# Patient Record
Sex: FEMALE | Race: BLACK OR AFRICAN AMERICAN | Employment: UNEMPLOYED | URBAN - METROPOLITAN AREA
[De-identification: names, ages, dates, MRNs, and addresses within clinical notes are randomized per-mention and may not be internally consistent; named-entity substitution may affect disease eponyms.]

---

## 2018-04-06 ENCOUNTER — APPOINTMENT (OUTPATIENT)
Dept: ULTRASOUND IMAGING | Age: 33
End: 2018-04-06
Payer: COMMERCIAL

## 2018-04-06 ENCOUNTER — HOSPITAL ENCOUNTER (EMERGENCY)
Age: 33
Discharge: HOME | End: 2018-04-06
Payer: COMMERCIAL

## 2018-04-06 VITALS
OXYGEN SATURATION: 100 % | HEART RATE: 112 BPM | RESPIRATION RATE: 18 BRPM | DIASTOLIC BLOOD PRESSURE: 98 MMHG | WEIGHT: 250 LBS | TEMPERATURE: 98.6 F | BODY MASS INDEX: 47.2 KG/M2 | SYSTOLIC BLOOD PRESSURE: 171 MMHG | HEIGHT: 61 IN

## 2018-04-06 DIAGNOSIS — O20.0 THREATENED MISCARRIAGE: Primary | ICD-10-CM

## 2018-04-06 DIAGNOSIS — O41.8X10 SUBCHORIONIC HEMORRHAGE OF PLACENTA IN FIRST TRIMESTER, SINGLE OR UNSPECIFIED FETUS: ICD-10-CM

## 2018-04-06 DIAGNOSIS — O46.8X1 SUBCHORIONIC HEMORRHAGE OF PLACENTA IN FIRST TRIMESTER, SINGLE OR UNSPECIFIED FETUS: ICD-10-CM

## 2018-04-06 LAB
ALBUMIN SERPL-MCNC: 3.8 G/DL (ref 3.5–5.2)
ALP BLD-CCNC: 56 U/L (ref 35–104)
ALT SERPL-CCNC: 10 U/L (ref 0–32)
ANION GAP SERPL CALCULATED.3IONS-SCNC: 16 MMOL/L (ref 7–16)
AST SERPL-CCNC: 11 U/L (ref 0–31)
BACTERIA: ABNORMAL /HPF
BASOPHILS ABSOLUTE: 0.02 E9/L (ref 0–0.2)
BASOPHILS RELATIVE PERCENT: 0.2 % (ref 0–2)
BILIRUB SERPL-MCNC: <0.2 MG/DL (ref 0–1.2)
BILIRUBIN URINE: NEGATIVE
BLOOD, URINE: ABNORMAL
BUN BLDV-MCNC: 11 MG/DL (ref 6–20)
CALCIUM SERPL-MCNC: 8.9 MG/DL (ref 8.6–10.2)
CHLORIDE BLD-SCNC: 98 MMOL/L (ref 98–107)
CHP ED QC CHECK: YES
CLARITY: CLEAR
CO2: 23 MMOL/L (ref 22–29)
COLOR: YELLOW
CREAT SERPL-MCNC: 0.6 MG/DL (ref 0.5–1)
EOSINOPHILS ABSOLUTE: 0.17 E9/L (ref 0.05–0.5)
EOSINOPHILS RELATIVE PERCENT: 1.7 % (ref 0–6)
EPITHELIAL CELLS, UA: ABNORMAL /HPF
GFR AFRICAN AMERICAN: >60
GFR NON-AFRICAN AMERICAN: >60 ML/MIN/1.73
GLUCOSE BLD-MCNC: 86 MG/DL (ref 74–109)
GLUCOSE URINE: NEGATIVE MG/DL
GONADOTROPIN, CHORIONIC (HCG) QUANT: ABNORMAL MIU/ML
HCT VFR BLD CALC: 38.7 % (ref 34–48)
HEMOGLOBIN: 12.4 G/DL (ref 11.5–15.5)
IMMATURE GRANULOCYTES #: 0.03 E9/L
IMMATURE GRANULOCYTES %: 0.3 % (ref 0–5)
KETONES, URINE: NEGATIVE MG/DL
LEUKOCYTE ESTERASE, URINE: NEGATIVE
LYMPHOCYTES ABSOLUTE: 2.87 E9/L (ref 1.5–4)
LYMPHOCYTES RELATIVE PERCENT: 29.2 % (ref 20–42)
MCH RBC QN AUTO: 25.9 PG (ref 26–35)
MCHC RBC AUTO-ENTMCNC: 32 % (ref 32–34.5)
MCV RBC AUTO: 80.8 FL (ref 80–99.9)
MONOCYTES ABSOLUTE: 0.7 E9/L (ref 0.1–0.95)
MONOCYTES RELATIVE PERCENT: 7.1 % (ref 2–12)
NEUTROPHILS ABSOLUTE: 6.03 E9/L (ref 1.8–7.3)
NEUTROPHILS RELATIVE PERCENT: 61.5 % (ref 43–80)
NITRITE, URINE: NEGATIVE
PDW BLD-RTO: 14.9 FL (ref 11.5–15)
PH UA: 6 (ref 5–9)
PLATELET # BLD: 234 E9/L (ref 130–450)
PMV BLD AUTO: 9.4 FL (ref 7–12)
POTASSIUM SERPL-SCNC: 3.7 MMOL/L (ref 3.5–5)
PREGNANCY TEST URINE, POC: POSITIVE
PROTEIN UA: 30 MG/DL
RBC # BLD: 4.79 E12/L (ref 3.5–5.5)
RBC UA: >20 /HPF (ref 0–2)
SODIUM BLD-SCNC: 137 MMOL/L (ref 132–146)
SPECIFIC GRAVITY UA: >=1.03 (ref 1–1.03)
TOTAL PROTEIN: 7.4 G/DL (ref 6.4–8.3)
UROBILINOGEN, URINE: 0.2 E.U./DL
WBC # BLD: 9.8 E9/L (ref 4.5–11.5)
WBC UA: ABNORMAL /HPF (ref 0–5)

## 2018-04-06 PROCEDURE — 36415 COLL VENOUS BLD VENIPUNCTURE: CPT

## 2018-04-06 PROCEDURE — 80053 COMPREHEN METABOLIC PANEL: CPT

## 2018-04-06 PROCEDURE — 99284 EMERGENCY DEPT VISIT MOD MDM: CPT

## 2018-04-06 PROCEDURE — 85025 COMPLETE CBC W/AUTO DIFF WBC: CPT

## 2018-04-06 PROCEDURE — 76817 TRANSVAGINAL US OBSTETRIC: CPT

## 2018-04-06 PROCEDURE — 81001 URINALYSIS AUTO W/SCOPE: CPT

## 2018-04-06 PROCEDURE — 84702 CHORIONIC GONADOTROPIN TEST: CPT

## 2018-04-06 ASSESSMENT — PAIN DESCRIPTION - PAIN TYPE: TYPE: ACUTE PAIN

## 2018-04-06 ASSESSMENT — PAIN SCALES - GENERAL: PAINLEVEL_OUTOF10: 3

## 2018-04-06 ASSESSMENT — PAIN DESCRIPTION - ORIENTATION: ORIENTATION: LOWER

## 2018-04-06 ASSESSMENT — PAIN DESCRIPTION - LOCATION: LOCATION: BACK

## 2018-04-06 ASSESSMENT — PAIN DESCRIPTION - ONSET: ONSET: ON-GOING

## 2018-04-06 ASSESSMENT — PAIN DESCRIPTION - DESCRIPTORS: DESCRIPTORS: ACHING

## 2018-08-13 ENCOUNTER — HOSPITAL ENCOUNTER (EMERGENCY)
Age: 33
Discharge: HOME | End: 2018-08-13
Attending: EMERGENCY MEDICINE
Payer: COMMERCIAL

## 2018-08-13 VITALS
WEIGHT: 230 LBS | HEART RATE: 108 BPM | OXYGEN SATURATION: 98 % | DIASTOLIC BLOOD PRESSURE: 59 MMHG | BODY MASS INDEX: 42.33 KG/M2 | TEMPERATURE: 98.5 F | RESPIRATION RATE: 18 BRPM | HEIGHT: 62 IN | SYSTOLIC BLOOD PRESSURE: 118 MMHG

## 2018-08-13 DIAGNOSIS — R00.2 PALPITATIONS: Primary | ICD-10-CM

## 2018-08-13 LAB
ANION GAP SERPL CALCULATED.3IONS-SCNC: 13 MMOL/L (ref 7–16)
BACTERIA: NORMAL /HPF
BASOPHILS ABSOLUTE: 0.02 E9/L (ref 0–0.2)
BASOPHILS RELATIVE PERCENT: 0.2 % (ref 0–2)
BILIRUBIN URINE: NEGATIVE
BLOOD, URINE: NEGATIVE
BUN BLDV-MCNC: 7 MG/DL (ref 6–20)
CALCIUM SERPL-MCNC: 8.5 MG/DL (ref 8.6–10.2)
CHLORIDE BLD-SCNC: 106 MMOL/L (ref 98–107)
CLARITY: CLEAR
CO2: 18 MMOL/L (ref 22–29)
COLOR: YELLOW
CREAT SERPL-MCNC: 0.4 MG/DL (ref 0.5–1)
EKG ATRIAL RATE: 111 BPM
EKG P AXIS: 39 DEGREES
EKG P-R INTERVAL: 154 MS
EKG Q-T INTERVAL: 332 MS
EKG QRS DURATION: 80 MS
EKG QTC CALCULATION (BAZETT): 451 MS
EKG R AXIS: 65 DEGREES
EKG T AXIS: 32 DEGREES
EKG VENTRICULAR RATE: 111 BPM
EOSINOPHILS ABSOLUTE: 0.09 E9/L (ref 0.05–0.5)
EOSINOPHILS RELATIVE PERCENT: 0.8 % (ref 0–6)
EPITHELIAL CELLS, UA: NORMAL /HPF
GFR AFRICAN AMERICAN: >60
GFR NON-AFRICAN AMERICAN: >60 ML/MIN/1.73
GLUCOSE BLD-MCNC: 104 MG/DL (ref 74–109)
GLUCOSE URINE: NEGATIVE MG/DL
HCT VFR BLD CALC: 34.4 % (ref 34–48)
HEMOGLOBIN: 11.4 G/DL (ref 11.5–15.5)
IMMATURE GRANULOCYTES #: 0.06 E9/L
IMMATURE GRANULOCYTES %: 0.5 % (ref 0–5)
KETONES, URINE: NEGATIVE MG/DL
LEUKOCYTE ESTERASE, URINE: ABNORMAL
LYMPHOCYTES ABSOLUTE: 2.29 E9/L (ref 1.5–4)
LYMPHOCYTES RELATIVE PERCENT: 19.6 % (ref 20–42)
MCH RBC QN AUTO: 26.6 PG (ref 26–35)
MCHC RBC AUTO-ENTMCNC: 33.1 % (ref 32–34.5)
MCV RBC AUTO: 80.2 FL (ref 80–99.9)
MONOCYTES ABSOLUTE: 0.88 E9/L (ref 0.1–0.95)
MONOCYTES RELATIVE PERCENT: 7.5 % (ref 2–12)
NEUTROPHILS ABSOLUTE: 8.36 E9/L (ref 1.8–7.3)
NEUTROPHILS RELATIVE PERCENT: 71.4 % (ref 43–80)
NITRITE, URINE: NEGATIVE
PDW BLD-RTO: 14.3 FL (ref 11.5–15)
PH UA: 6 (ref 5–9)
PLATELET # BLD: 215 E9/L (ref 130–450)
PMV BLD AUTO: 9.4 FL (ref 7–12)
POTASSIUM SERPL-SCNC: 4.1 MMOL/L (ref 3.5–5)
PROTEIN UA: NEGATIVE MG/DL
RBC # BLD: 4.29 E12/L (ref 3.5–5.5)
RBC UA: NORMAL /HPF (ref 0–2)
SODIUM BLD-SCNC: 137 MMOL/L (ref 132–146)
SPECIFIC GRAVITY UA: 1.01 (ref 1–1.03)
TSH SERPL DL<=0.05 MIU/L-ACNC: 1 UIU/ML (ref 0.27–4.2)
UROBILINOGEN, URINE: 0.2 E.U./DL
WBC # BLD: 11.7 E9/L (ref 4.5–11.5)
WBC UA: NORMAL /HPF (ref 0–5)

## 2018-08-13 PROCEDURE — 99285 EMERGENCY DEPT VISIT HI MDM: CPT

## 2018-08-13 PROCEDURE — 81001 URINALYSIS AUTO W/SCOPE: CPT

## 2018-08-13 PROCEDURE — 84443 ASSAY THYROID STIM HORMONE: CPT

## 2018-08-13 PROCEDURE — 85025 COMPLETE CBC W/AUTO DIFF WBC: CPT

## 2018-08-13 PROCEDURE — 80048 BASIC METABOLIC PNL TOTAL CA: CPT

## 2018-08-13 RX ORDER — PANTOPRAZOLE SODIUM 40 MG/1
40 TABLET, DELAYED RELEASE ORAL DAILY
COMMUNITY
End: 2020-08-17

## 2018-08-13 RX ORDER — AMOXICILLIN 500 MG/1
500 CAPSULE ORAL 3 TIMES DAILY
COMMUNITY
End: 2020-08-17

## 2018-08-13 ASSESSMENT — ENCOUNTER SYMPTOMS
BACK PAIN: 0
SHORTNESS OF BREATH: 0
ABDOMINAL PAIN: 0
SORE THROAT: 0

## 2018-08-13 NOTE — ED PROVIDER NOTES
rhythm and intact distal pulses. No murmur heard. Pulmonary/Chest: Effort normal and breath sounds normal. No respiratory distress. She has no wheezes. She has no rales. Abdominal: Soft. She exhibits no distension. There is no tenderness. There is no rebound and no guarding. Musculoskeletal: She exhibits no edema. Lymphadenopathy:     She has no cervical adenopathy. Neurological: She is alert and oriented to person, place, and time. No cranial nerve deficit. Skin: Skin is warm and dry. No rash noted. Psychiatric: She has a normal mood and affect. Her behavior is normal. Thought content normal.   Nursing note and vitals reviewed. Procedures    MDM  Number of Diagnoses or Management Options  Palpitations:   Diagnosis management comments: This is a 34year old female who is 27 weeks pregnant who presented to the ED for palpations which began 3 days ago. Patient was in no distress and described it as a sensation as her heart wanting to jump out of her chest. Patient was evaluated for a number of etiologies including anemia, cystitis and hypoglycemia to mention a few. Patient did not have any abnormalities and I discussed the need to go to her OB/GYN for further follow today. The patient was agreeable to plan and she was advised to return to the ED immediately if she developed any increasing palpitations, chest pain or any other concerns. She was agreeable.             --------------------------------------------- PAST HISTORY ---------------------------------------------  Past Medical History:  has a past medical history of Abnormal Pap smear; Anemia; Irregular menses; Migraines; and UTI (lower urinary tract infection). Past Surgical History:  has a past surgical history that includes hernia repair and Salpingo-oophorectomy (Left, 03/27/2016). Social History:  reports that she has never smoked.  She has never used smokeless tobacco. She reports that she does not drink alcohol or use drugs. Family History: family history includes Hypertension in her mother. The patients home medications have been reviewed. Allergies: Patient has no known allergies.     -------------------------------------------------- RESULTS -------------------------------------------------  Labs:  Results for orders placed or performed during the hospital encounter of 08/13/18   Urinalysis   Result Value Ref Range    Color, UA Yellow Straw/Yellow    Clarity, UA Clear Clear    Glucose, Ur Negative Negative mg/dL    Bilirubin Urine Negative Negative    Ketones, Urine Negative Negative mg/dL    Specific Gravity, UA 1.010 1.005 - 1.030    Blood, Urine Negative Negative    pH, UA 6.0 5.0 - 9.0    Protein, UA Negative Negative mg/dL    Urobilinogen, Urine 0.2 <2.0 E.U./dL    Nitrite, Urine Negative Negative    Leukocyte Esterase, Urine TRACE (A) Negative   TSH without Reflex   Result Value Ref Range    TSH 1.000 0.270 - 4.200 uIU/mL   Basic Metabolic Panel   Result Value Ref Range    Sodium 137 132 - 146 mmol/L    Potassium 4.1 3.5 - 5.0 mmol/L    Chloride 106 98 - 107 mmol/L    CO2 18 (L) 22 - 29 mmol/L    Anion Gap 13 7 - 16 mmol/L    Glucose 104 74 - 109 mg/dL    BUN 7 6 - 20 mg/dL    CREATININE 0.4 (L) 0.5 - 1.0 mg/dL    GFR Non-African American >60 >=60 mL/min/1.73    GFR African American >60     Calcium 8.5 (L) 8.6 - 10.2 mg/dL   CBC Auto Differential   Result Value Ref Range    WBC 11.7 (H) 4.5 - 11.5 E9/L    RBC 4.29 3.50 - 5.50 E12/L    Hemoglobin 11.4 (L) 11.5 - 15.5 g/dL    Hematocrit 34.4 34.0 - 48.0 %    MCV 80.2 80.0 - 99.9 fL    MCH 26.6 26.0 - 35.0 pg    MCHC 33.1 32.0 - 34.5 %    RDW 14.3 11.5 - 15.0 fL    Platelets 683 991 - 565 E9/L    MPV 9.4 7.0 - 12.0 fL    Neutrophils % 71.4 43.0 - 80.0 %    Immature Granulocytes % 0.5 0.0 - 5.0 %    Lymphocytes % 19.6 (L) 20.0 - 42.0 %    Monocytes % 7.5 2.0 - 12.0 %    Eosinophils % 0.8 0.0 - 6.0 %    Basophils % 0.2 0.0 - 2.0 %    Neutrophils # 8.36 (H) 1.80 - management. They have remained hemodynamically stable throughout their entire ED visit and are stable for discharge with outpatient follow-up. The plan has been discussed in detail and they are aware of the specific conditions for emergent return, as well as the importance of follow-up. Discharge Medication List as of 8/13/2018  5:30 PM          Diagnosis:  1. Palpitations        Disposition:  Patient's disposition: Discharge to home  Patient's condition is stable.          Jatinder Nath, DO  Resident  08/13/18 3504

## 2018-08-18 ENCOUNTER — HOSPITAL ENCOUNTER (OUTPATIENT)
Age: 33
Setting detail: OBSERVATION
Discharge: HOME | End: 2018-08-18
Attending: OBSTETRICS & GYNECOLOGY | Admitting: OBSTETRICS & GYNECOLOGY
Payer: COMMERCIAL

## 2018-08-18 VITALS
SYSTOLIC BLOOD PRESSURE: 124 MMHG | TEMPERATURE: 98.2 F | HEIGHT: 62 IN | BODY MASS INDEX: 42.33 KG/M2 | WEIGHT: 230 LBS | RESPIRATION RATE: 14 BRPM | HEART RATE: 89 BPM | DIASTOLIC BLOOD PRESSURE: 58 MMHG

## 2018-08-18 PROBLEM — O47.00 PRETERM CONTRACTIONS: Status: ACTIVE | Noted: 2018-08-18

## 2018-08-18 LAB
BACTERIA: ABNORMAL /HPF
BILIRUBIN URINE: NEGATIVE
BLOOD, URINE: ABNORMAL
CLARITY: CLEAR
COLOR: YELLOW
EPITHELIAL CELLS, UA: ABNORMAL /HPF
FETAL FIBRONECTIN: POSITIVE
GLUCOSE URINE: NEGATIVE MG/DL
KETONES, URINE: NEGATIVE MG/DL
LEUKOCYTE ESTERASE, URINE: ABNORMAL
NITRITE, URINE: NEGATIVE
PH UA: 7 (ref 5–9)
PROTEIN UA: NEGATIVE MG/DL
RBC UA: ABNORMAL /HPF (ref 0–2)
SPECIFIC GRAVITY UA: 1.01 (ref 1–1.03)
UROBILINOGEN, URINE: 0.2 E.U./DL
WBC UA: ABNORMAL /HPF (ref 0–5)

## 2018-08-18 PROCEDURE — 76817 TRANSVAGINAL US OBSTETRIC: CPT

## 2018-08-18 PROCEDURE — 76817 TRANSVAGINAL US OBSTETRIC: CPT | Performed by: OBSTETRICS & GYNECOLOGY

## 2018-08-18 PROCEDURE — 87077 CULTURE AEROBIC IDENTIFY: CPT

## 2018-08-18 PROCEDURE — 76819 FETAL BIOPHYS PROFIL W/O NST: CPT | Performed by: OBSTETRICS & GYNECOLOGY

## 2018-08-18 PROCEDURE — 96372 THER/PROPH/DIAG INJ SC/IM: CPT

## 2018-08-18 PROCEDURE — 82731 ASSAY OF FETAL FIBRONECTIN: CPT

## 2018-08-18 PROCEDURE — 87088 URINE BACTERIA CULTURE: CPT

## 2018-08-18 PROCEDURE — 87186 SC STD MICRODIL/AGAR DIL: CPT

## 2018-08-18 PROCEDURE — G0378 HOSPITAL OBSERVATION PER HR: HCPCS

## 2018-08-18 PROCEDURE — 76819 FETAL BIOPHYS PROFIL W/O NST: CPT

## 2018-08-18 PROCEDURE — 99242 OFF/OP CONSLTJ NEW/EST SF 20: CPT | Performed by: OBSTETRICS & GYNECOLOGY

## 2018-08-18 PROCEDURE — 6360000002 HC RX W HCPCS: Performed by: OBSTETRICS & GYNECOLOGY

## 2018-08-18 PROCEDURE — 2500000003 HC RX 250 WO HCPCS: Performed by: OBSTETRICS & GYNECOLOGY

## 2018-08-18 PROCEDURE — 81001 URINALYSIS AUTO W/SCOPE: CPT

## 2018-08-18 PROCEDURE — 76811 OB US DETAILED SNGL FETUS: CPT

## 2018-08-18 PROCEDURE — 76811 OB US DETAILED SNGL FETUS: CPT | Performed by: OBSTETRICS & GYNECOLOGY

## 2018-08-18 RX ADMIN — LIDOCAINE HYDROCHLORIDE 1 G: 10 INJECTION, SOLUTION EPIDURAL; INFILTRATION; INTRACAUDAL; PERINEURAL at 11:25

## 2018-08-18 NOTE — PROGRESS NOTES
presents to labor and delivery at 28 weeks gestation with complaints of continuous vaginal pressure and abdominal cramping that started around 11pm yesterday evening. At 3:30am the patient went to the bathroom and noticed a small amount of red vaginal bleeding with mucous. Denies LOF, states positive fetal movement.

## 2018-08-18 NOTE — PROGRESS NOTES
Dr. Jose Juan Galdamez updated on UA results and positive fetal fibronectin. Order given for MFM consult.

## 2018-08-18 NOTE — PROGRESS NOTES
Spoke with Dr Jose Juan Galdamez. Updated on patient seeing MFM and OK for discharge. Notified of recommendation of treatment for UTI. Orders received. Patient of for discharge after.

## 2018-08-18 NOTE — H&P
CHIEF COMPLAINT:  contractions, vaginal bleeding    HISTORY OF PRESENT ILLNESS:      The patient is a 34 y.o. female at 28w0d presents with cramping and vaginal pressure. Pt  States that started around 11pm and then around 3:30 she got up to use restroom and wiped and saw blood. More wine colored. History of 2  deliveries at 35 weeks. Pt denies leaking of fluid and states good fetal movement   OB History      Para Term  AB Living    3 2 0 2 0 2    SAB TAB Ectopic Molar Multiple Live Births    0 0 0   0 2      Patient presents with a chief complaint as above and is being admitted for observation    Estimated Due Date: Estimated Date of Delivery: 11/10/18    PRENATAL CARE:    Complicated by: none    PAST OB HISTORY  OB History      Para Term  AB Living    3 2 0 2 0 2    SAB TAB Ectopic Molar Multiple Live Births    0 0 0   0 2          Past Medical History:        Diagnosis Date    Abnormal Pap smear     abnormal cells     Anemia     Iron deficiency    Irregular menses     Migraines     UTI (lower urinary tract infection)      Past Surgical History:        Procedure Laterality Date    HERNIA REPAIR      SALPINGO-OOPHORECTOMY Left 2016    laparoscopic     Allergies:  Patient has no known allergies. Social History:    Social History     Social History    Marital status: Single     Spouse name: N/A    Number of children: N/A    Years of education: N/A     Occupational History    Not on file.      Social History Main Topics    Smoking status: Never Smoker    Smokeless tobacco: Never Used    Alcohol use No      Comment: Social    Drug use: No    Sexual activity: Yes     Partners: Male     Other Topics Concern    Not on file     Social History Narrative    No narrative on file     Family History:   Family History   Problem Relation Age of Onset    Hypertension Mother      Medications Prior to Admission:  Prescriptions Prior to Admission: pantoprazole

## 2018-08-19 NOTE — DISCHARGE SUMMARY
Admitted  818/18    Third  Trimester  Bleeding  No  Blood  Seen   Consult  mfm  Due to   Pos  ffn  sono  Good  Home  sevral  Hrs  later

## 2018-08-20 LAB
ORGANISM: ABNORMAL
URINE CULTURE, ROUTINE: ABNORMAL
URINE CULTURE, ROUTINE: ABNORMAL

## 2018-08-22 ENCOUNTER — HOSPITAL ENCOUNTER (OUTPATIENT)
Dept: SLEEP CENTER | Age: 33
Discharge: HOME | End: 2018-08-22
Payer: COMMERCIAL

## 2018-08-22 PROCEDURE — 93226 XTRNL ECG REC<48 HR SCAN A/R: CPT

## 2018-08-22 PROCEDURE — 93225 XTRNL ECG REC<48 HRS REC: CPT

## 2018-10-21 ENCOUNTER — HOSPITAL ENCOUNTER (OUTPATIENT)
Age: 33
Setting detail: OBSERVATION
Discharge: HOME | End: 2018-10-21
Attending: OBSTETRICS & GYNECOLOGY | Admitting: OBSTETRICS & GYNECOLOGY
Payer: COMMERCIAL

## 2018-10-21 VITALS
SYSTOLIC BLOOD PRESSURE: 131 MMHG | RESPIRATION RATE: 20 BRPM | BODY MASS INDEX: 42.88 KG/M2 | TEMPERATURE: 99.3 F | HEART RATE: 112 BPM | WEIGHT: 233 LBS | DIASTOLIC BLOOD PRESSURE: 66 MMHG | HEIGHT: 62 IN

## 2018-10-21 PROBLEM — R09.89 UPPER RESPIRATORY SYMPTOM: Status: ACTIVE | Noted: 2018-10-21

## 2018-10-21 LAB
BACTERIA: ABNORMAL /HPF
BILIRUBIN URINE: NEGATIVE
BLOOD, URINE: ABNORMAL
CLARITY: CLEAR
COLOR: YELLOW
EPITHELIAL CELLS, UA: ABNORMAL /HPF
GLUCOSE URINE: 100 MG/DL
KETONES, URINE: NEGATIVE MG/DL
LEUKOCYTE ESTERASE, URINE: NEGATIVE
NITRITE, URINE: NEGATIVE
PH UA: 7.5 (ref 5–9)
PROTEIN UA: NEGATIVE MG/DL
RBC UA: ABNORMAL /HPF (ref 0–2)
SPECIFIC GRAVITY UA: 1.01 (ref 1–1.03)
UROBILINOGEN, URINE: 0.2 E.U./DL
WBC UA: ABNORMAL /HPF (ref 0–5)

## 2018-10-21 PROCEDURE — G0378 HOSPITAL OBSERVATION PER HR: HCPCS

## 2018-10-21 PROCEDURE — 81001 URINALYSIS AUTO W/SCOPE: CPT

## 2018-10-21 PROCEDURE — 99211 OFF/OP EST MAY X REQ PHY/QHP: CPT

## 2018-10-21 PROCEDURE — 2580000003 HC RX 258: Performed by: OBSTETRICS & GYNECOLOGY

## 2018-10-21 RX ORDER — METRONIDAZOLE 500 MG/1
500 TABLET ORAL 2 TIMES DAILY
COMMUNITY
End: 2020-08-17

## 2018-10-21 RX ORDER — SODIUM CHLORIDE, SODIUM LACTATE, POTASSIUM CHLORIDE, CALCIUM CHLORIDE 600; 310; 30; 20 MG/100ML; MG/100ML; MG/100ML; MG/100ML
INJECTION, SOLUTION INTRAVENOUS CONTINUOUS
Status: DISCONTINUED | OUTPATIENT
Start: 2018-10-21 | End: 2018-10-21 | Stop reason: HOSPADM

## 2018-10-21 RX ADMIN — SODIUM CHLORIDE, POTASSIUM CHLORIDE, SODIUM LACTATE AND CALCIUM CHLORIDE: 600; 310; 30; 20 INJECTION, SOLUTION INTRAVENOUS at 17:20

## 2018-10-21 NOTE — PROGRESS NOTES
Updated dr. Kwan Poster on fht, ua results, and patient pulse. States patient can be discharged and take otc cough medication.

## 2018-10-21 NOTE — PROGRESS NOTES
Patient given verbal and typed discharge instructions, verbalizes understanding. Ambulatory upon discharge.

## 2018-10-23 ENCOUNTER — HOSPITAL ENCOUNTER (OUTPATIENT)
Age: 33
Discharge: HOME | End: 2018-10-23
Payer: COMMERCIAL

## 2018-10-23 ENCOUNTER — HOSPITAL ENCOUNTER (OUTPATIENT)
Age: 33
Discharge: HOME | End: 2018-10-25
Payer: COMMERCIAL

## 2018-10-23 ENCOUNTER — HOSPITAL ENCOUNTER (OUTPATIENT)
Dept: GENERAL RADIOLOGY | Age: 33
Discharge: HOME | End: 2018-10-25
Payer: COMMERCIAL

## 2018-10-23 DIAGNOSIS — R50.9 HYPERTHERMIA: ICD-10-CM

## 2018-10-23 LAB
HCT VFR BLD CALC: 34.2 % (ref 34–48)
HEMOGLOBIN: 10.9 G/DL (ref 11.5–15.5)
MCH RBC QN AUTO: 25.5 PG (ref 26–35)
MCHC RBC AUTO-ENTMCNC: 31.9 % (ref 32–34.5)
MCV RBC AUTO: 79.9 FL (ref 80–99.9)
PDW BLD-RTO: 15.9 FL (ref 11.5–15)
PLATELET # BLD: 160 E9/L (ref 130–450)
PMV BLD AUTO: 9.8 FL (ref 7–12)
RBC # BLD: 4.28 E12/L (ref 3.5–5.5)
WBC # BLD: 11.2 E9/L (ref 4.5–11.5)

## 2018-10-23 PROCEDURE — 36415 COLL VENOUS BLD VENIPUNCTURE: CPT

## 2018-10-23 PROCEDURE — 71046 X-RAY EXAM CHEST 2 VIEWS: CPT

## 2018-10-23 PROCEDURE — 85027 COMPLETE CBC AUTOMATED: CPT

## 2018-10-24 ENCOUNTER — PREP FOR PROCEDURE (OUTPATIENT)
Dept: OBGYN | Age: 33
End: 2018-10-24

## 2018-10-24 RX ORDER — ONDANSETRON 2 MG/ML
4 INJECTION INTRAMUSCULAR; INTRAVENOUS EVERY 6 HOURS PRN
Status: CANCELLED | OUTPATIENT
Start: 2018-10-24

## 2018-10-24 RX ORDER — SODIUM CHLORIDE, SODIUM LACTATE, POTASSIUM CHLORIDE, CALCIUM CHLORIDE 600; 310; 30; 20 MG/100ML; MG/100ML; MG/100ML; MG/100ML
INJECTION, SOLUTION INTRAVENOUS CONTINUOUS
Status: CANCELLED | OUTPATIENT
Start: 2018-10-24

## 2018-10-31 ENCOUNTER — HOSPITAL ENCOUNTER (INPATIENT)
Age: 33
LOS: 3 days | Discharge: HOME | End: 2018-11-03
Attending: OBSTETRICS & GYNECOLOGY | Admitting: OBSTETRICS & GYNECOLOGY
Payer: COMMERCIAL

## 2018-10-31 ENCOUNTER — ANESTHESIA EVENT (OUTPATIENT)
Dept: LABOR AND DELIVERY | Age: 33
End: 2018-10-31

## 2018-10-31 ENCOUNTER — APPOINTMENT (OUTPATIENT)
Dept: LABOR AND DELIVERY | Age: 33
End: 2018-10-31
Payer: COMMERCIAL

## 2018-10-31 ENCOUNTER — ANESTHESIA (OUTPATIENT)
Dept: LABOR AND DELIVERY | Age: 33
End: 2018-10-31

## 2018-10-31 PROBLEM — Z3A.39 39 WEEKS GESTATION OF PREGNANCY: Status: ACTIVE | Noted: 2018-10-31

## 2018-10-31 LAB
ABO/RH: NORMAL
AMPHETAMINE SCREEN, URINE: NOT DETECTED
ANTIBODY SCREEN: NORMAL
BARBITURATE SCREEN URINE: NOT DETECTED
BENZODIAZEPINE SCREEN, URINE: NOT DETECTED
CANNABINOID SCREEN URINE: NOT DETECTED
COCAINE METABOLITE SCREEN URINE: NOT DETECTED
HCT VFR BLD CALC: 31.8 % (ref 34–48)
HEMOGLOBIN: 10.6 G/DL (ref 11.5–15.5)
MCH RBC QN AUTO: 26.2 PG (ref 26–35)
MCHC RBC AUTO-ENTMCNC: 33.3 % (ref 32–34.5)
MCV RBC AUTO: 78.5 FL (ref 80–99.9)
METHADONE SCREEN, URINE: NOT DETECTED
OPIATE SCREEN URINE: NOT DETECTED
PDW BLD-RTO: 15.6 FL (ref 11.5–15)
PHENCYCLIDINE SCREEN URINE: NOT DETECTED
PLATELET # BLD: 225 E9/L (ref 130–450)
PMV BLD AUTO: 10.1 FL (ref 7–12)
PROPOXYPHENE SCREEN: NOT DETECTED
RBC # BLD: 4.05 E12/L (ref 3.5–5.5)
WBC # BLD: 12.3 E9/L (ref 4.5–11.5)

## 2018-10-31 PROCEDURE — 36415 COLL VENOUS BLD VENIPUNCTURE: CPT

## 2018-10-31 PROCEDURE — 6360000002 HC RX W HCPCS: Performed by: OBSTETRICS & GYNECOLOGY

## 2018-10-31 PROCEDURE — 85027 COMPLETE CBC AUTOMATED: CPT

## 2018-10-31 PROCEDURE — 86900 BLOOD TYPING SEROLOGIC ABO: CPT

## 2018-10-31 PROCEDURE — 1220000001 HC SEMI PRIVATE L&D R&B

## 2018-10-31 PROCEDURE — 80307 DRUG TEST PRSMV CHEM ANLYZR: CPT

## 2018-10-31 PROCEDURE — 86901 BLOOD TYPING SEROLOGIC RH(D): CPT

## 2018-10-31 PROCEDURE — 86850 RBC ANTIBODY SCREEN: CPT

## 2018-10-31 PROCEDURE — 2580000003 HC RX 258: Performed by: OBSTETRICS & GYNECOLOGY

## 2018-10-31 RX ORDER — SODIUM CHLORIDE, SODIUM LACTATE, POTASSIUM CHLORIDE, CALCIUM CHLORIDE 600; 310; 30; 20 MG/100ML; MG/100ML; MG/100ML; MG/100ML
INJECTION, SOLUTION INTRAVENOUS CONTINUOUS
Status: DISCONTINUED | OUTPATIENT
Start: 2018-10-31 | End: 2018-11-03 | Stop reason: HOSPADM

## 2018-10-31 RX ORDER — LIDOCAINE HYDROCHLORIDE 10 MG/ML
INJECTION, SOLUTION INFILTRATION; PERINEURAL
Status: DISPENSED
Start: 2018-10-31 | End: 2018-11-01

## 2018-10-31 RX ORDER — ONDANSETRON 2 MG/ML
4 INJECTION INTRAMUSCULAR; INTRAVENOUS EVERY 6 HOURS PRN
Status: DISCONTINUED | OUTPATIENT
Start: 2018-10-31 | End: 2018-11-01 | Stop reason: HOSPADM

## 2018-10-31 RX ORDER — LIDOCAINE HYDROCHLORIDE 10 MG/ML
INJECTION, SOLUTION INFILTRATION; PERINEURAL
Status: DISCONTINUED
Start: 2018-10-31 | End: 2018-10-31 | Stop reason: WASHOUT

## 2018-10-31 RX ORDER — ACETAMINOPHEN 650 MG
TABLET, EXTENDED RELEASE ORAL
Status: COMPLETED
Start: 2018-10-31 | End: 2018-11-01

## 2018-10-31 RX ORDER — ACETAMINOPHEN 650 MG
TABLET, EXTENDED RELEASE ORAL
Status: DISCONTINUED
Start: 2018-10-31 | End: 2018-10-31 | Stop reason: WASHOUT

## 2018-10-31 RX ADMIN — Medication 1 MILLI-UNITS/MIN: at 22:05

## 2018-10-31 RX ADMIN — SODIUM CHLORIDE, POTASSIUM CHLORIDE, SODIUM LACTATE AND CALCIUM CHLORIDE: 600; 310; 30; 20 INJECTION, SOLUTION INTRAVENOUS at 21:00

## 2018-11-01 PROCEDURE — 6370000000 HC RX 637 (ALT 250 FOR IP): Performed by: OBSTETRICS & GYNECOLOGY

## 2018-11-01 PROCEDURE — 88307 TISSUE EXAM BY PATHOLOGIST: CPT

## 2018-11-01 PROCEDURE — 6360000002 HC RX W HCPCS: Performed by: OBSTETRICS & GYNECOLOGY

## 2018-11-01 PROCEDURE — 2580000003 HC RX 258: Performed by: OBSTETRICS & GYNECOLOGY

## 2018-11-01 PROCEDURE — 1220000000 HC SEMI PRIVATE OB R&B

## 2018-11-01 PROCEDURE — 7200000001 HC VAGINAL DELIVERY

## 2018-11-01 RX ORDER — IBUPROFEN 800 MG/1
800 TABLET ORAL EVERY 8 HOURS
Status: DISCONTINUED | OUTPATIENT
Start: 2018-11-01 | End: 2018-11-03 | Stop reason: HOSPADM

## 2018-11-01 RX ORDER — SODIUM CHLORIDE 0.9 % (FLUSH) 0.9 %
10 SYRINGE (ML) INJECTION PRN
Status: DISCONTINUED | OUTPATIENT
Start: 2018-11-01 | End: 2018-11-03 | Stop reason: HOSPADM

## 2018-11-01 RX ORDER — DOCUSATE SODIUM 100 MG/1
100 CAPSULE, LIQUID FILLED ORAL 2 TIMES DAILY
Status: DISCONTINUED | OUTPATIENT
Start: 2018-11-01 | End: 2018-11-03 | Stop reason: HOSPADM

## 2018-11-01 RX ORDER — ACETAMINOPHEN 325 MG/1
650 TABLET ORAL EVERY 4 HOURS PRN
Status: DISCONTINUED | OUTPATIENT
Start: 2018-11-01 | End: 2018-11-03 | Stop reason: HOSPADM

## 2018-11-01 RX ORDER — SODIUM CHLORIDE 0.9 % (FLUSH) 0.9 %
10 SYRINGE (ML) INJECTION EVERY 12 HOURS SCHEDULED
Status: DISCONTINUED | OUTPATIENT
Start: 2018-11-01 | End: 2018-11-03 | Stop reason: HOSPADM

## 2018-11-01 RX ORDER — LIDOCAINE HYDROCHLORIDE 10 MG/ML
INJECTION, SOLUTION INFILTRATION; PERINEURAL
Status: DISCONTINUED
Start: 2018-11-01 | End: 2018-11-01 | Stop reason: WASHOUT

## 2018-11-01 RX ORDER — ACETAMINOPHEN 650 MG
TABLET, EXTENDED RELEASE ORAL
Status: DISCONTINUED
Start: 2018-11-01 | End: 2018-11-01 | Stop reason: WASHOUT

## 2018-11-01 RX ORDER — LANOLIN 100 %
OINTMENT (GRAM) TOPICAL PRN
Status: DISCONTINUED | OUTPATIENT
Start: 2018-11-01 | End: 2018-11-03 | Stop reason: HOSPADM

## 2018-11-01 RX ORDER — SODIUM CHLORIDE, SODIUM LACTATE, POTASSIUM CHLORIDE, CALCIUM CHLORIDE 600; 310; 30; 20 MG/100ML; MG/100ML; MG/100ML; MG/100ML
INJECTION, SOLUTION INTRAVENOUS CONTINUOUS
Status: DISCONTINUED | OUTPATIENT
Start: 2018-11-01 | End: 2018-11-03 | Stop reason: HOSPADM

## 2018-11-01 RX ADMIN — ONDANSETRON 4 MG: 2 INJECTION INTRAMUSCULAR; INTRAVENOUS at 04:06

## 2018-11-01 RX ADMIN — Medication 10 ML: at 09:36

## 2018-11-01 RX ADMIN — IBUPROFEN 800 MG: 800 TABLET ORAL at 15:35

## 2018-11-01 RX ADMIN — DOCUSATE SODIUM 100 MG: 100 CAPSULE, LIQUID FILLED ORAL at 20:25

## 2018-11-01 RX ADMIN — SODIUM CHLORIDE, POTASSIUM CHLORIDE, SODIUM LACTATE AND CALCIUM CHLORIDE: 600; 310; 30; 20 INJECTION, SOLUTION INTRAVENOUS at 03:54

## 2018-11-01 RX ADMIN — DOCUSATE SODIUM 100 MG: 100 CAPSULE, LIQUID FILLED ORAL at 09:35

## 2018-11-01 RX ADMIN — IBUPROFEN 800 MG: 800 TABLET ORAL at 07:23

## 2018-11-01 RX ADMIN — Medication: at 04:57

## 2018-11-01 ASSESSMENT — PAIN SCALES - GENERAL
PAINLEVEL_OUTOF10: 2
PAINLEVEL_OUTOF10: 0

## 2018-11-01 NOTE — ANESTHESIA PRE PROCEDURE
survey Z36.89    Urinary tract infection during pregnancy, third trimester O23.43    Spotting complicating pregnancy, third trimester O26.853    Upper respiratory symptom R09.89    39 weeks gestation of pregnancy Z3A.39       Past Medical History:        Diagnosis Date    Abnormal Pap smear     abnormal cells 2008    Abnormal Pap smear of cervix     years ago    Anemia     Iron deficiency    Diabetes mellitus ([facility] [address]     GDM-diet controlled    Irregular menses     Migraines     UTI (lower urinary tract infection)        Past Surgical History:        Procedure Laterality Date    HERNIA REPAIR      SALPINGO-OOPHORECTOMY Left 03/27/2016    laparoscopic       Social History:    Social History   Substance Use Topics    Smoking status: Never Smoker    Smokeless tobacco: Never Used    Alcohol use No      Comment: Social                                Counseling given: Not Answered      Vital Signs (Current):   Vitals:    10/31/18 2049   BP: 137/83   Pulse: 105   Resp: 18   Temp: 36.8 °C (98.3 °F)   TempSrc: Oral   Weight: 236 lb (107 kg)   Height: 5' 2\" (1.575 m)                                              BP Readings from Last 3 Encounters:   10/31/18 137/83   10/21/18 131/66   08/18/18 (!) 124/58       NPO Status: Time of last liquid consumption: 1800                        Time of last solid consumption: 1800                        Date of last liquid consumption: 10/31/18                        Date of last solid food consumption: 10/31/18    BMI:   Wt Readings from Last 3 Encounters:   10/31/18 236 lb (107 kg)   10/21/18 233 lb (105.7 kg)   08/18/18 230 lb (104.3 kg)     Body mass index is 43.16 kg/m².     CBC:   Lab Results   Component Value Date    WBC 12.3 10/31/2018    RBC 4.05 10/31/2018    HGB 10.6 10/31/2018    HCT 31.8 10/31/2018    MCV 78.5 10/31/2018    RDW 15.6 10/31/2018     10/31/2018       CMP:   Lab Results   Component Value Date     08/13/2018    K 4.1 08/13/2018

## 2018-11-01 NOTE — LACTATION NOTE
Set up with Symphony pump, primo lacto collection system. Instructed on frequency, cleaning. PT requests pump for home- baby in NICU.

## 2018-11-01 NOTE — L&D DELIVERY NOTE
Delivery note    of viable male at 552  Nuchal cord x 1  apgars 8 9 spontaneous placenta iv pit  ebl 200cc  Special care here for delivery intact

## 2018-11-01 NOTE — PROGRESS NOTES
Pt , 39weeks here for scheduled induction with pitocin. Was 3cm in office this past week. Denies lof, vag bleeding, or any contractions. Baby has been moving adequately per patient.

## 2018-11-01 NOTE — CARE COORDINATION
Carol appeared calm, was doing skin to skin rocking and her baby in NICU for our conversation. Beto Cordoba was leaving the room to get her 2 other children from school when Sw arrived to assess. Sw encouraged her to ask for Sw if needs arise or discharge planning questions. Plan   Sw gave HEAP MYCAP info to [place]re[place]. Await Cord Stat results. Sw will follow for support and discharge planning as needed.   Electronically signed by Jacquelynne Bosworth on 11/1/2018 at 2:56 PM.

## 2018-11-02 LAB
HCT VFR BLD CALC: 27.6 % (ref 34–48)
HEMOGLOBIN: 8.9 G/DL (ref 11.5–15.5)
MCH RBC QN AUTO: 25.6 PG (ref 26–35)
MCHC RBC AUTO-ENTMCNC: 32.2 % (ref 32–34.5)
MCV RBC AUTO: 79.5 FL (ref 80–99.9)
PDW BLD-RTO: 15.8 FL (ref 11.5–15)
PLATELET # BLD: 186 E9/L (ref 130–450)
PMV BLD AUTO: 10.1 FL (ref 7–12)
RBC # BLD: 3.47 E12/L (ref 3.5–5.5)
WBC # BLD: 13.6 E9/L (ref 4.5–11.5)

## 2018-11-02 PROCEDURE — 6370000000 HC RX 637 (ALT 250 FOR IP)

## 2018-11-02 PROCEDURE — 85027 COMPLETE CBC AUTOMATED: CPT

## 2018-11-02 PROCEDURE — 6370000000 HC RX 637 (ALT 250 FOR IP): Performed by: OBSTETRICS & GYNECOLOGY

## 2018-11-02 PROCEDURE — 36415 COLL VENOUS BLD VENIPUNCTURE: CPT

## 2018-11-02 PROCEDURE — 1220000000 HC SEMI PRIVATE OB R&B

## 2018-11-02 RX ORDER — FERROUS SULFATE 325(65) MG
325 TABLET ORAL 2 TIMES DAILY WITH MEALS
Status: DISCONTINUED | OUTPATIENT
Start: 2018-11-02 | End: 2018-11-03 | Stop reason: HOSPADM

## 2018-11-02 RX ORDER — FERROUS SULFATE 325(65) MG
TABLET ORAL
Status: COMPLETED
Start: 2018-11-02 | End: 2018-11-02

## 2018-11-02 RX ADMIN — DOCUSATE SODIUM 100 MG: 100 CAPSULE, LIQUID FILLED ORAL at 10:55

## 2018-11-02 RX ADMIN — FERROUS SULFATE TAB 325 MG (65 MG ELEMENTAL FE) 325 MG: 325 (65 FE) TAB at 12:48

## 2018-11-02 RX ADMIN — BENZOCAINE AND LEVOMENTHOL: 200; 5 SPRAY TOPICAL at 22:48

## 2018-11-02 RX ADMIN — Medication: at 22:48

## 2018-11-02 RX ADMIN — DOCUSATE SODIUM 100 MG: 100 CAPSULE, LIQUID FILLED ORAL at 22:48

## 2018-11-02 RX ADMIN — IBUPROFEN 800 MG: 800 TABLET ORAL at 14:42

## 2018-11-02 RX ADMIN — IBUPROFEN 800 MG: 800 TABLET ORAL at 10:54

## 2018-11-02 RX ADMIN — IBUPROFEN 800 MG: 800 TABLET ORAL at 00:41

## 2018-11-02 RX ADMIN — FERROUS SULFATE TAB 325 MG (65 MG ELEMENTAL FE) 325 MG: 325 (65 FE) TAB at 22:49

## 2018-11-02 RX ADMIN — IBUPROFEN 800 MG: 800 TABLET ORAL at 22:49

## 2018-11-02 ASSESSMENT — PAIN SCALES - GENERAL
PAINLEVEL_OUTOF10: 2

## 2018-11-03 VITALS
HEIGHT: 62 IN | SYSTOLIC BLOOD PRESSURE: 128 MMHG | BODY MASS INDEX: 43.43 KG/M2 | TEMPERATURE: 98.8 F | DIASTOLIC BLOOD PRESSURE: 68 MMHG | WEIGHT: 236 LBS | HEART RATE: 84 BPM | RESPIRATION RATE: 16 BRPM

## 2018-11-03 PROCEDURE — 6370000000 HC RX 637 (ALT 250 FOR IP): Performed by: OBSTETRICS & GYNECOLOGY

## 2018-11-03 RX ORDER — IBUPROFEN 800 MG/1
800 TABLET ORAL EVERY 8 HOURS
Qty: 120 TABLET | Refills: 1 | Status: SHIPPED | OUTPATIENT
Start: 2018-11-03 | End: 2021-01-26

## 2018-11-03 RX ADMIN — IBUPROFEN 800 MG: 800 TABLET ORAL at 08:17

## 2018-11-03 RX ADMIN — DOCUSATE SODIUM 100 MG: 100 CAPSULE, LIQUID FILLED ORAL at 08:17

## 2018-11-03 RX ADMIN — FERROUS SULFATE TAB 325 MG (65 MG ELEMENTAL FE) 325 MG: 325 (65 FE) TAB at 08:17

## 2018-11-03 ASSESSMENT — PAIN SCALES - GENERAL: PAINLEVEL_OUTOF10: 3

## 2018-11-03 NOTE — DISCHARGE SUMMARY
Admitted 10/31/18  iup 39 weeks  Gestational diabetes   of viable male 18  Discharged 11/3/18  rx motrin  Satis condition

## 2018-12-13 ENCOUNTER — HOSPITAL ENCOUNTER (OUTPATIENT)
Age: 33
Discharge: HOME | End: 2018-12-15
Payer: COMMERCIAL

## 2018-12-13 PROCEDURE — 88175 CYTOPATH C/V AUTO FLUID REDO: CPT

## 2020-07-02 ENCOUNTER — HOSPITAL ENCOUNTER (OUTPATIENT)
Age: 35
Discharge: HOME | End: 2020-07-02
Payer: COMMERCIAL

## 2020-07-02 LAB
ALBUMIN SERPL-MCNC: 3.9 G/DL (ref 3.5–5.2)
ALP BLD-CCNC: 74 U/L (ref 35–104)
ALT SERPL-CCNC: 12 U/L (ref 0–32)
ANION GAP SERPL CALCULATED.3IONS-SCNC: 13 MMOL/L (ref 7–16)
AST SERPL-CCNC: 12 U/L (ref 0–31)
BASOPHILS ABSOLUTE: 0.02 E9/L (ref 0–0.2)
BASOPHILS RELATIVE PERCENT: 0.2 % (ref 0–2)
BILIRUB SERPL-MCNC: <0.2 MG/DL (ref 0–1.2)
BUN BLDV-MCNC: 11 MG/DL (ref 6–20)
CALCIUM SERPL-MCNC: 8.6 MG/DL (ref 8.6–10.2)
CHLORIDE BLD-SCNC: 102 MMOL/L (ref 98–107)
CHOLESTEROL, TOTAL: 148 MG/DL (ref 0–199)
CO2: 22 MMOL/L (ref 22–29)
CREAT SERPL-MCNC: 0.6 MG/DL (ref 0.5–1)
EOSINOPHILS ABSOLUTE: 0.13 E9/L (ref 0.05–0.5)
EOSINOPHILS RELATIVE PERCENT: 1.4 % (ref 0–6)
GFR AFRICAN AMERICAN: >60
GFR NON-AFRICAN AMERICAN: >60 ML/MIN/1.73
GLUCOSE BLD-MCNC: 120 MG/DL (ref 74–99)
HBA1C MFR BLD: 5.8 % (ref 4–5.6)
HCT VFR BLD CALC: 38.3 % (ref 34–48)
HDLC SERPL-MCNC: 30 MG/DL
HEMOGLOBIN: 12.1 G/DL (ref 11.5–15.5)
IMMATURE GRANULOCYTES #: 0.04 E9/L
IMMATURE GRANULOCYTES %: 0.4 % (ref 0–5)
LDL CHOLESTEROL CALCULATED: 97 MG/DL (ref 0–99)
LYMPHOCYTES ABSOLUTE: 2.18 E9/L (ref 1.5–4)
LYMPHOCYTES RELATIVE PERCENT: 22.8 % (ref 20–42)
MCH RBC QN AUTO: 25 PG (ref 26–35)
MCHC RBC AUTO-ENTMCNC: 31.6 % (ref 32–34.5)
MCV RBC AUTO: 79.1 FL (ref 80–99.9)
MONOCYTES ABSOLUTE: 0.78 E9/L (ref 0.1–0.95)
MONOCYTES RELATIVE PERCENT: 8.1 % (ref 2–12)
NEUTROPHILS ABSOLUTE: 6.43 E9/L (ref 1.8–7.3)
NEUTROPHILS RELATIVE PERCENT: 67.1 % (ref 43–80)
PDW BLD-RTO: 15.9 FL (ref 11.5–15)
PLATELET # BLD: 281 E9/L (ref 130–450)
PMV BLD AUTO: 9.4 FL (ref 7–12)
POTASSIUM SERPL-SCNC: 3.9 MMOL/L (ref 3.5–5)
RBC # BLD: 4.84 E12/L (ref 3.5–5.5)
SODIUM BLD-SCNC: 137 MMOL/L (ref 132–146)
TOTAL PROTEIN: 7.3 G/DL (ref 6.4–8.3)
TRIGL SERPL-MCNC: 106 MG/DL (ref 0–149)
TSH SERPL DL<=0.05 MIU/L-ACNC: 0.96 UIU/ML (ref 0.27–4.2)
VITAMIN D 25-HYDROXY: 15 NG/ML (ref 30–100)
VLDLC SERPL CALC-MCNC: 21 MG/DL
WBC # BLD: 9.6 E9/L (ref 4.5–11.5)

## 2020-07-02 PROCEDURE — 83036 HEMOGLOBIN GLYCOSYLATED A1C: CPT

## 2020-07-02 PROCEDURE — 85025 COMPLETE CBC W/AUTO DIFF WBC: CPT

## 2020-07-02 PROCEDURE — 84443 ASSAY THYROID STIM HORMONE: CPT

## 2020-07-02 PROCEDURE — 80053 COMPREHEN METABOLIC PANEL: CPT

## 2020-07-02 PROCEDURE — 80061 LIPID PANEL: CPT

## 2020-07-02 PROCEDURE — 36415 COLL VENOUS BLD VENIPUNCTURE: CPT

## 2020-07-02 PROCEDURE — 82306 VITAMIN D 25 HYDROXY: CPT

## 2020-07-17 ENCOUNTER — APPOINTMENT (OUTPATIENT)
Dept: GENERAL RADIOLOGY | Age: 35
End: 2020-07-17
Payer: COMMERCIAL

## 2020-07-17 ENCOUNTER — HOSPITAL ENCOUNTER (OUTPATIENT)
Dept: GENERAL RADIOLOGY | Age: 35
Discharge: HOME | End: 2020-07-19
Payer: COMMERCIAL

## 2020-07-17 PROCEDURE — 76642 ULTRASOUND BREAST LIMITED: CPT

## 2020-07-17 PROCEDURE — G0279 TOMOSYNTHESIS, MAMMO: HCPCS

## 2020-07-21 ENCOUNTER — TELEPHONE (OUTPATIENT)
Dept: GENERAL RADIOLOGY | Age: 35
End: 2020-07-21

## 2020-07-21 NOTE — TELEPHONE ENCOUNTER
Authorization for breast MRI (CPT 78437) has been obtained from [address] [place] [address]. Authorization # [id number], valid until 1-17-21.

## 2020-08-04 ENCOUNTER — HOSPITAL ENCOUNTER (OUTPATIENT)
Dept: MRI IMAGING | Age: 35
Discharge: HOME | End: 2020-08-06
Payer: COMMERCIAL

## 2020-08-04 PROCEDURE — 77049 MRI BREAST C-+ W/CAD BI: CPT

## 2020-08-04 PROCEDURE — 6360000004 HC RX CONTRAST MEDICATION: Performed by: RADIOLOGY

## 2020-08-04 PROCEDURE — A9585 GADOBUTROL INJECTION: HCPCS | Performed by: RADIOLOGY

## 2020-08-04 RX ADMIN — GADOBUTROL 10 ML: 604.72 INJECTION INTRAVENOUS at 13:25

## 2020-08-05 ENCOUNTER — TELEPHONE (OUTPATIENT)
Dept: GENERAL RADIOLOGY | Age: 35
End: 2020-08-05

## 2020-08-05 NOTE — TELEPHONE ENCOUNTER
Instructed on breast MRI findings of skin thickening/enhancement medial aspect of both breasts. She denies known skin infection or inflammation to that area. She states her physician's office called her this morning and will refer her to a surgeon.  Electronically signed by Julia Colindres RN, BSN on 8/5/2020 at 9:28 AM

## 2020-08-13 ENCOUNTER — TELEPHONE (OUTPATIENT)
Dept: BREAST CENTER | Age: 35
End: 2020-08-13

## 2020-08-13 NOTE — TELEPHONE ENCOUNTER
Left message with call back number in attempt to obtain information for upcoming consultation in the breast clinic on Monday.

## 2020-08-13 NOTE — PROGRESS NOTES
Subjective:      Patient ID: Deepa Vaughn is a 27 y.o. female. HPI  History and Physical    Patient's Name/Date of Birth: Deepa Vaughn / 1985    Date: 2020        Deepa Vaughn presents for evaluation of bilateral breast inflammation and skin thickening. Patient had more pain in the right more than left, occurred about 3 months ago. Saw her PCP 7 days later, at that time pain resolved but right . Patient denies pain at this time. Skin punch biopsy recommended per  MRI. PCP: Jasmin Ge MD. Gynecologist:Andie. The tenderness was located in  bilateral breasts but right side was worse. She has not been treated with antibiotics. Patient denies  prior similar episode of breast inflammation. The patient had noted a change in BSE since presentation, which has since resolved. Did not recall any prior infections or trauma to the breast immediately prior to swelling and discomfort. Patient denies nipple discharge. Patient denies a personal history of breast cancer. Breast cancer risk factors include family hx on mother's side, maternal aunt times 2 with breast cancer, maternal grandmother breast cancer. Ashkenazi Baptist Ancestry: No.    OBSTETRIC RELATED HISTORY:  Age of menarche was 4. Age of menopause was NA     Patient denies hormonal therapy. Patient is . Age of first live birth was 31. Patient did breast feed. Is patient interested in fertility information about fertility preservation? Yes    CANCER SURVEILLANCE HISTORY:  Mammograms: Yes   Breast MRI's: Yes   Breast Biopsies: No   Colonoscopy: No   GI Polyps: No   EGD: No  Pelvic Exam: Yes   Pap Smear: Yes   Dermatology: No   Lung screening: no        Estimated body mass index is 43.16 kg/m² as calculated from the following:    Height as of 10/31/18: 5' 2\" (1.575 m). Weight as of 10/31/18: 236 lb (107 kg).   Bra Size: 38dd    Because violence is so common, we ask all our patients: are you in a relationship or do you live with a person who threatens, hurts, or controls you:  denies    Patient drinks little caffeinated beverages. Patient does not smoke cigarettes. Patient does not use recreational drugs. Past Medical History:   Diagnosis Date    Abnormal Pap smear     abnormal cells 2008    Abnormal Pap smear of cervix     years ago    Anemia     Iron deficiency    Diabetes mellitus (HCC)     GDM-diet controlled    Irregular menses     Migraines     UTI (lower urinary tract infection)        Past Surgical History:   Procedure Laterality Date    HERNIA REPAIR      SALPINGO-OOPHORECTOMY Left 03/27/2016    laparoscopic       Current Outpatient Medications   Medication Sig Dispense Refill    ibuprofen (ADVIL;MOTRIN) 800 MG tablet Take 1 tablet by mouth every 8 hours 120 tablet 1    metroNIDAZOLE (FLAGYL) 500 MG tablet Take 500 mg by mouth 2 times daily      Prenatal MV-Min-Fe Fum-FA-DHA (PRENATAL 1 PO) Take by mouth      pantoprazole (PROTONIX) 40 MG tablet Take 40 mg by mouth daily      amoxicillin (AMOXIL) 500 MG capsule Take 500 mg by mouth 3 times daily       No current facility-administered medications for this visit.         No Known Allergies    Family History   Problem Relation Age of Onset    Hypertension Mother        Social History     Socioeconomic History    Marital status: Single     Spouse name: Not on file    Number of children: Not on file    Years of education: Not on file    Highest education level: Not on file   Occupational History    Not on file   Social Needs    Financial resource strain: Not on file    Food insecurity     Worry: Not on file     Inability: Not on file    Transportation needs     Medical: Not on file     Non-medical: Not on file   Tobacco Use    Smoking status: Never Smoker    Smokeless tobacco: Never Used   Substance and Sexual Activity    Alcohol use: No     Comment: Social    Drug use: No    Sexual activity: Yes     Partners: Male Lifestyle    Physical activity     Days per week: Not on file     Minutes per session: Not on file    Stress: Not on file   Relationships    Social connections     Talks on phone: Not on file     Gets together: Not on file     Attends Rastafarian service: Not on file     Active member of club or organization: Not on file     Attends meetings of clubs or organizations: Not on file     Relationship status: Not on file    Intimate partner violence     Fear of current or ex partner: Not on file     Emotionally abused: Not on file     Physically abused: Not on file     Forced sexual activity: Not on file   Other Topics Concern    Not on file   Social History Narrative    Not on file       Occupation: [employer] nurse    Review of Systems  CONSTITUTIONAL: No fever, chills. Good appetite and energy level. ENMT: Eyes: No diplopia; Nose: No epistaxis. Mouth: No sore throat. RESPIRATORY: No hemoptysis, shortness of breath, cough. CARDIOVASCULAR: No chest pain, pressure, or palpitations. GASTROINTESTINAL: No nausea/vomiting, abdominal pain, diarrhea/constipation. No blood in the stools. GENITOURINARY: No dysuria, urinary frequency, hematuria. NEURO: No syncope, presyncope, headache. Remainder:  ROS NEGATIVE    Patient denies previous history of DVT/PE. Review of systems as noted above completely reviewed with patient. No changes. Objective:   Physical Exam  Vitals signs and nursing note reviewed. Constitutional:       General: She is not in acute distress. Appearance: She is well-developed. She is obese. She is not diaphoretic. HENT:      Head: Normocephalic and atraumatic. Eyes:      Conjunctiva/sclera: Conjunctivae normal.      Pupils: Pupils are equal, round, and reactive to light. Neck:      Musculoskeletal: Normal range of motion and neck supple. Thyroid: No thyromegaly. Trachea: No tracheal deviation.    Cardiovascular:      Rate and Rhythm: Normal rate and regular rhythm. Heart sounds: Normal heart sounds. Pulmonary:      Effort: Pulmonary effort is normal. No respiratory distress. Breath sounds: Normal breath sounds. Chest:      Breasts: Breasts are symmetrical.         Right: No inverted nipple, mass, nipple discharge, skin change or tenderness. Left: No inverted nipple, mass, nipple discharge, skin change or tenderness. Comments: Breasts large and ptotic but no current skin changes. Prominent dense breast tissue in the upper outer aspects bilaterally. Correlates with imaging. Abdominal:      General: There is no distension. Palpations: Abdomen is soft. Musculoskeletal:      Right shoulder: She exhibits normal range of motion. Left shoulder: She exhibits normal range of motion. Lymphadenopathy:      Cervical: No cervical adenopathy. Upper Body:      Right upper body: No supraclavicular adenopathy. Left upper body: No supraclavicular adenopathy. Skin:     General: Skin is warm and dry. Coloration: Skin is not pale. Findings: No erythema. Comments: Multiple tattoos   Neurological:      Mental Status: She is alert and oriented to person, place, and time. Psychiatric:         Behavior: Behavior normal.         Thought Content: Thought content normal.         Judgment: Judgment normal.       Assessment:      27 y.o. woman who presents for clinical evaluation for history of bilateral breast inflammation/pain/tenderness and skin thickening on imaging. She does have a family history of breast cancer.       7/17/2020: BILATERAL DIAGNOSTIC MAMMOGRAM AND ULTRASOUNDS: [facility]    MAMMOGRAM FINDINGS:    Finding 1:  There are multiple oval masses of varying size seen in the scattered region of both breasts.         Finding 2: Bonilla Hearing is a focal asymmetry seen in the left breast in the upper outer quadrant region and in the retro-areolar region.         ULTRASOUND FINDINGS:         Finding 1:  Sonography was performed in both breasts using a radial and anti-radial approach. The findings in question are not seen on ultrasound.         Finding 2:  The finding in question is not seen on ultrasound.         Stereotactic biopsy of these lesions cannot be performed due to their limited visualization on mammography which would prevent localization.         IMPRESSION:    Finding 1:  Masses in both breasts require additional evaluation. A breast MRI is recommended.         Finding 2:  Focal asymmetry in the left breast requires additional evaluation. A breast MRI is recommended.         =======================================    BI-RADS Category 0:  Incomplete: Need Additional Imaging Evaluation =======================================         RISK ASSESSMENT:    During this patient's visit, information obtained was used to generate a lifetime risk assessment using the Tyrer-Cuzick model (also called the MISHA [International Breast Cancer Intervention Study] Breast Cancer Risk Evaluation Tool).      - LIFETIME RISK -    Patient has a Versie [place] score of 23.7%    - BREAST TISSUE DENSITY -    Extremely dense      8/4/2020: BILATERAL MRI; [facility]    FINDINGS:    There is heterogeneous fibroglandular tissue with moderate background    parenchymal enhancement. Focal areas of skin thickening are visualized    in the medial aspects of both breasts with associated enhancement    (images 31 and 60 of the axial T1 postcontrast series). There is no    lymphadenopathy.  Bilateral skin and nipple areolar complexes are    normal. Visualized portions of the chest and abdomen are unremarkable.              Impression    Focal areas of skin thickening in the medial aspects of both breasts    with associated enhancement are suspicious.         RECOMMENDATION:    Punch biopsy of the suspicious areas of skin thickening in the medial    aspect of both breasts is advised.         BIRADS 4: Suspicious abnormality                Clinical examination correlates with imaging, although no skin thickening present today. Dense tissue upper outer quadrants bilaterally. Correlates with her imaging findings. Long discussion about opportunities to decrease her risk of developing breast cancer including exercise, weight loss, and healthy diet. In light of her family history suggested that she come to see us for imaging and a clinical breast exam in July 2021. Questions answered to patient satisfaction. Plan:      1. Continue monthly breast self examination; detailed instructions reviewed today. Bring any changes to your physician's attention. 2. Education/Lifestyle recommendations: A regular exercise program is encouraged. Avoid excessive caffeine intake. Maintain a diet rich in vegetables and fruits avoiding processed and fast food. 3. Avoid alcohol. 4. Limit caffeine intake. 5. Routine screening imaging in July 2021.  6. Continue follow up with Primary Care. 7. Office follow-up visit should be scheduled in 1 year and PRN. During today's visit, face-to-face time 45 minutes, greater than 50% in counseling education and coordination of care. All questions were answered to her apparent satisfaction, and she is agreeable to the plan as outlined above. I personally and independently saw and examined patient and reviewed all pertinent laboratory data and imaging studies. I have reviewed and agree with the CNP history and review of systems as documented in the above note. This document is generated, in part, by voice recognition software and thus syntax and grammatical errors are possible. Sarah Vuong MD [facility]  August 17, 2020

## 2020-08-14 ENCOUNTER — TELEPHONE (OUTPATIENT)
Dept: BREAST CENTER | Age: 35
End: 2020-08-14

## 2020-08-14 NOTE — TELEPHONE ENCOUNTER
Spoke with patient and updated obstetric related and cancer surveillance history. This information will be used for medical decision making and planning for the upcoming surgical consultation on August 17, 2020 with Dr. Luciano Treviño.

## 2020-08-17 ENCOUNTER — OFFICE VISIT (OUTPATIENT)
Dept: BREAST CENTER | Age: 35
End: 2020-08-17
Payer: COMMERCIAL

## 2020-08-17 VITALS
WEIGHT: 242 LBS | SYSTOLIC BLOOD PRESSURE: 134 MMHG | BODY MASS INDEX: 44.53 KG/M2 | HEIGHT: 62 IN | HEART RATE: 99 BPM | RESPIRATION RATE: 18 BRPM | DIASTOLIC BLOOD PRESSURE: 80 MMHG | OXYGEN SATURATION: 98 % | TEMPERATURE: 99.3 F

## 2020-08-17 PROCEDURE — G8427 DOCREV CUR MEDS BY ELIG CLIN: HCPCS | Performed by: SURGERY

## 2020-08-17 PROCEDURE — G8417 CALC BMI ABV UP PARAM F/U: HCPCS | Performed by: SURGERY

## 2020-08-17 PROCEDURE — 99204 OFFICE O/P NEW MOD 45 MIN: CPT | Performed by: SURGERY

## 2020-08-17 PROCEDURE — 99203 OFFICE O/P NEW LOW 30 MIN: CPT | Performed by: SURGERY

## 2020-08-17 PROCEDURE — 4004F PT TOBACCO SCREEN RCVD TLK: CPT | Performed by: SURGERY

## 2020-08-17 NOTE — PATIENT INSTRUCTIONS
Patient is scheduled for mammogram 8/5/21 @ 8:[facility]am [facility]    Office visit  Teresa Villa CNP   8/5/21 @ 9:00am

## 2020-08-17 NOTE — LETTER
[facility] Breast  [address] [address]  Phone: [phone]  Fax: [phone]    Tamara Ojeda MD        August 17, 2020     Betty Bustillos, [address]    Patient: Judie Olvera  MR Number: [id number]  YOB: 1985  Date of Visit: 8/17/2020    Dear Dr. Betty Bustillos:    Thank you for the request for consultation for Judie Olvera to me for the evaluation of her breast changes. Below are the relevant portions of my assessment and plan of care.    27 y.o. woman who presents for clinical evaluation for history of bilateral breast inflammation/pain/tenderness and skin thickening on imaging. She does have a family history of breast cancer.        7/17/2020: BILATERAL DIAGNOSTIC MAMMOGRAM AND ULTRASOUNDS: [facility]     MAMMOGRAM FINDINGS:    Finding 1:  There are multiple oval masses of varying size seen in the scattered region of both breasts.         Finding 2: Max Rojas is a focal asymmetry seen in the left breast in the upper outer quadrant region and in the retro-areolar region.         ULTRASOUND FINDINGS:         Finding 1:  Sonography was performed in both breasts using a radial and anti-radial approach. The findings in question are not seen on ultrasound.         Finding 2:  The finding in question is not seen on ultrasound.         Stereotactic biopsy of these lesions cannot be performed due to their limited visualization on mammography which would prevent localization.         IMPRESSION:    Finding 1:  Masses in both breasts require additional evaluation. A breast MRI is recommended.         Finding 2:  Focal asymmetry in the left breast requires additional evaluation.     A breast MRI is recommended.         =======================================    BI-RADS Category 0:  Incomplete: Need Additional Imaging Evaluation =======================================         RISK ASSESSMENT: During this patient's visit, information obtained was used to generate a lifetime risk assessment using the Tyrer-Cuzick model (also called the MISHA [International Breast Cancer Intervention Study] Breast Cancer Risk Evaluation Tool).          LIFETIME RISK     Patient has a Tyrer Cuzick score of 23.7%     BREAST TISSUE DENSITY     Extremely dense       8/4/2020: BILATERAL MRI; [facility]     FINDINGS:    There is heterogeneous fibroglandular tissue with moderate background    parenchymal enhancement. Focal areas of skin thickening are visualized    in the medial aspects of both breasts with associated enhancement    (images 31 and 60 of the axial T1 postcontrast series). There is no    lymphadenopathy. Bilateral skin and nipple areolar complexes are    normal. Visualized portions of the chest and abdomen are unremarkable.              Impression    Focal areas of skin thickening in the medial aspects of both breasts    with associated enhancement are suspicious.         RECOMMENDATION:    Punch biopsy of the suspicious areas of skin thickening in the medial    aspect of both breasts is advised.         BIRADS 4: Suspicious abnormality                 Clinical examination correlates with imaging, although no skin thickening present today. Dense tissue upper outer quadrants bilaterally. Correlates with her imaging findings. Long discussion about opportunities to decrease her risk of developing breast cancer including exercise, weight loss, and healthy diet. In light of her family history suggested that she come to see us for imaging and a clinical breast exam in July 2021. Questions answered to patient satisfaction.     If you have questions, please do not hesitate to call me. I look forward to following Carol along with you.     Sincerely,  Brisa Haddad MD

## 2020-11-11 LAB
ALBUMIN SERPL-MCNC: NORMAL G/DL
ALP BLD-CCNC: NORMAL U/L
ALT SERPL-CCNC: NORMAL U/L
ANION GAP SERPL CALCULATED.3IONS-SCNC: NORMAL MMOL/L
AST SERPL-CCNC: NORMAL U/L
AVERAGE GLUCOSE: 128
BILIRUB SERPL-MCNC: NORMAL MG/DL
BUN BLDV-MCNC: NORMAL MG/DL
CALCIUM SERPL-MCNC: NORMAL MG/DL
CHLORIDE BLD-SCNC: NORMAL MMOL/L
CHOLESTEROL, TOTAL: NORMAL
CHOLESTEROL/HDL RATIO: NORMAL
CO2: NORMAL
CREAT SERPL-MCNC: NORMAL MG/DL
FERRITIN: NORMAL
GFR CALCULATED: NORMAL
GLUCOSE BLD-MCNC: NORMAL MG/DL
HBA1C MFR BLD: 6.1 %
HDLC SERPL-MCNC: NORMAL MG/DL
IRON: NORMAL
LDL CHOLESTEROL CALCULATED: NORMAL
LDL CHOLESTEROL DIRECT: NORMAL
NONHDLC SERPL-MCNC: NORMAL MG/DL
POTASSIUM SERPL-SCNC: NORMAL MMOL/L
SODIUM BLD-SCNC: NORMAL MMOL/L
TOTAL IRON BINDING CAPACITY: NORMAL
TOTAL PROTEIN: NORMAL
TRIGL SERPL-MCNC: NORMAL MG/DL
VITAMIN D 25-HYDROXY: NORMAL
VITAMIN D2, 25 HYDROXY: NORMAL
VITAMIN D3,25 HYDROXY: NORMAL
VLDLC SERPL CALC-MCNC: NORMAL MG/DL

## 2020-12-08 ENCOUNTER — OFFICE VISIT (OUTPATIENT)
Dept: PRIMARY CARE CLINIC | Age: 35
End: 2020-12-08
Payer: COMMERCIAL

## 2020-12-08 VITALS
TEMPERATURE: 97.8 F | DIASTOLIC BLOOD PRESSURE: 70 MMHG | OXYGEN SATURATION: 99 % | BODY MASS INDEX: 44.16 KG/M2 | RESPIRATION RATE: 18 BRPM | SYSTOLIC BLOOD PRESSURE: 132 MMHG | HEIGHT: 62 IN | WEIGHT: 240 LBS | HEART RATE: 106 BPM

## 2020-12-08 DIAGNOSIS — Z20.822 SUSPECTED COVID-19 VIRUS INFECTION: ICD-10-CM

## 2020-12-08 DIAGNOSIS — R35.0 URINARY FREQUENCY: ICD-10-CM

## 2020-12-08 LAB
BILIRUBIN, POC: NORMAL
BLOOD URINE, POC: NORMAL
CLARITY, POC: CLEAR
COLOR, POC: YELLOW
GLUCOSE URINE, POC: NORMAL
KETONES, POC: NORMAL
LEUKOCYTE EST, POC: NORMAL
Lab: NORMAL
NITRITE, POC: NORMAL
PH, POC: NORMAL
PROTEIN, POC: 30
QC PASS/FAIL: NORMAL
SARS-COV-2, POC: NORMAL
SPECIFIC GRAVITY, POC: 1.02
UROBILINOGEN, POC: NORMAL

## 2020-12-08 PROCEDURE — 81002 URINALYSIS NONAUTO W/O SCOPE: CPT | Performed by: NURSE PRACTITIONER

## 2020-12-08 PROCEDURE — G8427 DOCREV CUR MEDS BY ELIG CLIN: HCPCS | Performed by: NURSE PRACTITIONER

## 2020-12-08 PROCEDURE — 99213 OFFICE O/P EST LOW 20 MIN: CPT | Performed by: NURSE PRACTITIONER

## 2020-12-08 PROCEDURE — G8484 FLU IMMUNIZE NO ADMIN: HCPCS | Performed by: NURSE PRACTITIONER

## 2020-12-08 PROCEDURE — G8417 CALC BMI ABV UP PARAM F/U: HCPCS | Performed by: NURSE PRACTITIONER

## 2020-12-08 PROCEDURE — 87426 SARSCOV CORONAVIRUS AG IA: CPT | Performed by: NURSE PRACTITIONER

## 2020-12-08 PROCEDURE — 1036F TOBACCO NON-USER: CPT | Performed by: NURSE PRACTITIONER

## 2020-12-08 RX ORDER — HYDROXYZINE PAMOATE 25 MG/1
CAPSULE ORAL
Status: ON HOLD | COMMUNITY
Start: 2020-11-11 | End: 2021-06-09 | Stop reason: HOSPADM

## 2020-12-08 RX ORDER — ASCORBIC ACID 500 MG
500 TABLET ORAL 2 TIMES DAILY
Qty: 14 TABLET | Refills: 0 | Status: SHIPPED
Start: 2020-12-08 | End: 2021-06-07

## 2020-12-08 RX ORDER — ZINC SULFATE 50(220)MG
50 CAPSULE ORAL DAILY
Qty: 7 CAPSULE | Refills: 0 | Status: SHIPPED
Start: 2020-12-08 | End: 2021-03-18

## 2020-12-08 NOTE — PROGRESS NOTES
Chief Complaint   Sweats (x3 days ); Generalized Body Aches; Cough; Pharyngitis; and Nasal Congestion      History of Present Illness   Source of history provided by: patientLucía Schultz is a 38 y.o. old female who presents to the flu clinic for evaluation of cough, sore throat and nasal congestion X 3 days. Associated symptoms include body aches, head aches, chills, decreased of smell and taste, shortness of breath, nausea, and urinary frequency. Since onset symptoms have been about the same. Patient has had known COVID exposure. Has taken Advil at home with some symptomatic relief. Denies any fever, chills, CP, dyspnea, LE edema, abdominal pain, vomiting, rash, or lethargy. Denies any hx of asthma, recurrent bronchitis, COPD, pneumonia. Has no history of tobacco abuse. ROS   Pertinent positives and negatives are stated within HPI, all other systems reviewed and are negative. Past Medical History:  has a past medical history of Abnormal Pap smear, Abnormal Pap smear of cervix, Anemia, Diabetes mellitus ([address] Irregular menses, Migraines, and UTI (lower urinary tract infection). Surgical History:  has a past surgical history that includes hernia repair and Salpingo-oophorectomy (Left, 03/27/2016). Social History:  reports that she has never smoked. She has never used smokeless tobacco. She reports that she does not drink alcohol or use drugs. Family History: family history includes Breast Cancer in her maternal uncle; Breast Cancer (age of onset: 48) in her maternal aunt and maternal aunt; Breast Cancer (age of onset: 80) in her maternal grandmother; Hypertension in her mother. Allergies: Patient has no known allergies.     Physical Exam      VS:  /70   Pulse 106   Temp 97.8 °F (36.6 °C) (Temporal)   Resp 18   Ht 5' 2\" (1.575 m)   Wt 240 lb (108.9 kg)   SpO2 99%   BMI 43.90 kg/m²    Oxygen Saturation Interpretation: Normal.    Constitutional:  Alert, development consistent with age. NAD. Head:  NC/NT. Airway patent. No TTP over maxillary and frontal sinuses. Mouth: Posterior pharynx with mild erythema and clear postnasal drip. No tonsillar hypertrophy or exudate. Neck:  Normal ROM. Supple. No anterior cervical adenopathy noted. Lungs: CTAB without wheezes, rales, or rhonchi. CV:  Regular rate and rhythm, normal heart sounds, without pathological murmurs, ectopy, gallops, or rubs. GI: Bowel sounds are active x4 abdomen is soft nontender. There is no suprapubic tenderness noted. Back: No CVA tenderness noted bilaterally. Skin:  Normal turgor. Warm, dry, without visible rash. Lymphatic: No lymphangitis or adenopathy noted. Neurological:  Oriented. Motor functions intact. Lab / Imaging Results   (All laboratory and radiology results have been personally reviewed by myself)  Labs:  Results for orders placed or performed in visit on 12/08/20   POCT Urinalysis no Micro   Result Value Ref Range    Color, UA yellow     Clarity, UA clear     Glucose, UA POC neg     Bilirubin, UA neg     Ketones, UA neg     Spec Grav, UA 1.025     Blood, UA POC trace     pH, UA neg     Protein, UA POC 30     Urobilinogen, UA neg     Leukocytes, UA neg     Nitrite, UA neg    POCT COVID-19, Antigen   Result Value Ref Range    SARS-COV-2, POC Not-Detected Not Detected    Lot Number [id number]     QC Pass/Fail pass        Imaging: All Radiology results interpreted by Radiologist unless otherwise noted. No results found. Medical Decision Making   Pt non-toxic, in no apparent distress and stable at time of discharge. Assessment/Plan   Carol was seen today for sweats, generalized body aches, cough, pharyngitis and nasal congestion. Diagnoses and all orders for this visit:    Urinary frequency  -     POCT Urinalysis no Micro  -     Culture, Urine; Future    Suspected COVID-19 virus infection  -     COVID-19; Future  -     vitamin C (ASCORBIC ACID) 500 MG tablet;  Take 1 tablet by mouth 2 times daily for 7 days  -     zinc sulfate (ZINCATE) 220 (50 Zn) MG capsule; Take 1 capsule by mouth daily for 7 days  -     POCT COVID-19, Antigen    Viral illness  -     vitamin C (ASCORBIC ACID) 500 MG tablet; Take 1 tablet by mouth 2 times daily for 7 days  -     zinc sulfate (ZINCATE) 220 (50 Zn) MG capsule; Take 1 capsule by mouth daily for 7 days        Rapid Covid testing in office is negative. COVID-19 swab obtained and pending, will call with results once available. Advised cautionary self-quarantine at home in the interim. If testing is positive, pt should remain out of work and the general public for at least 10 days from the start of symptoms. Regardless of testing results, pt should also be fever free for 24 hours and symptoms should be improved overall prior to returning. Increase fluids and rest. Symptomatic relief discussed including Tylenol prn pain/fever. UA in office showed trace blood. Will send for urine culture and call with results. If urine culture is positive we will call in an antibiotic. Increase fluids and rest.  Perineal hygiene discussed. The patient was agreeable to this plan. Flag symptoms were discussed. Schedule virtual f/u with PCP in 7-10 days if symptoms persist. ED sooner if symptoms worsen or change. ED immediately with high or refractory fever, progressive SOB, dyspnea, CP, calf pain/swelling, shaking chills, vomiting, abdominal pain, lethargy, flank pain, or decreased urinary output. Pt verbalizes understanding and is in agreement with plan of care. All questions answered. KRISTYN Kelsey NP    This visit was provided as a focused evaluation during the COVID -19 pandemic/national emergency. A comprehensive review of all previous patient history and testing was not conducted. Pertinent findings were elicited during the visit. *NOTE: This report was transcribed using voice recognition software.  Every effort was made to ensure accuracy; however, inadvertent computerized transcription errors may be present.

## 2020-12-10 LAB — SARS-COV-2, PCR: NOT DETECTED

## 2020-12-11 LAB — URINE CULTURE, ROUTINE: NORMAL

## 2020-12-14 ENCOUNTER — OFFICE VISIT (OUTPATIENT)
Dept: PRIMARY CARE CLINIC | Age: 35
End: 2020-12-14
Payer: COMMERCIAL

## 2020-12-14 VITALS
DIASTOLIC BLOOD PRESSURE: 90 MMHG | HEART RATE: 118 BPM | BODY MASS INDEX: 43.9 KG/M2 | OXYGEN SATURATION: 99 % | SYSTOLIC BLOOD PRESSURE: 130 MMHG | WEIGHT: 240 LBS | RESPIRATION RATE: 16 BRPM | TEMPERATURE: 100.3 F

## 2020-12-14 DIAGNOSIS — Z20.822 SUSPECTED COVID-19 VIRUS INFECTION: ICD-10-CM

## 2020-12-14 DIAGNOSIS — R50.9 FEVER, UNSPECIFIED FEVER CAUSE: ICD-10-CM

## 2020-12-14 LAB
INFLUENZA A ANTIBODY: NORMAL
INFLUENZA B ANTIBODY: NORMAL
Lab: NORMAL
QC PASS/FAIL: NORMAL
SARS-COV-2, POC: NORMAL

## 2020-12-14 PROCEDURE — 87426 SARSCOV CORONAVIRUS AG IA: CPT | Performed by: PHYSICIAN ASSISTANT

## 2020-12-14 PROCEDURE — G8417 CALC BMI ABV UP PARAM F/U: HCPCS | Performed by: PHYSICIAN ASSISTANT

## 2020-12-14 PROCEDURE — G8484 FLU IMMUNIZE NO ADMIN: HCPCS | Performed by: PHYSICIAN ASSISTANT

## 2020-12-14 PROCEDURE — 1036F TOBACCO NON-USER: CPT | Performed by: PHYSICIAN ASSISTANT

## 2020-12-14 PROCEDURE — 87804 INFLUENZA ASSAY W/OPTIC: CPT | Performed by: PHYSICIAN ASSISTANT

## 2020-12-14 PROCEDURE — 99213 OFFICE O/P EST LOW 20 MIN: CPT | Performed by: PHYSICIAN ASSISTANT

## 2020-12-14 PROCEDURE — G8427 DOCREV CUR MEDS BY ELIG CLIN: HCPCS | Performed by: PHYSICIAN ASSISTANT

## 2020-12-14 RX ORDER — AZITHROMYCIN 250 MG/1
250 TABLET, FILM COATED ORAL SEE ADMIN INSTRUCTIONS
Qty: 6 TABLET | Refills: 0 | Status: SHIPPED | OUTPATIENT
Start: 2020-12-14 | End: 2020-12-19

## 2020-12-14 RX ORDER — BENZONATATE 100 MG/1
100 CAPSULE ORAL 3 TIMES DAILY PRN
Qty: 30 CAPSULE | Refills: 0 | Status: SHIPPED | OUTPATIENT
Start: 2020-12-14 | End: 2020-12-24

## 2020-12-14 NOTE — PROGRESS NOTES
Chief Complaint   Headache (Symptoms started last Sun, tested Tues was negative. Still with symptoms, needs cleared to rtw), Nausea, and Cough    History of Present Illness   Source of history provided by: patient. Merilynn Skiff is a 38 y.o. old female who has a past medical history of:   Past Medical History:   Diagnosis Date    Abnormal Pap smear     abnormal cells 2008    Abnormal Pap smear of cervix     years ago    Anemia     Iron deficiency    Diabetes mellitus ([facility] [address]     GDM-diet controlled    Irregular menses     Migraines     UTI (lower urinary tract infection)     Presents to the flu clinic for evaluation of intermittent fever (TMAX 102F), chills, headaches, nausea, sore throat, cough, sweats, and congestion x 8 days. Was seen 6 days ago and rapid and PCR testing were negative for COVID. Denies any CP, dyspnea, LE edema, abdominal pain, vomiting, rash, or lethargy. Denies diarrhea or loss of taste/smell. Has been taking Tylenol without symptomatic relief. Reports contact with individuals with known COVID-19 infection or under investigation for COVID-19 infection- works in home care. Denies any hx of asthma or COPD. Denies hx of tobacco use. ROS   Pertinent positives and negatives are stated within HPI, all other systems reviewed and are negative. Surgical History:  has a past surgical history that includes hernia repair and Salpingo-oophorectomy (Left, 03/27/2016). Social History:  reports that she has never smoked. She has never used smokeless tobacco. She reports that she does not drink alcohol or use drugs. Family History: family history includes Breast Cancer in her maternal uncle; Breast Cancer (age of onset: 48) in her maternal aunt and maternal aunt; Breast Cancer (age of onset: [address]) in her maternal grandmother; Hypertension in her mother. Allergies: Patient has no known allergies.     Physical Exam      VS:  BP (!) 130/90   Pulse 118   Temp 100.3 °F (37.9 °C)   Resp 16 Wt 240 lb (108.9 kg)   SpO2 99%   BMI 43.90 kg/m²    Oxygen Saturation Interpretation: Normal.    Constitutional:  Alert, development consistent with age. NAD. Head:  NC/NT. Airway patent. Ears: TMs normal bilaterally. Canals without exudate or swelling bilaterally. Mouth: Posterior pharynx with mild erythema and clear postnasal drip. No tonsillar hypertrophy or exudate. Neck:  Normal ROM. Supple. No anterior cervical adenopathy noted. Lungs: CTAB without wheezes, rales, or rhonchi. CV: Tachycardic. Regular rhythm, normal heart sounds, without pathological murmurs, ectopy, gallops, or rubs. Skin:  Normal turgor. Warm, dry, without visible rash. Lymphatic: No lymphangitis or adenopathy noted. Neurological:  Oriented. Motor functions intact. Lab / Imaging Results   (All laboratory and radiology results have been personally reviewed by myself)  Labs:  Results for orders placed or performed in visit on 12/14/20   POCT COVID-19, Antigen   Result Value Ref Range    SARS-COV-2, POC Not-Detected Not Detected    Lot Number [id number]     QC Pass/Fail pass    POCT Influenza A/B   Result Value Ref Range    Influenza A Ab neg     Influenza B Ab neg      Imaging: All Radiology results interpreted by Radiologist unless otherwise noted. No results found. Medical Decision Making   Pt non-toxic, in no apparent distress and stable at time of discharge. Assessment/Plan   Carol was seen today for headache, nausea and cough. Diagnoses and all orders for this visit:    Suspected COVID-19 virus infection  -     POCT COVID-19, Antigen  -     COVID-19; Future  -     azithromycin (ZITHROMAX) 250 MG tablet; Take 1 tablet by mouth See Admin Instructions for 5 days 500mg on day 1 followed by 250mg on days 2 - 5  -     benzonatate (TESSALON PERLES) 100 MG capsule;  Take 1 capsule by mouth 3 times daily as needed for Cough    Fever, unspecified fever cause  -     POCT Influenza A/B  -     COVID-19; Future  - azithromycin (ZITHROMAX) 250 MG tablet; Take 1 tablet by mouth See Admin Instructions for 5 days 500mg on day 1 followed by 250mg on days 2 - 5  -     benzonatate (TESSALON PERLES) 100 MG capsule; Take 1 capsule by mouth 3 times daily as needed for Cough      Rapid COVID and influenza negative. COVID-19 swab obtained and pending, will call with results once available. Advised cautionary self-quarantine at home in the interim. Pt should also be fever free for 24 hours and symptoms should be improved overall prior to returning to work if applicable. Scripts written for zpak and tessalon perles, side effects discussed. Increase fluids and rest. Symptomatic relief discussed including Tylenol prn pain/fever. Schedule virtual f/u with PCP in 7-10 days if symptoms persist. ED sooner if symptoms worsen or change. ED immediately with high or refractory fever, progressive SOB, dyspnea, CP, calf pain/swelling, shaking chills, vomiting, abdominal pain, lethargy, flank pain, or decreased urinary output. Pt verbalizes understanding and is in agreement with plan of care. All questions answered. Mitch Hale PA-C    This visit was provided as a focused evaluation during the COVID -19 pandemic/national emergency. A comprehensive review of all previous patient history and testing was not conducted. Pertinent findings were elicited during the visit.

## 2020-12-15 LAB — SARS-COV-2, PCR: NOT DETECTED

## 2021-01-06 ENCOUNTER — HOSPITAL ENCOUNTER (OUTPATIENT)
Dept: GENERAL RADIOLOGY | Age: 36
Discharge: HOME | End: 2021-01-08
Payer: COMMERCIAL

## 2021-01-06 ENCOUNTER — HOSPITAL ENCOUNTER (OUTPATIENT)
Age: 36
Discharge: HOME | End: 2021-01-08
Payer: COMMERCIAL

## 2021-01-06 DIAGNOSIS — R05.9 COUGH: ICD-10-CM

## 2021-01-06 PROCEDURE — 71046 X-RAY EXAM CHEST 2 VIEWS: CPT

## 2021-01-26 ENCOUNTER — HOSPITAL ENCOUNTER (EMERGENCY)
Age: 36
Discharge: HOME | End: 2021-01-27
Attending: EMERGENCY MEDICINE
Payer: COMMERCIAL

## 2021-01-26 ENCOUNTER — NURSE TRIAGE (OUTPATIENT)
Dept: OTHER | Facility: CLINIC | Age: 36
End: 2021-01-26

## 2021-01-26 ENCOUNTER — APPOINTMENT (OUTPATIENT)
Dept: GENERAL RADIOLOGY | Age: 36
End: 2021-01-26
Payer: COMMERCIAL

## 2021-01-26 DIAGNOSIS — R05.9 COUGH: ICD-10-CM

## 2021-01-26 DIAGNOSIS — J40 BRONCHITIS: Primary | ICD-10-CM

## 2021-01-26 DIAGNOSIS — R19.7 NAUSEA VOMITING AND DIARRHEA: ICD-10-CM

## 2021-01-26 DIAGNOSIS — R59.0 AXILLARY ADENOPATHY: ICD-10-CM

## 2021-01-26 DIAGNOSIS — R11.2 NAUSEA VOMITING AND DIARRHEA: ICD-10-CM

## 2021-01-26 LAB
ALBUMIN SERPL-MCNC: 3.8 G/DL (ref 3.5–5.2)
ALP BLD-CCNC: 77 U/L (ref 35–104)
ALT SERPL-CCNC: 12 U/L (ref 0–32)
ANION GAP SERPL CALCULATED.3IONS-SCNC: 10 MMOL/L (ref 7–16)
AST SERPL-CCNC: 18 U/L (ref 0–31)
BACTERIA: ABNORMAL /HPF
BASOPHILS ABSOLUTE: 0.04 E9/L (ref 0–0.2)
BASOPHILS RELATIVE PERCENT: 0.3 % (ref 0–2)
BILIRUB SERPL-MCNC: <0.2 MG/DL (ref 0–1.2)
BILIRUBIN URINE: NEGATIVE
BLOOD, URINE: ABNORMAL
BUN BLDV-MCNC: 14 MG/DL (ref 6–20)
CALCIUM SERPL-MCNC: 8.9 MG/DL (ref 8.6–10.2)
CHLORIDE BLD-SCNC: 105 MMOL/L (ref 98–107)
CLARITY: CLEAR
CO2: 22 MMOL/L (ref 22–29)
COLOR: YELLOW
CREAT SERPL-MCNC: 0.7 MG/DL (ref 0.5–1)
EOSINOPHILS ABSOLUTE: 0.11 E9/L (ref 0.05–0.5)
EOSINOPHILS RELATIVE PERCENT: 0.9 % (ref 0–6)
EPITHELIAL CELLS, UA: ABNORMAL /HPF
GFR AFRICAN AMERICAN: >60
GFR NON-AFRICAN AMERICAN: >60 ML/MIN/1.73
GLUCOSE BLD-MCNC: 120 MG/DL (ref 74–99)
GLUCOSE URINE: NEGATIVE MG/DL
HCG, URINE, POC: NEGATIVE
HCT VFR BLD CALC: 38.1 % (ref 34–48)
HEMOGLOBIN: 11.8 G/DL (ref 11.5–15.5)
IMMATURE GRANULOCYTES #: 0.05 E9/L
IMMATURE GRANULOCYTES %: 0.4 % (ref 0–5)
KETONES, URINE: NEGATIVE MG/DL
LEUKOCYTE ESTERASE, URINE: ABNORMAL
LYMPHOCYTES ABSOLUTE: 3.53 E9/L (ref 1.5–4)
LYMPHOCYTES RELATIVE PERCENT: 28 % (ref 20–42)
Lab: NORMAL
MCH RBC QN AUTO: 23.2 PG (ref 26–35)
MCHC RBC AUTO-ENTMCNC: 31 % (ref 32–34.5)
MCV RBC AUTO: 75 FL (ref 80–99.9)
MONOCYTES ABSOLUTE: 1.47 E9/L (ref 0.1–0.95)
MONOCYTES RELATIVE PERCENT: 11.6 % (ref 2–12)
NEGATIVE QC PASS/FAIL: NORMAL
NEUTROPHILS ABSOLUTE: 7.42 E9/L (ref 1.8–7.3)
NEUTROPHILS RELATIVE PERCENT: 58.8 % (ref 43–80)
NITRITE, URINE: NEGATIVE
PDW BLD-RTO: 18 FL (ref 11.5–15)
PH UA: 6.5 (ref 5–9)
PLATELET # BLD: 364 E9/L (ref 130–450)
PMV BLD AUTO: 9.3 FL (ref 7–12)
POSITIVE QC PASS/FAIL: NORMAL
POTASSIUM REFLEX MAGNESIUM: 4.3 MMOL/L (ref 3.5–5)
PROTEIN UA: ABNORMAL MG/DL
RBC # BLD: 5.08 E12/L (ref 3.5–5.5)
RBC UA: ABNORMAL /HPF (ref 0–2)
REASON FOR REJECTION: NORMAL
REJECTED TEST: NORMAL
SODIUM BLD-SCNC: 137 MMOL/L (ref 132–146)
SPECIFIC GRAVITY UA: 1.02 (ref 1–1.03)
TOTAL PROTEIN: 7.5 G/DL (ref 6.4–8.3)
TROPONIN: <0.01 NG/ML (ref 0–0.03)
UROBILINOGEN, URINE: 0.2 E.U./DL
WBC # BLD: 12.6 E9/L (ref 4.5–11.5)
WBC UA: ABNORMAL /HPF (ref 0–5)

## 2021-01-26 PROCEDURE — 84484 ASSAY OF TROPONIN QUANT: CPT

## 2021-01-26 PROCEDURE — 80053 COMPREHEN METABOLIC PANEL: CPT

## 2021-01-26 PROCEDURE — 6360000002 HC RX W HCPCS: Performed by: STUDENT IN AN ORGANIZED HEALTH CARE EDUCATION/TRAINING PROGRAM

## 2021-01-26 PROCEDURE — 71046 X-RAY EXAM CHEST 2 VIEWS: CPT

## 2021-01-26 PROCEDURE — 36415 COLL VENOUS BLD VENIPUNCTURE: CPT

## 2021-01-26 PROCEDURE — 96374 THER/PROPH/DIAG INJ IV PUSH: CPT

## 2021-01-26 PROCEDURE — 81001 URINALYSIS AUTO W/SCOPE: CPT

## 2021-01-26 PROCEDURE — 85025 COMPLETE CBC W/AUTO DIFF WBC: CPT

## 2021-01-26 PROCEDURE — 85378 FIBRIN DEGRADE SEMIQUANT: CPT

## 2021-01-26 PROCEDURE — 0202U NFCT DS 22 TRGT SARS-COV-2: CPT

## 2021-01-26 PROCEDURE — 99284 EMERGENCY DEPT VISIT MOD MDM: CPT

## 2021-01-26 PROCEDURE — 93005 ELECTROCARDIOGRAM TRACING: CPT | Performed by: PHYSICIAN ASSISTANT

## 2021-01-26 RX ORDER — ONDANSETRON 2 MG/ML
4 INJECTION INTRAMUSCULAR; INTRAVENOUS ONCE
Status: COMPLETED | OUTPATIENT
Start: 2021-01-26 | End: 2021-01-26

## 2021-01-26 RX ORDER — BUDESONIDE AND FORMOTEROL FUMARATE DIHYDRATE 160; 4.5 UG/1; UG/1
2 AEROSOL RESPIRATORY (INHALATION) 2 TIMES DAILY
COMMUNITY
Start: 2021-01-08 | End: 2022-10-11 | Stop reason: SDUPTHER

## 2021-01-26 RX ORDER — FERROUS SULFATE 325(65) MG
325 TABLET ORAL
COMMUNITY
End: 2021-08-27 | Stop reason: ALTCHOICE

## 2021-01-26 RX ADMIN — ONDANSETRON 4 MG: 2 INJECTION INTRAMUSCULAR; INTRAVENOUS at 23:24

## 2021-01-26 ASSESSMENT — PAIN SCALES - GENERAL: PAINLEVEL_OUTOF10: 4

## 2021-01-26 ASSESSMENT — PAIN DESCRIPTION - FREQUENCY: FREQUENCY: CONTINUOUS

## 2021-01-27 ENCOUNTER — APPOINTMENT (OUTPATIENT)
Dept: CT IMAGING | Age: 36
End: 2021-01-27
Payer: COMMERCIAL

## 2021-01-27 VITALS
SYSTOLIC BLOOD PRESSURE: 119 MMHG | HEART RATE: 108 BPM | TEMPERATURE: 98.8 F | DIASTOLIC BLOOD PRESSURE: 79 MMHG | BODY MASS INDEX: 44.16 KG/M2 | RESPIRATION RATE: 18 BRPM | HEIGHT: 62 IN | OXYGEN SATURATION: 100 % | WEIGHT: 240 LBS

## 2021-01-27 LAB
ADENOVIRUS BY PCR: NOT DETECTED
BORDETELLA PARAPERTUSSIS BY PCR: NOT DETECTED
BORDETELLA PERTUSSIS BY PCR: NOT DETECTED
CHLAMYDOPHILIA PNEUMONIAE BY PCR: NOT DETECTED
CORONAVIRUS 229E BY PCR: NOT DETECTED
CORONAVIRUS HKU1 BY PCR: NOT DETECTED
CORONAVIRUS NL63 BY PCR: NOT DETECTED
CORONAVIRUS OC43 BY PCR: NOT DETECTED
D DIMER: 303 NG/ML DDU
EKG ATRIAL RATE: 95 BPM
EKG P AXIS: 27 DEGREES
EKG P-R INTERVAL: 150 MS
EKG Q-T INTERVAL: 344 MS
EKG QRS DURATION: 88 MS
EKG QTC CALCULATION (BAZETT): 432 MS
EKG R AXIS: 72 DEGREES
EKG T AXIS: 27 DEGREES
EKG VENTRICULAR RATE: 95 BPM
HUMAN METAPNEUMOVIRUS BY PCR: NOT DETECTED
HUMAN RHINOVIRUS/ENTEROVIRUS BY PCR: NOT DETECTED
INFLUENZA A BY PCR: NOT DETECTED
INFLUENZA B BY PCR: NOT DETECTED
MYCOPLASMA PNEUMONIAE BY PCR: NOT DETECTED
PARAINFLUENZA VIRUS 1 BY PCR: NOT DETECTED
PARAINFLUENZA VIRUS 2 BY PCR: NOT DETECTED
PARAINFLUENZA VIRUS 3 BY PCR: NOT DETECTED
PARAINFLUENZA VIRUS 4 BY PCR: NOT DETECTED
RESPIRATORY SYNCYTIAL VIRUS BY PCR: NOT DETECTED
SARS-COV-2, PCR: NOT DETECTED

## 2021-01-27 PROCEDURE — 93010 ELECTROCARDIOGRAM REPORT: CPT | Performed by: INTERNAL MEDICINE

## 2021-01-27 PROCEDURE — 6360000004 HC RX CONTRAST MEDICATION: Performed by: RADIOLOGY

## 2021-01-27 PROCEDURE — 71275 CT ANGIOGRAPHY CHEST: CPT

## 2021-01-27 RX ADMIN — IOPAMIDOL 75 ML: 755 INJECTION, SOLUTION INTRAVENOUS at 00:25

## 2021-01-27 ASSESSMENT — ENCOUNTER SYMPTOMS
SHORTNESS OF BREATH: 0
NAUSEA: 1
BACK PAIN: 0
ABDOMINAL PAIN: 0
CHEST TIGHTNESS: 1
SORE THROAT: 0
VOMITING: 1
ABDOMINAL DISTENTION: 0
DIARRHEA: 0
COUGH: 1

## 2021-01-27 NOTE — TELEPHONE ENCOUNTER
Brief description of triage: Patient is calling because she has had a cough, shortness of breath, headache and fatigue since Dec 6, 2020. She has been tested many times for COVID-19 since onset of symptoms with negative results. Symptoms are being treated with medication per patient report. Patient is currently having shortness of breath at rest, sounded winded over the phone. Feels chest pressure. Heart rate is in the 130's at rest. Pulse ox is in the upper 90's. Patient is currently having nausea and vomiting as well. Denies fever. Patient denies any chronic illness    Reason for Disposition   Chest pain   [1] Chest pain lasts > 5 minutes AND [2] described as crushing, pressure-like, or heavy    Answer Assessment - Initial Assessment Questions  1. RESPIRATORY STATUS: \"Describe your breathing? \" (e.g., wheezing, shortness of breath, unable to speak, severe coughing)       - Shortness of breath. 2. ONSET: \"When did this breathing problem begin? \"       - Onset was Dec 6, 2020 - patient stated she has been treated with different medications and is not getting any better. 3. PATTERN \"Does the difficult breathing come and go, or has it been constant since it started? \"       - Constant. 4. SEVERITY: \"How bad is your breathing? \" (e.g., mild, moderate, severe)     - MILD: No SOB at rest, mild SOB with walking, speaks normally in sentences, can lay down, no retractions, pulse < 100.     - MODERATE: SOB at rest, SOB with minimal exertion and prefers to sit, cannot lie down flat, speaks in phrases, mild retractions, audible wheezing, pulse 100-120.     - SEVERE: Very SOB at rest, speaks in single words, struggling to breathe, sitting hunched forward, retractions, pulse > 120       - Patient currently having shortness of breath at rest. Patient sounded winded over the phone. 5. RECURRENT SYMPTOM: \"Have you had difficulty breathing before? \" If so, ask: \"When was the last time? \" and \"What happened that time? \" - Has had this issue since Dec 6, 2020    6. CARDIAC HISTORY: \"Do you have any history of heart disease? \" (e.g., heart attack, angina, bypass surgery, angioplasty)       - Denies any chronic disease. 7. LUNG HISTORY: \"Do you have any history of lung disease? \"  (e.g., pulmonary embolus, asthma, emphysema)      - Denies any chronic disease. 8. CAUSE: \"What do you think is causing the breathing problem? \"       - Unsure of the cause. 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 9. OTHER SYMPTOMS: \"Do you have any other symptoms? (e.g., dizziness, runny nose, cough, chest pain, fever)      - Cough, increased heart rate, nausea, vomiting and chest pressure. Denies fever. Protocols used: BREATHING DIFFICULTY-ADULT-AH, CHEST PAIN-ADULT-AH    Triage indicates for patient to call 911. Care advice provided, patient verbalizes understanding; denies any other questions or concerns; instructed to call back for any new or worsening symptoms. Attention Provider: Thank you for allowing me to participate in the care of your patient. The patient was connected to triage in response to symptoms provided. Please do not respond through this encounter as the response is not directed to a shared pool.

## 2021-01-27 NOTE — ED PROVIDER NOTES
HPI     Patient is a 38 y.o. femalewith no pertinent past medical history who presents with a chief complaint of cough. This has been occurring for 2 months. Patient states that it gets better with nothing. Patient states that it gets worse with nothing. Patient states that it is severe in severity. Patient states that she has been having intermittent severe cough for the past 2 months. Patient has been to multiple doctors and been tested for Covid multiple times. Patient also states that when she coughs a lot she will have a posttussive emesis. Patient notes that she is also short of breath. Patient has been sent home on [facility] that have little help. Patient states that she coughs and much that she will urinate herself and sometimes with fecal continence. Patient has severe coughing episodes. Patient states that she is not coughing anything up.v patient denies any fevers, chills, and any other diarrhea. Review of Systems   Constitutional: Negative for activity change, appetite change, chills, fatigue and fever. HENT: Negative for congestion, drooling and sore throat. Respiratory: Positive for cough and chest tightness. Negative for shortness of breath. Cardiovascular: Positive for chest pain. Negative for palpitations. Gastrointestinal: Positive for nausea and vomiting. Negative for abdominal distention, abdominal pain and diarrhea. Genitourinary: Negative for decreased urine volume, difficulty urinating, enuresis, flank pain, frequency and hematuria. Musculoskeletal: Negative for arthralgias, back pain and neck stiffness. Skin: Negative for rash and wound. Neurological: Negative for dizziness, facial asymmetry, light-headedness and headaches. Psychiatric/Behavioral: Negative for agitation, confusion and decreased concentration. Physical Exam  Constitutional:       General: She is not in acute distress. Appearance: Normal appearance.  She is not ill-appearing. HENT:      Mouth/Throat:      Mouth: Mucous membranes are moist.   Eyes:      Extraocular Movements: Extraocular movements intact. Pupils: Pupils are equal, round, and reactive to light. Cardiovascular:      Rate and Rhythm: Normal rate and regular rhythm. Pulses: Normal pulses. Heart sounds: Normal heart sounds. No murmur. Pulmonary:      Effort: Pulmonary effort is normal.      Breath sounds: Normal breath sounds. Comments: Patient had multiple coughing episodes while being examined. Patient's lungs are clear. Abdominal:      General: Abdomen is flat. There is no distension. Palpations: Abdomen is soft. Tenderness: There is no abdominal tenderness. There is no guarding. Musculoskeletal:         General: No swelling or tenderness. Skin:     General: Skin is warm and dry. Capillary Refill: Capillary refill takes less than 2 seconds. Neurological:      General: No focal deficit present. Mental Status: She is alert and oriented to person, place, and time. Psychiatric:         Mood and Affect: Mood normal.         Behavior: Behavior normal.          Procedures     [facility]     ED Course as of Jan 27 0104   Tue Jan 26, 2021   2594 EKG: This EKG is signed and interpreted by me. Rate: 95  Rhythm: Sinus  Interpretation: Normal sinus rhythm, normal VA interval, normal QRS, normal QT interval, no acute ST or T wave changes  Comparison: no previous EKG available        [JA]      ED Course User Index  [JA] Ronak Sanchez MD      Patient is a 38 y.o. female presenting with cough and shortness of breath. Patient has an extensive work-up for this. Patient states that the reason why she is coming in today is because the coughing is so bad. Patient has been tested for Covid multiple times and also had antigens that were all negative. Patient works as a nurse. Patient will have a cardiac work-up. Patient's lab work is benign.   Patient had a D-dimer drawn and then had a CTA. Patient CTA was negative for any acute findings. Patient was informed of this. Concern for possible pertussis but patient has also received azithromycin in the past.  Patient had a respiratory film array sent downtown. Patient was educated to follow-up on her [address] Patient will also receive a phone call if positive. Patient was given the number of a pulmonologist.  Patient is agreeable to this plan. Patient was given return precautions. Patient will follow up with their primary care provider. Patient is agreeable to this plan. Patient has remained stable throughout their stay in the ED. Patient was seen and evaluated by myself and my attending Martín Interiano MD. Assessment and Plan discussed with attending provider, please see attestation for final plan of care. This note was done using dictation software and there may be some grammatical errors associated with this. Madie Echeverria MD       ED Course as of Jan 27 0119   Tue Jan 26, 2021   0374 EKG: This EKG is signed and interpreted by me. Rate: 95  Rhythm: Sinus  Interpretation: Normal sinus rhythm, normal OR interval, normal QRS, normal QT interval, no acute ST or T wave changes  Comparison: no previous EKG available        [JA]      ED Course User Index  [RIA] Martín Interiano MD       --------------------------------------------- PAST HISTORY ---------------------------------------------  Past Medical History:  has a past medical history of Abnormal Pap smear, Abnormal Pap smear of cervix, Anemia, Diabetes mellitus ([address] Irregular menses, Migraines, and UTI (lower urinary tract infection). Past Surgical History:  has a past surgical history that includes hernia repair and Salpingo-oophorectomy (Left, 03/27/2016). Social History:  reports that she has never smoked. She has never used smokeless tobacco. She reports that she does not drink alcohol or use drugs.     Family History: family history includes Breast Cancer in her maternal uncle; Breast Cancer (age of onset: 48) in her maternal aunt and maternal aunt; Breast Cancer (age of onset: 80) in her maternal grandmother; Hypertension in her mother. The patients home medications have been reviewed. Allergies: Patient has no known allergies.     -------------------------------------------------- RESULTS -------------------------------------------------  Labs:  Results for orders placed or performed during the hospital encounter of 01/26/21   CBC Auto Differential   Result Value Ref Range    WBC 12.6 (H) 4.5 - 11.5 E9/L    RBC 5.08 3.50 - 5.50 E12/L    Hemoglobin 11.8 11.5 - 15.5 g/dL    Hematocrit 38.1 34.0 - 48.0 %    MCV 75.0 (L) 80.0 - 99.9 fL    MCH 23.2 (L) 26.0 - 35.0 pg    MCHC 31.0 (L) 32.0 - 34.5 %    RDW 18.0 (H) 11.5 - 15.0 fL    Platelets 384 291 - 900 E9/L    MPV 9.3 7.0 - 12.0 fL    Neutrophils % 58.8 43.0 - 80.0 %    Immature Granulocytes % 0.4 0.0 - 5.0 %    Lymphocytes % 28.0 20.0 - 42.0 %    Monocytes % 11.6 2.0 - 12.0 %    Eosinophils % 0.9 0.0 - 6.0 %    Basophils % 0.3 0.0 - 2.0 %    Neutrophils Absolute 7.42 (H) 1.80 - 7.30 E9/L    Immature Granulocytes # 0.05 E9/L    Lymphocytes Absolute 3.53 1.50 - 4.00 E9/L    Monocytes Absolute 1.47 (H) 0.10 - 0.95 E9/L    Eosinophils Absolute 0.11 0.05 - 0.50 E9/L    Basophils Absolute 0.04 0.00 - 0.20 E9/L   Comprehensive Metabolic Panel w/ Reflex to MG   Result Value Ref Range    Sodium 137 132 - 146 mmol/L    Potassium reflex Magnesium 4.3 3.5 - 5.0 mmol/L    Chloride 105 98 - 107 mmol/L    CO2 22 22 - 29 mmol/L    Anion Gap 10 7 - 16 mmol/L    Glucose 120 (H) 74 - 99 mg/dL    BUN 14 6 - 20 mg/dL    CREATININE 0.7 0.5 - 1.0 mg/dL    GFR Non-African American >60 >=60 mL/min/1.73    GFR African American >60     Calcium 8.9 8.6 - 10.2 mg/dL    Total Protein 7.5 6.4 - 8.3 g/dL    Albumin 3.8 3.5 - 5.2 g/dL    Total Bilirubin <0.2 0.0 - 1.2 mg/dL    Alkaline Phosphatase 77 35 - 104 U/L    ALT 12 0 - 32 U/L    AST 18 0 - 31 U/L   Troponin   Result Value Ref Range    Troponin <0.01 0.00 - 0.03 ng/mL   Urinalysis   Result Value Ref Range    Color, UA Yellow Straw/Yellow    Clarity, UA Clear Clear    Glucose, Ur Negative Negative mg/dL    Bilirubin Urine Negative Negative    Ketones, Urine Negative Negative mg/dL    Specific Gravity, UA 1.020 1.005 - 1.030    Blood, Urine SMALL (A) Negative    pH, UA 6.5 5.0 - 9.0    Protein, UA TRACE Negative mg/dL    Urobilinogen, Urine 0.2 <2.0 E.U./dL    Nitrite, Urine Negative Negative    Leukocyte Esterase, Urine TRACE (A) Negative   D-dimer, quantitative   Result Value Ref Range    D-Dimer, Quant 303 ng/mL DDU   SPECIMEN REJECTION   Result Value Ref Range    Rejected Test dimer     Reason for Rejection see below    Microscopic Urinalysis   Result Value Ref Range    WBC, UA 2-5 0 - 5 /HPF    RBC, UA 1-3 0 - 2 /HPF    Epithelial Cells, UA MANY /HPF    Bacteria, UA RARE (A) None Seen /HPF   POC Pregnancy Urine Qual   Result Value Ref Range    HCG, Urine, POC Negative Negative    Lot Number [id number]     Positive QC Pass/Fail Pass     Negative QC Pass/Fail Pass    EKG 12 Lead   Result Value Ref Range    Ventricular Rate 95 BPM    Atrial Rate 95 BPM    P-R Interval 150 ms    QRS Duration 88 ms    Q-T Interval 344 ms    QTc Calculation (Bazett) 432 ms    P Axis 27 degrees    R Axis 72 degrees    T Axis 27 degrees       Radiology:  CTA PULMONARY W CONTRAST   Final Result   No evidence of pulmonary embolism or acute pulmonary abnormality. XR CHEST (2 VW)   Final Result   No acute process. ------------------------- NURSING NOTES AND VITALS REVIEWED ---------------------------  Date / Time Roomed:  1/26/2021 10:33 PM  ED Bed Assignment:  12/12    The nursing notes within the ED encounter and vital signs as below have been reviewed.    /79   Pulse 108   Temp 98.8 °F (37.1 °C)   Resp 18   Ht 5' 2\" (1.575 m)   Wt 240 lb (108.9 kg)   SpO2 100%   BMI 43.90 kg/m²   Oxygen Saturation Interpretation: Normal      ------------------------------------------ PROGRESS NOTES ------------------------------------------  1:19 AM EST  I have spoken with the patient and discussed todays results, in addition to providing specific details for the plan of care and counseling regarding the diagnosis and prognosis. Their questions are answered at this time and they are agreeable with the plan. I discussed at length with them reasons for immediate return here for re evaluation. They will followup with their pulmonologist and primary care physician by calling their office tomorrow. --------------------------------- ADDITIONAL PROVIDER NOTES ---------------------------------  At this time the patient is without objective evidence of an acute process requiring hospitalization or inpatient management. They have remained hemodynamically stable throughout their entire ED visit and are stable for discharge with outpatient follow-up. The plan has been discussed in detail and they are aware of the specific conditions for emergent return, as well as the importance of follow-up. New Prescriptions    No medications on file       Diagnosis:  1. Bronchitis    2. Axillary adenopathy    3. Cough    4. Nausea vomiting and diarrhea        Disposition:  Patient's disposition: Discharge to home  Patient's condition is stable.          Melissa Thrasher MD  Resident  01/27/21 [id number]       Melissa Thrasher MD  Resident  01/27/21 9721

## 2021-01-27 NOTE — ED NOTES
FIRST PROVIDER CONTACT ASSESSMENT NOTE      Department of Emergency Medicine   ED  First Provider Note   1/26/21  10:31 PM EST    Chief Complaint: Shortness of Breath, Cough (Chest Pressure), Emesis, and Diarrhea      History of Present Illness:    Ruba Hassan is a 38 y.o. female who presents to the ED by private car for sob cough   Focused Screening Exam:  Constitutional:  Alert, appears stated age and is in no distress. Heart regular rate and rhythm  Lungs clear    *ALLERGIES*     Patient has no known allergies.      ED Triage Vitals   BP Temp Temp src Pulse Resp SpO2 Height Weight   -- -- -- -- -- -- -- --        Initial Plan of Care:  Initiate Treatment-Testing, Proceed toTreatment Area When Bed Available for ED Attending/MLP to Continue Care    -----------------END OF FIRST PROVIDER CONTACT ASSESSMENT NOTE--------------  Electronically signed by MISTY Davis   DD: 1/26/21     MISTY Davis  01/26/21 223

## 2021-01-28 ENCOUNTER — CARE COORDINATION (OUTPATIENT)
Dept: OTHER | Facility: CLINIC | Age: 36
End: 2021-01-28

## 2021-01-28 NOTE — CARE COORDINATION
[facility] [facility] ED Follow Up Call    2021    Patient: Mireille Mason Patient : 1985   MRN: [id number]  Reason for Admission: cough  Discharge Date: 2021      Date/Time:  2021 12:25 PM  Attempted to reach patient by telephone. Call within 2 business days of discharge: Yes Left HIPPA compliant message requesting a return call. Will attempt to reach patient again.

## 2021-01-29 ENCOUNTER — CARE COORDINATION (OUTPATIENT)
Dept: OTHER | Facility: CLINIC | Age: 36
End: 2021-01-29

## 2021-01-29 NOTE — CARE COORDINATION
[facility] ED Follow Up Call    2021    Patient: Katherine Tilley Patient : 1985   MRN: [id number]  Reason for Admission: cough  Discharge Date: 2021      Date/Time:  2021 10:25 AM  Attempted to reach patient by telephone. Call within 2 business days of discharge: Yes Left HIPPA compliant message requesting a return call. UTR letter sent via [employer]. No further outreach scheduled with this ACM, ACM will sign off care team at this time. Episode of Care resolved. Patient has this ACM's contact information if future needs arise.

## 2021-06-04 ENCOUNTER — HOSPITAL ENCOUNTER (OUTPATIENT)
Age: 36
Discharge: HOME | End: 2021-06-06
Payer: COMMERCIAL

## 2021-06-04 DIAGNOSIS — Z01.818 PREOP TESTING: ICD-10-CM

## 2021-06-04 PROCEDURE — U0005 INFEC AGEN DETEC AMPLI PROBE: HCPCS

## 2021-06-04 PROCEDURE — U0003 INFECTIOUS AGENT DETECTION BY NUCLEIC ACID (DNA OR RNA); SEVERE ACUTE RESPIRATORY SYNDROME CORONAVIRUS 2 (SARS-COV-2) (CORONAVIRUS DISEASE [COVID-19]), AMPLIFIED PROBE TECHNIQUE, MAKING USE OF HIGH THROUGHPUT TECHNOLOGIES AS DESCRIBED BY CMS-2020-01-R: HCPCS

## 2021-06-06 ENCOUNTER — NURSE TRIAGE (OUTPATIENT)
Dept: OTHER | Facility: CLINIC | Age: 36
End: 2021-06-06

## 2021-06-06 LAB
SARS-COV-2: NOT DETECTED
SOURCE: NORMAL

## 2021-06-06 PROCEDURE — 99284 EMERGENCY DEPT VISIT MOD MDM: CPT

## 2021-06-06 PROCEDURE — 29515 APPLICATION SHORT LEG SPLINT: CPT

## 2021-06-06 ASSESSMENT — PAIN SCALES - GENERAL: PAINLEVEL_OUTOF10: 7

## 2021-06-06 ASSESSMENT — PAIN DESCRIPTION - PAIN TYPE: TYPE: ACUTE PAIN

## 2021-06-07 ENCOUNTER — APPOINTMENT (OUTPATIENT)
Dept: GENERAL RADIOLOGY | Age: 36
End: 2021-06-07
Payer: COMMERCIAL

## 2021-06-07 ENCOUNTER — HOSPITAL ENCOUNTER (EMERGENCY)
Age: 36
Discharge: HOME | End: 2021-06-07
Payer: COMMERCIAL

## 2021-06-07 VITALS
SYSTOLIC BLOOD PRESSURE: 137 MMHG | HEIGHT: 62 IN | BODY MASS INDEX: 45.27 KG/M2 | HEART RATE: 76 BPM | RESPIRATION RATE: 16 BRPM | WEIGHT: 246 LBS | TEMPERATURE: 97.8 F | OXYGEN SATURATION: 99 % | DIASTOLIC BLOOD PRESSURE: 60 MMHG

## 2021-06-07 DIAGNOSIS — S93.602A FOOT SPRAIN, LEFT, INITIAL ENCOUNTER: ICD-10-CM

## 2021-06-07 DIAGNOSIS — S93.402A SPRAIN OF LEFT ANKLE, UNSPECIFIED LIGAMENT, INITIAL ENCOUNTER: ICD-10-CM

## 2021-06-07 DIAGNOSIS — S86.002A INJURY OF LEFT ACHILLES TENDON, INITIAL ENCOUNTER: Primary | ICD-10-CM

## 2021-06-07 PROCEDURE — 73610 X-RAY EXAM OF ANKLE: CPT

## 2021-06-07 PROCEDURE — 6370000000 HC RX 637 (ALT 250 FOR IP): Performed by: PHYSICIAN ASSISTANT

## 2021-06-07 PROCEDURE — 73630 X-RAY EXAM OF FOOT: CPT

## 2021-06-07 RX ORDER — OXYCODONE HYDROCHLORIDE AND ACETAMINOPHEN 5; 325 MG/1; MG/1
1 TABLET ORAL EVERY 6 HOURS PRN
Qty: 10 TABLET | Refills: 0 | Status: ON HOLD | OUTPATIENT
Start: 2021-06-07 | End: 2021-06-09 | Stop reason: HOSPADM

## 2021-06-07 RX ORDER — IBUPROFEN 800 MG/1
800 TABLET ORAL ONCE
Status: COMPLETED | OUTPATIENT
Start: 2021-06-07 | End: 2021-06-07

## 2021-06-07 RX ORDER — NAPROXEN 500 MG/1
500 TABLET ORAL 2 TIMES DAILY
Qty: 14 TABLET | Refills: 0 | Status: ON HOLD | OUTPATIENT
Start: 2021-06-07 | End: 2021-06-09 | Stop reason: HOSPADM

## 2021-06-07 RX ORDER — ALPRAZOLAM 0.5 MG/1
0.5 TABLET ORAL 2 TIMES DAILY PRN
Status: ON HOLD | COMMUNITY
End: 2021-06-09 | Stop reason: HOSPADM

## 2021-06-07 RX ORDER — HYDROCODONE BITARTRATE AND ACETAMINOPHEN 5; 325 MG/1; MG/1
1 TABLET ORAL EVERY 6 HOURS PRN
Qty: 12 TABLET | Refills: 0 | Status: SHIPPED | OUTPATIENT
Start: 2021-06-07 | End: 2021-06-07

## 2021-06-07 RX ADMIN — IBUPROFEN 800 MG: 800 TABLET, FILM COATED ORAL at 01:01

## 2021-06-07 ASSESSMENT — PAIN SCALES - GENERAL: PAINLEVEL_OUTOF10: 6

## 2021-06-07 NOTE — TELEPHONE ENCOUNTER
Reason for Disposition   Sounds like a serious injury to the triager    Answer Assessment - Initial Assessment Questions  1. MECHANISM: \"How did the injury happen? \" (e.g., twisting injury, direct blow)      Playing with kids, went to get up and heard a pop in the left leg/ankle  and \"went to the ground\". pt fell to the ground and scraped the right knee     2. ONSET: \"When did the injury happen? \" (Minutes or hours ago)       6/6/2021- few hours prior to triage     3. LOCATION: \"Where is the injury located? \"       Left ankle and leg     4. APPEARANCE of INJURY: \"What does the injury look like? \"  (e.g., deformity of leg)      Injury is starting to swell, cool to the touch     5. SEVERITY: \"Can you put weight on that leg? \" \"Can you walk? \"       Swelling has increased to the point I can't put  on my flip flop, and I can't wiggle my toes\"     6. SIZE: For cuts, bruises, or swelling, ask: \"How large is it? \" (e.g., inches or centimeters)       Swelling in the foot and ankle - \"left leg is a little bigger\" - the left  foot is swollen to the point patient cannot wiggle toes and has loss of sensation in  the foot. 7. PAIN: \"Is there pain? \" If so, ask: \"How bad is the pain? \"  (Scale 1-10; or mild, moderate, severe)      Pain from the ankle to the back of the leg to the back of the knee  7/10    8. TETANUS: For any breaks in the skin, ask: \"When was the last tetanus booster? \"      Busted knee on the right knee when pt fell - \"may have had a tetanus shot when I got hired 6 years ago\"    9. OTHER SYMPTOMS: \"Do you have any other symptoms? \"      Toes are numb, \"cant bring my foot up\" - flex my foot, foot and leg are cool to the touch, cant wiggle toes, numbness in the toes     10. PREGNANCY: \"Is there any chance you are pregnant? \" \"When was your last menstrual period? \"        Denies - LMP - April 16th or 17th    Protocols used: LEG INJURY-ADULT-AH    Brief description of triage: pt calling to report a left leg and foot

## 2021-06-07 NOTE — ED NOTES
Bed:  HALL-06  Expected date:   Expected time:   Means of arrival:   Comments:  Julienne Becerra RN  06/07/21 [id number]

## 2021-06-07 NOTE — PROGRESS NOTES
Have you been tested for COVID  Yes     06/04/21      Have you been told you were positive for COVID No  Have you had any known exposure to someone that is positive for COVID No  Do you have a cough                   No              Do you have shortness of breath No                 Do you have a sore throat            No                Are you having chills                    No                Are you having muscle aches. No                    Please come to the hospital wearing a mask and have your significant other wear a mask as well. Both of you should check your temperature before leaving to come here,  if it is 100 or higher please call [phone] for instruction.

## 2021-06-07 NOTE — PROGRESS NOTES
[facility] PRE-ADMISSION TESTING INSTRUCTIONS    The Preadmission Testing patient is instructed accordingly using the following criteria (check applicable):    ARRIVAL INSTRUCTIONS:  [x] Parking the day of Surgery is located in the Main Entrance lot. Upon entering the door, make an immediate right to the surgery reception desk    [x] Bring photo ID and insurance card    [] Bring in a copy of Living will or Durable Power of  papers. [x] Please be sure to arrange for responsible adult to provide transportation to and from the hospital    [x] Please arrange for responsible adult to be with you for the 24 hour period post procedure due to having anesthesia      GENERAL INSTRUCTIONS:    [x] Nothing by mouth after midnight, including gum, candy, mints or water    [x] You may brush your teeth, but do not swallow any water    [x] Take medications as instructed with 1-2 oz of water    [x] Stop herbal supplements and vitamins 5 days prior to procedure    [x] Follow preop dosing of blood thinners per physician instructions    [] Take 1/2 dose of evening insulin, but no insulin after midnight    [] No oral diabetic medications after midnight    [] If diabetic and have low blood sugar or feel symptomatic, take 1-2oz apple juice only    [] Bring inhalers day of surgery    [] Bring C-PAP/ Bi-Pap day of surgery    [x] Bring urine specimen day of surgery    [x] Shower or bath with soap, lather and rinse well, AM of Surgery, no lotion, powders or creams to surgical site    [] Follow bowel prep as instructed per surgeon    [x] No tobacco products within 24 hours of surgery     [x] No alcohol or illegal drug use within 24 hours of surgery.     [x] Jewelry, body piercing's, eyeglasses, contact lenses and dentures are not permitted into surgery (bring cases)      [x] Please do not wear any nail polish, make up or hair products on the day of surgery    [x] You can expect a call the business day prior to procedure to notify you if your arrival time changes    [x] If you receive a survey after surgery we would greatly appreciate your comments    [] Parent/guardian of a minor must accompany their child and remain on the premises  the entire time they are under our care     [] Pediatric patients may bring favorite toy, blanket or comfort item with them    [] A caregiver or family member must remain with the patient during their stay if they are mentally handicapped, have dementia, disoriented or unable to use a call light or would be a safety concern if left unattended    [x] Please notify surgeon if you develop any illness between now and time of surgery (cold, cough, sore throat, fever, nausea, vomiting) or any signs of infections  including skin, wounds, and dental.    [x]  The Outpatient Pharmacy is available to fill your prescription here on your day of surgery, ask your preop nurse for details    [] Other instructions    EDUCATIONAL MATERIALS PROVIDED:    [] PAT Preoperative Education Packet/Booklet     [] Medication List    [] Transfusion bracelet applied with instructions    [] Shower with soap, lather and rinse well, and use CHG wipes provided the evening before surgery as instructed    [] Incentive spirometer with instructions

## 2021-06-07 NOTE — ED PROVIDER NOTES
[facility]  HPI:  6/7/21, Time: 12:34 AM EDT         Robles Bryant is a 38 y.o. female presenting to the ED for  Left ankle , foot injury  beginning yesterday  The complaint has been persistent, moderate in severity, and worsened by movement of left ankle . Patient comes in with complaint of left ankle foot pain. She states she was playing with her children when she noticed a pop at the back of the ankle which caused her to fall forward. She states she has had increasing swelling to the ankle foot area with some numbness tingling. Review of Systems:   A complete review of systems was performed and pertinent positives and negatives are stated within HPI, all other systems reviewed and are negative.          --------------------------------------------- PAST HISTORY ---------------------------------------------  Past Medical History:  has a past medical history of Abnormal Pap smear, Abnormal Pap smear of cervix, Anemia, Diabetes mellitus ([address] Irregular menses, Migraines, and UTI (lower urinary tract infection). Past Surgical History:  has a past surgical history that includes hernia repair and Salpingo-oophorectomy (Left, 03/27/2016). Social History:  reports that she has never smoked. She has never used smokeless tobacco. She reports that she does not drink alcohol and does not use drugs. Family History: family history includes Breast Cancer in her maternal uncle; Breast Cancer (age of onset: 48) in her maternal aunt and maternal aunt; Breast Cancer (age of onset: 80) in her maternal grandmother; Hypertension in her mother. The patients home medications have been reviewed. Allergies: Patient has no known allergies.     -------------------------------------------------- RESULTS -------------------------------------------------  All laboratory and radiology results have been personally reviewed by myself   LABS:  No results found for this visit on 06/07/21. RADIOLOGY:  Interpreted by Radiologist.  XR ANKLE LEFT (MIN 3 VIEWS)   Final Result   Negative for acute abnormality. XR FOOT LEFT (MIN 3 VIEWS)   Final Result   Negative for acute abnormality.             ------------------------- NURSING NOTES AND VITALS REVIEWED ---------------------------   The nursing notes within the ED encounter and vital signs as below have been reviewed. /63   Pulse 88   Temp 97.8 °F (36.6 °C)   Resp 16   Ht 5' 2\" (1.575 m)   Wt 246 lb (111.6 kg)   SpO2 98%   BMI 44.99 kg/m²   Oxygen Saturation Interpretation: Normal      ---------------------------------------------------PHYSICAL EXAM--------------------------------------      Constitutional/General: Alert and oriented x3, well appearing, non toxic in NAD  Head: Normocephalic and atraumatic  Eyes: PERRL, EOMI  Mouth: Oropharynx clear, handling secretions, no trismus  Neck: Supple, full ROM,   Pulmonary: Lungs clear to auscultation bilaterally, no wheezes, rales, or rhonchi. Not in respiratory distress  Cardiovascular:  Regular rate and rhythm, no murmurs, gallops, or rubs. 2+ distal pulses  Abdomen: Soft, non tender, non distended,   Extremities: Moves all extremities x 4. Warm and well perfused tenderness of the left posterior ankle with deficit noted at the Achilles tendon. Patient is able to flex extend the foot. No tenderness of the medial lateral malleolus. Tenderness of the lateral left foot. Pulses normal cap refill less than 2 seconds normal sensation. Skin: warm and dry without rash  Neurologic: GCS 15,  Psych: Normal Affect      ------------------------------ ED COURSE/MEDICAL DECISION MAKING----------------------  Medications   ibuprofen (ADVIL;MOTRIN) tablet 800 mg (800 mg Oral Given 6/7/21 0101)         ED COURSE:       Medical Decision Making:    Patient comes in with complaint of left ankle pain after playing with her children and feeling a pop in the back of the heel.   She does appear to have deficit at the Achilles tendon she is able to flex and extend the foot. She was placed in a posterior leg splint and given crutches with referral to podiatry. Counseling: The emergency provider has spoken with the patient and discussed todays results, in addition to providing specific details for the plan of care and counseling regarding the diagnosis and prognosis. Questions are answered at this time and they are agreeable with the plan.      --------------------------------- IMPRESSION AND DISPOSITION ---------------------------------    IMPRESSION  1. Injury of left Achilles tendon, initial encounter    2. Sprain of left ankle, unspecified ligament, initial encounter    3. Foot sprain, left, initial encounter        DISPOSITION  Disposition: Discharge to home  Patient condition is good      NOTE: This report was transcribed using voice recognition software.  Every effort was made to ensure accuracy; however, inadvertent computerized transcription errors may be present     Etha Nissen, [place]  06/07/21 6292

## 2021-06-08 ENCOUNTER — CARE COORDINATION (OUTPATIENT)
Dept: OTHER | Facility: CLINIC | Age: 36
End: 2021-06-08

## 2021-06-08 ENCOUNTER — ANESTHESIA EVENT (OUTPATIENT)
Dept: OPERATING ROOM | Age: 36
End: 2021-06-08
Payer: COMMERCIAL

## 2021-06-08 NOTE — CARE COORDINATION
[facility] ED Follow Up Call    2021    Patient: Guadalupe Feliz Patient : 1985   MRN: [id number]  Reason for Admission: Left achilles tendon injury, Left ankle & foot sprain  Discharge Date: 2021      Care Transitions ED Follow Up    Care Transitions Interventions  Do you have any ongoing symptoms?: Yes   Onset of Patient-reported symptoms: In the past 7 days   Patient-reported symptoms: Pain   Do you have a copy of your discharge instructions?: Yes   Do you understand what to report and when to return?: Yes   Are you following your discharge instructions?: Yes   Do you have all of your prescriptions and are they filled?: Yes   Have you scheduled your follow up appointment?: No   Were you discharged with any Home Care or Post Acute Services or do you currently have any active services?: No              Needs to be reviewed by the provider   Additional needs identified to be addressed with provider No  none           Discussed COVID-19 related testing which was available at this time. Test results were negative. Patient informed of results, if available? No; COVID testing was not performed in ED but rather performed on 2021 for upcoming OP surgery. Ambulatory Care Manager [facility]) contacted the patient by telephone to perform post ED visit assessment. Call within 2 business days of discharge: Yes. Verified name and  with patient as identifiers. Patient reports still with left lower leg/foot pain that she currently rates as \"6\"/10. Reports decreased swelling since ED visit yesterday and states splint to LLE now looser. States she has been elevating LLE with sitting. States she called Dr. Cheryl Whitmore office, as listed on ED AVS for podiatry follow up and was told they are out of network. [facility] offered to send list of in network podiatrists to patient; patient accepted offer. [facility] sent list to patient via [address] [facility] to follow up with patient tomorrow morning.  Patient states has OP gynecological surgery scheduled tomorrow with hospital arrival time of noon. Reports being nervous due to current condition of left lower leg/foot and having pre-scheduled surgery. Patient states not sleeping well with current LLE injury but states does not normally sleep well due to anxiety. Patient states was given Percocet (instead of Norco; due to Norco allergy). States taking Percocet when able as it causes drowsiness and she needs to be alert to care for her kids. States using crutches with ambulation and states this is difficult. Reports ambulating as little as possible due to pain and difficulty ambulating with crutches. States has someone to transport her to surgery tomorrow. Interventions:    Counseling and education provided at today's visit on:   Red Flag symptoms to report  Symptom management  Medication compliance  Specialist appointment compliance  Activity limitations  Reinforced Discharge instructions  Advanced Care Planning education  Falls prevention and home safety measures    Medication reconciliation was performed with patient, who verbalizes understanding of administration of home medications. Advised obtaining a 90-day supply of all daily and as-needed medications. Reinforced resources available to patient including: PCP, Specialist and Benefits related nurse triage line. ACM encouraged outreach to Nurse Access Line and/or ACM for assessment and intervention guidance as needed. ACM encouraged patient to contact 911 for life threatening emergencies and PCP office 24/7 for non life-threatening symptoms. Reminded patient of alternatives to ED such as urgent care, walk in clinics and e-visits as available. Reviewed proper ED utilization using Right Care, Right Place, Right Time Flyer. Discussed follow-up appointments. If no appointment was previously scheduled, appointment scheduling offered: Yes Is follow up appointment scheduled within 7 days of discharge?  Podiatrist follow up still needs scheduled. ACM will assist with scheduling once patient determines what provider is preferred. [facility] follow up appointment(s):   Future Appointments   Date Time Provider [place]   6/9/2021  1:50 PM SCHEDULE, [facility]   6/19/2021 10:00 PM SE[facility] SLEEP LAB BEDROOM 2 SEB SLEEP [place] HOD   8/5/2021  9:00 AM [address], APRN - CNP [facility]     Non-[facility] follow up appointment(s): None known    Plan:  Continue daily outreaches to provide telephonic support, education and resources as needed. Discuss / follow up on:   Provider follow up appointment compliance  Goal Progress      Patient verbalized understanding and is agreeable. Goals      Conditions and Symptoms      I will schedule office visits, as directed by my provider. I will keep my appointment or reschedule if I have to cancel. I will notify my provider of any barriers to my plan of care. I will notify my provider of any symptoms that indicate a worsening of my condition. Barriers: was referred to out of network podiatrist for follow up  Plan for overcoming my barriers: I will work with my ACM to overcome barriers  Confidence: 8/10  Anticipated Goal Completion Date: 6/20/2021 6/8/2021: ACM sent list of in network podiatrists to patient via [employer] this date. ACM reviewed red flags and nurse access line with patient. Plan:  -ACM to send in network podiatrist list to patient via [address] (done)    -ACM to follow up with patient tomorrow to get name of chosen provider and discuss getting initial office visit scheduled. Anderson Price RN BSN  Associate Care Manager  Phone: [phone]  Email: [email or web address]

## 2021-06-09 ENCOUNTER — HOSPITAL ENCOUNTER (OUTPATIENT)
Age: 36
Setting detail: OUTPATIENT SURGERY
Discharge: HOME | End: 2021-06-09
Attending: OBSTETRICS & GYNECOLOGY | Admitting: OBSTETRICS & GYNECOLOGY
Payer: COMMERCIAL

## 2021-06-09 ENCOUNTER — ANESTHESIA (OUTPATIENT)
Dept: OPERATING ROOM | Age: 36
End: 2021-06-09
Payer: COMMERCIAL

## 2021-06-09 ENCOUNTER — CARE COORDINATION (OUTPATIENT)
Dept: OTHER | Facility: CLINIC | Age: 36
End: 2021-06-09

## 2021-06-09 VITALS
TEMPERATURE: 96.4 F | DIASTOLIC BLOOD PRESSURE: 59 MMHG | OXYGEN SATURATION: 98 % | SYSTOLIC BLOOD PRESSURE: 101 MMHG | RESPIRATION RATE: 15 BRPM

## 2021-06-09 VITALS
SYSTOLIC BLOOD PRESSURE: 134 MMHG | DIASTOLIC BLOOD PRESSURE: 66 MMHG | WEIGHT: 246 LBS | HEIGHT: 62 IN | HEART RATE: 82 BPM | TEMPERATURE: 97.5 F | BODY MASS INDEX: 45.27 KG/M2 | OXYGEN SATURATION: 97 % | RESPIRATION RATE: 16 BRPM

## 2021-06-09 DIAGNOSIS — Z01.818 PREOP TESTING: Primary | ICD-10-CM

## 2021-06-09 LAB
HCG, URINE, POC: NEGATIVE
HCT VFR BLD CALC: 29.6 % (ref 34–48)
HEMOGLOBIN: 8.9 G/DL (ref 11.5–15.5)
Lab: NORMAL
MCH RBC QN AUTO: 22.8 PG (ref 26–35)
MCHC RBC AUTO-ENTMCNC: 30.1 % (ref 32–34.5)
MCV RBC AUTO: 75.9 FL (ref 80–99.9)
NEGATIVE QC PASS/FAIL: NORMAL
PDW BLD-RTO: 18.6 FL (ref 11.5–15)
PLATELET # BLD: 219 E9/L (ref 130–450)
PMV BLD AUTO: 9 FL (ref 7–12)
POSITIVE QC PASS/FAIL: NORMAL
RBC # BLD: 3.9 E12/L (ref 3.5–5.5)
WBC # BLD: 7.5 E9/L (ref 4.5–11.5)

## 2021-06-09 PROCEDURE — C1886 CATHETER, ABLATION: HCPCS | Performed by: OBSTETRICS & GYNECOLOGY

## 2021-06-09 PROCEDURE — 3600000014 HC SURGERY LEVEL 4 ADDTL 15MIN: Performed by: OBSTETRICS & GYNECOLOGY

## 2021-06-09 PROCEDURE — 2500000003 HC RX 250 WO HCPCS: Performed by: NURSE ANESTHETIST, CERTIFIED REGISTERED

## 2021-06-09 PROCEDURE — 6360000002 HC RX W HCPCS: Performed by: NURSE ANESTHETIST, CERTIFIED REGISTERED

## 2021-06-09 PROCEDURE — 36415 COLL VENOUS BLD VENIPUNCTURE: CPT

## 2021-06-09 PROCEDURE — 7100000000 HC PACU RECOVERY - FIRST 15 MIN: Performed by: OBSTETRICS & GYNECOLOGY

## 2021-06-09 PROCEDURE — 7100000001 HC PACU RECOVERY - ADDTL 15 MIN: Performed by: OBSTETRICS & GYNECOLOGY

## 2021-06-09 PROCEDURE — 3700000001 HC ADD 15 MINUTES (ANESTHESIA): Performed by: OBSTETRICS & GYNECOLOGY

## 2021-06-09 PROCEDURE — 3700000000 HC ANESTHESIA ATTENDED CARE: Performed by: OBSTETRICS & GYNECOLOGY

## 2021-06-09 PROCEDURE — 7100000010 HC PHASE II RECOVERY - FIRST 15 MIN: Performed by: OBSTETRICS & GYNECOLOGY

## 2021-06-09 PROCEDURE — 2580000003 HC RX 258: Performed by: NURSE ANESTHETIST, CERTIFIED REGISTERED

## 2021-06-09 PROCEDURE — 2709999900 HC NON-CHARGEABLE SUPPLY: Performed by: OBSTETRICS & GYNECOLOGY

## 2021-06-09 PROCEDURE — 88305 TISSUE EXAM BY PATHOLOGIST: CPT

## 2021-06-09 PROCEDURE — 3600000004 HC SURGERY LEVEL 4 BASE: Performed by: OBSTETRICS & GYNECOLOGY

## 2021-06-09 PROCEDURE — 85027 COMPLETE CBC AUTOMATED: CPT

## 2021-06-09 PROCEDURE — 7100000011 HC PHASE II RECOVERY - ADDTL 15 MIN: Performed by: OBSTETRICS & GYNECOLOGY

## 2021-06-09 RX ORDER — MIDAZOLAM HYDROCHLORIDE 1 MG/ML
INJECTION INTRAMUSCULAR; INTRAVENOUS PRN
Status: DISCONTINUED | OUTPATIENT
Start: 2021-06-09 | End: 2021-06-09 | Stop reason: SDUPTHER

## 2021-06-09 RX ORDER — GLYCOPYRROLATE 1 MG/5 ML
SYRINGE (ML) INTRAVENOUS PRN
Status: DISCONTINUED | OUTPATIENT
Start: 2021-06-09 | End: 2021-06-09 | Stop reason: SDUPTHER

## 2021-06-09 RX ORDER — FENTANYL CITRATE 50 UG/ML
INJECTION, SOLUTION INTRAMUSCULAR; INTRAVENOUS PRN
Status: DISCONTINUED | OUTPATIENT
Start: 2021-06-09 | End: 2021-06-09 | Stop reason: SDUPTHER

## 2021-06-09 RX ORDER — SUCCINYLCHOLINE/SOD CL,ISO/PF 200MG/10ML
SYRINGE (ML) INTRAVENOUS PRN
Status: DISCONTINUED | OUTPATIENT
Start: 2021-06-09 | End: 2021-06-09 | Stop reason: SDUPTHER

## 2021-06-09 RX ORDER — PROPOFOL 10 MG/ML
INJECTION, EMULSION INTRAVENOUS PRN
Status: DISCONTINUED | OUTPATIENT
Start: 2021-06-09 | End: 2021-06-09 | Stop reason: SDUPTHER

## 2021-06-09 RX ORDER — ONDANSETRON 2 MG/ML
INJECTION INTRAMUSCULAR; INTRAVENOUS PRN
Status: DISCONTINUED | OUTPATIENT
Start: 2021-06-09 | End: 2021-06-09 | Stop reason: SDUPTHER

## 2021-06-09 RX ORDER — DEXAMETHASONE SODIUM PHOSPHATE 4 MG/ML
INJECTION, SOLUTION INTRA-ARTICULAR; INTRALESIONAL; INTRAMUSCULAR; INTRAVENOUS; SOFT TISSUE PRN
Status: DISCONTINUED | OUTPATIENT
Start: 2021-06-09 | End: 2021-06-09 | Stop reason: SDUPTHER

## 2021-06-09 RX ORDER — FENTANYL CITRATE 50 UG/ML
25 INJECTION, SOLUTION INTRAMUSCULAR; INTRAVENOUS EVERY 5 MIN PRN
Status: DISCONTINUED | OUTPATIENT
Start: 2021-06-09 | End: 2021-06-09 | Stop reason: HOSPADM

## 2021-06-09 RX ORDER — IBUPROFEN 800 MG/1
800 TABLET ORAL EVERY 8 HOURS PRN
Qty: 30 TABLET | Refills: 0 | Status: SHIPPED | OUTPATIENT
Start: 2021-06-09

## 2021-06-09 RX ORDER — LIDOCAINE HYDROCHLORIDE 20 MG/ML
INJECTION, SOLUTION INFILTRATION; PERINEURAL PRN
Status: DISCONTINUED | OUTPATIENT
Start: 2021-06-09 | End: 2021-06-09 | Stop reason: SDUPTHER

## 2021-06-09 RX ORDER — SODIUM CHLORIDE 9 MG/ML
INJECTION, SOLUTION INTRAVENOUS CONTINUOUS PRN
Status: DISCONTINUED | OUTPATIENT
Start: 2021-06-09 | End: 2021-06-09 | Stop reason: SDUPTHER

## 2021-06-09 RX ORDER — ESMOLOL HYDROCHLORIDE 10 MG/ML
INJECTION INTRAVENOUS PRN
Status: DISCONTINUED | OUTPATIENT
Start: 2021-06-09 | End: 2021-06-09 | Stop reason: SDUPTHER

## 2021-06-09 RX ADMIN — FENTANYL CITRATE 50 MCG: 50 INJECTION, SOLUTION INTRAMUSCULAR; INTRAVENOUS at 14:07

## 2021-06-09 RX ADMIN — FENTANYL CITRATE 50 MCG: 50 INJECTION, SOLUTION INTRAMUSCULAR; INTRAVENOUS at 14:17

## 2021-06-09 RX ADMIN — ONDANSETRON 4 MG: 2 INJECTION INTRAMUSCULAR; INTRAVENOUS at 14:22

## 2021-06-09 RX ADMIN — LIDOCAINE HYDROCHLORIDE 50 MG: 20 INJECTION, SOLUTION INFILTRATION; PERINEURAL at 14:07

## 2021-06-09 RX ADMIN — Medication 12 MCG: at 14:07

## 2021-06-09 RX ADMIN — MIDAZOLAM 2 MG: 1 INJECTION INTRAMUSCULAR; INTRAVENOUS at 13:57

## 2021-06-09 RX ADMIN — Medication 140 MG: at 14:07

## 2021-06-09 RX ADMIN — SODIUM CHLORIDE: 9 INJECTION, SOLUTION INTRAVENOUS at 13:57

## 2021-06-09 RX ADMIN — ESMOLOL HYDROCHLORIDE 30 MG: 10 INJECTION, SOLUTION INTRAVENOUS at 14:12

## 2021-06-09 RX ADMIN — DEXAMETHASONE SODIUM PHOSPHATE 8 MG: 4 INJECTION, SOLUTION INTRAMUSCULAR; INTRAVENOUS at 14:11

## 2021-06-09 RX ADMIN — ESMOLOL HYDROCHLORIDE 20 MG: 10 INJECTION, SOLUTION INTRAVENOUS at 14:40

## 2021-06-09 RX ADMIN — PROPOFOL 200 MG: 10 INJECTION, EMULSION INTRAVENOUS at 14:07

## 2021-06-09 ASSESSMENT — PULMONARY FUNCTION TESTS
PIF_VALUE: 5
PIF_VALUE: 2
PIF_VALUE: 1
PIF_VALUE: 22
PIF_VALUE: 2
PIF_VALUE: 28
PIF_VALUE: 8
PIF_VALUE: 5
PIF_VALUE: 7
PIF_VALUE: 7
PIF_VALUE: 22
PIF_VALUE: 3
PIF_VALUE: 22
PIF_VALUE: 20
PIF_VALUE: 31
PIF_VALUE: 22
PIF_VALUE: 1
PIF_VALUE: 22
PIF_VALUE: 22
PIF_VALUE: 21
PIF_VALUE: 22
PIF_VALUE: 22
PIF_VALUE: 31
PIF_VALUE: 16
PIF_VALUE: 0
PIF_VALUE: 16
PIF_VALUE: 21
PIF_VALUE: 2
PIF_VALUE: 5
PIF_VALUE: 22
PIF_VALUE: 22
PIF_VALUE: 28
PIF_VALUE: 4
PIF_VALUE: 13
PIF_VALUE: 22
PIF_VALUE: 21
PIF_VALUE: 15
PIF_VALUE: 22
PIF_VALUE: 22
PIF_VALUE: 4
PIF_VALUE: 7
PIF_VALUE: 0
PIF_VALUE: 22
PIF_VALUE: 22

## 2021-06-09 ASSESSMENT — PAIN DESCRIPTION - LOCATION: LOCATION: ABDOMEN

## 2021-06-09 ASSESSMENT — PAIN SCALES - GENERAL
PAINLEVEL_OUTOF10: 2

## 2021-06-09 ASSESSMENT — PAIN - FUNCTIONAL ASSESSMENT: PAIN_FUNCTIONAL_ASSESSMENT: 0-10

## 2021-06-09 ASSESSMENT — PAIN DESCRIPTION - DESCRIPTORS: DESCRIPTORS: DISCOMFORT

## 2021-06-09 ASSESSMENT — PAIN DESCRIPTION - PAIN TYPE: TYPE: SURGICAL PAIN

## 2021-06-09 ASSESSMENT — LIFESTYLE VARIABLES: SMOKING_STATUS: 0

## 2021-06-09 NOTE — PROGRESS NOTES
Went over discharge instructions with patient and family member. Both verbalized understanding of instructions. Went to go an  prescriptions then to the car.

## 2021-06-09 NOTE — H&P
Department of Gynecology  Attending Pre-operative History and Physical        DIAGNOSIS:  Menorrhagia    INDICATION:  same    PROCEDURE:  [facility] Ablation    CHIEF COMPLAINT:   As above    Reason for Admission:  surgery    History obtained from patient    HISTORY OF PRESENT ILLNESS:                     The patient is a 38 y.o. female with no significant past medical history  who presents with menorrhagia      Past Medical History:        Diagnosis Date    Abnormal Pap smear     abnormal cells     Abnormal Pap smear of cervix     years ago    Anemia     Iron deficiency    Anxiety     Diabetes mellitus ([facility] [address]     GDM-diet controlled    Gastritis     History of palpitations     and tachycardia with anxiety    History of sleep study to be completed 21    Irregular menses     Left ankle injury 2021    Migraines     Uses crutches 2021    left foot injury    UTI (lower urinary tract infection)      Past Surgical History:        Procedure Laterality Date    HERNIA REPAIR      SALPINGO-OOPHORECTOMY Left 2016    laparoscopic left side       Past Gynecological History:        OB History    Para Term  AB Living   3 2 0 2 0 2   SAB TAB Ectopic Molar Multiple Live Births   0 0 0   0 2      # Outcome Date GA Lbr Oc/2nd Weight Sex Delivery Anes PTL Lv   3             2  13 35w3d    Vag-Spont   REBEKAH   1  12 35w2d    Vag-Spont   REBEKAH       Medications Prior to Admission:   Medications Prior to Admission: naproxen (NAPROSYN) 500 MG tablet, Take 1 tablet by mouth 2 times daily for 7 days  oxyCODONE-acetaminophen (PERCOCET) 5-325 MG per tablet, Take 1 tablet by mouth every 6 hours as needed for Pain for up to 5 days.   ALPRAZolam (XANAX) 0.5 MG tablet, Take 0.5 mg by mouth 2 times daily as needed for Anxiety.  ergocalciferol (ERGOCALCIFEROL) 1.25 MG (65966 UT) capsule, Takes on   escitalopram (LEXAPRO) 10 MG tablet, take 1 tablet by mouth every morning  pantoprazole (PROTONIX) 40 MG tablet, take 1 tablet by mouth every morning  traZODone (DESYREL) 50 MG tablet, Takes PRN  fluticasone (FLONASE) 50 MCG/ACT nasal spray, instill 1 spray into each nostril daily  budesonide-formoterol (SYMBICORT) 160-4.5 MCG/ACT AERO, Inhale 1 puff into the lungs 2 times daily  ferrous sulfate (IRON 325) 325 (65 Fe) MG tablet, Take 325 mg by mouth daily (with breakfast)  montelukast (SINGULAIR) 10 MG tablet, take 1 tablet by mouth once daily  rizatriptan (MAXALT-MLT) 10 MG disintegrating tablet, dissolve 1 tablet ON TONGUE AT ONSET OF HEADACHE may repeat in 2 hours IF headache PERSISTS  hydrOXYzine (VISTARIL) 25 MG capsule, take 1 capsule by mouth once daily if needed    Allergies:  Norco [hydrocodone-acetaminophen] and Pcn [penicillins]     Social History:  TOBACCO:   reports that she has never smoked.  She has never used smokeless tobacco.    Family History:       Problem Relation Age of Onset    Hypertension Mother     Breast Cancer Maternal Aunt 48        breast cancer    Breast Cancer Maternal Uncle         colon cancer    Breast Cancer Maternal Grandmother 80        breast cancer    Breast Cancer Maternal Aunt 50        breast cancer       REVIEW OF SYSTEMS:      Constitutional:  negative  Eyes:  negative  Ears, nose, mouth, throat, and face:  negative  Respiratory:  negative  Cardiovascular:  negative  Gastrointestinal:  negative  Genitourinary:  negative  Integument/breast:  negative  Hematologic/lymphatic:  negative  Allergic/Immunologic:  negative  Endocrine:  negative  Musculoskeletal:  negative  Neurological:  negative  Behavior/Psych:  negative    PHYSICAL EXAM:    Vitals:  BP (!) 169/83   Pulse 77   Temp 98.1 °F (36.7 °C) (Temporal)   Resp 18   Ht 5' 2\" (1.575 m)   Wt 246 lb (111.6 kg)   LMP 04/17/2021 (Approximate)   SpO2 100%   Breastfeeding No   BMI 44.99 kg/m²     Eyes:  Lids and lashes normal, pupils equal, round and reactive to light, extra ocular muscles intact, sclera clear, conjunctiva normal    Head/ENT:  normocepalic, without obvious abnormality    Neck:  skin normal    Heart:  RRR    Lungs:  No increased work of breathing, good air exchange, clear to auscultation bilaterally, no crackles or wheezing    Abdomen:  Soft, nt    Extremities:  No clubbing, cyanosis, or edema          DATA:  Radiology Review:    GYN Sonogram / SIS   Transvaginal Imaging       Findings: The Uterus measures 9.2 x 7.3 x 7.1 cm and is normal in size, shape and echogenicity.  The endometrium is  8.1 mm in thickness.           The RT Ovary measures 5.9 x 4.7 x 2.2 cm   The LT Ovary is surgically absent       There is no fluid in the Posterior Cul-de-sac.  The adnexae are seen unremarkable.                Impression: Endometrial cavity was seen as NORMAL during the SIS procedure.       Reviewed, Interpreted and Approved by : Dr. Rupesh Zafar:      1. Patient is a 38 y.o. female with above specified procedure planned [facility] ABlation with anesthesia    2. Procedure options, risks and benefits reviewed with patient. Patient expresses understanding.

## 2021-06-09 NOTE — ANESTHESIA PRE PROCEDURE
Department of Anesthesiology  Preprocedure Note       Name:  Aayush Aguayo   Age:  38 y.o.  :  1985                                          MRN:  [id number]         Date:  2021      Surgeon: Celio Patrick):  Rob Sandoval DO    Procedure: Procedure(s):  DILATATION AND CURETTAGE HYSTEROSCOPY NOVASURE ABLATION    Medications prior to admission:   Prior to Admission medications    Medication Sig Start Date End Date Taking? Authorizing Provider   naproxen (NAPROSYN) 500 MG tablet Take 1 tablet by mouth 2 times daily for 7 days 21 Yes MISTY Multani   oxyCODONE-acetaminophen (PERCOCET) 5-325 MG per tablet Take 1 tablet by mouth every 6 hours as needed for Pain for up to 5 days. 21 Yes MISTY Multani   ALPRAZolam (XANAX) 0.5 MG tablet Take 0.5 mg by mouth 2 times daily as needed for Anxiety.    Yes Historical Provider, MD   ergocalciferol (ERGOCALCIFEROL) 1.25 MG (23724 UT) capsule Takes on Mondays 2/10/21  Yes Historical Provider, MD   escitalopram (LEXAPRO) 10 MG tablet take 1 tablet by mouth every morning 2/10/21  Yes Historical Provider, MD   pantoprazole (PROTONIX) 40 MG tablet take 1 tablet by mouth every morning 2/10/21  Yes Historical Provider, MD   traZODone (DESYREL) 50 MG tablet Takes PRN 2/10/21  Yes Historical Provider, MD   fluticasone (FLONASE) 50 MCG/ACT nasal spray instill 1 spray into each nostril daily 2/10/21  Yes Historical Provider, MD   budesonide-formoterol (SYMBICORT) 160-4.5 MCG/ACT AERO Inhale 1 puff into the lungs 2 times daily 21  Yes Historical Provider, MD   ferrous sulfate (IRON 325) 325 (65 Fe) MG tablet Take 325 mg by mouth daily (with breakfast)   Yes Historical Provider, MD   montelukast (SINGULAIR) 10 MG tablet take 1 tablet by mouth once daily 2/10/21   Historical Provider, MD   rizatriptan (MAXALT-MLT) 10 MG disintegrating tablet dissolve 1 tablet ON TONGUE AT ONSET OF HEADACHE may repeat in 2 hours IF headache PERSISTS 2/10/21 Historical Provider, MD   hydrOXYzine (VISTARIL) 25 MG capsule take 1 capsule by mouth once daily if needed 20   Historical Provider, MD       Current medications:    No current facility-administered medications for this encounter. Allergies:     Allergies   Allergen Reactions    Norco [Hydrocodone-Acetaminophen] Nausea And Vomiting    Pcn [Penicillins] Nausea And Vomiting       Problem List:    Patient Active Problem List   Diagnosis Code    Abnormal Pap smear     Anemia D64.9    Threatened , antepartum O20.0    Vaginal bleeding before 22 weeks gestation O20.9    Right ovarian cyst N83.201     contractions O47.9    Threatened premature labor, third trimester O47.03    Maternal morbid obesity in third trimester, antepartum ([facility] [address] O99.213, E66.01    28 weeks gestation of pregnancy Z3A.28    Suspected shortening of cervix not found Z03.75    Previous  delivery in third trimester, antepartum O12.26    Encounter for fetal anatomic survey Z36.89    Urinary tract infection during pregnancy, third trimester O23.43    Spotting complicating pregnancy, third trimester O26.853    Upper respiratory symptom R09.89    39 weeks gestation of pregnancy Z3A.39       Past Medical History:        Diagnosis Date    Abnormal Pap smear     abnormal cells     Abnormal Pap smear of cervix     years ago    Anemia     Iron deficiency    Anxiety     Diabetes mellitus ([facility] [address]     GDM-diet controlled    Gastritis     History of palpitations     and tachycardia with anxiety    History of sleep study to be completed 21    Irregular menses     Left ankle injury 2021    Migraines     Uses crutches 2021    left foot injury    UTI (lower urinary tract infection)        Past Surgical History:        Procedure Laterality Date    HERNIA REPAIR      SALPINGO-OOPHORECTOMY Left 2016    laparoscopic left side       Social History:    Social History     Tobacco Use    Smoking status: Never Smoker    Smokeless tobacco: Never Used   Substance Use Topics    Alcohol use: Yes     Comment: Social                                Counseling given: Not Answered      Vital Signs (Current):   Vitals:    06/07/21 1148 06/09/21 1154   BP:  (!) 169/83   Pulse:  77   Resp:  18   Temp:  98.1 °F (36.7 °C)   TempSrc:  Temporal   SpO2:  100%   Weight: 246 lb (111.6 kg)    Height: 5' 2\" (1.575 m)                                               BP Readings from Last 3 Encounters:   06/09/21 (!) 169/83   06/07/21 137/60   05/10/21 124/76       NPO Status: Time of last liquid consumption: 2000                        Time of last solid consumption: 2000                        Date of last liquid consumption: 06/08/21                        Date of last solid food consumption: 06/08/21    BMI:   Wt Readings from Last 3 Encounters:   06/07/21 246 lb (111.6 kg)   06/06/21 246 lb (111.6 kg)   05/10/21 246 lb 9.6 oz (111.9 kg)     Body mass index is 44.99 kg/m². CBC:   Lab Results   Component Value Date    WBC 7.5 06/09/2021    RBC 3.90 06/09/2021    HGB 8.9 06/09/2021    HCT 29.6 06/09/2021    MCV 75.9 06/09/2021    RDW 18.6 06/09/2021     06/09/2021       CMP:   Lab Results   Component Value Date     01/26/2021    K 4.3 01/26/2021     01/26/2021    CO2 22 01/26/2021    BUN 14 01/26/2021    CREATININE 0.7 01/26/2021    GFRAA >60 01/26/2021    LABGLOM >60 01/26/2021    GLUCOSE 120 01/26/2021    GLUCOSE 94 09/03/2011    PROT 7.5 01/26/2021    CALCIUM 8.9 01/26/2021    BILITOT <0.2 01/26/2021    ALKPHOS 77 01/26/2021    AST 18 01/26/2021    ALT 12 01/26/2021       POC Tests: No results for input(s): POCGLU, POCNA, POCK, POCCL, POCBUN, POCHEMO, POCHCT in the last 72 hours.     Coags: No results found for: PROTIME, INR, APTT    HCG (If Applicable):   Lab Results   Component Value Date    PREGTESTUR negative 04/20/2021    PREGSERUM POSITIVE 09/03/2011    HCG 335564.1 (H) 09/03/2011

## 2021-06-09 NOTE — BRIEF OP NOTE
Brief Postoperative Note      Patient: Robles Bryant  YOB: 1985  MRN: [id number]    Date of Procedure: 6/9/2021    Pre-Op Diagnosis: MENORRHAGIA  ENDOMETRIAL POLYP    Post-Op Diagnosis: Same       Procedure(s):  DILATATION AND CURETTAGE HYSTEROSCOPY NOVASURE ABLATION    Surgeon(s):  Hailey Contreras DO    Assistant:  * No surgical staff found *    Anesthesia: General    Estimated Blood Loss (mL): less than 50     Complications: None    Specimens:   ID Type Source Tests Collected by Time Destination   A : ENDOMETRIAL CURETTINGS Tissue Tissue SURGICAL PATHOLOGY Hailey Contreras DO 6/9/2021 1424        Implants:  * No implants in log *      Drains: * No LDAs found *    Findings: large amount of tissue on curettage; uterus sounded almost 10cm and using cavity length of 6 and cavity width of 2.0cm at a power of 66 the cavity was ablated for 2 minutes    Electronically signed by Hailey Contreras DO on 6/9/2021 at 2:36 PM

## 2021-06-10 NOTE — OP NOTE
[address] [address]                                OPERATIVE REPORT    PATIENT NAME: Ines Boyce                 :        1985  MED REC NO:   [id number]                            ROOM:  ACCOUNT NO:   [de-identified]                           ADMIT DATE: 2021  PROVIDER:     Hernán Garcia DO    DATE OF PROCEDURE:  2021    PREOPERATIVE DIAGNOSES:  Menorrhagia, endometrial polyp, anemia. POSTOPERATIVE DIAGNOSES:  Menorrhagia, endometrial polyp, anemia. Pending pathology. PROCEDURE PERFORMED:  D and C, hysteroscopy with NovaSure ablation. SURGEON:  Hernán Garcia DO.    ANESTHESIA:  General.    ESTIMATED BLOOD LOSS:  Minimal.    COMPLICATIONS:  None. FINDINGS:  Uterus sounded to 10 cm using a cavity length of 6 and a  cavity width of 2.0. At a power of 66, the cavity was ablated for 2  minutes. Large amount of tissue obtained on curettage. DESCRIPTION OF PROCEDURE:  The patient was taken to the operating room. She was placed in dorsal supine position and given general anesthesia,  then placed in dorsal lithotomy position and prepped and draped in  sterile fashion. Sterile weighted speculum was placed in the vagina. The anterior lip of  the cervix was grasped with a single-tooth tenaculum. The uterus was  then gently sounded to the measurement noted above. The cervix was then  gently dilated. The video hysteroscope was gently introduced. Using  saline infusion the cavity was noted, with the findings as noted above. After this was performed, a sharp curettage was performed resulting in a  moderate amount of tissue. At this point in time, the NovaSure ablation  device was gently introduced to the cavity. Using the measurements  obtained on length and width, the cavity was ablated, with the  measurements and findings as above.  After the end of the procedure, the  NovaSure ablation device was removed. The cavity was visualized one more  time with the video hysteroscope, noting the ablated cavity. Good hemostasis was noted. All instruments were removed. The patient  tolerated the procedure well. She was transferred to recovery in stable  condition.         Josephine Hernandez DO    D: 06/09/2021 14:38:08       T: 06/09/2021 23:52:11     [id number]  Job#: [id number]     Doc#: [id number]    CC:

## 2021-06-10 NOTE — ANESTHESIA POSTPROCEDURE EVALUATION
Department of Anesthesiology  Postprocedure Note    Patient: Fredi Pillai  MRN: [id number]  YOB: 1985  Date of evaluation: 6/9/2021  Time:  8:04 PM     Procedure Summary     Date: 06/09/21 Room / Location: [facility] OR [address] [place] [address]    Anesthesia Start: 9124 Anesthesia Stop: 1449    Procedure: DILATATION AND CURETTAGE HYSTEROSCOPY NOVASURE ABLATION (N/A Vagina ) Diagnosis: (MENORRHAGIA  ENDOMETRIAL POLYP)    Surgeons: Patricia Meadows DO Responsible Provider: Niraj Mckeon MD    Anesthesia Type: general ASA Status: 3          Anesthesia Type: general    Julius Phase I: Julius Score: 10    Julius Phase II: Julius Score: 10    Last vitals: Reviewed and per EMR flowsheets.        Anesthesia Post Evaluation    Patient location during evaluation: PACU  Patient participation: complete - patient participated  Level of consciousness: awake and alert  Pain score: 2  Airway patency: patent  Nausea & Vomiting: no vomiting and no nausea  Complications: no  Cardiovascular status: hemodynamically stable  Respiratory status: spontaneous ventilation  Hydration status: stable

## 2021-06-11 ENCOUNTER — CARE COORDINATION (OUTPATIENT)
Dept: OTHER | Facility: CLINIC | Age: 36
End: 2021-06-11

## 2021-06-11 RX ORDER — TRAZODONE HYDROCHLORIDE 50 MG/1
50 TABLET ORAL NIGHTLY
COMMUNITY
End: 2021-08-27 | Stop reason: ALTCHOICE

## 2021-06-11 RX ORDER — NAPROXEN 500 MG/1
500 TABLET ORAL 2 TIMES DAILY WITH MEALS
COMMUNITY
End: 2021-06-16 | Stop reason: ALTCHOICE

## 2021-06-11 RX ORDER — OXYCODONE HYDROCHLORIDE AND ACETAMINOPHEN 5; 325 MG/1; MG/1
1 TABLET ORAL EVERY 6 HOURS PRN
COMMUNITY
End: 2021-06-16 | Stop reason: ALTCHOICE

## 2021-06-11 RX ORDER — HYDROXYZINE PAMOATE 25 MG/1
25 CAPSULE ORAL DAILY PRN
COMMUNITY
End: 2021-06-16 | Stop reason: ALTCHOICE

## 2021-06-11 RX ORDER — ALPRAZOLAM 0.5 MG/1
0.5 TABLET ORAL 3 TIMES DAILY PRN
Status: ON HOLD | COMMUNITY
End: 2021-09-04 | Stop reason: HOSPADM

## 2021-06-11 NOTE — CARE COORDINATION
[facility] Transitions Initial Follow Up Call    Call within 2 business days of discharge: Yes    Patient: Dustin Nicolas Patient : 1985   MRN: [id number]  Reason for Admission: D&C, Hysteroscopy, Novasure ablation  Discharge Date: 21 RARS: No data recorded    Last Discharge [facility]       Complaint Diagnosis Description Type Department Provider    21  Preop testing Admission (Discharged) Corbin Torres DO           Spoke with: Corie Ventura, patient    Facility: [address] [place] [address],[address]    Non-face-to-face services provided:  Obtained and reviewed discharge summary and/or continuity of care documents  Assessment and support for treatment adherence and medication management-complete medication review performed with patient    Care Transitions 24 Hour Call    Do you have any ongoing symptoms?: No  Do you have a copy of your discharge instructions?: Yes  Do you have all of your prescriptions and are they filled?: Yes  Have you scheduled your follow up appointment?: Yes  How are you going to get to your appointment?: Car - family or friend to transport  Were you discharged with any Home Care or Post Acute Services: No  Care Transitions Interventions       Transitions of Care Initial Call    Was this an external facility discharge? No Discharge Facility: [address] [place] [address] to be reviewed by the provider   Additional needs identified to be addressed with provider: No  none             Method of communication with provider : none    Was this a readmission? No  Patient stated reason for admission: D&C, hysteroscopy, ablation  Patients top risk factors for readmission: functional physical ability, medical condition-recent left achilles injury, recent gynecological surgery and polypharmacy    Associate Care Manager [facility]) contacted the patient by telephone to perform post hospital discharge assessment. Verified name and  with patient as identifiers.  Provided introduction to self, and explanation of the ACM role. [facility] called patient for CC outreach after outpatient gynecological surgery on 6/9/2021. Patient states \"I felt so sick until yesterday evening\" with symptoms of dizziness and nausea. States also was experiencing heartburn with eating. States also had throat pain and neck soreness post-op. Reports has been using ice application to neck area. Reports feeling better today. States has been lying down and resting a lot due to ongoing LLE pain and post op discomfort. States she removed splint to LLE to shower today. States shower went ok and she replaced splint as soon as possible after shower. Patient states she [facility] [address] [facility]. States LLE pain and swelling has decreased since last ACM outreach. Patient states mid calf area painful to touch since injury. Patient states she called Dr. Natalie Dudley office re: the multiple discontinued meds on post-op AVS. States she was told per Dr. Meme Hwang ok to continue Xanax, Vistaril, Naproxen, Percocet, & Trazodone. States she took Ibuprofen yesterday after home yesterday and took 1 Percocet before bed last night. Reports slept ok. States appetite improved since yesterday and had BM this morning. [facility] reviewed discharge instructions, medical action plan and red flags with patient who verbalized understanding. Patient given an opportunity to ask questions and does not have any further questions or concerns at this time. Were discharge instructions available to patient? Yes. Reviewed appropriate site of care based on symptoms and resources available to patient including: PCP, Specialist and Benefits related nurse triage line. The patient agrees to contact the PCP office for questions related to their healthcare. Medication reconciliation was performed with patient, who verbalizes understanding of administration of home medications.  Advised obtaining a 90-day supply of all daily and as-needed medications. Covid Risk Education     Educated patient about risk for severe COVID-19 due to risk factors according to CDC guidelines. ACM reviewed discharge instructions, medical action plan and red flag symptoms with the patient who verbalized understanding. Discussed COVID vaccination status: No. Education provided on COVID-19 vaccination as appropriate. Discussed exposure protocols and quarantine with CDC Guidelines. Patient was given an opportunity to verbalize any questions and concerns and agrees to contact ACM or health care provider for questions related to their healthcare. Reviewed and educated patient on any new and changed medications related to discharge diagnosis. ACM provided contact information. Plan for follow-up call in 5-7 days based on severity of symptoms and risk factors. Goals      Conditions and Symptoms      I will schedule office visits, as directed by my provider. I will keep my appointment or reschedule if I have to cancel. I will notify my provider of any barriers to my plan of care. I will notify my provider of any symptoms that indicate a worsening of my condition. Barriers: was referred to out of network podiatrist for follow up  Plan for overcoming my barriers: I will work with my ACM to overcome barriers  Confidence: 8/10  Anticipated Goal Completion Date: 6/20/2021 6/8/2021: ACM sent list of in network podiatrists to patient via [employer] this date. ACM reviewed red flags and nurse access line with patient. 6/11/2021: Patient has office visit with Dr. Asia Degroot 6/15/2021. Also has follow up with Dr. Román Chou 6/30/2021.                Follow Up  Future Appointments   Date Time Provider Rose [place]   6/19/2021 10:00 PM [facility] BEDROOM 2 [facility] SLEEP [place] HOD   6/30/2021  1:50 PM Hailey Contreras DO [facility]   8/5/2021  9:00 AM Jaiden Real, APRN - CNP [facility]     Alma Rosa Chu RN

## 2021-06-16 NOTE — PROGRESS NOTES
[facility] PRE-ADMISSION TESTING INSTRUCTIONS    The Preadmission Testing patient is instructed accordingly using the following criteria (check applicable):    ARRIVAL INSTRUCTIONS:  [x] Parking the day of Surgery is located in the Main Entrance lot. Upon entering the door, make an immediate right to the surgery reception desk    [x] Bring photo ID and insurance card    [] Bring in a copy of Living will or Durable Power of  papers. [x] Please be sure to arrange for responsible adult to provide transportation to and from the hospital    [x] Please arrange for responsible adult to be with you for the 24 hour period post procedure due to having anesthesia      GENERAL INSTRUCTIONS:    [x] Nothing by mouth after midnight, including gum, candy, mints or water    [x] You may brush your teeth, but do not swallow any water    [x] Take medications as instructed with 1-2 oz of water    [x] Stop herbal supplements and vitamins 5 days prior to procedure    [] Follow preop dosing of blood thinners per physician instructions    [] Take 1/2 dose of evening insulin, but no insulin after midnight    [] No oral diabetic medications after midnight    [] If diabetic and have low blood sugar or feel symptomatic, take 1-2oz apple juice only    [] Bring inhalers day of surgery    [] Bring C-PAP/ Bi-Pap day of surgery    [] Bring urine specimen day of surgery    [x] Shower or bath with soap, lather and rinse well, AM of Surgery, no lotion, powders or creams to surgical site    [] Follow bowel prep as instructed per surgeon    [x] No tobacco products within 24 hours of surgery     [x] No alcohol or illegal drug use within 24 hours of surgery.     [x] Jewelry, body piercing's, eyeglasses, contact lenses and dentures are not permitted into surgery (bring cases)      [x] Please do not wear any nail polish, make up or hair products on the day of surgery    [x] You can expect a call the business day prior to procedure to notify you if your arrival time changes    [x] If you receive a survey after surgery we would greatly appreciate your comments    [] Parent/guardian of a minor must accompany their child and remain on the premises  the entire time they are under our care     [] Pediatric patients may bring favorite toy, blanket or comfort item with them    [] A caregiver or family member must remain with the patient during their stay if they are mentally handicapped, have dementia, disoriented or unable to use a call light or would be a safety concern if left unattended    [x] Please notify surgeon if you develop any illness between now and time of surgery (cold, cough, sore throat, fever, nausea, vomiting) or any signs of infections  including skin, wounds, and dental.    [x]  The Outpatient Pharmacy is available to fill your prescription here on your day of surgery, ask your preop nurse for details    [] Other instructions    EDUCATIONAL MATERIALS PROVIDED:    [] PAT Preoperative Education Packet/Booklet     [] Medication List    [] Transfusion bracelet applied with instructions    [] Shower with soap, lather and rinse well, and use CHG wipes provided the evening before surgery as instructed    [] Incentive spirometer with instructions

## 2021-06-17 ENCOUNTER — ANESTHESIA EVENT (OUTPATIENT)
Dept: OPERATING ROOM | Age: 36
End: 2021-06-17
Payer: COMMERCIAL

## 2021-06-18 ENCOUNTER — HOSPITAL ENCOUNTER (OUTPATIENT)
Age: 36
Setting detail: OUTPATIENT SURGERY
Discharge: HOME | End: 2021-06-18
Attending: PODIATRIST | Admitting: PODIATRIST
Payer: COMMERCIAL

## 2021-06-18 ENCOUNTER — APPOINTMENT (OUTPATIENT)
Dept: GENERAL RADIOLOGY | Age: 36
End: 2021-06-18
Attending: PODIATRIST
Payer: COMMERCIAL

## 2021-06-18 ENCOUNTER — ANESTHESIA (OUTPATIENT)
Dept: OPERATING ROOM | Age: 36
End: 2021-06-18
Payer: COMMERCIAL

## 2021-06-18 VITALS
BODY MASS INDEX: 45.08 KG/M2 | OXYGEN SATURATION: 100 % | HEIGHT: 62 IN | HEART RATE: 102 BPM | WEIGHT: 245 LBS | TEMPERATURE: 96.8 F | SYSTOLIC BLOOD PRESSURE: 148 MMHG | DIASTOLIC BLOOD PRESSURE: 76 MMHG | RESPIRATION RATE: 16 BRPM

## 2021-06-18 VITALS — TEMPERATURE: 95.5 F | DIASTOLIC BLOOD PRESSURE: 60 MMHG | SYSTOLIC BLOOD PRESSURE: 107 MMHG | OXYGEN SATURATION: 100 %

## 2021-06-18 LAB
HCG, URINE, POC: NEGATIVE
HCT VFR BLD CALC: 36.9 % (ref 34–48)
HEMOGLOBIN: 11.2 G/DL (ref 11.5–15.5)
Lab: NORMAL
MCH RBC QN AUTO: 23 PG (ref 26–35)
MCHC RBC AUTO-ENTMCNC: 30.4 % (ref 32–34.5)
MCV RBC AUTO: 75.6 FL (ref 80–99.9)
METER GLUCOSE: 126 MG/DL (ref 74–99)
NEGATIVE QC PASS/FAIL: NORMAL
PDW BLD-RTO: 18.9 FL (ref 11.5–15)
PLATELET # BLD: 317 E9/L (ref 130–450)
PMV BLD AUTO: 9.7 FL (ref 7–12)
POSITIVE QC PASS/FAIL: NORMAL
RBC # BLD: 4.88 E12/L (ref 3.5–5.5)
SARS-COV-2, NAAT: NOT DETECTED
WBC # BLD: 10.3 E9/L (ref 4.5–11.5)

## 2021-06-18 PROCEDURE — 2709999900 HC NON-CHARGEABLE SUPPLY: Performed by: PODIATRIST

## 2021-06-18 PROCEDURE — C1713 ANCHOR/SCREW BN/BN,TIS/BN: HCPCS | Performed by: PODIATRIST

## 2021-06-18 PROCEDURE — 3600000013 HC SURGERY LEVEL 3 ADDTL 15MIN: Performed by: PODIATRIST

## 2021-06-18 PROCEDURE — 76942 ECHO GUIDE FOR BIOPSY: CPT | Performed by: ANESTHESIOLOGY

## 2021-06-18 PROCEDURE — 3600000003 HC SURGERY LEVEL 3 BASE: Performed by: PODIATRIST

## 2021-06-18 PROCEDURE — 73610 X-RAY EXAM OF ANKLE: CPT

## 2021-06-18 PROCEDURE — 6360000002 HC RX W HCPCS: Performed by: PODIATRIST

## 2021-06-18 PROCEDURE — 7100000010 HC PHASE II RECOVERY - FIRST 15 MIN: Performed by: PODIATRIST

## 2021-06-18 PROCEDURE — 7100000000 HC PACU RECOVERY - FIRST 15 MIN: Performed by: PODIATRIST

## 2021-06-18 PROCEDURE — 87635 SARS-COV-2 COVID-19 AMP PRB: CPT

## 2021-06-18 PROCEDURE — 3209999900 FLUORO FOR SURGICAL PROCEDURES

## 2021-06-18 PROCEDURE — 6360000002 HC RX W HCPCS: Performed by: ANESTHESIOLOGY

## 2021-06-18 PROCEDURE — 6360000002 HC RX W HCPCS

## 2021-06-18 PROCEDURE — 2500000003 HC RX 250 WO HCPCS

## 2021-06-18 PROCEDURE — 3700000001 HC ADD 15 MINUTES (ANESTHESIA): Performed by: PODIATRIST

## 2021-06-18 PROCEDURE — 3700000000 HC ANESTHESIA ATTENDED CARE: Performed by: PODIATRIST

## 2021-06-18 PROCEDURE — 2500000003 HC RX 250 WO HCPCS: Performed by: PODIATRIST

## 2021-06-18 PROCEDURE — 6370000000 HC RX 637 (ALT 250 FOR IP)

## 2021-06-18 PROCEDURE — 82962 GLUCOSE BLOOD TEST: CPT

## 2021-06-18 PROCEDURE — 64445 NJX AA&/STRD SCIATIC NRV IMG: CPT | Performed by: ANESTHESIOLOGY

## 2021-06-18 PROCEDURE — C1762 CONN TISS, HUMAN(INC FASCIA): HCPCS | Performed by: PODIATRIST

## 2021-06-18 PROCEDURE — 7100000001 HC PACU RECOVERY - ADDTL 15 MIN: Performed by: PODIATRIST

## 2021-06-18 PROCEDURE — 7100000011 HC PHASE II RECOVERY - ADDTL 15 MIN: Performed by: PODIATRIST

## 2021-06-18 PROCEDURE — 36415 COLL VENOUS BLD VENIPUNCTURE: CPT

## 2021-06-18 PROCEDURE — 2500000003 HC RX 250 WO HCPCS: Performed by: ANESTHESIOLOGY

## 2021-06-18 PROCEDURE — 2580000003 HC RX 258

## 2021-06-18 PROCEDURE — 85027 COMPLETE CBC AUTOMATED: CPT

## 2021-06-18 DEVICE — SYSTEM IMPL TEND 4.75MM SWIVELOCK BAN SUT LASSO W/ NIT WIRE: Type: IMPLANTABLE DEVICE | Site: HEEL | Status: FUNCTIONAL

## 2021-06-18 DEVICE — GRAFT HUM TISS W40XL70MM THK1MM ACELLULAR ARTHROFLEX: Type: IMPLANTABLE DEVICE | Site: HEEL | Status: FUNCTIONAL

## 2021-06-18 RX ORDER — ROPIVACAINE HYDROCHLORIDE 5 MG/ML
30 INJECTION, SOLUTION EPIDURAL; INFILTRATION; PERINEURAL ONCE
Status: COMPLETED | OUTPATIENT
Start: 2021-06-18 | End: 2021-06-18

## 2021-06-18 RX ORDER — MEPERIDINE HYDROCHLORIDE 25 MG/ML
12.5 INJECTION INTRAMUSCULAR; INTRAVENOUS; SUBCUTANEOUS EVERY 5 MIN PRN
Status: DISCONTINUED | OUTPATIENT
Start: 2021-06-18 | End: 2021-06-18 | Stop reason: HOSPADM

## 2021-06-18 RX ORDER — FENTANYL CITRATE 50 UG/ML
INJECTION, SOLUTION INTRAMUSCULAR; INTRAVENOUS PRN
Status: DISCONTINUED | OUTPATIENT
Start: 2021-06-18 | End: 2021-06-18 | Stop reason: SDUPTHER

## 2021-06-18 RX ORDER — DEXAMETHASONE SODIUM PHOSPHATE 4 MG/ML
INJECTION, SOLUTION INTRA-ARTICULAR; INTRALESIONAL; INTRAMUSCULAR; INTRAVENOUS; SOFT TISSUE PRN
Status: DISCONTINUED | OUTPATIENT
Start: 2021-06-18 | End: 2021-06-18 | Stop reason: SDUPTHER

## 2021-06-18 RX ORDER — SODIUM CHLORIDE 9 MG/ML
INJECTION, SOLUTION INTRAVENOUS CONTINUOUS PRN
Status: DISCONTINUED | OUTPATIENT
Start: 2021-06-18 | End: 2021-06-18 | Stop reason: SDUPTHER

## 2021-06-18 RX ORDER — ONDANSETRON 2 MG/ML
INJECTION INTRAMUSCULAR; INTRAVENOUS PRN
Status: DISCONTINUED | OUTPATIENT
Start: 2021-06-18 | End: 2021-06-18 | Stop reason: SDUPTHER

## 2021-06-18 RX ORDER — LIDOCAINE HYDROCHLORIDE 20 MG/ML
INJECTION, SOLUTION EPIDURAL; INFILTRATION; INTRACAUDAL; PERINEURAL PRN
Status: DISCONTINUED | OUTPATIENT
Start: 2021-06-18 | End: 2021-06-18 | Stop reason: SDUPTHER

## 2021-06-18 RX ORDER — ROCURONIUM BROMIDE 10 MG/ML
INJECTION, SOLUTION INTRAVENOUS PRN
Status: DISCONTINUED | OUTPATIENT
Start: 2021-06-18 | End: 2021-06-18 | Stop reason: SDUPTHER

## 2021-06-18 RX ORDER — ONDANSETRON 2 MG/ML
4 INJECTION INTRAMUSCULAR; INTRAVENOUS
Status: DISCONTINUED | OUTPATIENT
Start: 2021-06-18 | End: 2021-06-18 | Stop reason: HOSPADM

## 2021-06-18 RX ORDER — MIDAZOLAM HYDROCHLORIDE 1 MG/ML
INJECTION INTRAMUSCULAR; INTRAVENOUS PRN
Status: DISCONTINUED | OUTPATIENT
Start: 2021-06-18 | End: 2021-06-18 | Stop reason: SDUPTHER

## 2021-06-18 RX ORDER — ALBUTEROL SULFATE 90 UG/1
AEROSOL, METERED RESPIRATORY (INHALATION) PRN
Status: DISCONTINUED | OUTPATIENT
Start: 2021-06-18 | End: 2021-06-18 | Stop reason: SDUPTHER

## 2021-06-18 RX ORDER — GLYCOPYRROLATE 1 MG/5 ML
SYRINGE (ML) INTRAVENOUS PRN
Status: DISCONTINUED | OUTPATIENT
Start: 2021-06-18 | End: 2021-06-18 | Stop reason: SDUPTHER

## 2021-06-18 RX ORDER — LIDOCAINE HYDROCHLORIDE 10 MG/ML
5 INJECTION, SOLUTION EPIDURAL; INFILTRATION; INTRACAUDAL; PERINEURAL ONCE
Status: COMPLETED | OUTPATIENT
Start: 2021-06-18 | End: 2021-06-18

## 2021-06-18 RX ORDER — FENTANYL CITRATE 50 UG/ML
100 INJECTION, SOLUTION INTRAMUSCULAR; INTRAVENOUS ONCE
Status: DISCONTINUED | OUTPATIENT
Start: 2021-06-18 | End: 2021-06-18 | Stop reason: HOSPADM

## 2021-06-18 RX ORDER — PROPOFOL 10 MG/ML
INJECTION, EMULSION INTRAVENOUS PRN
Status: DISCONTINUED | OUTPATIENT
Start: 2021-06-18 | End: 2021-06-18 | Stop reason: SDUPTHER

## 2021-06-18 RX ORDER — MIDAZOLAM HYDROCHLORIDE 2 MG/2ML
1 INJECTION, SOLUTION INTRAMUSCULAR; INTRAVENOUS EVERY 5 MIN PRN
Status: DISCONTINUED | OUTPATIENT
Start: 2021-06-18 | End: 2021-06-18 | Stop reason: HOSPADM

## 2021-06-18 RX ORDER — BUPIVACAINE HYDROCHLORIDE 5 MG/ML
INJECTION, SOLUTION EPIDURAL; INTRACAUDAL PRN
Status: DISCONTINUED | OUTPATIENT
Start: 2021-06-18 | End: 2021-06-18 | Stop reason: ALTCHOICE

## 2021-06-18 RX ORDER — NEOSTIGMINE METHYLSULFATE 1 MG/ML
INJECTION, SOLUTION INTRAVENOUS PRN
Status: DISCONTINUED | OUTPATIENT
Start: 2021-06-18 | End: 2021-06-18 | Stop reason: SDUPTHER

## 2021-06-18 RX ADMIN — MEPERIDINE HYDROCHLORIDE 12.5 MG: 25 INJECTION, SOLUTION INTRAMUSCULAR; INTRAVENOUS; SUBCUTANEOUS at 11:13

## 2021-06-18 RX ADMIN — LIDOCAINE HYDROCHLORIDE 100 MG: 20 INJECTION, SOLUTION EPIDURAL; INFILTRATION; INTRACAUDAL; PERINEURAL at 07:42

## 2021-06-18 RX ADMIN — HYDROMORPHONE HYDROCHLORIDE 0.25 MG: 1 INJECTION, SOLUTION INTRAMUSCULAR; INTRAVENOUS; SUBCUTANEOUS at 10:40

## 2021-06-18 RX ADMIN — SODIUM CHLORIDE: 9 INJECTION, SOLUTION INTRAVENOUS at 06:03

## 2021-06-18 RX ADMIN — ALBUTEROL SULFATE 2 PUFF: 108 AEROSOL, METERED RESPIRATORY (INHALATION) at 09:11

## 2021-06-18 RX ADMIN — DEXAMETHASONE SODIUM PHOSPHATE 10 MG: 4 INJECTION, SOLUTION INTRAMUSCULAR; INTRAVENOUS at 07:48

## 2021-06-18 RX ADMIN — Medication 3 MG: at 10:09

## 2021-06-18 RX ADMIN — HYDROMORPHONE HYDROCHLORIDE 0.25 MG: 1 INJECTION, SOLUTION INTRAMUSCULAR; INTRAVENOUS; SUBCUTANEOUS at 10:45

## 2021-06-18 RX ADMIN — FENTANYL CITRATE 50 MCG: 50 INJECTION, SOLUTION INTRAMUSCULAR; INTRAVENOUS at 08:55

## 2021-06-18 RX ADMIN — SODIUM CHLORIDE: 9 INJECTION, SOLUTION INTRAVENOUS at 08:56

## 2021-06-18 RX ADMIN — ROCURONIUM BROMIDE 50 MG: 10 INJECTION, SOLUTION INTRAVENOUS at 09:08

## 2021-06-18 RX ADMIN — Medication 0.6 MG: at 10:09

## 2021-06-18 RX ADMIN — FENTANYL CITRATE 50 MCG: 50 INJECTION, SOLUTION INTRAMUSCULAR; INTRAVENOUS at 09:54

## 2021-06-18 RX ADMIN — FENTANYL CITRATE 100 MCG: 50 INJECTION, SOLUTION INTRAMUSCULAR; INTRAVENOUS at 07:42

## 2021-06-18 RX ADMIN — PROPOFOL 200 MG: 10 INJECTION, EMULSION INTRAVENOUS at 07:42

## 2021-06-18 RX ADMIN — ROPIVACAINE HYDROCHLORIDE 15 ML: 5 INJECTION, SOLUTION EPIDURAL; INFILTRATION; PERINEURAL at 11:08

## 2021-06-18 RX ADMIN — ROPIVACAINE HYDROCHLORIDE 30 ML: 5 INJECTION, SOLUTION EPIDURAL; INFILTRATION; PERINEURAL at 11:00

## 2021-06-18 RX ADMIN — ROCURONIUM BROMIDE 50 MG: 10 INJECTION, SOLUTION INTRAVENOUS at 07:42

## 2021-06-18 RX ADMIN — LIDOCAINE HYDROCHLORIDE 4 ML: 10 INJECTION, SOLUTION EPIDURAL; INFILTRATION; INTRACAUDAL; PERINEURAL at 10:58

## 2021-06-18 RX ADMIN — MIDAZOLAM 2 MG: 1 INJECTION INTRAMUSCULAR; INTRAVENOUS at 07:36

## 2021-06-18 RX ADMIN — ONDANSETRON 4 MG: 2 INJECTION INTRAMUSCULAR; INTRAVENOUS at 09:19

## 2021-06-18 RX ADMIN — Medication 2000 MG: at 07:48

## 2021-06-18 ASSESSMENT — PULMONARY FUNCTION TESTS
PIF_VALUE: 27
PIF_VALUE: 31
PIF_VALUE: 27
PIF_VALUE: 1
PIF_VALUE: 27
PIF_VALUE: 2
PIF_VALUE: 27
PIF_VALUE: 3
PIF_VALUE: 27
PIF_VALUE: 31
PIF_VALUE: 30
PIF_VALUE: 27
PIF_VALUE: 30
PIF_VALUE: 4
PIF_VALUE: 30
PIF_VALUE: 30
PIF_VALUE: 27
PIF_VALUE: 30
PIF_VALUE: 27
PIF_VALUE: 27
PIF_VALUE: 30
PIF_VALUE: 31
PIF_VALUE: 33
PIF_VALUE: 27
PIF_VALUE: 23
PIF_VALUE: 30
PIF_VALUE: 27
PIF_VALUE: 30
PIF_VALUE: 27
PIF_VALUE: 27
PIF_VALUE: 35
PIF_VALUE: 27
PIF_VALUE: 27
PIF_VALUE: 20
PIF_VALUE: 30
PIF_VALUE: 33
PIF_VALUE: 27
PIF_VALUE: 30
PIF_VALUE: 27
PIF_VALUE: 30
PIF_VALUE: 33
PIF_VALUE: 1
PIF_VALUE: 31
PIF_VALUE: 31
PIF_VALUE: 30
PIF_VALUE: 18
PIF_VALUE: 30
PIF_VALUE: 5
PIF_VALUE: 25
PIF_VALUE: 4
PIF_VALUE: 27
PIF_VALUE: 27
PIF_VALUE: 25
PIF_VALUE: 32
PIF_VALUE: 6
PIF_VALUE: 27
PIF_VALUE: 7
PIF_VALUE: 30
PIF_VALUE: 30
PIF_VALUE: 27
PIF_VALUE: 24
PIF_VALUE: 25
PIF_VALUE: 29
PIF_VALUE: 30
PIF_VALUE: 4
PIF_VALUE: 27
PIF_VALUE: 32
PIF_VALUE: 32
PIF_VALUE: 27
PIF_VALUE: 3
PIF_VALUE: 27
PIF_VALUE: 26
PIF_VALUE: 27
PIF_VALUE: 1
PIF_VALUE: 30
PIF_VALUE: 30
PIF_VALUE: 27
PIF_VALUE: 3
PIF_VALUE: 25
PIF_VALUE: 23
PIF_VALUE: 30
PIF_VALUE: 30
PIF_VALUE: 31
PIF_VALUE: 3
PIF_VALUE: 29
PIF_VALUE: 27
PIF_VALUE: 31
PIF_VALUE: 27
PIF_VALUE: 27
PIF_VALUE: 31
PIF_VALUE: 25
PIF_VALUE: 27
PIF_VALUE: 27
PIF_VALUE: 30
PIF_VALUE: 28
PIF_VALUE: 27
PIF_VALUE: 31
PIF_VALUE: 30
PIF_VALUE: 30
PIF_VALUE: 25
PIF_VALUE: 27
PIF_VALUE: 33
PIF_VALUE: 27
PIF_VALUE: 30
PIF_VALUE: 30
PIF_VALUE: 32
PIF_VALUE: 30
PIF_VALUE: 30
PIF_VALUE: 23
PIF_VALUE: 31
PIF_VALUE: 27
PIF_VALUE: 30
PIF_VALUE: 32
PIF_VALUE: 30
PIF_VALUE: 22
PIF_VALUE: 27
PIF_VALUE: 27
PIF_VALUE: 30
PIF_VALUE: 27
PIF_VALUE: 25
PIF_VALUE: 33
PIF_VALUE: 30
PIF_VALUE: 27
PIF_VALUE: 27
PIF_VALUE: 30
PIF_VALUE: 25
PIF_VALUE: 27
PIF_VALUE: 21
PIF_VALUE: 32
PIF_VALUE: 27
PIF_VALUE: 27
PIF_VALUE: 24
PIF_VALUE: 30
PIF_VALUE: 27
PIF_VALUE: 23
PIF_VALUE: 30
PIF_VALUE: 25
PIF_VALUE: 25
PIF_VALUE: 28
PIF_VALUE: 27
PIF_VALUE: 5
PIF_VALUE: 24
PIF_VALUE: 27
PIF_VALUE: 30
PIF_VALUE: 5
PIF_VALUE: 27

## 2021-06-18 ASSESSMENT — PAIN SCALES - GENERAL
PAINLEVEL_OUTOF10: 0
PAINLEVEL_OUTOF10: 6
PAINLEVEL_OUTOF10: 5
PAINLEVEL_OUTOF10: 0
PAINLEVEL_OUTOF10: 6
PAINLEVEL_OUTOF10: 3
PAINLEVEL_OUTOF10: 4
PAINLEVEL_OUTOF10: 6
PAINLEVEL_OUTOF10: 0
PAINLEVEL_OUTOF10: 6

## 2021-06-18 ASSESSMENT — PAIN DESCRIPTION - PAIN TYPE
TYPE: SURGICAL PAIN

## 2021-06-18 ASSESSMENT — PAIN DESCRIPTION - ORIENTATION
ORIENTATION: LEFT
ORIENTATION: LEFT

## 2021-06-18 ASSESSMENT — LIFESTYLE VARIABLES: SMOKING_STATUS: 0

## 2021-06-18 ASSESSMENT — PAIN DESCRIPTION - LOCATION
LOCATION: FOOT
LOCATION: LEG

## 2021-06-18 ASSESSMENT — PAIN - FUNCTIONAL ASSESSMENT: PAIN_FUNCTIONAL_ASSESSMENT: 0-10

## 2021-06-18 ASSESSMENT — PAIN DESCRIPTION - PROGRESSION
CLINICAL_PROGRESSION: GRADUALLY WORSENING
CLINICAL_PROGRESSION: NOT CHANGED
CLINICAL_PROGRESSION: GRADUALLY IMPROVING

## 2021-06-18 NOTE — ANESTHESIA POSTPROCEDURE EVALUATION
Department of Anesthesiology  Postprocedure Note    Patient: Neena Ocampo  MRN: [id number]  YOB: 1985  Date of evaluation: 6/18/2021  Time:  11:59 AM     Procedure Summary     Date: 06/18/21 Room / Location: [facility] OR 01 / [facility]    Anesthesia Start: 1335 Anesthesia Stop: 0190    Procedure: REPAIR OF ACHILLES TENDON TEAR LEFT SIDE (Left ) Diagnosis: (ACHILLES TENDON TEAR ACUTE ACHILLES TENDON RUPTURE LEFT)    Surgeons: Rajan Hernandez DPM Responsible Provider: Paul Yeung MD    Anesthesia Type: general ASA Status: 3          Anesthesia Type: general    Julius Phase I: Julius Score: 10    Julius Phase II:      Last vitals: Reviewed and per EMR flowsheets.        Anesthesia Post Evaluation    Patient location during evaluation: PACU  Patient participation: complete - patient participated  Level of consciousness: awake and alert  Airway patency: patent  Nausea & Vomiting: no vomiting and no nausea  Complications: no  Cardiovascular status: hemodynamically stable  Respiratory status: acceptable

## 2021-06-18 NOTE — BRIEF OP NOTE
Brief Postoperative Note      Patient: Cony Acevedo  YOB: 1985  MRN: [id number]    Date of Procedure: 6/18/2021    Pre-Op Diagnosis: ACHILLES TENDON TEAR ACUTE ACHILLES TENDON RUPTURE LEFT    Post-Op Diagnosis: Same       Procedure(s):  REPAIR OF ACHILLES TENDON TEAR LEFT SIDE   +++ARTHREX+++     ++STAFF TO ROOM 6++    Surgeon(s):  Cindy Pierre DPM    Assistant:  * No surgical staff found Virgil Dawson PGY2    Anesthesia: General    Estimated Blood Loss (mL): Minimal    Complications: None    Specimens:   * No specimens in log *    Implants:  Implant Name Type Inv.  Item Serial No.  Lot No. LRB No. Used Action   SYSTEM IMPL TEND 4.75MM SWIVELOCK BAN SUT LASSO W/ NIT WIRE  SYSTEM IMPL TEND 4.75MM SWIVELOCK BAN SUT LASSO W/ NIT WIRE  ARTHREX [facility]- [id number] Left 1 Implanted   GRAFT HUM TISS [id number] THK1MM ACELLULAR ARTHROFLEX  GRAFT HUM TISS [id number] THK1MM ACELLULAR ARTHROFLEX  [facility] TISS[facility]- [de-identified] Left 1 Implanted         Drains: * No LDAs found *    Findings: consistent with diagnosis     Electronically signed by Virgil Dawson DPM on 6/18/2021 at 10:11 AM

## 2021-06-18 NOTE — ANESTHESIA PROCEDURE NOTES
Peripheral Block    Patient location during procedure: pre-op  Staffing  Performed: anesthesiologist   Anesthesiologist: Erica Zheng MD  Preanesthetic Checklist  Completed: patient identified, IV checked, site marked, risks and benefits discussed, surgical consent, monitors and equipment checked, pre-op evaluation, timeout performed, anesthesia consent given, oxygen available and patient being monitored  Peripheral Block  Patient position: supine  Prep: ChloraPrep  Patient monitoring: cardiac monitor, continuous pulse ox, frequent blood pressure checks and IV access  Block type: Sciatic  Laterality: left  Injection technique: single-shot  Guidance: ultrasound guided  Local infiltration: ropivacaine  Infiltration strength: 1 %  Dose: 30 mL  Popliteal  Provider prep: mask and sterile gloves  Local infiltration: ropivacaine  Needle  Needle gauge: 20 G  Needle length: 10 cm  Needle localization: ultrasound guidance  Assessment  Injection assessment: negative aspiration for heme, no paresthesia on injection and local visualized surrounding nerve on ultrasound  Paresthesia pain: none  Slow fractionated injection: yes  Hemodynamics: stable  Additional Notes  U/S 62319.  Time out performed. (1) Under ultrasound guidance, a 20 gauge needle was inserted and placed in close proximity to the left popliteal  nerve.  (2) Ultrasound was also used to visualize the spread of the anesthetic in close proximity to the nerve being blocked. (3) The nerve appeared anatomically normal, and (4 there were no apparent abnormal pathological findings on the image that were readily visible and related to the nerve being blocked. (5) A permanent ultrasound image was saved in the patient's record.             Reason for block: post-op pain management and at surgeon's request

## 2021-06-18 NOTE — PROGRESS NOTES
1100 left popliteal nerve block done post op in pacu  1108 left adductor canal nerve block done post op in pacu  Pt tolerated procedures well

## 2021-06-18 NOTE — ANESTHESIA PROCEDURE NOTES
Peripheral Block    Patient location during procedure: PACU  Staffing  Anesthesiologist: Angela Schrader MD  Preanesthetic Checklist  Completed: patient identified, IV checked, site marked, risks and benefits discussed, surgical consent, monitors and equipment checked, pre-op evaluation, timeout performed, anesthesia consent given, oxygen available and patient being monitored  Peripheral Block  Patient position: supine  Prep: ChloraPrep  Patient monitoring: cardiac monitor, continuous pulse ox, frequent blood pressure checks and IV access  Block type: Saphenous  Laterality: left  Injection technique: single-shot  Guidance: ultrasound guided  Local infiltration: ropivacaine  Infiltration strength: 1 %  Dose: 15 mL  Provider prep: mask and sterile gloves  Local infiltration: ropivacaine  Needle  Needle type: combined needle/nerve stimulator   Needle gauge: 20 G  Needle length: 10 cm  Needle localization: ultrasound guidance  Assessment  Injection assessment: negative aspiration for heme, no paresthesia on injection and local visualized surrounding nerve on ultrasound  Slow fractionated injection: yes  Hemodynamics: stable  Additional Notes  U/S 25443.  (1) Under ultrasound guidance, a 20 gauge needle was inserted and placed in close proximity to the left saphenous nerve.  (2) Ultrasound was also used to visualize the spread of the anesthetic in close proximity to the nerve being blocked. (3) The nerve appeared anatomically normal, and (4 there were no apparent abnormal pathological findings on the image that were readily visible and related to the nerve being blocked. (5) A permanent ultrasound image was saved in the patient's record.       Reason for block: post-op pain management and at surgeon's request

## 2021-06-18 NOTE — ANESTHESIA PRE PROCEDURE
Department of Anesthesiology  Preprocedure Note       Name:  Katja Bernard   Age:  38 y.o.  :  1985                                          MRN:  [id number]         Date:  2021      Surgeon: Elmer Dias):  Irene Anne DPM    Procedure: Procedure(s):  REPAIR OF ACHILLES TENDON TEAR LEFT SIDE   +++ARTHREX+++     ++STAFF TO ROOM 6++    Medications prior to admission:   Prior to Admission medications    Medication Sig Start Date End Date Taking? Authorizing Provider   ALPRAZolam Luis Bors) 0.5 MG tablet Take 0.5 mg by mouth 2 times daily as needed for Anxiety. Historical Provider, MD   traZODone (DESYREL) 50 MG tablet Take 50 mg by mouth nightly Dose: 1/2 tablet; patient takes PRN    Historical Provider, MD   ibuprofen (ADVIL;MOTRIN) 800 MG tablet Take 1 tablet by mouth every 8 hours as needed for Pain 21   Satnam Herrera DO   ergocalciferol (ERGOCALCIFEROL) 1.25 MG (62068 UT) capsule Takes on Mondays 2/10/21   Historical Provider, MD   montelukast (SINGULAIR) 10 MG tablet take 1 tablet by mouth once daily 2/10/21   Historical Provider, MD   escitalopram (LEXAPRO) 10 MG tablet take 1 tablet by mouth every morning 2/10/21   Historical Provider, MD   pantoprazole (PROTONIX) 40 MG tablet take 1 tablet by mouth every morning 2/10/21   Historical Provider, MD   rizatriptan (MAXALT-MLT) 10 MG disintegrating tablet dissolve 1 tablet ON TONGUE AT ONSET OF HEADACHE may repeat in 2 hours IF headache PERSISTS 2/10/21   Historical Provider, MD   fluticasone (FLONASE) 50 MCG/ACT nasal spray instill 1 spray into each nostril daily 2/10/21   Historical Provider, MD   budesonide-formoterol (SYMBICORT) 160-4.5 MCG/ACT AERO Inhale 1 puff into the lungs 2 times daily 21   Historical Provider, MD   ferrous sulfate (IRON 325) 325 (65 Fe) MG tablet Take 325 mg by mouth daily (with breakfast)    Historical Provider, MD       Current medications:    No current facility-administered medications for this visit. No current outpatient medications on file. Facility-Administered Medications Ordered in Other Visits   Medication Dose Route Frequency Provider Last Rate Last Admin    ceFAZolin (ANCEF) 2000 mg in sterile water 20 mL IV syringe  2,000 mg Intravenous Cyndie Mcmahan MD        0.9 % sodium chloride infusion   Intravenous Continuous PRN Nhan Mejia RN   New Bag at 21 0420       Allergies:     Allergies   Allergen Reactions    Norco [Hydrocodone-Acetaminophen] Nausea And Vomiting    Pcn [Penicillins] Nausea And Vomiting       Problem List:    Patient Active Problem List   Diagnosis Code    Abnormal Pap smear     Anemia D64.9    Threatened , antepartum O20.0    Vaginal bleeding before 22 weeks gestation O20.9    Right ovarian cyst N83.201     contractions O47.9    Threatened premature labor, third trimester O47.03    Maternal morbid obesity in third trimester, antepartum ([facility] [address] [address] O99.213, E66.01    28 weeks gestation of pregnancy Z3A.28    Suspected shortening of cervix not found Z03.75    Previous  delivery in third trimester, antepartum O12.26    Encounter for fetal anatomic survey Z36.89    Urinary tract infection during pregnancy, third trimester O23.43    Spotting complicating pregnancy, third trimester O26.853    Upper respiratory symptom R09.89    39 weeks gestation of pregnancy Z3A.39       Past Medical History:        Diagnosis Date    Anemia     Iron deficiency    Anxiety     Arthritis     Diabetes mellitus ([facility] [address]     GDM-diet controlled    History of palpitations     and tachycardia with anxiety    Irregular menses     Left ankle injury 2021    Migraines     Uses crutches 2021    left foot injury       Past Surgical History:        Procedure Laterality Date    DILATION AND CURETTAGE OF UTERUS N/A 2021    DILATATION AND CURETTAGE HYSTEROSCOPY NOVASURE ABLATION performed by Brayan Carl DO at [facility] [address]    Drug/Infectious Status (If Applicable):  No results found for: HIV, HEPCAB    COVID-19 Screening (If Applicable):   Lab Results   Component Value Date    COVID19 Not Detected 06/18/2021    COVID19 Not Detected 06/04/2021    COVID19 Not Detected 01/26/2021           Anesthesia Evaluation  Patient summary reviewed no history of anesthetic complications:   Airway: Mallampati: II  TM distance: >3 FB   Neck ROM: full  Mouth opening: > = 3 FB Dental:      Comment: Patient denies loose missing chipped teeth     Pulmonary:Negative Pulmonary ROS breath sounds clear to auscultation  (+) sleep apnea (not on cpap ):  asthma (on inhalers. denies diagnosis of asthma ):     (-) not a current smoker                           Cardiovascular:Negative CV ROS  Exercise tolerance: good (>4 METS),       (-) hypertension and  angina    ECG reviewed  Rhythm: regular  Rate: normal                    Neuro/Psych:   (+) headaches: migraine headaches, depression/anxiety             GI/Hepatic/Renal:   (+) GERD: poorly controlled, morbid obesity          Endo/Other:    (+) Diabetes, blood dyscrasia: anemia:., .                 Abdominal:   (+) obese,         Vascular: negative vascular ROS. Anesthesia Plan      general     ASA 3     (Pt agrees to GA--IV induction. Discussed options of block after surgery )  Induction: intravenous. MIPS: Postoperative opioids intended and Prophylactic antiemetics administered. Anesthetic plan and risks discussed with patient. Use of blood products discussed with patient whom consented to blood products. Plan discussed with CRNA and attending. Selene Jang RN   6/18/2021    DOS STAFF ADDENDUM:    Pt seen and examined, chart reviewed (including anesthesia, drug and allergy history). Anesthetic plan, risks, benefits, alternatives, and personnel involved discussed with patient. Patient verbalized an understanding and agrees to proceed.   Plan discussed with care team members and agreed upon. Dominic Bustos MD  Staff Anesthesiologist  6:54 AM     Department of Internal Medicine            Date/Time Seen:  6/18/21 at 6:54 AM EDT      Update History & Physical              The patient's History and Physical of was reviewed with the patient and there were no significant changes. I examined the patient and there were no significant changes from the previous History and Physical.    Plan: The risk, benefits, expected outcome, and alternative to the recommended procedure have been discussed with the patient. Patient understands and wants to proceed with the procedure.     Electronically signed by Dominic Bustos MD  on 6/18/2021 at 6:54 AM

## 2021-06-20 NOTE — OP NOTE
Operative Note      Patient: Wilian Kennedy  YOB: 1985  MRN: [id number]    Date of Procedure: 6/18/2021    Pre-Op Diagnosis: ACHILLES TENDON TEAR ACUTE ACHILLES TENDON RUPTURE LEFT    Post-Op Diagnosis: Same       Procedure(s):  REPAIR OF ACHILLES TENDON TEAR LEFT SIDE    Surgeon(s):  Evita Guerrero DPM    Assistant:   * No surgical staff found Pranav Loomis PGY2    Anesthesia: General    Estimated Blood Loss (mL): Minimal    Complications: None    Specimens:   * No specimens in log *    Implants:  Implant Name Type Inv. Item Serial No.  Lot No. LRB No. Used Action   SYSTEM IMPL TEND 4.75MM SWIVELOCK BAN SUT LASSO W/ NIT WIRE  SYSTEM IMPL TEND 4.75MM SWIVELOCK BAN SUT LASSO W/ NIT WIRE  [employer]RE[employer] [facility]- [id number] Left 1 Implanted   GRAFT HUM TISS [id number] THK1MM ACELLULAR ARTHROFLEX  GRAFT HUM TISS [id number] THK1MM ACELLULAR ARTHROFLEX  [facility] TISSUE [facility]- [de-identified] Left 1 Implanted         Drains: * No LDAs found *    Findings: consistent with diagnosis     Detailed Description of Procedure: The patient was brought into the operating room, properly identified, and placed in the supine position next to the OR table. General anesthesia was induced and then the patient was flipped into the prone position onto the operating room table. She was then prepped and draped in the usual sterile and aseptic manner with a well-padded thigh tourniquet. After elevating the left lower extremity tourniquet was inflated to 350 mmHg of pressure. Attention was then directed to the posterior aspect of the left leg and heel region. Utilizing a #15 blade, an approximately 12 cm linear longitudinal incision was made just medial to the central portion of the Achilles tendon overlying the notable rupture/defect of the Achilles tendon about 14 cm proximal to the distal calcaneal insertion. The incision was deepened through the subcutaneous tissue down to the level of the Achilles tendon. Examination of the tendon revealed an approximately 2 cm complete rupture of the Achilles tendon. The hematoma surrounding the Achilles tendon rupture was flushed out using copious amounts of normal sterile saline. Next, using fiber tape suture, a double [place] stitch was used in the proximal stump of the Achilles tendon rupture, with the 4 suture tails coming out the distal end of the proximal stump. Then an approximately 3 cm incision was made just distal to the calcaneal insertion of the Achilles tendon. Utilizing a #15 blade, the incision was carried down to and including subcutaneous tissue down to the level of the calcaneal bone. Within the calcaneal incision, at the medial and lateral aspects, a drill guide was inserted down to the bone and a 3.5 mm drill was used to drill down to a hard stop at 45 degrees from the center line of the Achilles in the coronal plane and 45 degrees from posterior to anterior in the sagittal plane. After drilling with a 3.5 mm drill, a tap was used with a 4.75 mm tap to prepare the two holes for the suture anchors. Then, utilizing the Arthrex cannula, the cannula was inserted at the medial and lateral aspects of the distal Achilles tendon and pushed out proximally to retrieve the suture tails from proximal stump and then transferred down through the distal stump of the Achilles tendon to the previously prepared bone tunnels at the calcaneus. Next the fiber tape sutures were then secured to just distal to Achilles with two 4.75 mm suture anchors with the foot in 10 to 15 degrees plantarflexion in the resting position of the contralateral foot. Proper length, alignment, and correction was noted at this time. To enhance healing, the Arthrex acellular arthro-Flex graft was then applied overlying the Achilles tendon rupture and secured with 3-0 Vicryl. The surgical site was then flushed with copious amounts of normal sterile saline.   The deep and subcutaneous layers were then coapted with 3-0 Monocryl suture. The skin was then closed with 3-0 nylon suture. The calf was tested and plantarflexion was noted. Next a proximal block of about 30 cc's of 0.5% Marcaine plain were injected about the surgical site. Tourniquet was deflated and normal vascular perfusion was noted. The wound was dressed with Betadine soaked Adaptic, 4 x 4's, ABD pad, Kerlix, web roll, and a posterior splint was applied. Patient tolerated the anesthesia and procedure well, without complications. She left the operating room with vital signs stable and vascular status intact to all areas of the left lower extremity. Following a period of postoperative monitoring, the patient will be discharged home with both written and oral postoperative instructions. The patient is not to bear weight on her left foot. She is to follow-up with Dr. Brittney Jarvis in clinic within 1 week.      Electronically signed by Neil Trevino DPM on 6/20/2021 at 12:14 PM

## 2021-06-21 ENCOUNTER — CARE COORDINATION (OUTPATIENT)
Dept: OTHER | Facility: CLINIC | Age: 36
End: 2021-06-21

## 2021-06-22 RX ORDER — OXYCODONE AND ACETAMINOPHEN 7.5; 325 MG/1; MG/1
1 TABLET ORAL EVERY 6 HOURS PRN
Status: ON HOLD | COMMUNITY
End: 2021-08-07 | Stop reason: HOSPADM

## 2021-06-22 RX ORDER — ASPIRIN 81 MG/1
81 TABLET ORAL 2 TIMES DAILY
COMMUNITY
End: 2021-08-27 | Stop reason: ALTCHOICE

## 2021-06-22 RX ORDER — DOXYCYCLINE HYCLATE 100 MG/1
100 CAPSULE ORAL 2 TIMES DAILY
Status: ON HOLD | COMMUNITY
End: 2021-07-31

## 2021-06-22 NOTE — CARE COORDINATION
[facility] Transitions Initial Follow Up Call    Call within 2 business days of discharge: Yes    Patient: Nivia Dakins Patient : 1985   MRN: [id number]  Reason for Admission: Left achilles repair  Discharge Date: 21 RARS: No data recorded    Last Discharge [facility]       Complaint Diagnosis Description Type Department Provider    21   Admission (Discharged) [facility], [facility]           Spoke with: Josep Venutra, patient    Facility: [address] [place] [address],[address]    Non-face-to-face services provided:  Obtained and reviewed discharge summary and/or continuity of care documents  Assessment and support for treatment adherence and medication management-complete medication review performed with patient    Care Transitions 24 Hour Call    Do you have any ongoing symptoms?: Yes  Patient-reported symptoms: Pain  Do you have a copy of your discharge instructions?: Yes  Do you have all of your prescriptions and are they filled?: Yes  Have you scheduled your follow up appointment?: Yes  How are you going to get to your appointment?: Car - family or friend to transport  Were you discharged with any Home Care or Post Acute Services: No  Do you feel like you have everything you need to keep you well at home?: No  Care Transitions Interventions       Transitions of Care Initial Call    Was this an external facility discharge? No Discharge Facility: [address] [place] [address],[address]    Challenges to be reviewed by the provider   Additional needs identified to be addressed with provider: No  none             Method of communication with provider : none      Advance Care Planning:   Does patient have an Advance Directive:  ACM previously reviewed and updated healthcare decision maker with patient. Was this a readmission?  No  Patient stated reason for admission: achilles surgery  Patients top risk factors for readmission: functional physical ability, medical condition-recent gyn & achilles surgeries and support system    Associate Care Manager [facility]) contacted the patient by telephone to perform post hospital discharge assessment. Verified name and  with patient as identifiers. Provided introduction to self, and explanation of the ACM role. ACM called patient for CC post-op follow up after left achilles surgery 2021 by Dr. Daniel Moreno. Patient states \"this has been rough\" due to post-op pain. States she has been having difficulty getting comfortable due to pain. Reports using Percocet q 6 hours around the clock, ice application, position changes, and elevation. States has had difficulty sleeping due to pain. Reports sleeping ~3 hours at a time during the night. Patient states \"glad to have surgery done and over with\". Patient states LLE NWB x2 weeks and states Dr. Daniel Moreno said plan is to start PT ~2 weeks post-op. Patient  has follow up with Dr. Daniel Moreno 2021. States she was told plan is to wear boot \"a few months\". Patient  has been getting help around the house from her older 2 kids (ages 9 & 5). Patient reports swelling to LLE has decreased since surgery.  woke up in severe pain in PACU and received nerve block which she states is keeping pain more controlled than it was.  was told to take ASA 81 mg BID x2 weeks due to immobility. Reports compliance with all medications.  has been sweating intermittently which prompted her to monitor temperature. States afebrile since home. Reports occasional nausea. Reports went upstairs a couple days ago to get washed up but reports staying on first level of the home for the most part. Patient reports issues with crutches due to rubber on bottom of one of the crutches and concerns that she may fall. States crutches were provided to her at ED visit on 2021. Patient questioned ACM about possibly getting knee scooter or new crutches. Patient gave verbal consent to have [address] [place] [address]  work on DME for her. ACM reviewed discharge instructions, medical action plan and red flags with patient who verbalized understanding. Patient given an opportunity to ask questions and does not have any further questions or concerns at this time. Were discharge instructions available to patient? Yes. Reviewed appropriate site of care based on symptoms and resources available to patient including: Specialist and Benefits related nurse triage line. The patient agrees to contact the PCP office for questions related to their healthcare. Medication reconciliation was performed with patient, who verbalizes understanding of administration of home medications. Advised obtaining a 90-day supply of all daily and as-needed medications. Covid Risk Education     Educated patient about risk for severe COVID-19 due to risk factors according to CDC guidelines. ACM reviewed discharge instructions, medical action plan and red flag symptoms with the patient who verbalized understanding. Discussed COVID vaccination status: No. Education provided on COVID-19 vaccination as appropriate. Discussed exposure protocols and quarantine with CDC Guidelines. Patient was given an opportunity to verbalize any questions and concerns and agrees to contact ACM or health care provider for questions related to their healthcare. Reviewed and educated patient on any new and changed medications related to discharge diagnosis. Was patient discharged with a pulse oximeter? No Discussed and confirmed pulse oximeter discharge instructions and when to notify provider or seek emergency care. ACM provided contact information. Plan for follow-up call in 5-7 days based on severity of symptoms and risk factors. Goals      Conditions and Symptoms      I will schedule office visits, as directed by my provider. I will keep my appointment or reschedule if I have to cancel. I will notify my provider of any barriers to my plan of care.   I will notify my provider of any symptoms that indicate a worsening of my condition. Barriers: was referred to out of network podiatrist for follow up  Plan for overcoming my barriers: I will work with my ACM to overcome barriers  Confidence: 8/10  Anticipated Goal Completion Date: 7/20/2021 6/8/2021: ACM sent list of in network podiatrists to patient via [employer] this date. ACM reviewed red flags and nurse access line with patient. 6/11/2021: Patient has office visit with Dr. Kerri Schultz 6/15/2021. Also has follow up with Dr. Shimon Nolan 6/30/2021.     6/21/2021: Patient states has follow up with Dr. Reza Delgado 6/24/2021. ACM discussed red flags and nurse access line with patient                Follow Up  Future Appointments   Date Time Provider [place]   6/30/2021  1:50 PM DO XIN Sanchez [facility]   8/5/2021  9:00 AM KRISTYN Nolasco - [address] [facility]     Follow-up With  Details  Why  Contact Info   Jose Steele DPM  On 6/24/2021  post-op follow up  [address] [place] [address]  [phone]     Plan:  -ACM to send referral to [address] [place] [address]  to assist patient with getting knee scooter or replacement crutches due to rubber stopper on end of one of the crutch legs coming off (done- referral sent 6/22/2021)    -Patient to follow up with Dr. Reza Delgado 6/24/2021 & 7/1/2021    -Patient states plan is to start PT in ~2 weeks. -ACM to follow up with patient in ~1 week. Anthony Jimeenz RN BSN  Associate Care Manager  Phone: [phone]  Email: [email or web address]

## 2021-06-23 ENCOUNTER — CARE COORDINATION (OUTPATIENT)
Dept: OTHER | Facility: CLINIC | Age: 36
End: 2021-06-23

## 2021-06-23 NOTE — CARE COORDINATION
clinics and e-visits as available. Yris Soto, [address] Coordinator  Associate Care Management  [address] [place] [address], [address]  Phone: [phone]  [email or web address]

## 2021-06-23 NOTE — CARE COORDINATION
Associate Care Manager forwarded referral for DME to Gordo Khanna LPN due to Luz Simons, [facility] specialist out sick. Martin Lux RN BSN  Associate Care Manager  Phone: [phone]  Email: [email or web address]

## 2021-06-24 ENCOUNTER — CARE COORDINATION (OUTPATIENT)
Dept: OTHER | Facility: CLINIC | Age: 36
End: 2021-06-24

## 2021-06-24 NOTE — CARE COORDINATION
ACC: Zandra An LPN CM Risk Score: 4  Charlson 10 Year Mortality Risk Score: 2%     Received call back from Nano Avila with [id number] with pricing information for a knee scooter. Purchase: $358 and takes 2 weeks to come to store    Rental:  - Weekly $35  - Monthly $120    Will forward pricing to patient and await for her response following her appointment with Dr. Delgado Jo today. Yris Virk, [address] Coordinator  Associate Care Management  [address] [place] [address]  Phone: [phone]  [email or web address]

## 2021-06-24 NOTE — CARE COORDINATION
Care Coordination  (CCSS) received referral from LPN/RN/SW requesting assistance with Follow-up on other/specific needs DME--patient needs replacement crutches or knee scooter  . Summary Note:     [address] - Per [place] they do provide crutches and/or knee scooters. States the insurance will only cover one or the other not both. States provider needs to fax orders and demographics to fax# [phone]. Also was informed patient could get a crutch repair kit which includes rubber pad  for an out of pocket cost of $16.93. CCSS attempted to outreach patient - Left VM  message to return call. Resources/Services Provided: In Network Tier I provider per [facility] site:                [address], [address] [place] [address]  [phone]       Plan:              Chart routed to LPN/RN/SW for review.

## 2021-06-29 ENCOUNTER — CARE COORDINATION (OUTPATIENT)
Dept: OTHER | Facility: CLINIC | Age: 36
End: 2021-06-29

## 2021-06-30 ENCOUNTER — CARE COORDINATION (OUTPATIENT)
Dept: OTHER | Facility: CLINIC | Age: 36
End: 2021-06-30

## 2021-06-30 NOTE — CARE COORDINATION
Care Coordination  (CCSS) received referral from LPN/RN/SW requesting assistance with Follow-up on other/specific needs DME-patient needs replacement crutches or knee scooter  . CCSS contacted the patient by telephone, verified name and  with patient as identifiers. Summary Note:     Spoke with patient informed her [facility] has knee scooters in stock which they will bill to insurance. Patient stated her providers office let her borrow a knee scooter from their office. Also informed patient there is a crutch repair kit available  for a \"out of pocket cost \" of $16.93. Patient was very grateful for the info and stated she will give [facility] contact # and fax# [phone] to her provider office if needed. Resources/Services Provided:     [facility]  [address], [address] [facility]  [phone]      No further outreach scheduled with Zach Doss will sign off care team at this time. Patient will be further outreached by the ACM on the care team.                        Plan:              Chart routed to LPN/RN/SW for review.

## 2021-07-07 ENCOUNTER — HOSPITAL ENCOUNTER (OUTPATIENT)
Dept: SLEEP CENTER | Age: 36
Discharge: HOME | End: 2021-07-07
Payer: COMMERCIAL

## 2021-07-07 DIAGNOSIS — G47.19 EXCESSIVE DAYTIME SLEEPINESS: ICD-10-CM

## 2021-07-07 DIAGNOSIS — E66.01 CLASS 3 SEVERE OBESITY WITH BODY MASS INDEX (BMI) OF 40.0 TO 44.9 IN ADULT, UNSPECIFIED OBESITY TYPE, UNSPECIFIED WHETHER SERIOUS COMORBIDITY PRESENT (HCC): Primary | ICD-10-CM

## 2021-07-07 DIAGNOSIS — R06.83 SNORES: ICD-10-CM

## 2021-07-07 DIAGNOSIS — R40.0 EXCESSIVE SLEEPINESS WHILE DRIVING: ICD-10-CM

## 2021-07-07 PROCEDURE — 95810 POLYSOM 6/> YRS 4/> PARAM: CPT

## 2021-07-08 VITALS
WEIGHT: 245 LBS | BODY MASS INDEX: 45.08 KG/M2 | HEART RATE: 96 BPM | OXYGEN SATURATION: 97 % | HEIGHT: 62 IN | SYSTOLIC BLOOD PRESSURE: 169 MMHG | DIASTOLIC BLOOD PRESSURE: 100 MMHG

## 2021-07-08 ASSESSMENT — SLEEP AND FATIGUE QUESTIONNAIRES
HOW LIKELY ARE YOU TO NOD OFF OR FALL ASLEEP WHILE SITTING QUIETLY AFTER LUNCH WITHOUT ALCOHOL: 2
HOW LIKELY ARE YOU TO NOD OFF OR FALL ASLEEP IN A CAR, WHILE STOPPED FOR A FEW MINUTES IN TRAFFIC: 1
HOW LIKELY ARE YOU TO NOD OFF OR FALL ASLEEP WHEN YOU ARE A PASSENGER IN A CAR FOR AN HOUR WITHOUT A BREAK: 3
HOW LIKELY ARE YOU TO NOD OFF OR FALL ASLEEP WHILE LYING DOWN TO REST IN THE AFTERNOON WHEN CIRCUMSTANCES PERMIT: 3
HOW LIKELY ARE YOU TO NOD OFF OR FALL ASLEEP WHILE SITTING AND TALKING TO SOMEONE: 1
HOW LIKELY ARE YOU TO NOD OFF OR FALL ASLEEP WHILE SITTING INACTIVE IN A PUBLIC PLACE: 1
HOW LIKELY ARE YOU TO NOD OFF OR FALL ASLEEP WHILE SITTING AND READING: 2
ESS TOTAL SCORE: 15
HOW LIKELY ARE YOU TO NOD OFF OR FALL ASLEEP WHILE WATCHING TV: 2

## 2021-07-15 NOTE — PROGRESS NOTES
[address] [address]                               SLEEP STUDY REPORT    PATIENT NAME: Abida Treviño                 :        1985  MED REC NO:   [id number]                            ROOM:  ACCOUNT NO:   [de-identified]                           ADMIT DATE: 2021  PROVIDER:     Yaritza Dominguez MD    DATE OF STUDY:  2021    The patient with evaluation for sleep apnea with history of snoring,  restless sleep, fatigue, and daytime sleepiness with B[place]ch[place] score of  16/24. PAST MEDICAL HISTORY:  Significant for morbid obesity with a BMI of  44.8. History of anemia, history of asthma, GERD, and mood disorder. MEDICATIONS:  Include the use of hydrocodone, Xanax, Symbicort, Celexa,  iron sulfate, fluticasone, hydroxyzine, montelukast, pantoprazol. The patient's neck circumference is 15.5 inches. The patient with past  medical history as well of obesity, anxiety, and arthritis. This test was performed with a recording of bilateral six EEGs, sleep  apnea protocol, bilateral EOGs, bilateral chin EMGs, bilateral anterior  tibialis EMGs, chest and abdominal movement, continuous pulse oximetry,  EKGs, and nasal pressure and airflows. Mild snoring was recorded during  this test.    SLEEP ARCHITECTURE:  Total recording time was 422 minutes, total sleep  time was 337 minutes, and sleep efficiency was 80%. The sleep latency  was 59 minutes and the patient spent 26 minutes awake after sleep onset. The REM latency was 63 minutes and the patient had 3 episodes of REM. SLEEP STAGES:  7.8% in stage I, 63% in stage II. Delta sleep was 0. REM sleep was 28.4%. RESPIRATORY EVENTS:  0 episodes of apneas, 2 episodes of obstructive  apneas, and 4 episodes of hypopneas for a longest event of 35 seconds  and the mean event of 16 seconds.   The combined apnea-hypopnea index was  7.8 and increased number of events were seen during supine position and  non-supine position. The patient spent in the supine position 88% of  the time, 10% on left side as well as right side. AROUSALS:  44 episode of arousals for an index of 7.8. The majority of  those were spontaneous. LIMB MOVEMENTS:  Total limb movement was 22 for an index of 3.9. No PLM  series seen. OXYGEN SATURATION:  The oxygen desaturation index was 6.2. The oxygen  saturation fluctuated between 98% to 84% with a mean sat of 94%. The  patient spent less than a minute with saturation equal or less than 88%. Heart rate fluctuated between 69 and 120 beats per minutes with the  average heart rate of 91 beats per minute. SUMMARY:  The patient presented with the followin. Decreased sleep efficiency, which is most likely associated to first  night events. 2.  Mild snoring. 3.  Mild JOYCE.     Considering the patient's morbid obesity, if the patient is able to  achieve 10% weight loss, we will suggest to reevaluate diagnostic  polysomnogram.        Arvind Cortez MD      D: 2021 15:34:31       T: 07/15/2021 5:14:28     [email or web address]  Job#: [id number]     Doc#: [id number]    CC:

## 2021-07-19 ENCOUNTER — CARE COORDINATION (OUTPATIENT)
Dept: OTHER | Facility: CLINIC | Age: 36
End: 2021-07-19

## 2021-07-19 NOTE — CARE COORDINATION
Associate Care Manager (ACM) called patient for CC outreach. No answer; ACM left HIPAA compliant voicemail message with request for return call. ACM also sent lost to follow up letter to patient via [address] ACM will continue to follow. Gila Dahl RN BSN  Associate Care Manager  Phone: [phone]  Email: [email or web address]

## 2021-07-26 ENCOUNTER — CARE COORDINATION (OUTPATIENT)
Dept: OTHER | Facility: CLINIC | Age: 36
End: 2021-07-26

## 2021-07-26 RX ORDER — ACETAMINOPHEN 500 MG
1000 TABLET ORAL EVERY 6 HOURS PRN
COMMUNITY

## 2021-07-26 NOTE — CARE COORDINATION
Ambulatory Care Coordination Note  7/26/2021  CM Risk Score: 8  Charlson 10 Year Mortality Risk Score: 2%     ACC: Juan Braswell RN    Summary Note: Associate Care Manager (ACM) called patient for CC outreach. Patient reports things have been rough recently as left foot incision still open, which is preventing her from showering.  has not been able to start PT due to her and her children are currently in quarantine due to children's father Colin Fink. States she has office visit scheduled for her children tomorrow as youngest son has had fever and gets febrile seizures. Patient states she has had productive cough with yellow sputum, low grade temp, and PCP started her on Z Ellis. States she has also been taking Zinc and Vitamin C. Patient reports physician gave her home exercise instructions which she states she has been doing.  has had increased pain from putting toes on floor.  has also had issues with urinary incontinence recently and is hoping to see urology once able to get to office appointment.  has been \"exhausted from doing anything\" recently. States using knee scooter (borrowed from podiatry office) and now back is hurting. Also reports LUE pain and difficulty moving at times. Patient reports has been ordering groceries from local stores and mother is dropping items off when needed. Patient states intially told she would be able to return to work end of August but states podiatry notified her she will be in boot for \"few months\". 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 ACM suggested patient discuss concerns with podiatry, which she states she plans to do. Patient reports currently using PRN Ibuprofen or Acetaminophen for pain. States anticipates will need stronger pain med to use prior to therapy once starting PT. Patient states podiatry notified her to discuss that with them once need arises. Patient states she plans to do so.        Goals Addressed                    This Visit's Progress       Patient Stated      My incision to left foot will heal. (pt-stated)         My incision to left foot will heal.    Barriers: none  Plan for overcoming my barriers: N/A  Confidence: 8/10  Anticipated Goal Completion Date: 9/1/2021 7/26/2021: Patient reports incision to left foot open. Patient following up with podiatry. Prior to Admission medications    Medication Sig Start Date End Date Taking? Authorizing Provider   Azithromycin (ZITHROMAX Z-WALESKA PO) Take by mouth As directed   Yes Historical Provider, MD   ZINC PO Take by mouth   Yes Historical Provider, MD   Ascorbic Acid (VITAMIN C PO) Take by mouth   Yes Historical Provider, MD   acetaminophen (TYLENOL) 500 MG tablet Take 1,000 mg by mouth every 6 hours as needed for Pain   Yes Historical Provider, MD   oxyCODONE-acetaminophen (PERCOCET) 7.5-325 MG per tablet Take 1 tablet by mouth every 6 hours as needed for Pain. Patient not taking: Reported on 7/26/2021    Historical Provider, MD   aspirin 81 MG EC tablet Take 81 mg by mouth 2 times daily    Historical Provider, MD   doxycycline hyclate (VIBRAMYCIN) 100 MG capsule Take 100 mg by mouth 2 times daily    Historical Provider, MD   ALPRAZolam (XANAX) 0.5 MG tablet Take 0.5 mg by mouth 2 times daily as needed for Anxiety.     Historical Provider, MD   traZODone (DESYREL) 50 MG tablet Take 50 mg by mouth nightly Dose: 1/2 tablet; patient takes PRN    Historical Provider, MD   ibuprofen (ADVIL;MOTRIN) 800 MG tablet Take 1 tablet by mouth every 8 hours as needed for Pain 6/9/21   Obed Evans DO   ergocalciferol (ERGOCALCIFEROL) 1.25 MG (63810 UT) capsule Takes on Mondays 2/10/21   Historical Provider, MD   montelukast (SINGULAIR) 10 MG tablet take 1 tablet by mouth once daily 2/10/21   Historical Provider, MD   escitalopram (LEXAPRO) 10 MG tablet take 1 tablet by mouth every morning 2/10/21   Historical Provider, MD   pantoprazole (PROTONIX) 40 MG tablet take 1 tablet by mouth every morning 2/10/21   Historical Provider, MD   rizatriptan (MAXALT-MLT) 10 MG disintegrating tablet dissolve 1 tablet ON TONGUE AT ONSET OF HEADACHE may repeat in 2 hours IF headache PERSISTS 2/10/21   Historical Provider, MD   fluticasone (FLONASE) 50 MCG/ACT nasal spray instill 1 spray into each nostril daily 2/10/21   Historical Provider, MD   budesonide-formoterol (SYMBICORT) 160-4.5 MCG/ACT AERO Inhale 1 puff into the lungs 2 times daily 1/8/21   Historical Provider, MD   ferrous sulfate (IRON 325) 325 (65 Fe) MG tablet Take 325 mg by mouth daily (with breakfast)    Historical Provider, MD       Future Appointments   Date Time Provider Rose Ga   8/25/2021 10:30 AM [address], APRN - CNP [facility]   10/12/2021  9:30 AM DO [facility]     Plan:  -Patient to finish Z pack for URI symptoms.    -Patient hoping to start physical therapy soon.    -Patient to see urology for incontinence. -ACM to follow up with patient in ~2 weeks. Gene Chirinos RN BSN  Associate Care Manager  Phone: [phone]  Email: [email or web address]

## 2021-07-27 LAB — SARS-COV-2: DETECTED

## 2021-07-31 ENCOUNTER — APPOINTMENT (OUTPATIENT)
Dept: CT IMAGING | Age: 36
DRG: 177 | End: 2021-07-31
Payer: COMMERCIAL

## 2021-07-31 ENCOUNTER — HOSPITAL ENCOUNTER (INPATIENT)
Age: 36
LOS: 7 days | Discharge: HOME | DRG: 177 | End: 2021-08-07
Attending: EMERGENCY MEDICINE | Admitting: INTERNAL MEDICINE
Payer: COMMERCIAL

## 2021-07-31 DIAGNOSIS — J96.01 ACUTE RESPIRATORY FAILURE WITH HYPOXIA (HCC): ICD-10-CM

## 2021-07-31 DIAGNOSIS — U07.1 COVID-19: ICD-10-CM

## 2021-07-31 DIAGNOSIS — J12.82 PNEUMONIA DUE TO COVID-19 VIRUS: Primary | ICD-10-CM

## 2021-07-31 DIAGNOSIS — U07.1 PNEUMONIA DUE TO COVID-19 VIRUS: Primary | ICD-10-CM

## 2021-07-31 LAB
ALBUMIN SERPL-MCNC: 3.5 G/DL (ref 3.5–5.2)
ALP BLD-CCNC: 68 U/L (ref 35–104)
ALT SERPL-CCNC: 21 U/L (ref 0–32)
ANION GAP SERPL CALCULATED.3IONS-SCNC: 12 MMOL/L (ref 7–16)
APTT: 30.2 SEC (ref 24.5–35.1)
AST SERPL-CCNC: 34 U/L (ref 0–31)
B.E.: -2.3 MMOL/L (ref -3–3)
BASOPHILS ABSOLUTE: 0.01 E9/L (ref 0–0.2)
BASOPHILS RELATIVE PERCENT: 0.1 % (ref 0–2)
BILIRUB SERPL-MCNC: <0.2 MG/DL (ref 0–1.2)
BUN BLDV-MCNC: 8 MG/DL (ref 6–20)
CALCIUM SERPL-MCNC: 7.8 MG/DL (ref 8.6–10.2)
CHLORIDE BLD-SCNC: 101 MMOL/L (ref 98–107)
CO2: 23 MMOL/L (ref 22–29)
COHB: 0.2 % (ref 0–1.5)
CREAT SERPL-MCNC: 0.8 MG/DL (ref 0.5–1)
CRITICAL: ABNORMAL
D DIMER: 228 NG/ML DDU
DATE ANALYZED: ABNORMAL
DATE OF COLLECTION: ABNORMAL
EKG ATRIAL RATE: 117 BPM
EKG P AXIS: 60 DEGREES
EKG P-R INTERVAL: 160 MS
EKG Q-T INTERVAL: 328 MS
EKG QRS DURATION: 82 MS
EKG QTC CALCULATION (BAZETT): 457 MS
EKG R AXIS: 72 DEGREES
EKG T AXIS: 19 DEGREES
EKG VENTRICULAR RATE: 117 BPM
EOSINOPHILS ABSOLUTE: 0 E9/L (ref 0.05–0.5)
EOSINOPHILS RELATIVE PERCENT: 0 % (ref 0–6)
GFR AFRICAN AMERICAN: >60
GFR NON-AFRICAN AMERICAN: >60 ML/MIN/1.73
GLUCOSE BLD-MCNC: 127 MG/DL (ref 74–99)
HCG QUALITATIVE: NEGATIVE
HCO3: 21.7 MMOL/L (ref 22–26)
HCT VFR BLD CALC: 36.5 % (ref 34–48)
HEMOGLOBIN: 11.4 G/DL (ref 11.5–15.5)
HHB: 3.7 % (ref 0–5)
IMMATURE GRANULOCYTES #: 0.05 E9/L
IMMATURE GRANULOCYTES %: 0.6 % (ref 0–5)
INR BLD: 1.1
LAB: ABNORMAL
LACTIC ACID, SEPSIS: 1.1 MMOL/L (ref 0.5–1.9)
LYMPHOCYTES ABSOLUTE: 0.83 E9/L (ref 1.5–4)
LYMPHOCYTES RELATIVE PERCENT: 10.4 % (ref 20–42)
Lab: ABNORMAL
MAGNESIUM: 1.7 MG/DL (ref 1.6–2.6)
MCH RBC QN AUTO: 23.2 PG (ref 26–35)
MCHC RBC AUTO-ENTMCNC: 31.2 % (ref 32–34.5)
MCV RBC AUTO: 74.3 FL (ref 80–99.9)
METHB: 0.3 % (ref 0–1.5)
MODE: ABNORMAL
MONOCYTES ABSOLUTE: 0.48 E9/L (ref 0.1–0.95)
MONOCYTES RELATIVE PERCENT: 6 % (ref 2–12)
NEUTROPHILS ABSOLUTE: 6.6 E9/L (ref 1.8–7.3)
NEUTROPHILS RELATIVE PERCENT: 82.9 % (ref 43–80)
O2 CONTENT: 15.2 ML/DL
O2 SATURATION: 96.3 % (ref 92–98.5)
O2HB: 95.8 % (ref 94–97)
OPERATOR ID: 166
PATIENT TEMP: 37 C
PCO2: 34.7 MMHG (ref 35–45)
PDW BLD-RTO: 17.9 FL (ref 11.5–15)
PH BLOOD GAS: 7.41 (ref 7.35–7.45)
PLATELET # BLD: 132 E9/L (ref 130–450)
PMV BLD AUTO: 10.1 FL (ref 7–12)
PO2: 86.4 MMHG (ref 75–100)
POTASSIUM REFLEX MAGNESIUM: 3.4 MMOL/L (ref 3.5–5)
PRO-BNP: 15 PG/ML (ref 0–125)
PROTHROMBIN TIME: 12 SEC (ref 9.3–12.4)
RBC # BLD: 4.91 E12/L (ref 3.5–5.5)
SODIUM BLD-SCNC: 136 MMOL/L (ref 132–146)
SOURCE, BLOOD GAS: ABNORMAL
THB: 11.2 G/DL (ref 11.5–16.5)
TIME ANALYZED: 1031
TOTAL PROTEIN: 7.1 G/DL (ref 6.4–8.3)
TROPONIN, HIGH SENSITIVITY: 6 NG/L (ref 0–9)
WBC # BLD: 8 E9/L (ref 4.5–11.5)

## 2021-07-31 PROCEDURE — 93010 ELECTROCARDIOGRAM REPORT: CPT | Performed by: INTERNAL MEDICINE

## 2021-07-31 PROCEDURE — 83735 ASSAY OF MAGNESIUM: CPT

## 2021-07-31 PROCEDURE — 93005 ELECTROCARDIOGRAM TRACING: CPT | Performed by: EMERGENCY MEDICINE

## 2021-07-31 PROCEDURE — 84484 ASSAY OF TROPONIN QUANT: CPT

## 2021-07-31 PROCEDURE — XW033E5 INTRODUCTION OF REMDESIVIR ANTI-INFECTIVE INTO PERIPHERAL VEIN, PERCUTANEOUS APPROACH, NEW TECHNOLOGY GROUP 5: ICD-10-PCS | Performed by: INTERNAL MEDICINE

## 2021-07-31 PROCEDURE — 82805 BLOOD GASES W/O2 SATURATION: CPT

## 2021-07-31 PROCEDURE — 71275 CT ANGIOGRAPHY CHEST: CPT

## 2021-07-31 PROCEDURE — 83605 ASSAY OF LACTIC ACID: CPT

## 2021-07-31 PROCEDURE — 84703 CHORIONIC GONADOTROPIN ASSAY: CPT

## 2021-07-31 PROCEDURE — 2580000003 HC RX 258: Performed by: EMERGENCY MEDICINE

## 2021-07-31 PROCEDURE — 85025 COMPLETE CBC W/AUTO DIFF WBC: CPT

## 2021-07-31 PROCEDURE — 85730 THROMBOPLASTIN TIME PARTIAL: CPT

## 2021-07-31 PROCEDURE — 6360000004 HC RX CONTRAST MEDICATION: Performed by: RADIOLOGY

## 2021-07-31 PROCEDURE — 6360000002 HC RX W HCPCS: Performed by: EMERGENCY MEDICINE

## 2021-07-31 PROCEDURE — 6360000002 HC RX W HCPCS: Performed by: INTERNAL MEDICINE

## 2021-07-31 PROCEDURE — 99285 EMERGENCY DEPT VISIT HI MDM: CPT

## 2021-07-31 PROCEDURE — 6370000000 HC RX 637 (ALT 250 FOR IP): Performed by: INTERNAL MEDICINE

## 2021-07-31 PROCEDURE — 85610 PROTHROMBIN TIME: CPT

## 2021-07-31 PROCEDURE — 2060000000 HC ICU INTERMEDIATE R&B

## 2021-07-31 PROCEDURE — 2700000000 HC OXYGEN THERAPY PER DAY

## 2021-07-31 PROCEDURE — 87040 BLOOD CULTURE FOR BACTERIA: CPT

## 2021-07-31 PROCEDURE — 80053 COMPREHEN METABOLIC PANEL: CPT

## 2021-07-31 PROCEDURE — 6370000000 HC RX 637 (ALT 250 FOR IP): Performed by: EMERGENCY MEDICINE

## 2021-07-31 PROCEDURE — 96374 THER/PROPH/DIAG INJ IV PUSH: CPT

## 2021-07-31 PROCEDURE — 85378 FIBRIN DEGRADE SEMIQUANT: CPT

## 2021-07-31 PROCEDURE — 83880 ASSAY OF NATRIURETIC PEPTIDE: CPT

## 2021-07-31 RX ORDER — MONTELUKAST SODIUM 10 MG/1
10 TABLET ORAL NIGHTLY
Status: DISCONTINUED | OUTPATIENT
Start: 2021-07-31 | End: 2021-08-07 | Stop reason: HOSPADM

## 2021-07-31 RX ORDER — ACETAMINOPHEN 500 MG
1000 TABLET ORAL ONCE
Status: COMPLETED | OUTPATIENT
Start: 2021-07-31 | End: 2021-07-31

## 2021-07-31 RX ORDER — ESCITALOPRAM OXALATE 10 MG/1
10 TABLET ORAL DAILY
Status: DISCONTINUED | OUTPATIENT
Start: 2021-08-01 | End: 2021-08-07 | Stop reason: HOSPADM

## 2021-07-31 RX ORDER — TRAZODONE HYDROCHLORIDE 50 MG/1
50 TABLET ORAL NIGHTLY
Status: DISCONTINUED | OUTPATIENT
Start: 2021-07-31 | End: 2021-08-07 | Stop reason: HOSPADM

## 2021-07-31 RX ORDER — ASCORBIC ACID 500 MG
500 TABLET ORAL DAILY
Status: DISCONTINUED | OUTPATIENT
Start: 2021-08-01 | End: 2021-08-07 | Stop reason: HOSPADM

## 2021-07-31 RX ORDER — ERGOCALCIFEROL 1.25 MG/1
50000 CAPSULE ORAL WEEKLY
Status: DISCONTINUED | OUTPATIENT
Start: 2021-08-02 | End: 2021-08-07 | Stop reason: HOSPADM

## 2021-07-31 RX ORDER — 0.9 % SODIUM CHLORIDE 0.9 %
1000 INTRAVENOUS SOLUTION INTRAVENOUS ONCE
Status: COMPLETED | OUTPATIENT
Start: 2021-07-31 | End: 2021-07-31

## 2021-07-31 RX ORDER — SUMATRIPTAN 50 MG/1
50 TABLET, FILM COATED ORAL DAILY PRN
Status: DISCONTINUED | OUTPATIENT
Start: 2021-07-31 | End: 2021-08-07 | Stop reason: HOSPADM

## 2021-07-31 RX ORDER — ALPRAZOLAM 0.25 MG/1
0.5 TABLET ORAL 2 TIMES DAILY PRN
Status: DISCONTINUED | OUTPATIENT
Start: 2021-07-31 | End: 2021-08-07 | Stop reason: HOSPADM

## 2021-07-31 RX ORDER — FERROUS SULFATE 325(65) MG
325 TABLET ORAL
Status: DISCONTINUED | OUTPATIENT
Start: 2021-08-01 | End: 2021-08-07 | Stop reason: HOSPADM

## 2021-07-31 RX ORDER — ASPIRIN 81 MG/1
81 TABLET ORAL 2 TIMES DAILY
Status: DISCONTINUED | OUTPATIENT
Start: 2021-07-31 | End: 2021-08-07 | Stop reason: HOSPADM

## 2021-07-31 RX ORDER — ACETAMINOPHEN 500 MG
1000 TABLET ORAL EVERY 6 HOURS PRN
Status: DISCONTINUED | OUTPATIENT
Start: 2021-07-31 | End: 2021-08-07 | Stop reason: HOSPADM

## 2021-07-31 RX ORDER — DEXAMETHASONE SODIUM PHOSPHATE 4 MG/ML
6 INJECTION, SOLUTION INTRA-ARTICULAR; INTRALESIONAL; INTRAMUSCULAR; INTRAVENOUS; SOFT TISSUE EVERY 6 HOURS
Status: DISCONTINUED | OUTPATIENT
Start: 2021-07-31 | End: 2021-07-31

## 2021-07-31 RX ORDER — BUDESONIDE AND FORMOTEROL FUMARATE DIHYDRATE 160; 4.5 UG/1; UG/1
1 AEROSOL RESPIRATORY (INHALATION) 2 TIMES DAILY
Status: DISCONTINUED | OUTPATIENT
Start: 2021-07-31 | End: 2021-08-03

## 2021-07-31 RX ORDER — PANTOPRAZOLE SODIUM 40 MG/1
40 TABLET, DELAYED RELEASE ORAL DAILY
Status: DISCONTINUED | OUTPATIENT
Start: 2021-08-01 | End: 2021-08-07 | Stop reason: HOSPADM

## 2021-07-31 RX ORDER — SUMATRIPTAN 50 MG/1
50 TABLET, FILM COATED ORAL ONCE
Status: DISCONTINUED | OUTPATIENT
Start: 2021-07-31 | End: 2021-07-31

## 2021-07-31 RX ORDER — DEXAMETHASONE SODIUM PHOSPHATE 10 MG/ML
6 INJECTION, SOLUTION INTRAMUSCULAR; INTRAVENOUS ONCE
Status: COMPLETED | OUTPATIENT
Start: 2021-07-31 | End: 2021-07-31

## 2021-07-31 RX ORDER — DEXAMETHASONE SODIUM PHOSPHATE 4 MG/ML
6 INJECTION, SOLUTION INTRA-ARTICULAR; INTRALESIONAL; INTRAMUSCULAR; INTRAVENOUS; SOFT TISSUE EVERY 12 HOURS
Status: DISCONTINUED | OUTPATIENT
Start: 2021-07-31 | End: 2021-08-07 | Stop reason: HOSPADM

## 2021-07-31 RX ORDER — FLUTICASONE PROPIONATE 50 MCG
1 SPRAY, SUSPENSION (ML) NASAL DAILY
Status: DISCONTINUED | OUTPATIENT
Start: 2021-07-31 | End: 2021-08-07 | Stop reason: HOSPADM

## 2021-07-31 RX ORDER — PANTOPRAZOLE SODIUM 40 MG/1
40 TABLET, DELAYED RELEASE ORAL DAILY
Status: DISCONTINUED | OUTPATIENT
Start: 2021-07-31 | End: 2021-07-31

## 2021-07-31 RX ORDER — BUDESONIDE AND FORMOTEROL FUMARATE DIHYDRATE 160; 4.5 UG/1; UG/1
1 AEROSOL RESPIRATORY (INHALATION) 2 TIMES DAILY
Status: DISCONTINUED | OUTPATIENT
Start: 2021-07-31 | End: 2021-07-31 | Stop reason: CLARIF

## 2021-07-31 RX ORDER — ARFORMOTEROL TARTRATE 15 UG/2ML
15 SOLUTION RESPIRATORY (INHALATION) 2 TIMES DAILY
Status: DISCONTINUED | OUTPATIENT
Start: 2021-07-31 | End: 2021-07-31 | Stop reason: ALTCHOICE

## 2021-07-31 RX ORDER — BUDESONIDE 0.5 MG/2ML
0.5 INHALANT ORAL 2 TIMES DAILY
Status: DISCONTINUED | OUTPATIENT
Start: 2021-07-31 | End: 2021-07-31 | Stop reason: ALTCHOICE

## 2021-07-31 RX ADMIN — TRAZODONE HYDROCHLORIDE 50 MG: 50 TABLET ORAL at 20:19

## 2021-07-31 RX ADMIN — IOPAMIDOL 75 ML: 755 INJECTION, SOLUTION INTRAVENOUS at 11:51

## 2021-07-31 RX ADMIN — BUDESONIDE AND FORMOTEROL FUMARATE DIHYDRATE 1 PUFF: 160; 4.5 AEROSOL RESPIRATORY (INHALATION) at 20:18

## 2021-07-31 RX ADMIN — ACETAMINOPHEN 1000 MG: 500 TABLET ORAL at 10:47

## 2021-07-31 RX ADMIN — ENOXAPARIN SODIUM 40 MG: 40 INJECTION SUBCUTANEOUS at 20:18

## 2021-07-31 RX ADMIN — MONTELUKAST SODIUM 10 MG: 10 TABLET, FILM COATED ORAL at 20:19

## 2021-07-31 RX ADMIN — SODIUM CHLORIDE 1000 ML: 9 INJECTION, SOLUTION INTRAVENOUS at 15:18

## 2021-07-31 RX ADMIN — SODIUM CHLORIDE 1000 ML: 9 INJECTION, SOLUTION INTRAVENOUS at 10:47

## 2021-07-31 RX ADMIN — FLUTICASONE PROPIONATE 1 SPRAY: 50 SPRAY, METERED NASAL at 20:18

## 2021-07-31 RX ADMIN — DEXAMETHASONE SODIUM PHOSPHATE 6 MG: 10 INJECTION, SOLUTION INTRAMUSCULAR; INTRAVENOUS at 10:47

## 2021-07-31 RX ADMIN — DEXAMETHASONE SODIUM PHOSPHATE 6 MG: 4 INJECTION, SOLUTION INTRAMUSCULAR; INTRAVENOUS at 20:18

## 2021-07-31 ASSESSMENT — ENCOUNTER SYMPTOMS
DIARRHEA: 0
SORE THROAT: 1
BACK PAIN: 0
SHORTNESS OF BREATH: 0
EYE PAIN: 0
WHEEZING: 0
RHINORRHEA: 0
ABDOMINAL PAIN: 0
EYE REDNESS: 0
CHEST TIGHTNESS: 0
VOMITING: 0

## 2021-07-31 ASSESSMENT — PAIN SCALES - GENERAL
PAINLEVEL_OUTOF10: 0
PAINLEVEL_OUTOF10: 0
PAINLEVEL_OUTOF10: 1

## 2021-07-31 ASSESSMENT — PAIN - FUNCTIONAL ASSESSMENT: PAIN_FUNCTIONAL_ASSESSMENT: PREVENTS OR INTERFERES SOME ACTIVE ACTIVITIES AND ADLS

## 2021-07-31 ASSESSMENT — PAIN DESCRIPTION - PROGRESSION: CLINICAL_PROGRESSION: NOT CHANGED

## 2021-07-31 ASSESSMENT — PAIN DESCRIPTION - FREQUENCY: FREQUENCY: CONTINUOUS

## 2021-07-31 ASSESSMENT — PAIN DESCRIPTION - DESCRIPTORS: DESCRIPTORS: ACHING

## 2021-07-31 ASSESSMENT — PAIN DESCRIPTION - LOCATION: LOCATION: BACK

## 2021-07-31 ASSESSMENT — PAIN DESCRIPTION - ORIENTATION: ORIENTATION: LOWER

## 2021-07-31 ASSESSMENT — PAIN DESCRIPTION - ONSET: ONSET: ON-GOING

## 2021-07-31 NOTE — PROGRESS NOTES
Progress Note      SUBJECTIVE:  Admit note  Patient started having dry cough and s.thr since   ON  SHE WAS TESTED POSITIVE FOR COVID. COUGH WAS NOT GETTING ANY BETTER. SHE DENIES SOB ALTHOUGH SHE CLAIMED THAT HER PULSE OX AT HOME WAS LESS THAN 90  IN ER PULSE OX AT ROOM AIR WAS 89 %  CXR SHOWED BILAT GROUND GLASS  WAS ADMITTED  OBJECTIVE:      Medications    Infusion Medications   Scheduled Medications    [START ON 2021] vitamin C  500 mg Oral Daily    aspirin  81 mg Oral BID    [START ON 2021] vitamin D  50,000 Units Oral Weekly    [START ON 2021] escitalopram  10 mg Oral Daily    [START ON 2021] ferrous sulfate  325 mg Oral Daily with breakfast    fluticasone  1 spray Each Nostril Daily    montelukast  10 mg Oral Nightly    traZODone  50 mg Oral Nightly    enoxaparin  40 mg Subcutaneous Nightly    dexamethasone  6 mg Intravenous Q12H    [START ON 2021] pantoprazole  40 mg Oral Daily    budesonide-formoterol  1 puff Inhalation BID       Physical  Temperature:  Current - Temp: 98.4 °F (36.9 °C);  Max - Temp  Av.9 °F (37.2 °C)  Min: 98.2 °F (36.8 °C)  Max: 100.1 °F (37.8 °C)    Respiratory Rate : Resp  Av.2  Min: 16  Max: 18    Pulse Range: Pulse  Av.8  Min: 101  Max: 121    Blood Presuure Range:  Systolic (47KQZ), WCJ:572 , Min:95 , MCV:080   ; Diastolic (30IJH), IIP:08, Min:40, Max:59      Pulse ox Range: SpO2  Av.4 %  Min: 86 %  Max: 96 %    24hr I & O:  No intake or output data in the 24 hours ending 21    Constitutional:  awake, alert, cooperative, no apparent distress, and appears stated age  Eyes:  pupils equal, round and reactive to light  ENT:  normocepalic, without obvious abnormality  NECK:  supple, symmetrical, trachea midline  HEMATOLOGIC/LYMPHATICS:  no cervical lymphadenopathy  LUNGS:  clear, no wheeze, no rhonchi  CARDIOVASCULAR:  Normal apical impulse, regular rate and rhythm, normal S1 and S2, no S3 or S4, and no murmur noted  ABDOMEN:  normal bowel sounds, non-distended, non-tender, no masses palpated  MUSCULOSKELETAL:  There is no redness, warmth, or swelling of the joints. Full range of motion noted. Motor strength is 5 out of 5 all extremities bilaterally. Tone is normal.  SKIN:  normal skin color, texture, turgor    Data      CBC:   Lab Results   Component Value Date    WBC 8.0 07/31/2021    RBC 4.91 07/31/2021    HGB 11.4 07/31/2021    HCT 36.5 07/31/2021    MCV 74.3 07/31/2021    MCH 23.2 07/31/2021    MCHC 31.2 07/31/2021    RDW 17.9 07/31/2021     07/31/2021    MPV 10.1 07/31/2021     BMP:    Lab Results   Component Value Date     07/31/2021    K 3.4 07/31/2021     07/31/2021    CO2 23 07/31/2021    BUN 8 07/31/2021    CREATININE 0.8 07/31/2021    CALCIUM 7.8 07/31/2021    GFRAA >60 07/31/2021    LABGLOM >60 07/31/2021    GLUCOSE 127 07/31/2021    GLUCOSE 94 09/03/2011     PT/INR:  No results found for: PTINR  Last 3 Troponin:  No components found for: TROPININI      ASSESSMENT:      Active Problems:    Pneumonia due to COVID-19 virus  Resolved Problems:    * No resolved hospital problems.  *  HYPOXIA  BY HX ASTHMA/GERD/OBESE/MOOD DISORDER          PLAN:    DVT PROPHY  START DECADRON IV AND REMDESIVIR---DOSE CALCULATED BY PHARMACIST  CONT HOME MEDS  TITRATE OXYGEN  STABLE      Electronically signed by Caridad Johnson MD on 7/31/2021 at 7:46 PM

## 2021-07-31 NOTE — ED PROVIDER NOTES
ATTENDING PROVIDER ATTESTATION:     Dorcas Hilario presented to the emergency department for evaluation of Positive For Covid-19 (pt states she had a sore throat last week. was having some blood tinged sputum recently. 86% RA upon arrival to ED. ), Shortness of Breath, and Pharyngitis   and was initially evaluated by the Medical Resident. See Original ED Note for H&P and ED course above. I have reviewed and discussed the case, including pertinent history (medical, surgical, family and social) and exam findings with the Medical Resident assigned to Dorcas Lizzyvivian. I have personally performed and/or participated in the history, exam, medical decision making, and procedures and agree with all pertinent clinical information. I, Dr. Julianne Worley MD, am the primary provider of this record. I have reviewed my findings and recommendations with the assigned Medical Resident, Dorcas Hilario and members of family present at the time of disposition. My findings/plan: The primary encounter diagnosis was Pneumonia due to COVID-19 virus. A diagnosis of Acute respiratory failure with hypoxia (HCC) was also pertinent to this visit. Current Discharge Medication List        Julianne Worley MD    58-year-old female presenting to the emergency department with sore throat and decreased pulse ox sats, tested positive for COVID-19 on 7-. Patient reports that she began having symptoms of sore throat and muscle pain on Wednesday, (7-). Symptoms have been moderate in severity and worsening. No exacerbating or alleviating factors. Reports associated symptoms of shortness of breath. Patient reports that members of her household are positive for COVID-19. Patient reports that she was tested on 7- and was positive for COVID-19. Patient reports that 3 days ago she began having cough.   Patient reports that yesterday she began coughing up blood and was found to have decreased pulse oxygenation by self assessment using pulse oximetry at home. Patient currently 95% on 4L O2 nasal cannula. Patient reports no shortness of breath, chest pain, numbness, weakness. Review of Systems   Constitutional: Negative for fatigue and fever. HENT: Positive for sore throat. Negative for hearing loss and rhinorrhea. Eyes: Negative for pain and redness. Respiratory: Negative for chest tightness, shortness of breath and wheezing. Cardiovascular: Positive for palpitations. Negative for chest pain. Gastrointestinal: Negative for abdominal pain, diarrhea and vomiting. Genitourinary: Negative for flank pain and pelvic pain. Musculoskeletal: Negative for back pain, neck pain and neck stiffness. Skin: Negative for rash and wound. Neurological: Negative for syncope, weakness, light-headedness and numbness. Psychiatric/Behavioral: Negative for confusion. The patient is not nervous/anxious. All other systems reviewed and are negative. Physical Exam  Vitals and nursing note reviewed. Constitutional:       General: She is not in acute distress. Appearance: She is obese. She is not diaphoretic. HENT:      Head: Normocephalic. Mouth/Throat:      Mouth: Mucous membranes are moist.      Pharynx: No oropharyngeal exudate. Eyes:      Extraocular Movements: Extraocular movements intact. Pupils: Pupils are equal, round, and reactive to light. Cardiovascular:      Rate and Rhythm: Regular rhythm. Tachycardia present. Heart sounds: Normal heart sounds. Pulmonary:      Effort: Pulmonary effort is normal. No tachypnea or respiratory distress. Breath sounds: Normal breath sounds. No wheezing. Chest:      Chest wall: No tenderness. Abdominal:      General: Bowel sounds are normal.      Palpations: Abdomen is soft. There is no hepatomegaly or splenomegaly. Tenderness: There is no abdominal tenderness. There is no guarding or rebound.    Musculoskeletal: Cervical back: Normal range of motion and neck supple. Right lower leg: No edema. Left lower leg: No edema. Skin:     General: Skin is warm. Coloration: Skin is not cyanotic. Neurological:      Mental Status: She is alert and oriented to person, place, and time. Psychiatric:         Mood and Affect: Mood normal.         Behavior: Behavior normal.         Procedures    MDM  Number of Diagnoses or Management Options  Acute respiratory failure with hypoxia ([address]  Pneumonia due to COVID-19 virus  Diagnosis management comments: 58-year-old female presented to the emergency department with symptoms of COVID-19 after a positive COVID-19 test. CTA shows no evidence of pulmonary embolism, bilateral multifocal groundglass and semisolid pulmonary infiltrates most consistent with Covid pneumonia  EKG showed sinus tachycardia, no significant change from previous EKG on January 26, 2021. Glucose was 127. As patient required 4 L of O2 for oxygen saturation 95%, hospitalist was consulted and  accepted the patient for admission under Dr. Michael Pichardo.,       ED Course as of Jul 31 1848   Sat Jul 31, 2021   1012 EKG: This EKG is signed and interpreted by me. Rate: 117  Rhythm: Sinus  Interpretation: Sinus tachycardia, normal [place] interval, normal QRS, normal QT interval, no acute ST or T wave changes  Comparison: no previous EKG available        [JA]   1423 Hospitalist was consulted. Dr. Valentino Jews accepted the patient for admission under Dr. Michael Troy. [JA]   1846 Magnesium:    Magnesium 1.7 [MARILEE]      ED Course User Index  [JA] Julianne Worley MD  [MARILEE] Samantha Veras DO       Labs      Radiology      EKG Interpretation. EKG:    Rate:117   Rhythm: sinus  Interpretation: Sinus tachycardia, normal axis, no acute ST changes   Comparison: No Change from previous EKG on 1/29/2021    ED Course as of Jul 31 1848   Sat Jul 31, 2021   1012 EKG: This EKG is signed and interpreted by me. MCHC 31.2 (L) 32.0 - 34.5 %    RDW 17.9 (H) 11.5 - 15.0 fL    Platelets 475 330 - 010 E9/L    MPV 10.1 7.0 - 12.0 fL    Neutrophils % 82.9 (H) 43.0 - 80.0 %    Immature Granulocytes % 0.6 0.0 - 5.0 %    Lymphocytes % 10.4 (L) 20.0 - 42.0 %    Monocytes % 6.0 2.0 - 12.0 %    Eosinophils % 0.0 0.0 - 6.0 %    Basophils % 0.1 0.0 - 2.0 %    Neutrophils Absolute 6.60 1.80 - 7.30 E9/L    Immature Granulocytes # 0.05 E9/L    Lymphocytes Absolute 0.83 (L) 1.50 - 4.00 E9/L    Monocytes Absolute 0.48 0.10 - 0.95 E9/L    Eosinophils Absolute 0.00 (L) 0.05 - 0.50 E9/L    Basophils Absolute 0.01 0.00 - 0.20 E9/L   Comprehensive Metabolic Panel w/ Reflex to MG   Result Value Ref Range    Sodium 136 132 - 146 mmol/L    Potassium reflex Magnesium 3.4 (L) 3.5 - 5.0 mmol/L    Chloride 101 98 - 107 mmol/L    CO2 23 22 - 29 mmol/L    Anion Gap 12 7 - 16 mmol/L    Glucose 127 (H) 74 - 99 mg/dL    BUN 8 6 - 20 mg/dL    CREATININE 0.8 0.5 - 1.0 mg/dL    GFR Non-African American >60 >=60 mL/min/1.73    GFR African American >60     Calcium 7.8 (L) 8.6 - 10.2 mg/dL    Total Protein 7.1 6.4 - 8.3 g/dL    Albumin 3.5 3.5 - 5.2 g/dL    Total Bilirubin <0.2 0.0 - 1.2 mg/dL    Alkaline Phosphatase 68 35 - 104 U/L    ALT 21 0 - 32 U/L    AST 34 (H) 0 - 31 U/L   Troponin   Result Value Ref Range    Troponin, High Sensitivity 6 0 - 9 ng/L   Brain Natriuretic Peptide   Result Value Ref Range    Pro-BNP 15 0 - 125 pg/mL   Protime-INR   Result Value Ref Range    Protime 12.0 9.3 - 12.4 sec    INR 1.1    APTT   Result Value Ref Range    aPTT 30.2 24.5 - 35.1 sec   D-Dimer, Quantitative   Result Value Ref Range    D-Dimer, Quant 228 ng/mL DDU   Lactate, Sepsis   Result Value Ref Range    Lactic Acid, Sepsis 1.1 0.5 - 1.9 mmol/L   HCG Qualitative, Serum   Result Value Ref Range    hCG Qual NEGATIVE NEGATIVE   Magnesium   Result Value Ref Range    Magnesium 1.7 1.6 - 2.6 mg/dL   Blood Gas, Arterial   Result Value Ref Range    Date Analyzed [id number] Time Analyzed 1031     Source: Blood Arterial     pH, Blood Gas 7.414 7.350 - 7.450    PCO2 34.7 (L) 35.0 - 45.0 mmHg    PO2 86.4 75.0 - 100.0 mmHg    HCO3 21.7 (L) 22.0 - 26.0 mmol/L    B.E. -2.3 -3.0 - 3.0 mmol/L    O2 Sat 96.3 92.0 - 98.5 %    O2Hb 95.8 94.0 - 97.0 %    COHb 0.2 0.0 - 1.5 %    MetHb 0.3 0.0 - 1.5 %    O2 Content 15.2 mL/dL    HHb 3.7 0.0 - 5.0 %    tHb (est) 11.2 (L) 11.5 - 16.5 g/dL    Mode NC- 4 L     Date Of Collection      Time Collected      Pt Temp 37.0 C     ID [id number]     Lab [id number]     Critical(s) Notified . No Critical Values    EKG 12 Lead   Result Value Ref Range    Ventricular Rate 117 BPM    Atrial Rate 117 BPM    P-R Interval 160 ms    QRS Duration 82 ms    Q-T Interval 328 ms    QTc Calculation (Bazett) 457 ms    P Axis 60 degrees    R Axis 72 degrees    T Axis 19 degrees       RADIOLOGY:  CTA PULMONARY W CONTRAST   Final Result   1. There is no evidence of a pulmonary embolus. 2. Bilateral multifocal ground-glass and semi solid pulmonary infiltrates   most consistent with COVID pneumonia.                   ------------------------- NURSING NOTES AND VITALS REVIEWED ---------------------------  Date / Time Roomed:  7/31/2021  9:29 AM  ED Bed Assignment:  0626/0626-A    The nursing notes within the ED encounter and vital signs as below have been reviewed.      Patient Vitals for the past 24 hrs:   BP Temp Temp src Pulse Resp SpO2 Height Weight   07/31/21 1645 (!) 109/58 98.4 °F (36.9 °C) Oral 101 18 96 % -- --   07/31/21 1519 (!) 107/55 98.2 °F (36.8 °C) Oral 102 18 -- -- --   07/31/21 1224 (!) 103/44 98.8 °F (37.1 °C) Oral 119 16 94 % -- --   07/31/21 1052 (!) 107/59 -- -- -- 16 96 % -- --   07/31/21 0941 (!) 95/40 -- -- -- -- 95 % -- --   07/31/21 0936 -- 100.1 °F (37.8 °C) Oral 121 18 (!) 86 % 5' 2\" (1.575 m) 239 lb (108.4 kg)       Oxygen Saturation Interpretation: Normal    ------------------------------------------ PROGRESS NOTES ------------------------------------------  Re-evaluation(s):    Patients symptoms are improving  Repeat physical examination is not changed    Counseling:  I have spoken with the patient and discussed todays results, in addition to providing specific details for the plan of care and counseling regarding the diagnosis and prognosis. Their questions are answered at this time and they are agreeable with the plan of admission.    --------------------------------- ADDITIONAL PROVIDER NOTES ---------------------------------  Consultations:  Dr Ardine Opitz will admit the patient. This patient's ED course included: a personal history and physicial examination, re-evaluation prior to disposition, cardiac monitoring and continuous pulse oximetry    This patient has remained hemodynamically stable during their ED course. Diagnosis:  1. Pneumonia due to COVID-19 virus    2. Acute respiratory failure with hypoxia (HCC)        Disposition:  Patient's disposition: Admit to telemetry  Patient's condition is stable.          Racheledelmira Ramos DO  Resident  07/31/21 0184       Marky Abernathy MD  08/01/21 9155

## 2021-07-31 NOTE — ED NOTES
Radiology Procedure Waiver   Name: Leonid Bose  : 1985  MRN: [id number]    Date:  21    Time: 9:50 AM EDT    Benefits of immediately proceeding with Radiology exam(s) without pre-testing outweigh the risks or are not indicated as specified below and therefore the following is/are being waived:    [x] Pregnancy test   [] Patients LMP on-time and regular.   [] Patient had Tubal Ligation or has other Contraception Device. [] Patient  is Menopausal or Premenarcheal.    [] Patient had Full or Partial Hysterectomy. [] Protocol for Iodine allergy    [] MRI Questionnaire     [] BUN/Creatinine   [] Patient age w/no hx of renal dysfunction. [] Patient on Dialysis. [] Recent Normal Labs.   Electronically signed by Faby Woody MD on 21 at 9:50 AM EDT          Ablation     Faby Woody MD  21 [phone]

## 2021-08-01 LAB
ANION GAP SERPL CALCULATED.3IONS-SCNC: 9 MMOL/L (ref 7–16)
BUN BLDV-MCNC: 9 MG/DL (ref 6–20)
CALCIUM SERPL-MCNC: 7.7 MG/DL (ref 8.6–10.2)
CHLORIDE BLD-SCNC: 108 MMOL/L (ref 98–107)
CO2: 24 MMOL/L (ref 22–29)
CREAT SERPL-MCNC: 0.6 MG/DL (ref 0.5–1)
GFR AFRICAN AMERICAN: >60
GFR NON-AFRICAN AMERICAN: >60 ML/MIN/1.73
GLUCOSE BLD-MCNC: 163 MG/DL (ref 74–99)
POTASSIUM SERPL-SCNC: 4.2 MMOL/L (ref 3.5–5)
SODIUM BLD-SCNC: 141 MMOL/L (ref 132–146)

## 2021-08-01 PROCEDURE — 2060000000 HC ICU INTERMEDIATE R&B

## 2021-08-01 PROCEDURE — 6370000000 HC RX 637 (ALT 250 FOR IP): Performed by: INTERNAL MEDICINE

## 2021-08-01 PROCEDURE — 2580000003 HC RX 258: Performed by: INTERNAL MEDICINE

## 2021-08-01 PROCEDURE — 2700000000 HC OXYGEN THERAPY PER DAY

## 2021-08-01 PROCEDURE — 6360000002 HC RX W HCPCS: Performed by: INTERNAL MEDICINE

## 2021-08-01 PROCEDURE — 94660 CPAP INITIATION&MGMT: CPT

## 2021-08-01 PROCEDURE — 36415 COLL VENOUS BLD VENIPUNCTURE: CPT

## 2021-08-01 PROCEDURE — 2500000003 HC RX 250 WO HCPCS: Performed by: INTERNAL MEDICINE

## 2021-08-01 PROCEDURE — 80048 BASIC METABOLIC PNL TOTAL CA: CPT

## 2021-08-01 RX ORDER — 0.9 % SODIUM CHLORIDE 0.9 %
30 INTRAVENOUS SOLUTION INTRAVENOUS PRN
Status: DISCONTINUED | OUTPATIENT
Start: 2021-08-01 | End: 2021-08-07 | Stop reason: HOSPADM

## 2021-08-01 RX ADMIN — ESCITALOPRAM 10 MG: 10 TABLET, FILM COATED ORAL at 07:50

## 2021-08-01 RX ADMIN — ALPRAZOLAM 0.5 MG: 0.25 TABLET ORAL at 13:09

## 2021-08-01 RX ADMIN — FERROUS SULFATE TAB 325 MG (65 MG ELEMENTAL FE) 325 MG: 325 (65 FE) TAB at 07:50

## 2021-08-01 RX ADMIN — FLUTICASONE PROPIONATE 1 SPRAY: 50 SPRAY, METERED NASAL at 20:45

## 2021-08-01 RX ADMIN — DEXAMETHASONE SODIUM PHOSPHATE 6 MG: 4 INJECTION, SOLUTION INTRAMUSCULAR; INTRAVENOUS at 20:52

## 2021-08-01 RX ADMIN — ALPRAZOLAM 0.5 MG: 0.25 TABLET ORAL at 00:02

## 2021-08-01 RX ADMIN — REMDESIVIR 200 MG: 100 INJECTION, POWDER, LYOPHILIZED, FOR SOLUTION INTRAVENOUS at 11:20

## 2021-08-01 RX ADMIN — ACETAMINOPHEN 1000 MG: 500 TABLET ORAL at 00:02

## 2021-08-01 RX ADMIN — DEXAMETHASONE SODIUM PHOSPHATE 6 MG: 4 INJECTION, SOLUTION INTRAMUSCULAR; INTRAVENOUS at 07:50

## 2021-08-01 RX ADMIN — BUDESONIDE AND FORMOTEROL FUMARATE DIHYDRATE 1 PUFF: 160; 4.5 AEROSOL RESPIRATORY (INHALATION) at 07:51

## 2021-08-01 RX ADMIN — MONTELUKAST SODIUM 10 MG: 10 TABLET, FILM COATED ORAL at 20:52

## 2021-08-01 RX ADMIN — OXYCODONE HYDROCHLORIDE AND ACETAMINOPHEN 500 MG: 500 TABLET ORAL at 07:50

## 2021-08-01 RX ADMIN — BUDESONIDE AND FORMOTEROL FUMARATE DIHYDRATE 1 PUFF: 160; 4.5 AEROSOL RESPIRATORY (INHALATION) at 20:45

## 2021-08-01 RX ADMIN — ENOXAPARIN SODIUM 40 MG: 40 INJECTION SUBCUTANEOUS at 20:52

## 2021-08-01 RX ADMIN — PANTOPRAZOLE SODIUM 40 MG: 40 TABLET, DELAYED RELEASE ORAL at 07:50

## 2021-08-01 ASSESSMENT — PAIN SCALES - GENERAL
PAINLEVEL_OUTOF10: 6
PAINLEVEL_OUTOF10: 0
PAINLEVEL_OUTOF10: 6

## 2021-08-01 ASSESSMENT — PAIN DESCRIPTION - ONSET: ONSET: ON-GOING

## 2021-08-01 ASSESSMENT — PAIN DESCRIPTION - ORIENTATION: ORIENTATION: LOWER

## 2021-08-01 ASSESSMENT — PAIN DESCRIPTION - PROGRESSION: CLINICAL_PROGRESSION: NOT CHANGED

## 2021-08-01 ASSESSMENT — PAIN - FUNCTIONAL ASSESSMENT: PAIN_FUNCTIONAL_ASSESSMENT: ACTIVITIES ARE NOT PREVENTED

## 2021-08-01 ASSESSMENT — PAIN DESCRIPTION - FREQUENCY: FREQUENCY: INTERMITTENT

## 2021-08-01 ASSESSMENT — PAIN DESCRIPTION - DESCRIPTORS: DESCRIPTORS: ACHING

## 2021-08-01 ASSESSMENT — PAIN DESCRIPTION - LOCATION: LOCATION: BACK

## 2021-08-01 NOTE — PROGRESS NOTES
Spoke with Dr. Nikia Mcneil - notified him of abnormal vitals - trial bipap. If bipap fails, will have to transfer pt. Respiratory Therapy notified. They will be up to trail pt. On bipap. Per Dr. Nikia Mcneil, RT to adjust bipap settings as needed.     Electronically signed by Godwin Mejia RN on 8/1/2021 at 11:47 AM

## 2021-08-01 NOTE — PLAN OF CARE
Problem: Pain:  Goal: Pain level will decrease  Description: Pain level will decrease  8/1/2021 0951 by Sherri Valente RN  Outcome: Met This Shift     Problem: Pain:  Goal: Control of acute pain  Description: Control of acute pain  8/1/2021 0951 by Sherri Valente RN  Outcome: Met This Shift     Problem: Pain:  Goal: Control of chronic pain  Description: Control of chronic pain  8/1/2021 0951 by Sherri Valente RN  Outcome: Met This Shift     Problem: Airway Clearance - Ineffective  Goal: Achieve or maintain patent airway  8/1/2021 0951 by Sherri Valente RN  Outcome: Met This Shift     Problem: Gas Exchange - Impaired  Goal: Absence of hypoxia  8/1/2021 0951 by Sherri Valente RN  Outcome: Met This Shift     Problem: Gas Exchange - Impaired  Goal: Promote optimal lung function  8/1/2021 0951 by Sherri Valente RN  Outcome: Met This Shift     Problem: Breathing Pattern - Ineffective  Goal: Ability to achieve and maintain a regular respiratory rate  8/1/2021 0951 by Sherri Valente RN  Outcome: Met This Shift     Problem: Body Temperature -  Risk of, Imbalanced  Goal: Ability to maintain a body temperature within defined limits  8/1/2021 0951 by Sherri Valente RN  Outcome: Met This Shift     Problem: Body Temperature -  Risk of, Imbalanced  Goal: Will regain or maintain usual level of consciousness  8/1/2021 0951 by Sherri Valente RN  Outcome: Met This Shift     Problem:  Body Temperature -  Risk of, Imbalanced  Goal: Complications related to the disease process, condition or treatment will be avoided or minimized  8/1/2021 0951 by Sherri Valente RN  Outcome: Met This Shift     Problem: Isolation Precautions - Risk of Spread of Infection  Goal: Prevent transmission of infection  8/1/2021 0951 by Sherri Valente RN  Outcome: Met This Shift     Problem: Nutrition Deficits  Goal: Optimize nutritional status  8/1/2021 0951 by Sherri Valente RN  Outcome: Met This Shift Problem: Risk for Fluid Volume Deficit  Goal: Maintain normal heart rhythm  8/1/2021 0951 by Nena Canales RN  Outcome: Met This Shift     Problem: Risk for Fluid Volume Deficit  Goal: Maintain absence of muscle cramping  8/1/2021 0951 by Nena Canales RN  Outcome: Met This Shift     Problem: Risk for Fluid Volume Deficit  Goal: Maintain normal serum potassium, sodium, calcium, phosphorus, and pH  8/1/2021 0951 by Nena Canales RN  Outcome: Met This Shift     Problem: Loneliness or Risk for Loneliness  Goal: Demonstrate positive use of time alone when socialization is not possible  8/1/2021 0951 by Nena Canales RN  Outcome: Met This Shift     Problem: Fatigue  Goal: Verbalize increase energy and improved vitality  8/1/2021 0951 by Nena Canales RN  Outcome: Met This Shift     Problem: Patient Education: Go to Patient Education Activity  Goal: Patient/Family Education  8/1/2021 0951 by Nena Canales RN  Outcome: Met This Shift

## 2021-08-01 NOTE — PLAN OF CARE
Problem: Pain:  Goal: Pain level will decrease  Description: Pain level will decrease  8/1/2021 0335 by Enrique Verdin RN  Outcome: Ongoing  8/1/2021 0335 by Enrique Verdin RN  Outcome: Ongoing  Goal: Control of acute pain  Description: Control of acute pain  8/1/2021 0335 by Enrique Verdin RN  Outcome: Ongoing  8/1/2021 0335 by Enrique Verdin RN  Outcome: Ongoing  Goal: Control of chronic pain  Description: Control of chronic pain  8/1/2021 0335 by Enrique Verdin RN  Outcome: Ongoing  8/1/2021 0335 by Enrique Verdin RN  Outcome: Ongoing     Problem: Airway Clearance - Ineffective  Goal: Achieve or maintain patent airway  Outcome: Ongoing     Problem: Gas Exchange - Impaired  Goal: Absence of hypoxia  Outcome: Ongoing  Goal: Promote optimal lung function  Outcome: Ongoing     Problem: Breathing Pattern - Ineffective  Goal: Ability to achieve and maintain a regular respiratory rate  Outcome: Ongoing     Problem:  Body Temperature -  Risk of, Imbalanced  Goal: Ability to maintain a body temperature within defined limits  Outcome: Ongoing  Goal: Will regain or maintain usual level of consciousness  Outcome: Ongoing  Goal: Complications related to the disease process, condition or treatment will be avoided or minimized  Outcome: Ongoing     Problem: Isolation Precautions - Risk of Spread of Infection  Goal: Prevent transmission of infection  Outcome: Ongoing     Problem: Nutrition Deficits  Goal: Optimize nutritional status  Outcome: Ongoing     Problem: Risk for Fluid Volume Deficit  Goal: Maintain normal heart rhythm  Outcome: Ongoing  Goal: Maintain absence of muscle cramping  Outcome: Ongoing  Goal: Maintain normal serum potassium, sodium, calcium, phosphorus, and pH  Outcome: Ongoing     Problem: Loneliness or Risk for Loneliness  Goal: Demonstrate positive use of time alone when socialization is not possible  Outcome: Ongoing     Problem: Fatigue  Goal: Verbalize increase energy and improved vitality  Outcome: Ongoing     Problem: Patient Education: Go to Patient Education Activity  Goal: Patient/Family Education  Outcome: Ongoing

## 2021-08-01 NOTE — PROGRESS NOTES
Progress Note      SUBJECTIVE: ADMITTED WITH COVID PNEUMONIA  AFEBRILE  NOT IN RESP DISTRESS  IS ON 5 LTS OXYGEN--92 %SAT  NOT STARTED ON REMDESIVIR    OBJECTIVE:      Medications    Infusion Medications   Scheduled Medications    vitamin C  500 mg Oral Daily    aspirin  81 mg Oral BID    [START ON 2021] vitamin D  50,000 Units Oral Weekly    escitalopram  10 mg Oral Daily    ferrous sulfate  325 mg Oral Daily with breakfast    fluticasone  1 spray Each Nostril Daily    montelukast  10 mg Oral Nightly    traZODone  50 mg Oral Nightly    enoxaparin  40 mg Subcutaneous Nightly    dexamethasone  6 mg Intravenous Q12H    pantoprazole  40 mg Oral Daily    budesonide-formoterol  1 puff Inhalation BID       Physical  Temperature:  Current - Temp: 98.3 °F (36.8 °C); Max - Temp  Av.6 °F (37 °C)  Min: 98.2 °F (36.8 °C)  Max: 99.2 °F (37.3 °C)    Respiratory Rate : Resp  Av.3  Min: 16  Max: 32    Pulse Range: Pulse  Av.7  Min: 100  Max: 119    Blood Presuure Range:  Systolic (06LHZ), KCO:839 , Min:103 , PZE:014   ; Diastolic (62OTO), QYJ:69, Min:44, Max:85      Pulse ox Range: SpO2  Av.7 %  Min: 92 %  Max: 96 %    24hr I & O:  No intake or output data in the 24 hours ending 21 0941    Constitutional:  awake, alert, cooperative, no apparent distress, and appears stated age  Eyes:  pupils equal, round and reactive to light  ENT:  normocepalic, without obvious abnormality  NECK:  supple, symmetrical, trachea midline  HEMATOLOGIC/LYMPHATICS:  no cervical lymphadenopathy  LUNGS:  clear, no wheeze, no rhonchi  CARDIOVASCULAR:  Normal apical impulse, regular rate and rhythm, normal S1 and S2, no S3 or S4, and no murmur noted  ABDOMEN:  normal bowel sounds, non-distended, non-tender, no masses palpated  MUSCULOSKELETAL:  There is no redness, warmth, or swelling of the joints. Full range of motion noted. Motor strength is 5 out of 5 all extremities bilaterally.   Tone is normal.  SKIN: normal skin color, texture, turgor    Data      CBC:   Lab Results   Component Value Date    WBC 8.0 07/31/2021    RBC 4.91 07/31/2021    HGB 11.4 07/31/2021    HCT 36.5 07/31/2021    MCV 74.3 07/31/2021    MCH 23.2 07/31/2021    MCHC 31.2 07/31/2021    RDW 17.9 07/31/2021     07/31/2021    MPV 10.1 07/31/2021     BMP:    Lab Results   Component Value Date     08/01/2021    K 4.2 08/01/2021    K 3.4 07/31/2021     08/01/2021    CO2 24 08/01/2021    BUN 9 08/01/2021    CREATININE 0.6 08/01/2021    CALCIUM 7.7 08/01/2021    GFRAA >60 08/01/2021    LABGLOM >60 08/01/2021    GLUCOSE 163 08/01/2021    GLUCOSE 94 09/03/2011     PT/INR:  No results found for: PTINR  Last 3 Troponin:  No components found for: TROPININI      ASSESSMENT:      Active Problems:    Pneumonia due to COVID-19 virus  Resolved Problems:    * No resolved hospital problems.  *  BY HX OBESITY/ASTHMA            PLAN:    CONT SAME PLAN  WILL CHECK WITH PHARMACY FOR REMDESIVIR DOSAGE  CONT TO TITRATE OXYGEN      Electronically signed by Jarrod Floyd MD on 8/1/2021 at 9:41 AM

## 2021-08-01 NOTE — PROGRESS NOTES
Patient SPO2 88% on 3 liters nasal cannula. Titrated oxygen up to 5 liters. Patient SPO2 92%. Will continue to monitor.

## 2021-08-01 NOTE — PROGRESS NOTES
Call out to Dr. Dilan Sierra for pt. Status update.     Electronically signed by Luis Carlos Taveras RN on 8/1/2021 at 11:41 AM

## 2021-08-01 NOTE — PROGRESS NOTES
Date: 8/1/2021    Time: 12:26 PM    Patient Placed On BIPAP/CPAP/ Non-Invasive Ventilation? Yes    If no must comment. Facial area red/color change? No           If YES are Blister/Lesion present? No   If yes must notify nursing staff  BIPAP/CPAP skin barrier?   Yes    Skin barrier type:mepilexlite       Comments:        Antonette Cardona RCP

## 2021-08-01 NOTE — H&P
[address] [address]                              HISTORY AND PHYSICAL    PATIENT NAME: Ricardo Ramires                 :        1985  MED REC NO:   [id number]                            ROOM:       7161  ACCOUNT NO:   [de-identified]                           ADMIT DATE: 2021  PROVIDER:     Sanjuanita Woods MD      CHIEF COMPLAINT:  Short of breath and cough. HISTORY OF PRESENT ILLNESS:  The patient is a 76-year-old age lady who  claimed that she started having a dry cough along with shortness of  breath since 2021. On 2021, she was tested positive for  COVID and was in no distress. The patient claimed that now she feels that the cough is getting worse  but denies any shortness of breath at this time. She did test her pulse ox at home, and she claimed that it was low and  decided to come to the emergency room. The patient also claimed that other family members at home were tested  positive for COVID. While in the emergency room, her pulse ox was 95% on 4 liters of oxygen  and apparently was 89 at room air. The patient denies any change in taste, chest pain, leg swelling, or  pain. The patient's chest x-ray revealed ground-glass appearance bilaterally,  and the patient was admitted with COVID pneumonia. PAST MEDICAL HISTORY:  Obesity, asthma, GERD, and mood disorder. MEDICATIONS:  See list.    ALLERGIES:  Allergic to PENICILLIN and [address]. REVIEW OF SYSTEMS:  Negative. PHYSICAL EXAMINATION:  GENERAL:  This is a 76-year-old age lady, in no distress, comfortable at  rest.  VITAL SIGNS:  Blood pressure was initially 95/40 with pulse ox of 95% on  4 liters of oxygen. Temperature initially was 37.1, but also recorded  as 100.1 at one point. HEENT:  No facial swelling. Throat clear. Ear normal.  NECK:  Supple. Thyroid normal.  No wheezing appreciated.

## 2021-08-02 PROCEDURE — 2700000000 HC OXYGEN THERAPY PER DAY

## 2021-08-02 PROCEDURE — 6360000002 HC RX W HCPCS: Performed by: INTERNAL MEDICINE

## 2021-08-02 PROCEDURE — 94660 CPAP INITIATION&MGMT: CPT

## 2021-08-02 PROCEDURE — 2580000003 HC RX 258: Performed by: INTERNAL MEDICINE

## 2021-08-02 PROCEDURE — 6370000000 HC RX 637 (ALT 250 FOR IP): Performed by: INTERNAL MEDICINE

## 2021-08-02 PROCEDURE — 2580000003 HC RX 258

## 2021-08-02 PROCEDURE — 2060000000 HC ICU INTERMEDIATE R&B

## 2021-08-02 PROCEDURE — 2500000003 HC RX 250 WO HCPCS: Performed by: INTERNAL MEDICINE

## 2021-08-02 RX ORDER — SODIUM CHLORIDE 0.9 % (FLUSH) 0.9 %
10 SYRINGE (ML) INJECTION PRN
Status: DISCONTINUED | OUTPATIENT
Start: 2021-08-02 | End: 2021-08-07 | Stop reason: HOSPADM

## 2021-08-02 RX ORDER — SODIUM CHLORIDE 0.9 % (FLUSH) 0.9 %
SYRINGE (ML) INJECTION
Status: COMPLETED
Start: 2021-08-02 | End: 2021-08-02

## 2021-08-02 RX ORDER — SODIUM CHLORIDE 0.9 % (FLUSH) 0.9 %
10 SYRINGE (ML) INJECTION 2 TIMES DAILY
Status: DISCONTINUED | OUTPATIENT
Start: 2021-08-02 | End: 2021-08-07 | Stop reason: HOSPADM

## 2021-08-02 RX ADMIN — REMDESIVIR 100 MG: 100 INJECTION, POWDER, LYOPHILIZED, FOR SOLUTION INTRAVENOUS at 14:33

## 2021-08-02 RX ADMIN — SODIUM CHLORIDE, PRESERVATIVE FREE 10 ML: 5 INJECTION INTRAVENOUS at 12:16

## 2021-08-02 RX ADMIN — PANTOPRAZOLE SODIUM 40 MG: 40 TABLET, DELAYED RELEASE ORAL at 12:14

## 2021-08-02 RX ADMIN — BUDESONIDE AND FORMOTEROL FUMARATE DIHYDRATE 1 PUFF: 160; 4.5 AEROSOL RESPIRATORY (INHALATION) at 20:08

## 2021-08-02 RX ADMIN — ENOXAPARIN SODIUM 40 MG: 40 INJECTION SUBCUTANEOUS at 20:10

## 2021-08-02 RX ADMIN — DEXAMETHASONE SODIUM PHOSPHATE 6 MG: 4 INJECTION, SOLUTION INTRAMUSCULAR; INTRAVENOUS at 08:18

## 2021-08-02 RX ADMIN — SODIUM CHLORIDE, PRESERVATIVE FREE 10 ML: 5 INJECTION INTRAVENOUS at 14:33

## 2021-08-02 RX ADMIN — DEXAMETHASONE SODIUM PHOSPHATE 6 MG: 4 INJECTION, SOLUTION INTRAMUSCULAR; INTRAVENOUS at 20:08

## 2021-08-02 RX ADMIN — MONTELUKAST SODIUM 10 MG: 10 TABLET, FILM COATED ORAL at 20:08

## 2021-08-02 RX ADMIN — BUDESONIDE AND FORMOTEROL FUMARATE DIHYDRATE 1 PUFF: 160; 4.5 AEROSOL RESPIRATORY (INHALATION) at 07:00

## 2021-08-02 RX ADMIN — Medication 10 ML: at 20:11

## 2021-08-02 RX ADMIN — ALPRAZOLAM 0.5 MG: 0.25 TABLET ORAL at 12:10

## 2021-08-02 RX ADMIN — ERGOCALCIFEROL 50000 UNITS: 1.25 CAPSULE ORAL at 12:14

## 2021-08-02 RX ADMIN — ACETAMINOPHEN 1000 MG: 500 TABLET ORAL at 03:08

## 2021-08-02 RX ADMIN — FLUTICASONE PROPIONATE 1 SPRAY: 50 SPRAY, METERED NASAL at 20:08

## 2021-08-02 RX ADMIN — ALPRAZOLAM 0.5 MG: 0.25 TABLET ORAL at 20:07

## 2021-08-02 RX ADMIN — FERROUS SULFATE TAB 325 MG (65 MG ELEMENTAL FE) 325 MG: 325 (65 FE) TAB at 12:14

## 2021-08-02 RX ADMIN — OXYCODONE HYDROCHLORIDE AND ACETAMINOPHEN 500 MG: 500 TABLET ORAL at 12:13

## 2021-08-02 RX ADMIN — ESCITALOPRAM 10 MG: 10 TABLET, FILM COATED ORAL at 12:14

## 2021-08-02 RX ADMIN — Medication 10 ML: at 12:16

## 2021-08-02 ASSESSMENT — PAIN SCALES - GENERAL
PAINLEVEL_OUTOF10: 0
PAINLEVEL_OUTOF10: 3
PAINLEVEL_OUTOF10: 0
PAINLEVEL_OUTOF10: 6

## 2021-08-02 NOTE — CONSULTS
Pulmonary Consultation    Admit Date: 7/31/2021  Requesting Physician: Cali De La Paz MD    Reason for consultation:  · Hypoxia with COVID-19 and pneumonia  HPI:  · The patient is a 13-year-old female who presented to the emergency room with increasing shortness of breath of several days duration. Seen and examined there, the patient had a similar presentation to earlier this year when she had an influenza pneumonia. CT scan then, however, was clear. · This time in the emergency room, the patient was tested for COVID-19 and was positive. Her CT scan of the chest shows extensive infiltrates. Since admission, she is required increasing amounts of oxygen. Placed on a combination of dexamethasone and remdesivir, the patient is seen now somewhat short of breath but able to speak in full sentences. She has no pre-existing lung disease. She is a lifelong non-smoker. PMH:    Past Medical History:   Diagnosis Date    Anemia     Iron deficiency    Anxiety     Arthritis     Diabetes mellitus ([address]     GDM-diet controlled    History of palpitations     and tachycardia with anxiety    Irregular menses     Left ankle injury 06/07/2021    Migraines     Uses crutches 06/2021    left foot injury     PSH:   Past Surgical History:   Procedure Laterality Date    ACHILLES TENDON SURGERY Left 6/18/2021    REPAIR OF ACHILLES TENDON TEAR LEFT SIDE performed by Key Raya DPM at [address] N/A 6/9/2021    DILATATION AND CURETTAGE HYSTEROSCOPY NOVASURE ABLATION performed by Eddie Abdi DO at [address] SALPINGO-OOPHORECTOMY Left 03/27/2016    laparoscopic left side       Review of Systems:   · Constitutional: As noted in the HPI. · Eyes: No visual changes or diplopia. No scleral icterus. · ENT: No headaches, hearing loss or vertigo. No nasal congestion, or sore throat.   · Cardiovascular: No chest pain, dyspnea on exertion, or 24HR INTAKE/OUTPUT:  No intake or output data in the 24 hours ending 21 1436  CURRENT PULSE OXIMETRY:  SpO2: 95 %  24HR PULSE OXIMETRY RANGE:  SpO2  Av %  Min: 78 %  Max: 98 %    EXAM:  General: No distress. Alert. Eyes: PERRL. No sclera icterus. No conjunctival injection. ENT: No discharge. Pharynx clear. Rhinophyma  Neck: Trachea midline. Normal thyroid. No jvd, no hjr. Resp: No wheezing. No accessory muscle use. Diffuse rales. No rhonchi. CV: Regular rate. Regular rhythm. No murmur No rub. Abd: Non-tender. Non-distended. No masses. No organmegaly. Normal bowel sounds. Skin: Warm and dry. No nodule on exposed extremities. No rash on exposed extremities. Lymph: No cervical LAD. No supraclavicular LAD. Ext: No joint deformity. No clubbing. No cyanosis. No edema  Neuro: Awake. Follows commands. Positive pupils/gag/corneals. Normal pain response. Lab Results:  CBC:   Recent Labs     21  0954   WBC 8.0   HGB 11.4*   HCT 36.5   MCV 74.3*          BMP:  Recent Labs     21  0954 21  0324    141   K 3.4* 4.2    108*   CO2 23 24   BUN 8 9   CREATININE 0.8 0.6    ALB:3,BILIDIR:3,BILITOT:3,ALKPHOS:3)@    PT/INR:   Recent Labs     21  0954   PROTIME 12.0   INR 1.1       Cultures:  Sputum: not available  Blood: not available    ABG:   Recent Labs     21  1031   PH 7.414   PO2 86.4   PCO2 34.7*   HCO3 21.7*   BE -2.3   O2SAT 96.3   METHB 0.3   O2HB 95.8   COHB 0.2   O2CON 15.2   HHB 3.7   THB 11.2*     FiO2 : 92 %       Films:     CTA PULMONARY W CONTRAST   Final Result   1. There is no evidence of a pulmonary embolus. 2. Bilateral multifocal ground-glass and semi solid pulmonary infiltrates   most consistent with COVID pneumonia. .        Assessment:  1. Severe COVID-19 pneumonia with profound hypoxia  2. Rule out superimposed bacterial pneumonia      Plan:  1.  Check inflammatory mediators to see if the patient would be a candidate for tocilizumab  2. Increase DVT prophylaxis to twice daily  3. Check procalcitonin  4. Further recommendations to follow. Thanks for letting us see this patient in consultation. Total time in reviewing the previous admissions and records, reviewing the current x-rays, labs, and discussing with clinical staff including nursing and physicians, exceeded 50 minutes. Please contact us with any questions. Office [phone] or after hours through [employer], x [phone]. Please note that voice recognition technology was used (while wearing a Covid mandated mask) in the preparation of this note and make therefore it may contain inadvertent transcription errors. If the patient is a COVID 19 isolation patient, the above physical exam reflects that of the examining physician for the day. Rupal Bradley MD,  M.D., F.C.C.P.     [facility], [address] [place] [address], [address] [place] [address]

## 2021-08-02 NOTE — PROGRESS NOTES
Respiratory in room. Patient placed in prone position. Respiration rate decreased to 28 and SPO2 increased to 100% on Fio2 of 90%.

## 2021-08-02 NOTE — PROGRESS NOTES
Admit Date: 7/31/2021    Subjective: On bipap feels little better     Objective:     Patient Vitals for the past 8 hrs:   Resp SpO2   08/02/21 0454 (!) 40 --   08/02/21 0300 (!) 35 --   08/02/21 0100 (!) 33 98 %   08/01/21 2345 28 96 %     No intake/output data recorded. No intake/output data recorded. HEENT: Normal  NECK: Thyroid normal. No carotid bruit. No lymphphadenopathy. CVS: RRR  RS: Clear. No wheeze. No rhonchi. ABD: Soft. Non tender. No mass. Normal BS.obese   EXT: No edema. Non tender. Pulses present.    NEURO: no focal deficit       Scheduled Meds:   remdesivir IVPB  100 mg Intravenous Q24H    vitamin C  500 mg Oral Daily    aspirin  81 mg Oral BID    vitamin D  50,000 Units Oral Weekly    escitalopram  10 mg Oral Daily    ferrous sulfate  325 mg Oral Daily with breakfast    fluticasone  1 spray Each Nostril Daily    montelukast  10 mg Oral Nightly    traZODone  50 mg Oral Nightly    enoxaparin  40 mg Subcutaneous Nightly    dexamethasone  6 mg Intravenous Q12H    pantoprazole  40 mg Oral Daily    budesonide-formoterol  1 puff Inhalation BID     Continuous Infusions:    CBC with Differential:    Lab Results   Component Value Date    WBC 8.0 07/31/2021    RBC 4.91 07/31/2021    HGB 11.4 07/31/2021    HCT 36.5 07/31/2021     07/31/2021    MCV 74.3 07/31/2021    MCH 23.2 07/31/2021    MCHC 31.2 07/31/2021    RDW 17.9 07/31/2021    SEGSPCT 75 09/03/2011    LYMPHOPCT 10.4 07/31/2021    MONOPCT 6.0 07/31/2021    BASOPCT 0.1 07/31/2021    MONOSABS 0.48 07/31/2021    LYMPHSABS 0.83 07/31/2021    EOSABS 0.00 07/31/2021    BASOSABS 0.01 07/31/2021     CMP:    Lab Results   Component Value Date     08/01/2021    K 4.2 08/01/2021    K 3.4 07/31/2021     08/01/2021    CO2 24 08/01/2021    BUN 9 08/01/2021    CREATININE 0.6 08/01/2021    GFRAA >60 08/01/2021    LABGLOM >60 08/01/2021    PROT 7.1 07/31/2021    LABALBU 3.5 07/31/2021    CALCIUM 7.7 08/01/2021    BILITOT <0.2 07/31/2021    ALKPHOS 68 07/31/2021    AST 34 07/31/2021    ALT 21 07/31/2021     PT/INR:    Lab Results   Component Value Date    PROTIME 12.0 07/31/2021    INR 1.1 07/31/2021       Assessment:     Active Problems:    Pneumonia due to COVID-19 virus    Hypoxic respiratory failure due to above       Plan:   Continue support ,Rem. steroid monitor

## 2021-08-02 NOTE — FLOWSHEET NOTE
Patient taken off Bi-Pap and placed on 12 liters nasal cannula to take to the bathroom. Resting SPO2 dropped to 78%. Placed patient back on Bi-Pap Fio2 70%. SPO2 increased to 86%. Increased Fio2 to 100% SPO2 increased 94%.         08/01/21 2030   Vital Signs   Resp (!) 48   Oxygen Therapy   SpO2 94 %   Pulse Oximeter Device Mode Intermittent   Pulse Oximeter Device Location Left;Finger   O2 Device PAP (positive airway pressure)   Skin Assessment Clean, dry, & intact   Skin Protection for O2 Device Yes   Orientation Middle   Location Nose   Intervention(s) Skin Barrier   FiO2  100 %

## 2021-08-02 NOTE — ACP (ADVANCE CARE PLANNING)
Advance Care Planning     Advance Care Planning Activator (Inpatient)  Conversation Note      Date of ACP Conversation: 8/2/2021     Conversation Conducted with: Patient with Decision Making Capacity    ACP Activator: Ganesh Montalvo RN        Health Care Decision Maker:     Current Designated Health Care Decision Maker:     Primary Decision Maker: Sean Selby - Rol[place] - [phone]    Secondary Decision Maker: Levi Bess - Other - [phone]        Care Preferences    Ventilation: \"If you were in your present state of health and suddenly became very ill and were unable to breathe on your own, what would your preference be about the use of a ventilator (breathing machine) if it were available to you? \"      Would the patient desire the use of ventilator (breathing machine)?: yes    \"If your health worsens and it becomes clear that your chance of recovery is unlikely, what would your preference be about the use of a ventilator (breathing machine) if it were available to you? \"     Would the patient desire the use of ventilator (breathing machine)?: Yes      Resuscitation  \"CPR works best to restart the heart when there is a sudden event, like a heart attack, in someone who is otherwise healthy. Unfortunately, CPR does not typically restart the heart for people who have serious health conditions or who are very sick. \"    \"In the event your heart stopped as a result of an underlying serious health condition, would you want attempts to be made to restart your heart (answer \"yes\" for attempt to resuscitate) or would you prefer a natural death (answer \"no\" for do not attempt to resuscitate)? \" yes       [x] Yes   [] No   Educated Patient / Belinda Jimenez regarding differences between Advance Directives and portable DNR orders.     Length of ACP Conversation in minutes:  15  Conversation Outcomes:  [x] ACP discussion completed  [] Existing advance directive reviewed with patient; no changes to patient's previously recorded wishes  [] New Advance Directive completed  [] Portable Do Not Rescitate prepared for Provider review and signature  [] POLST/POST/MOLST/MOST prepared for Provider review and signature      Follow-up plan:    [] Schedule follow-up conversation to continue planning  [x] Referred individual to Provider for additional questions/concerns   [] Advised patient/agent/surrogate to review completed ACP document and update if needed with changes in condition, patient preferences or care setting    [] This note routed to one or more involved healthcare providers

## 2021-08-02 NOTE — CARE COORDINATION
+ Covid. IV Remdesivir day 2. Spoke with patient- discussed plan of care and discharge planning. Pt resides at home with her 3 children and their father. Pt stated that they have been quarantined at home since July 19th. Pt stated that typically she is independent and does not use any DME, however she is recovering from achiles repair surgery and has a knee scooter and crutches that she currently has been using. Pt does not have home oxygen- stated that she does not have home oxygen DME preference if home O2 is warranted. Referral to Janay Chen at [facility] for potential home O2 needs.

## 2021-08-03 ENCOUNTER — APPOINTMENT (OUTPATIENT)
Dept: CT IMAGING | Age: 36
DRG: 177 | End: 2021-08-03
Payer: COMMERCIAL

## 2021-08-03 LAB
ANISOCYTOSIS: ABNORMAL
ATYPICAL LYMPHOCYTE RELATIVE PERCENT: 3 % (ref 0–4)
BASOPHILS ABSOLUTE: 0 E9/L (ref 0–0.2)
BASOPHILS RELATIVE PERCENT: 0 % (ref 0–2)
C-REACTIVE PROTEIN: 2.3 MG/DL (ref 0–0.4)
D DIMER: 1968 NG/ML DDU
EOSINOPHILS ABSOLUTE: 0 E9/L (ref 0.05–0.5)
EOSINOPHILS RELATIVE PERCENT: 0 % (ref 0–6)
FERRITIN: 201 NG/ML
HCT VFR BLD CALC: 36.6 % (ref 34–48)
HEMOGLOBIN: 11.1 G/DL (ref 11.5–15.5)
HYPOCHROMIA: ABNORMAL
LYMPHOCYTES ABSOLUTE: 0.7 E9/L (ref 1.5–4)
LYMPHOCYTES RELATIVE PERCENT: 5 % (ref 20–42)
MCH RBC QN AUTO: 23.1 PG (ref 26–35)
MCHC RBC AUTO-ENTMCNC: 30.3 % (ref 32–34.5)
MCV RBC AUTO: 76.1 FL (ref 80–99.9)
MONOCYTES ABSOLUTE: 0.7 E9/L (ref 0.1–0.95)
MONOCYTES RELATIVE PERCENT: 8 % (ref 2–12)
NEUTROPHILS ABSOLUTE: 7.39 E9/L (ref 1.8–7.3)
NEUTROPHILS RELATIVE PERCENT: 84 % (ref 43–80)
NUCLEATED RED BLOOD CELLS: 2 /100 WBC
OVALOCYTES: ABNORMAL
PDW BLD-RTO: 17.8 FL (ref 11.5–15)
PLATELET # BLD: 272 E9/L (ref 130–450)
PMV BLD AUTO: 8.9 FL (ref 7–12)
POIKILOCYTES: ABNORMAL
POLYCHROMASIA: ABNORMAL
PROCALCITONIN: 0.09 NG/ML (ref 0–0.08)
RBC # BLD: 4.81 E12/L (ref 3.5–5.5)
SCHISTOCYTES: ABNORMAL
SEDIMENTATION RATE, ERYTHROCYTE: 25 MM/HR (ref 0–20)
WBC # BLD: 8.8 E9/L (ref 4.5–11.5)

## 2021-08-03 PROCEDURE — 2500000003 HC RX 250 WO HCPCS: Performed by: INTERNAL MEDICINE

## 2021-08-03 PROCEDURE — 6360000002 HC RX W HCPCS: Performed by: INTERNAL MEDICINE

## 2021-08-03 PROCEDURE — 82728 ASSAY OF FERRITIN: CPT

## 2021-08-03 PROCEDURE — 86140 C-REACTIVE PROTEIN: CPT

## 2021-08-03 PROCEDURE — 6370000000 HC RX 637 (ALT 250 FOR IP): Performed by: INTERNAL MEDICINE

## 2021-08-03 PROCEDURE — 36415 COLL VENOUS BLD VENIPUNCTURE: CPT

## 2021-08-03 PROCEDURE — 87081 CULTURE SCREEN ONLY: CPT

## 2021-08-03 PROCEDURE — 2580000003 HC RX 258: Performed by: INTERNAL MEDICINE

## 2021-08-03 PROCEDURE — 85025 COMPLETE CBC W/AUTO DIFF WBC: CPT

## 2021-08-03 PROCEDURE — 94660 CPAP INITIATION&MGMT: CPT

## 2021-08-03 PROCEDURE — 85651 RBC SED RATE NONAUTOMATED: CPT

## 2021-08-03 PROCEDURE — 2000000000 HC ICU R&B

## 2021-08-03 PROCEDURE — 71275 CT ANGIOGRAPHY CHEST: CPT

## 2021-08-03 PROCEDURE — 2700000000 HC OXYGEN THERAPY PER DAY

## 2021-08-03 PROCEDURE — 85378 FIBRIN DEGRADE SEMIQUANT: CPT

## 2021-08-03 PROCEDURE — 84145 PROCALCITONIN (PCT): CPT

## 2021-08-03 PROCEDURE — 6360000004 HC RX CONTRAST MEDICATION: Performed by: RADIOLOGY

## 2021-08-03 RX ORDER — BUDESONIDE AND FORMOTEROL FUMARATE DIHYDRATE 160; 4.5 UG/1; UG/1
2 AEROSOL RESPIRATORY (INHALATION) 2 TIMES DAILY
Status: DISCONTINUED | OUTPATIENT
Start: 2021-08-03 | End: 2021-08-07 | Stop reason: HOSPADM

## 2021-08-03 RX ADMIN — PANTOPRAZOLE SODIUM 40 MG: 40 TABLET, DELAYED RELEASE ORAL at 08:09

## 2021-08-03 RX ADMIN — ENOXAPARIN SODIUM 40 MG: 40 INJECTION SUBCUTANEOUS at 20:38

## 2021-08-03 RX ADMIN — ASPIRIN 81 MG: 81 TABLET, COATED ORAL at 08:09

## 2021-08-03 RX ADMIN — ALPRAZOLAM 0.5 MG: 0.25 TABLET ORAL at 05:28

## 2021-08-03 RX ADMIN — ACETAMINOPHEN 1000 MG: 500 TABLET ORAL at 05:28

## 2021-08-03 RX ADMIN — ESCITALOPRAM 10 MG: 10 TABLET, FILM COATED ORAL at 08:09

## 2021-08-03 RX ADMIN — Medication 10 ML: at 08:10

## 2021-08-03 RX ADMIN — ALPRAZOLAM 0.5 MG: 0.25 TABLET ORAL at 16:30

## 2021-08-03 RX ADMIN — BUDESONIDE AND FORMOTEROL FUMARATE DIHYDRATE 2 PUFF: 160; 4.5 AEROSOL RESPIRATORY (INHALATION) at 20:39

## 2021-08-03 RX ADMIN — FLUTICASONE PROPIONATE 1 SPRAY: 50 SPRAY, METERED NASAL at 20:45

## 2021-08-03 RX ADMIN — TOCILIZUMAB 800 MG: 20 INJECTION, SOLUTION, CONCENTRATE INTRAVENOUS at 15:12

## 2021-08-03 RX ADMIN — REMDESIVIR 100 MG: 100 INJECTION, POWDER, LYOPHILIZED, FOR SOLUTION INTRAVENOUS at 13:14

## 2021-08-03 RX ADMIN — MONTELUKAST SODIUM 10 MG: 10 TABLET, FILM COATED ORAL at 20:38

## 2021-08-03 RX ADMIN — Medication 10 ML: at 20:38

## 2021-08-03 RX ADMIN — ENOXAPARIN SODIUM 40 MG: 40 INJECTION SUBCUTANEOUS at 08:21

## 2021-08-03 RX ADMIN — FERROUS SULFATE TAB 325 MG (65 MG ELEMENTAL FE) 325 MG: 325 (65 FE) TAB at 08:09

## 2021-08-03 RX ADMIN — ASPIRIN 81 MG: 81 TABLET, COATED ORAL at 20:38

## 2021-08-03 RX ADMIN — SODIUM CHLORIDE 30 ML: 9 INJECTION, SOLUTION INTRAVENOUS at 13:13

## 2021-08-03 RX ADMIN — DEXAMETHASONE SODIUM PHOSPHATE 6 MG: 4 INJECTION, SOLUTION INTRAMUSCULAR; INTRAVENOUS at 08:09

## 2021-08-03 RX ADMIN — OXYCODONE HYDROCHLORIDE AND ACETAMINOPHEN 500 MG: 500 TABLET ORAL at 08:09

## 2021-08-03 RX ADMIN — SODIUM CHLORIDE, PRESERVATIVE FREE 10 ML: 5 INJECTION INTRAVENOUS at 13:08

## 2021-08-03 RX ADMIN — DEXAMETHASONE SODIUM PHOSPHATE 6 MG: 4 INJECTION, SOLUTION INTRAMUSCULAR; INTRAVENOUS at 20:38

## 2021-08-03 RX ADMIN — BUDESONIDE AND FORMOTEROL FUMARATE DIHYDRATE 1 PUFF: 160; 4.5 AEROSOL RESPIRATORY (INHALATION) at 08:10

## 2021-08-03 RX ADMIN — IOPAMIDOL 80 ML: 755 INJECTION, SOLUTION INTRAVENOUS at 13:02

## 2021-08-03 ASSESSMENT — PAIN SCALES - GENERAL
PAINLEVEL_OUTOF10: 0
PAINLEVEL_OUTOF10: 6
PAINLEVEL_OUTOF10: 6

## 2021-08-03 ASSESSMENT — ENCOUNTER SYMPTOMS
DIARRHEA: 0
SHORTNESS OF BREATH: 1
ABDOMINAL PAIN: 0
VOMITING: 0
COUGH: 1

## 2021-08-03 NOTE — PROGRESS NOTES
Patient admitted from 626 to 204, with the following belongings cell phone, backpack/pure, clothes, placed on monitor, patient oriented to room and unit visiting hours. Patient guide at bedside, reviewed patient rights and responsibilities. MRSA nasal swab obtained. Bed alarm on. Call light within reach. Patient comfortable with no complaints at this time on bipap, spo2 98%. Will monitor.

## 2021-08-03 NOTE — PLAN OF CARE
Problem: Body Temperature -  Risk of, Imbalanced  Goal: Ability to maintain a body temperature within defined limits  Outcome: Met This Shift     Problem:  Body Temperature -  Risk of, Imbalanced  Goal: Will regain or maintain usual level of consciousness  Outcome: Met This Shift     Problem: Risk for Fluid Volume Deficit  Goal: Maintain normal heart rhythm  Outcome: Met This Shift     Problem: Risk for Fluid Volume Deficit  Goal: Maintain normal serum potassium, sodium, calcium, phosphorus, and pH  Outcome: Met This Shift     Problem: Falls - Risk of:  Goal: Will remain free from falls  Description: Will remain free from falls  8/3/2021 1909 by Thais Kee RN  Outcome: Met This Shift     Problem: Anxiety:  Goal: Level of anxiety will decrease  Description: Level of anxiety will decrease  Outcome: Met This Shift

## 2021-08-03 NOTE — PLAN OF CARE
Problem: Pain:  Goal: Pain level will decrease  Description: Pain level will decrease  Outcome: Ongoing  Goal: Control of acute pain  Description: Control of acute pain  Outcome: Ongoing  Goal: Control of chronic pain  Description: Control of chronic pain  Outcome: Ongoing     Problem: Airway Clearance - Ineffective  Goal: Achieve or maintain patent airway  Outcome: Ongoing     Problem: Gas Exchange - Impaired  Goal: Absence of hypoxia  Outcome: Ongoing  Goal: Promote optimal lung function  Outcome: Ongoing     Problem: Breathing Pattern - Ineffective  Goal: Ability to achieve and maintain a regular respiratory rate  Outcome: Ongoing     Problem:  Body Temperature -  Risk of, Imbalanced  Goal: Ability to maintain a body temperature within defined limits  Outcome: Ongoing  Goal: Will regain or maintain usual level of consciousness  Outcome: Ongoing  Goal: Complications related to the disease process, condition or treatment will be avoided or minimized  Outcome: Ongoing     Problem: Isolation Precautions - Risk of Spread of Infection  Goal: Prevent transmission of infection  Outcome: Ongoing     Problem: Nutrition Deficits  Goal: Optimize nutritional status  Outcome: Ongoing     Problem: Risk for Fluid Volume Deficit  Goal: Maintain normal heart rhythm  Outcome: Ongoing  Goal: Maintain absence of muscle cramping  Outcome: Ongoing  Goal: Maintain normal serum potassium, sodium, calcium, phosphorus, and pH  Outcome: Ongoing     Problem: Loneliness or Risk for Loneliness  Goal: Demonstrate positive use of time alone when socialization is not possible  Outcome: Ongoing     Problem: Fatigue  Goal: Verbalize increase energy and improved vitality  Outcome: Ongoing     Problem: Patient Education: Go to Patient Education Activity  Goal: Patient/Family Education  Outcome: Ongoing     Problem: Falls - Risk of:  Goal: Will remain free from falls  Description: Will remain free from falls  Outcome: Ongoing  Goal: Absence of physical injury  Description: Absence of physical injury  Outcome: Ongoing

## 2021-08-03 NOTE — CONSULTS
Critical Care Admit/Consult Note         Patient - Samuel Hopkins   MRN -  [id number]   Mary # - [de-identified]   - 1985      Date of Admission -  2021  9:29 AM  Date of evaluation -  8/3/2021  0204/0204-A   Hospital Day - 3            ADMIT/CONSULT DETAILS     Reason for Admit/Consult   Acute respiratory failure with hypoxia secondary to pneumonia due to COVID-19 virus. Corina Rich MD  Primary Care Physician - MD ELIAS Shah   The patient is a 38 y.o. female with significant past medical history of anemia, obesity, asthma, GERD, mood disorder. Patient began feeling sick on 2021 initial symptoms of shortness of breath. Positive Covid test on . On  patient presents to emergency department after using home pulse ox and stated that her saturation was low. Complaining of worsening cough and sore throat as well as shortness of breath. Patient has been admitted to the Covid floor here in the hospital.  Patient been on BiPAP worsening pulse ox. Decision made to transfer her to the ICU. She has been getting Decadron since admission. Patient was started yesterday on remdesivir and today on Tocilizumab.          Past Medical History         Diagnosis Date    Anemia     Iron deficiency    Anxiety     Arthritis     Diabetes mellitus ([address]     GDM-diet controlled    History of palpitations     and tachycardia with anxiety    Irregular menses     Left ankle injury 2021    Migraines     Uses crutches 2021    left foot injury        Past Surgical History           Procedure Laterality Date    ACHILLES TENDON SURGERY Left 2021    REPAIR OF ACHILLES TENDON TEAR LEFT SIDE performed by Maik Ramirez DPM at [address] [facility] N/A 2021    DILATATION AND CURETTAGE HYSTEROSCOPY NOVASURE ABLATION performed by Diego Gutierrez DO at [address] [facility] SALPINGO-OOPHORECTOMY Left 03/27/2016    laparoscopic left side       Lines and Devices    Peripheral access    Current Medications   Current Medications    enoxaparin  40 mg Subcutaneous BID    budesonide-formoterol  2 puff Inhalation BID    sodium chloride flush  10 mL Intravenous BID    remdesivir IVPB  100 mg Intravenous Q24H    vitamin C  500 mg Oral Daily    aspirin  81 mg Oral BID    vitamin D  50,000 Units Oral Weekly    escitalopram  10 mg Oral Daily    ferrous sulfate  325 mg Oral Daily with breakfast    fluticasone  1 spray Each Nostril Daily    montelukast  10 mg Oral Nightly    traZODone  50 mg Oral Nightly    dexamethasone  6 mg Intravenous Q12H    pantoprazole  40 mg Oral Daily     sodium chloride flush **AND** sodium chloride flush, sodium chloride, acetaminophen, ALPRAZolam, SUMAtriptan    Home Medications  Medications Prior to Admission: aspirin 81 MG EC tablet, Take 81 mg by mouth 2 times daily  escitalopram (LEXAPRO) 10 MG tablet, take 1 tablet by mouth every morning  ferrous sulfate (IRON 325) 325 (65 Fe) MG tablet, Take 325 mg by mouth daily (with breakfast)  ZINC PO, Take by mouth daily Patient takes OTC  Ascorbic Acid (VITAMIN C PO), Take by mouth  acetaminophen (TYLENOL) 500 MG tablet, Take 1,000 mg by mouth every 6 hours as needed for Pain  [DISCONTINUED] Azithromycin (ZITHROMAX Z-WALESKA PO), Take by mouth As directed  oxyCODONE-acetaminophen (PERCOCET) 7.5-325 MG per tablet, Take 1 tablet by mouth every 6 hours as needed for Pain. (Patient not taking: Reported on 7/26/2021)  [DISCONTINUED] doxycycline hyclate (VIBRAMYCIN) 100 MG capsule, Take 100 mg by mouth 2 times daily  ALPRAZolam (XANAX) 0.5 MG tablet, Take 0.5 mg by mouth 2 times daily as needed for Anxiety.   traZODone (DESYREL) 50 MG tablet, Take 50 mg by mouth nightly Dose: 1/2 tablet; patient takes PRN  ibuprofen (ADVIL;MOTRIN) 800 MG tablet, Take 1 tablet by mouth every 8 hours as needed for Pain  ergocalciferol (ERGOCALCIFEROL) 1.25 MG (83176 UT) capsule, Takes on Mondays  montelukast (SINGULAIR) 10 MG tablet, take 1 tablet by mouth once daily  pantoprazole (PROTONIX) 40 MG tablet, take 1 tablet by mouth every morning  rizatriptan (MAXALT-MLT) 10 MG disintegrating tablet, dissolve 1 tablet ON TONGUE AT ONSET OF HEADACHE may repeat in 2 hours IF headache PERSISTS  fluticasone (FLONASE) 50 MCG/ACT nasal spray, instill 1 spray into each nostril daily  budesonide-formoterol (SYMBICORT) 160-4.5 MCG/ACT AERO, Inhale 1 puff into the lungs 2 times daily    Diet/Nutrition   ADULT DIET; Regular    Allergies   Norco [hydrocodone-acetaminophen] and Pcn [penicillins]    Social History   Tobacco   reports that she has never smoked. She has never used smokeless tobacco.    Alcohol     reports current alcohol use. Family History         Problem Relation Age of Onset    Hypertension Mother     Breast Cancer Maternal Aunt 48        breast cancer    Breast Cancer Maternal Uncle         colon cancer    Breast Cancer Maternal Grandmother 80        breast cancer    Breast Cancer Maternal Aunt 50        breast cancer       Sleep History       ROS     REVIEW OF SYSTEMS:  Review of Systems   Constitutional: Positive for activity change and fatigue. Negative for appetite change and fever. HENT: Negative for congestion. Respiratory: Positive for cough and shortness of breath. Cardiovascular: Negative for chest pain and leg swelling. Gastrointestinal: Negative for abdominal pain, diarrhea and vomiting. Genitourinary: Negative for dysuria. All other systems reviewed and are negative.         Mechanical Ventilation Data   VENT SETTINGS (Comprehensive)  Vent Information  Skin Assessment: Clean, dry, & intact  Equipment Changed: Humidification (new bag for airvo)  FiO2 : 90 %  SpO2: 97 %  SpO2/FiO2 ratio: 123.75  I Time/ I Time %: 0.8 s  Mask Type: Full face mask  Mask Size: Medium  Additional Respiratory  Assessments  Pulse: 78  Resp: (!) 39  SpO2: 97 %    ABG  Lab Results   Component Value Date    PH 7.414 2021    PCO2 34.7 2021    PO2 86.4 2021    HCO3 21.7 2021    O2SAT 96.3 2021     Lab Results   Component Value Date    MODE NC- 4 L 2021           Vitals    height is 5' 2\" (1.575 m) and weight is 239 lb (108.4 kg). Her axillary temperature is 98.9 °F (37.2 °C). Her blood pressure is 144/69 (abnormal) and her pulse is 78. Her respiration is 39 (abnormal) and oxygen saturation is 97%. Temperature Range: Temp: 98.9 °F (37.2 °C) Temp  Av.8 °F (37.1 °C)  Min: 98.4 °F (36.9 °C)  Max: 99.2 °F (37.3 °C)  BP Range:  Systolic (98MCS), DTE:639 , Min:144 , NXX:005     Diastolic (97MFC), DE, Min:69, Max:99    Pulse Range: Pulse  Av.2  Min: 78  Max: 101  Respiration Range: Resp  Av  Min: 20  Max: 40  Current Pulse Ox[de-identified]  SpO2: 97 %  24HR Pulse Ox Range:  SpO2  Av %  Min: 95 %  Max: 99 %  Oxygen Amount and Delivery: O2 Flow Rate (L/min): 60 L/min      I/O (24 Hours)    Patient Vitals for the past 8 hrs:   BP Temp Temp src Pulse Resp SpO2   21 1600 (!) 144/69 -- -- 78 (!) 39 97 %   21 1530 (!) 144/69 -- -- 82 30 99 %   21 1500 (!) 147/75 -- -- 86 28 99 %   21 1430 (!) 147/75 98.9 °F (37.2 °C) Axillary 84 28 97 %   21 1312 -- -- -- -- 29 --   21 0954 -- -- -- -- (!) 40 --       Intake/Output Summary (Last 24 hours) at 8/3/2021 1640  Last data filed at 2021 1811  Gross per 24 hour   Intake 240 ml   Output --   Net 240 ml     I/O last 3 completed shifts:   In: 56 [P.O.:240; IV Piggyback:250]  Out: 500 [Urine:500]     Patient Vitals for the past 96 hrs (Last 3 readings):   Weight   21 0936 239 lb (108.4 kg)         Drains/Tubes Outputs  None  Exam         PHYSICAL EXAM:    General appearance - alert and awake on BiPAP  Mental status - alert, oriented to person, place, and time, normal mood, behavior, speech, dress, motor activity, and thought processes  Eyes - pupils equal and reactive, extraocular eye movements intact, sclera anicteric  Ears - external ear normal  Nose - normal and patent, no erythema, discharge or polyps  Mouth - Dry oral mucosa, pharynx normal without lesions  Neck - supple, no significant adenopathy  Chest - Coarse breath sounds. No wheezes right sided decreased compared to left. Heart - normal rate, regular rhythm, normal S1, S2, no murmurs, rubs, clicks or gallops  Abdomen - soft, nontender, nondistended, no masses or organomegaly  Neurological - alert, oriented, normal speech, no focal findings or movement disorder noted  Extremities - peripheral pulses normal, no clubbing or cyanosis. No edema. Skin - normal coloration and turgor, no rashes, no suspicious skin lesions noted     Data   Old records and images have been reviewed    Lab Results   CBC     Lab Results   Component Value Date    WBC 8.0 07/31/2021    RBC 4.91 07/31/2021    HGB 11.4 07/31/2021    HCT 36.5 07/31/2021     07/31/2021    MCV 74.3 07/31/2021    MCH 23.2 07/31/2021    MCHC 31.2 07/31/2021    RDW 17.9 07/31/2021    SEGSPCT 75 09/03/2011    LYMPHOPCT 10.4 07/31/2021    MONOPCT 6.0 07/31/2021    BASOPCT 0.1 07/31/2021    MONOSABS 0.48 07/31/2021    LYMPHSABS 0.83 07/31/2021    EOSABS 0.00 07/31/2021    BASOSABS 0.01 07/31/2021       BMP   Lab Results   Component Value Date     08/01/2021    K 4.2 08/01/2021    K 3.4 07/31/2021     08/01/2021    CO2 24 08/01/2021    BUN 9 08/01/2021    CREATININE 0.6 08/01/2021    GLUCOSE 163 08/01/2021    GLUCOSE 94 09/03/2011    CALCIUM 7.7 08/01/2021       LFTS  Lab Results   Component Value Date    ALKPHOS 68 07/31/2021    ALT 21 07/31/2021    AST 34 07/31/2021    PROT 7.1 07/31/2021    BILITOT <0.2 07/31/2021    BILIDIR <0.2 11/19/2014    IBILI 0.0 11/19/2014    LABALBU 3.5 07/31/2021       INR  No results for input(s): PROTIME, INR in the last 72 hours. APTT  No results for input(s):  APTT in the last 72 hours. Lactic Acid  Lab Results   Component Value Date    LACTA 1.9 10/14/2017    LACTA 1.5 03/27/2016    LACTA 2.9 03/27/2016        BNP   No results for input(s): BNP in the last 72 hours. Cultures     No results for input(s): BC in the last 72 hours. No results for input(s): Otoniel Emma in the last 72 hours. No results for input(s): LABURIN in the last 72 hours. Radiology   CTA PULMONARY W CONTRAST    Result Date: 7/31/2021  EXAMINATION: CTA OF THE CHEST 7/31/2021 11:50 am TECHNIQUE: CTA of the chest was performed after the administration of intravenous contrast.  Multiplanar reformatted images are provided for review. MIP images are provided for review. Dose modulation, iterative reconstruction, and/or weight based adjustment of the mA/kV was utilized to reduce the radiation dose to as low as reasonably achievable. COMPARISON: None. HISTORY: ORDERING SYSTEM PROVIDED HISTORY: COVID-19, hypoxia, hemoptysis, eval for PE TECHNOLOGIST PROVIDED HISTORY: Reason for exam:->COVID-19, hypoxia, hemoptysis, eval for PE Decision Support Exception - unselect if not a suspected or confirmed emergency medical condition->Emergency Medical Condition (MA) FINDINGS: Pulmonary Arteries: Pulmonary arteries are adequately opacified for evaluation. No evidence of intraluminal filling defect to suggest pulmonary embolism. Main pulmonary artery is normal in caliber. Mediastinum: No evidence of mediastinal lymphadenopathy. The heart and pericardium demonstrate no acute abnormality. There is no acute abnormality of the thoracic aorta. Lungs/pleura: There are bilateral multifocal ground-glass and semi solid pulmonary infiltrates more prominent within the perihilar regions consistent with COVID pneumonia. Leon Navarrete Upper Abdomen: Limited images of the upper abdomen are unremarkable. Soft Tissues/Bones: No acute bone or soft tissue abnormality. 1. There is no evidence of a pulmonary embolus.  2. Bilateral multifocal ground-glass and semi solid pulmonary infiltrates most consistent with COVID pneumonia. SYSTEMS ASSESSMENT    Neuro   Awake, alert and oriented    Respiratory   Acute hypoxic respiratory failure  -Secondary to COVID-19  -Worsening O2 status requiring ICU admission  -Continue BiPAP, Saturating 98%, in no distress  -Decadron BID  -Wean oxygen as tolerated. Keep O2 sat >92%    Cardiovascular   Continue telemetry monitoring  -No evidence of heart strain on CTA chest    Gastrointestinal   GI prophylaxis  -Protonix daily  -Regular Diet    Renal   Monitor electrolytes and renal function  -No abnormalities currently  -Good UO     Infectious Disease   Pneumonia Secondary to COVID-19  -COVID-19 positive and worsening bilateral ground glassopacities on CT scan  -Remdesevir day 2 and Actemra day1  -Procalcitonin 0.09 and D-dimer 1968 (increased)    Hematology/Oncology   Hx of Iron Deficiency Anemia  -Last Hemoglobin 11.4 on 7/31 with MCV of 74.3  -Repeat CBC daily  -Ferrous sulfate    DVT Prophylaxis  -Lovenox    Endocrine   Keep glucose between 140-180    Social/Spiritual/DNR/Other   Full Code    Anxiety  -Lexapro daily and Xanax PRN      PLAN:     WEAN PER PROTOCOL:  [] No   [x] Yes  [] N/A    DISCONTINUE ANY LABS:   [x] No   [] Yes    ICU PROPHYLAXIS:  Stress ulcer:  [x] PPI Agent  [] W2Alcbv [] Sucralfate  [] Other:  VTE:   [x] Enoxaparin  [] Unfract. Heparin Subcut  [] EPC Cuffs    NUTRITION:  [] NPO [] Tube Feeding (Specify: ) [] TPN  [x] PO (Diet: ADULT DIET; Regular)    HOME MEDICATIONS RECONCILED: [] No  [] Yes    INSULIN DRIP:   [x] No   [] Yes    CONSULTATION NEEDED:  [] No   [x] Yes    FAMILY UPDATED:    [] No   [] Yes    TRANSFER OUT OF ICU:   [x] No   [] Yes        Di Baldwin DO, PGY2                      8/3/2021, 4:40 PM      I personally saw, examined and provided care for the patient. Radiographs, labs and medication list were reviewed by me independently.  I spoke with bedside nursing, therapists and consultants. Critical care services and times documented are independent of procedures and multidisciplinary rounds with Residents. Additionally comprehensive, multidisciplinary rounds were conducted with the MICU team. The case was discussed in detail and plans for care were established. Review of Residents documentation was conducted and revisions were made as appropriate. I agree with the above documented exam, problem list and plan of care. 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 Patient placed in prone position  8/3 increase O2 requirements transferred to ICU  8/3 repeat CT showing progression in groundglass opacities.   Basilar consolidations improved    On BiPAP 90% saturating 99%      Acute hypoxic respiratory failure on Decadron every 12  Covid pneumonitis  Increase inflammatory state due to coronavirus for toci today  Hypercoagulable state due to coronavirus elevated D-dimer  Morbid obesity BMI 43.7  Anxiety  Anemia  Arthritis  JOYCE showing apnea-hypopnea index of 8 increasing in the supine position with O2 lorenzo of 84% 7/14/2021  Asthma  GERD  Mood disorder  Abnormal uterine bleeding status post hysteroscopy  History of complex cyst left ovary with follicles  Achilles tendon rupture with a history of stent      On remdesivir  Continue psychiatric medications  Encouraged to prone  > 30 min CCT

## 2021-08-03 NOTE — PROGRESS NOTES
Message left on Dr. Keri Greenfield answering service re: oxygen requirements/bipap. Call returned at 0930- see new orders.

## 2021-08-03 NOTE — PROGRESS NOTES
Received permission from patient to speak to DALTONCALLUMsister STARR. Patient update provided. All questions answered.

## 2021-08-03 NOTE — PROGRESS NOTES
Received return phone call from [facility] [place], pt mother. Luzma updated on transfer to the ICU and pt status at this time.

## 2021-08-03 NOTE — PROGRESS NOTES
Admit Date: 7/31/2021    Subjective:     pulmonary consult appreciated   Still on 90% O2 states feels better taking fine     Objective:     Patient Vitals for the past 8 hrs:   Resp   08/03/21 0518 27   08/03/21 0121 (!) 36     I/O last 3 completed shifts: In: 56 [P.O.:240; IV Piggyback:250]  Out: 500 [Urine:500]  No intake/output data recorded. HEENT: Normal  NECK: Thyroid normal. No carotid bruit. No lymphphadenopathy. CVS: RRR  RS: Clear. No wheeze. No rhonchi. ABD: Soft. Non tender. No mass. Normal BS. EXT: No edema. Non tender. Pulses present.    NEURO: no focal deficit       Scheduled Meds:   sodium chloride flush  10 mL Intravenous BID    enoxaparin  40 mg Subcutaneous BID    remdesivir IVPB  100 mg Intravenous Q24H    vitamin C  500 mg Oral Daily    aspirin  81 mg Oral BID    vitamin D  50,000 Units Oral Weekly    escitalopram  10 mg Oral Daily    ferrous sulfate  325 mg Oral Daily with breakfast    fluticasone  1 spray Each Nostril Daily    montelukast  10 mg Oral Nightly    traZODone  50 mg Oral Nightly    dexamethasone  6 mg Intravenous Q12H    pantoprazole  40 mg Oral Daily    budesonide-formoterol  1 puff Inhalation BID     Continuous Infusions:    CBC with Differential:    Lab Results   Component Value Date    WBC 8.0 07/31/2021    RBC 4.91 07/31/2021    HGB 11.4 07/31/2021    HCT 36.5 07/31/2021     07/31/2021    MCV 74.3 07/31/2021    MCH 23.2 07/31/2021    MCHC 31.2 07/31/2021    RDW 17.9 07/31/2021    SEGSPCT 75 09/03/2011    LYMPHOPCT 10.4 07/31/2021    MONOPCT 6.0 07/31/2021    BASOPCT 0.1 07/31/2021    MONOSABS 0.48 07/31/2021    LYMPHSABS 0.83 07/31/2021    EOSABS 0.00 07/31/2021    BASOSABS 0.01 07/31/2021     CMP:    Lab Results   Component Value Date     08/01/2021    K 4.2 08/01/2021    K 3.4 07/31/2021     08/01/2021    CO2 24 08/01/2021    BUN 9 08/01/2021    CREATININE 0.6 08/01/2021    GFRAA >60 08/01/2021    LABGLOM >60 08/01/2021    PROT 7.1 07/31/2021    LABALBU 3.5 07/31/2021    CALCIUM 7.7 08/01/2021    BILITOT <0.2 07/31/2021    ALKPHOS 68 07/31/2021    AST 34 07/31/2021    ALT 21 07/31/2021     PT/INR:    Lab Results   Component Value Date    PROTIME 12.0 07/31/2021    INR 1.1 07/31/2021       Assessment:     Active Problems:    Pneumonia due to COVID-19 virus    Hypoxia due to above       Plan:   Continue supportive care may benefit from [address] ,await pulmonary input

## 2021-08-03 NOTE — PROGRESS NOTES
Michelle Boucher, mother, x4 re: transfer to ICU. No answer each time. Voicemail left re: transfer, ICU nurse's station number provided.

## 2021-08-03 NOTE — PROGRESS NOTES
Date: 8/3/2021    Time: 11:00 AM    Patient Placed On BIPAP/CPAP/ Non-Invasive Ventilation? No    If no must comment. Facial area red/color change? No           If YES are Blister/Lesion present? No   If yes must notify nursing staff  BIPAP/CPAP skin barrier?   Yes    Skin barrier type:mepilexlite       Comments: Patient already on [address] [facility] [place] [place]

## 2021-08-04 ENCOUNTER — APPOINTMENT (OUTPATIENT)
Dept: GENERAL RADIOLOGY | Age: 36
DRG: 177 | End: 2021-08-04
Payer: COMMERCIAL

## 2021-08-04 LAB
ANION GAP SERPL CALCULATED.3IONS-SCNC: 6 MMOL/L (ref 7–16)
ANISOCYTOSIS: ABNORMAL
ATYPICAL LYMPHOCYTE RELATIVE PERCENT: 2 % (ref 0–4)
BASOPHILS ABSOLUTE: 0 E9/L (ref 0–0.2)
BASOPHILS RELATIVE PERCENT: 0 % (ref 0–2)
BUN BLDV-MCNC: 17 MG/DL (ref 6–20)
CALCIUM SERPL-MCNC: 7.8 MG/DL (ref 8.6–10.2)
CHLORIDE BLD-SCNC: 104 MMOL/L (ref 98–107)
CO2: 27 MMOL/L (ref 22–29)
CREAT SERPL-MCNC: 0.5 MG/DL (ref 0.5–1)
EOSINOPHILS ABSOLUTE: 0 E9/L (ref 0.05–0.5)
EOSINOPHILS RELATIVE PERCENT: 0 % (ref 0–6)
GFR AFRICAN AMERICAN: >60
GFR NON-AFRICAN AMERICAN: >60 ML/MIN/1.73
GLUCOSE BLD-MCNC: 123 MG/DL (ref 74–99)
HCT VFR BLD CALC: 35.8 % (ref 34–48)
HEMOGLOBIN: 10.9 G/DL (ref 11.5–15.5)
LYMPHOCYTES ABSOLUTE: 1.13 E9/L (ref 1.5–4)
LYMPHOCYTES RELATIVE PERCENT: 10 % (ref 20–42)
MCH RBC QN AUTO: 23.1 PG (ref 26–35)
MCHC RBC AUTO-ENTMCNC: 30.4 % (ref 32–34.5)
MCV RBC AUTO: 75.8 FL (ref 80–99.9)
METAMYELOCYTES RELATIVE PERCENT: 3 % (ref 0–1)
MONOCYTES ABSOLUTE: 0.38 E9/L (ref 0.1–0.95)
MONOCYTES RELATIVE PERCENT: 4 % (ref 2–12)
MYELOCYTE PERCENT: 1 % (ref 0–0)
NEUTROPHILS ABSOLUTE: 7.9 E9/L (ref 1.8–7.3)
NEUTROPHILS RELATIVE PERCENT: 80 % (ref 43–80)
NUCLEATED RED BLOOD CELLS: 1 /100 WBC
PDW BLD-RTO: 17.2 FL (ref 11.5–15)
PLATELET # BLD: 272 E9/L (ref 130–450)
PMV BLD AUTO: 8.8 FL (ref 7–12)
POTASSIUM SERPL-SCNC: 4.3 MMOL/L (ref 3.5–5)
RBC # BLD: 4.72 E12/L (ref 3.5–5.5)
SODIUM BLD-SCNC: 137 MMOL/L (ref 132–146)
WBC # BLD: 9.4 E9/L (ref 4.5–11.5)

## 2021-08-04 PROCEDURE — 2500000003 HC RX 250 WO HCPCS: Performed by: INTERNAL MEDICINE

## 2021-08-04 PROCEDURE — 2580000003 HC RX 258: Performed by: INTERNAL MEDICINE

## 2021-08-04 PROCEDURE — 36415 COLL VENOUS BLD VENIPUNCTURE: CPT

## 2021-08-04 PROCEDURE — 6370000000 HC RX 637 (ALT 250 FOR IP): Performed by: INTERNAL MEDICINE

## 2021-08-04 PROCEDURE — 85025 COMPLETE CBC W/AUTO DIFF WBC: CPT

## 2021-08-04 PROCEDURE — 2060000000 HC ICU INTERMEDIATE R&B

## 2021-08-04 PROCEDURE — 80048 BASIC METABOLIC PNL TOTAL CA: CPT

## 2021-08-04 PROCEDURE — 6360000002 HC RX W HCPCS: Performed by: INTERNAL MEDICINE

## 2021-08-04 PROCEDURE — 71045 X-RAY EXAM CHEST 1 VIEW: CPT

## 2021-08-04 PROCEDURE — 2700000000 HC OXYGEN THERAPY PER DAY

## 2021-08-04 PROCEDURE — 94660 CPAP INITIATION&MGMT: CPT

## 2021-08-04 RX ORDER — AMLODIPINE BESYLATE 5 MG/1
5 TABLET ORAL DAILY
Status: DISCONTINUED | OUTPATIENT
Start: 2021-08-04 | End: 2021-08-07 | Stop reason: HOSPADM

## 2021-08-04 RX ADMIN — BUDESONIDE AND FORMOTEROL FUMARATE DIHYDRATE 2 PUFF: 160; 4.5 AEROSOL RESPIRATORY (INHALATION) at 19:53

## 2021-08-04 RX ADMIN — PANTOPRAZOLE SODIUM 40 MG: 40 TABLET, DELAYED RELEASE ORAL at 08:16

## 2021-08-04 RX ADMIN — DEXAMETHASONE SODIUM PHOSPHATE 6 MG: 4 INJECTION, SOLUTION INTRAMUSCULAR; INTRAVENOUS at 19:51

## 2021-08-04 RX ADMIN — ALPRAZOLAM 0.5 MG: 0.25 TABLET ORAL at 00:08

## 2021-08-04 RX ADMIN — FLUTICASONE PROPIONATE 1 SPRAY: 50 SPRAY, METERED NASAL at 19:53

## 2021-08-04 RX ADMIN — ENOXAPARIN SODIUM 40 MG: 40 INJECTION SUBCUTANEOUS at 19:51

## 2021-08-04 RX ADMIN — ASPIRIN 81 MG: 81 TABLET, COATED ORAL at 19:52

## 2021-08-04 RX ADMIN — FERROUS SULFATE TAB 325 MG (65 MG ELEMENTAL FE) 325 MG: 325 (65 FE) TAB at 08:16

## 2021-08-04 RX ADMIN — MONTELUKAST SODIUM 10 MG: 10 TABLET, FILM COATED ORAL at 19:52

## 2021-08-04 RX ADMIN — AMLODIPINE BESYLATE 5 MG: 5 TABLET ORAL at 17:09

## 2021-08-04 RX ADMIN — ENOXAPARIN SODIUM 40 MG: 40 INJECTION SUBCUTANEOUS at 08:16

## 2021-08-04 RX ADMIN — ALPRAZOLAM 0.5 MG: 0.25 TABLET ORAL at 19:52

## 2021-08-04 RX ADMIN — OXYCODONE HYDROCHLORIDE AND ACETAMINOPHEN 500 MG: 500 TABLET ORAL at 08:16

## 2021-08-04 RX ADMIN — ACETAMINOPHEN 1000 MG: 500 TABLET ORAL at 06:19

## 2021-08-04 RX ADMIN — ASPIRIN 81 MG: 81 TABLET, COATED ORAL at 08:16

## 2021-08-04 RX ADMIN — SODIUM CHLORIDE, PRESERVATIVE FREE 10 ML: 5 INJECTION INTRAVENOUS at 13:20

## 2021-08-04 RX ADMIN — Medication 10 ML: at 08:16

## 2021-08-04 RX ADMIN — REMDESIVIR 100 MG: 100 INJECTION, POWDER, LYOPHILIZED, FOR SOLUTION INTRAVENOUS at 13:20

## 2021-08-04 RX ADMIN — ESCITALOPRAM 10 MG: 10 TABLET, FILM COATED ORAL at 08:16

## 2021-08-04 RX ADMIN — ALPRAZOLAM 0.5 MG: 0.25 TABLET ORAL at 08:16

## 2021-08-04 RX ADMIN — Medication 10 ML: at 19:51

## 2021-08-04 RX ADMIN — DEXAMETHASONE SODIUM PHOSPHATE 6 MG: 4 INJECTION, SOLUTION INTRAMUSCULAR; INTRAVENOUS at 08:16

## 2021-08-04 ASSESSMENT — PAIN SCALES - GENERAL
PAINLEVEL_OUTOF10: 0
PAINLEVEL_OUTOF10: 6
PAINLEVEL_OUTOF10: 0

## 2021-08-04 NOTE — FLOWSHEET NOTE
Pt up to [facility] with Optiflow on, saturation dropped to 74% at lowest. Pt did recover quickly. NRB mask placed in room for the next OOB attempt.

## 2021-08-04 NOTE — CARE COORDINATION
8/4/2021 Social Work Discharge Planning:COVID POS-7/27. Pt has pnemonia due to COVID. Pt is on 55l high flow and uses no home o2. Wean o2. [facility] is following for home o2 needs. Continuing supportive care. From home with her children and their father.  Electronically signed by CHRISTINE Graham on 8/4/2021 at 8:13 AM

## 2021-08-04 NOTE — PLAN OF CARE
Problem: Pain:  Goal: Pain level will decrease  Description: Pain level will decrease  Outcome: Met This Shift  Goal: Control of acute pain  Description: Control of acute pain  Outcome: Met This Shift  Goal: Control of chronic pain  Description: Control of chronic pain  Outcome: Met This Shift     Problem: Airway Clearance - Ineffective  Goal: Achieve or maintain patent airway  Outcome: Met This Shift     Problem: Gas Exchange - Impaired  Goal: Absence of hypoxia  Outcome: Ongoing  Goal: Promote optimal lung function  Outcome: Ongoing     Problem: Breathing Pattern - Ineffective  Goal: Ability to achieve and maintain a regular respiratory rate  Outcome: Met This Shift     Problem:  Body Temperature -  Risk of, Imbalanced  Goal: Ability to maintain a body temperature within defined limits  8/4/2021 0115 by Gina Mortensen RN  Outcome: Met This Shift  8/3/2021 1909 by Maria Del Rosario Urbina RN  Outcome: Met This Shift  Goal: Will regain or maintain usual level of consciousness  8/4/2021 0115 by Gina Mortensen RN  Outcome: Met This Shift  8/3/2021 1909 by Maria Del Rosario Urbina RN  Outcome: Met This Shift  Goal: Complications related to the disease process, condition or treatment will be avoided or minimized  Outcome: Met This Shift     Problem: Isolation Precautions - Risk of Spread of Infection  Goal: Prevent transmission of infection  Outcome: Met This Shift     Problem: Nutrition Deficits  Goal: Optimize nutritional status  Outcome: Met This Shift     Problem: Risk for Fluid Volume Deficit  Goal: Maintain normal heart rhythm  8/4/2021 0115 by Gina Mortensen RN  Outcome: Met This Shift  8/3/2021 1909 by Maria Del Rosario Urbina RN  Outcome: Met This Shift  Goal: Maintain absence of muscle cramping  Outcome: Met This Shift  Goal: Maintain normal serum potassium, sodium, calcium, phosphorus, and pH  8/4/2021 0115 by Gina Mortensen RN  Outcome: Met This Shift  8/3/2021 1909 by Maria Del Rosario Urbina RN  Outcome: Met This Shift Problem: Loneliness or Risk for Loneliness  Goal: Demonstrate positive use of time alone when socialization is not possible  Outcome: Not Met This Shift     Problem: Fatigue  Goal: Verbalize increase energy and improved vitality  Outcome: Not Met This Shift     Problem: Patient Education: Go to Patient Education Activity  Goal: Patient/Family Education  Outcome: Met This Shift     Problem: Falls - Risk of:  Goal: Will remain free from falls  Description: Will remain free from falls  8/4/2021 0115 by Damari Boss RN  Outcome: Met This Shift  8/3/2021 1909 by Teresa Schultz RN  Outcome: Met This Shift  Goal: Absence of physical injury  Description: Absence of physical injury  Outcome: Met This Shift     Problem: Anxiety:  Goal: Level of anxiety will decrease  Description: Level of anxiety will decrease  8/4/2021 0115 by Damari Boss RN  Outcome: Not Met This Shift  8/3/2021 1909 by Teresa Schultz RN  Outcome: Met This Shift

## 2021-08-04 NOTE — PROGRESS NOTES
Critical Care Team - Daily Progress Note      Date and time: 2021 11:43 AM  Patient's name:  Stone Romero 88Dex S Record Number: [id number]  Patient's account/billing number: [de-identified]  Patient's YOB: 1985  Age: 38 y.o. Date of Admission: 2021  9:29 AM  Length of stay during current admission: 4      Primary Care Physician: Radha Kumar MD  ICU Attending Physician: Dr. Oralia Bray    Code Status: Full Code    Reason for ICU admission: Acute respiratory failure with hypoxia secondary to pneumonia due to COVID-19 virus      SUBJECTIVE:     OVERNIGHT EVENTS:       No significant events overnight. Patient had remained on the BiPAP 60% saturating at 96 to 98%. Has also been using the OptiFlow. Nurse stating that patient is able to self prone as well as ambulate to the bathroom on her own. Patient reported to me that she has been having some back soreness when she is laying flat she believes this is due to the bed being uncomfortable. Denies any back trauma or injury.      AWAKE & FOLLOWING COMMANDS:  [] No   [x] Yes   CURRENT VENTILATION STATUS:   [] Ventilator  [x] BIPAP  [x] Nasal Cannula [] Room Air      IF INTUBATED, ET TUBE MARKING AT LOWER LIP:       cms  SECRETIONS Amount:  [] Small [] Moderate  [] Large[x] None  Color:     [] White [] Colored  [] Bloody  SEDATION:  RAAS Score:  [] Propofol gtt  [] Versed gtt  [] Ativan gtt   [x] No Sedation  PARALYZED:  [x] No    [] Yes  DIARRHEA:                [x] No               VASOPRESSORS:  [x] No    []  CENTRAL LINES:     [x] No     OCAMPO'S CATHETER:   [x] No      OBJECTIVE:     VITAL SIGNS:  BP (!) 164/79   Pulse 91   Temp 98.4 °F (36.9 °C) (Axillary)   Resp 27   Ht 5' 2\" (1.575 m)   Wt 239 lb (108.4 kg)   SpO2 97%   BMI 43.71 kg/m²   Tmax over 24 hours:  Temp (24hrs), Av.6 °F (37 °C), Min:98.4 °F (36.9 °C), Max:98.9 °F (37.2 °C)      Patient Vitals for the past 6 hrs:   BP Temp Temp src Pulse Resp SpO2 Height   21 1100 (!) 164/79 -- -- 91 27 97 % --   08/04/21 1021 -- -- -- -- -- -- 5' 2\" (1.575 m)   08/04/21 1000 (!) 156/73 -- -- 87 27 95 % --   08/04/21 0900 (!) 156/73 -- -- 85 25 96 % --   08/04/21 0800 (!) 148/73 98.4 °F (36.9 °C) Axillary 93 25 98 % --   08/04/21 0743 -- -- -- -- 12 98 % --   08/04/21 0700 (!) 147/70 -- -- 80 (!) 43 99 % --   08/04/21 0600 134/75 -- -- 77 (!) 40 96 % --         Intake/Output Summary (Last 24 hours) at 8/4/2021 1143  Last data filed at 8/4/2021 0830  Gross per 24 hour   Intake 160 ml   Output 1200 ml   Net -1040 ml     Wt Readings from Last 2 Encounters:   07/31/21 239 lb (108.4 kg)   07/09/21 238 lb 3.2 oz (108 kg)     Body mass index is 43.71 kg/m². PHYSICAL EXAMINATION:    General appearance -alert and awake on BiPAP, speaking in full sentences  Mental status - alert, oriented to person, place, and time, normal mood, behavior, speech, dress, motor activity, and thought processes  Eyes - pupils equal and reactive, extraocular eye movements intact, sclera anicteric  Ears - external ear normal  Nose - normal and patent, no erythema, discharge or polyps  Mouth - mucous membranes moist, pharynx normal without lesions  Neck - supple, no significant adenopathy  Chest -mild basilar crackles, no wheezes. No respiratory distress. Heart - normal rate, regular rhythm, normal S1, S2, no murmurs, rubs, clicks or gallops  Abdomen - soft, nontender, nondistended, no masses or organomegaly  Neurological - alert, oriented, normal speech, no focal findings or movement disorder noted  Extremities - peripheral pulses normal, no clubbing or cyanosis. No edema. Skin - normal coloration and turgor, no rashes, no suspicious skin lesions noted   Back -no tenderness to palpation of the lumbar thoracic spine. There is no paraspinal tenderness bilaterally. No step-offs noted.        MEDICATIONS:    Scheduled Meds:   enoxaparin  40 mg Subcutaneous BID    budesonide-formoterol  2 puff Inhalation BID    sodium chloride flush  10 mL Intravenous BID    remdesivir IVPB  100 mg Intravenous Q24H    vitamin C  500 mg Oral Daily    aspirin  81 mg Oral BID    vitamin D  50,000 Units Oral Weekly    escitalopram  10 mg Oral Daily    ferrous sulfate  325 mg Oral Daily with breakfast    fluticasone  1 spray Each Nostril Daily    montelukast  10 mg Oral Nightly    traZODone  50 mg Oral Nightly    dexamethasone  6 mg Intravenous Q12H    pantoprazole  40 mg Oral Daily     Continuous Infusions:    PRN Meds:   sodium chloride flush, 10 mL, PRN  sodium chloride, 30 mL, PRN  acetaminophen, 1,000 mg, Q6H PRN  ALPRAZolam, 0.5 mg, BID PRN  SUMAtriptan, 50 mg, Daily PRN          VENT SETTINGS (Comprehensive) (if applicable):  Vent Information  Skin Assessment: Clean, dry, & intact  Equipment Changed: (S) Humidification  FiO2 : 60 %  SpO2: 97 %  SpO2/FiO2 ratio: 161.67  I Time/ I Time %: 0.9 s  Humidification Source: Heated wire  Humidification Temp: 34  Humidification Temp Measured: 37  Mask Type: Full face mask  Mask Size: Medium  Additional Respiratory  Assessments  Pulse: 91  Resp: 27  SpO2: 97 %  Humidification Source: Heated wire  Humidification Temp: 34  Oral Care: Patient refused    ABG  Lab Results   Component Value Date    PH 7.414 07/31/2021    PCO2 34.7 07/31/2021    PO2 86.4 07/31/2021    HCO3 21.7 07/31/2021    O2SAT 96.3 07/31/2021         Laboratory findings:    Complete Blood Count:   Recent Labs     08/03/21  1637 08/04/21  0625   WBC 8.8 9.4   HGB 11.1* 10.9*   HCT 36.6 35.8    272        Lactic Acid  Lab Results   Component Value Date    LACTA 1.9 10/14/2017    LACTA 1.5 03/27/2016    LACTA 2.9 03/27/2016        Last 3 Blood Glucose:   Recent Labs     08/04/21  0625   GLUCOSE 123*        PT/INR:    Lab Results   Component Value Date    PROTIME 12.0 07/31/2021    INR 1.1 07/31/2021     PTT:    Lab Results   Component Value Date    APTT 30.2 07/31/2021       Comprehensive Metabolic Profile:   Recent Labs     08/04/21  0625      K 4.3      CO2 27   BUN 17   CREATININE 0.5   GLUCOSE 123*   CALCIUM 7.8*      Magnesium:   Lab Results   Component Value Date    MG 1.7 07/31/2021     Phosphorus: No results found for: PHOS  Ionized Calcium: No results found for: CAION       Urinalysis:     Troponin: No results for input(s): TROPONINI in the last 72 hours. Cultures       No results for input(s): BC in the last 72 hours. No results for input(s): Emmalene Josemanuel in the last 72 hours. No results for input(s): LABURIN in the last 72 hours. Other pertinent Labs:       Radiology/Imaging:   CTA CHEST W CONTRAST    Result Date: 8/3/2021Significantly limited examination due to poor contrast bolus timing. This renders the examination nondiagnostic for detection of pulmonary emboli along multiple segmental and subsegmental pulmonary artery distributions. No central pulmonary embolism or right heart strain is appreciated. Significant interval progression in the previously described consolidative and ground-glass opacities throughout both lungs, in keeping with the known history of COVID pneumonia. Suspect hepatic steatosis. CTA PULMONARY W CONTRAST    Result Date: 7/31/2021  1. There is no evidence of a pulmonary embolus. 2. Bilateral multifocal ground-glass and semi solid pulmonary infiltrates most consistent with COVID pneumonia.         Chest Xray (8/4/2021):    SYSTEMS ASSESSMENT    Neuro   Awake, alert and oriented    Back Pain  -Negative physical exam findings, no paresthesias or incontinence  -Positional with laying flat  -Tylenol as needed    Respiratory   Acute hypoxic respiratory failure  -Secondary to COVID-19  -Continue BiPAP 60%, saturating 96 to 98%  -Continue weaning, use OptiFlow  -Decadron twice daily      Cardiovascular   Continue telemetry monitoring  -No evidence of heart strain on CTA    Gastrointestinal   GI prophylaxis  -Protonix daily  -Regular diet    Renal   Monitor electrolytes and renal function  -No abnormalities  -Good urine output, self ambulating to restroom     Infectious Disease   Pneumonia secondary to COVID-19  -Bilateral groundglass opacities on CT scan  -Remdesivir day 3 and Actemra given once  -Procalcitonin 0.09 and D-dimer 1968    Hematology/Oncology   History of iron deficiency anemia  -Last hemoglobin 10.9, stable  -Repeat CBC daily  -Continue ferrous sulfate    DVT prophylaxis  -Lovenox    Endocrine   Keep glucose between 1 40-1 80    Social/Spiritual/DNR/Other   Full code    Anxiety  -Lexapro daily and Xanax as needed      PLAN:     WEAN PER PROTOCOL:  [] No   [x] Yes  [] N/A  DISCONTINUE ANY LABS:   [x] No   [] Yes  ICU PROPHYLAXIS:  Stress ulcer:  [x] PPI Agent  [] [id number] [] Sucralfate  [] Other:  VTE:   [x] Enoxaparin  [] Unfract. Heparin Subcut  [] EPC Cuffs    NUTRITION:  [] NPO [] Tube Feeding (Specify: ) [] TPN  [x] PO (Diet: ADULT DIET; Regular  Adult Oral Nutrition Supplement; Low Calorie/High Protein Oral Supplement  Adult Oral Nutrition Supplement; Frozen Oral Supplement)    HOME MEDICATIONS RECONCILED: [] No  [] Yes  INSULIN DRIP:   [x] No   [] Yes  CONSULTATION NEEDED:  [x] No   [] Yes  FAMILY UPDATED:    [] No   [] Yes  TRANSFER OUT OF ICU:   [] No   [x] Yes    Tiajuana Hatchet, DO,PGY2                     8/4/2021, 11:43 AM  I personally saw, examined and provided care for the patient. Radiographs, labs and medication list were reviewed by me independently. I spoke with bedside nursing, therapists and consultants. Critical care services and times documented are independent of procedures and multidisciplinary rounds with Residents. Additionally comprehensive, multidisciplinary rounds were conducted with the MICU team. The case was discussed in detail and plans for care were established. Review of Residents documentation was conducted and revisions were made as appropriate. I agree with the above documented exam, problem list and plan of care.   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 Patient placed in prone position  8/3 8/3 repeat CT chest showing progression in groundglass opacities. Basilar consolidations improved  Due to increased O2 requirements transferred to ICU  On BiPAP 90% saturating 99%  8/4 down to 60% FiO2 and 55% OptiFlow. Patient is self proning. Uses the bathroom. Chest x-ray unchanged          1. Acute hypoxic respiratory failure on Decadron every 12  2. Covid pneumonitis on remdesivir  3. Increase inflammatory state due to coronaviruss/p  toci 8/3  4. Hypercoagulable state due to coronavirus elevated D-dimer  5. Morbid obesity BMI 43.7  6. Anxiety  7. Anemia microcytic on iron  8. Arthritis  9. JOYCE Sleep study 7/14/2021showing AHI of 8 worse in supine position with O2 lorenzo of 84%   10. Asthma  11. GERD  12. Mood disorder  13. Abnormal uterine bleeding status post hysteroscopy  14. History of complex cyst left ovary with follicles  15.  Achilles tendon rupture        On remdesivir  Continue psychiatric medications  Encouraged to prone  Stable  Empiric CPAP nightly  Okay to transfer to floor

## 2021-08-04 NOTE — PATIENT CARE CONFERENCE
Intensive Care Daily Quality Rounding Checklist      ICU Team Members: Solomon Cunha, bedside nurse, charge nurse, clinical pharmacist, respiratory therapist    ICU Day #: NUMBER: 2    Intubation Date: n/a    Ventilator Day #: n/a    Central Line Insertion Date: n/a        Day #: n/a     Arterial Line Insertion Date: n/a      Day #: n/a    Temporary Hemodialysis Catheter Insertion Date: n/a        Day # n/a    DVT Prophylaxis: Lovenox    GI Prophylaxis: Protonix    Farah Catheter Insertion Date: n/a       Day #: n/a      Continued need (if yes, reason documented and discussed with physician): none    Skin Issues/ Wounds and ordered treatment discussed on rounds: left heel, dressing as at home.   SOS precautions in place    Goals/ Plans for the Day: Daily labs, wean Oxygen as able, increase activity slowly, will transfer to floor today

## 2021-08-04 NOTE — PROGRESS NOTES
admission      Nutrition Interventions:   Food and/or Nutrient Delivery:  Continue Current Diet, Start Oral Nutrition Supplement (HP Ensure BID and Magic cup daily)  Nutrition Education/Counseling:  No recommendation at this time   Coordination of Nutrition Care:  Continue to monitor while inpatient    Goals:  PO >50% at meals and ONS       Nutrition Monitoring and Evaluation:   Behavioral-Environmental Outcomes:  None Identified   Food/Nutrient Intake Outcomes:  Food and Nutrient Intake, Supplement Intake  Physical Signs/Symptoms Outcomes:  Biochemical Data, GI Status, Fluid Status or Edema, Nutrition Focused Physical Findings, Weight, Skin     Discharge Planning:     Too soon to determine     Electronically signed by Ovidio Riley RD, CNSC, LD on 8/4/21 at 10:30 AM EDT    Contact: [phone]

## 2021-08-04 NOTE — PLAN OF CARE
Problem: Pain:  Goal: Pain level will decrease  Description: Pain level will decrease  Outcome: Met This Shift  Goal: Control of acute pain  Description: Control of acute pain  Outcome: Met This Shift  Goal: Control of chronic pain  Description: Control of chronic pain  Outcome: Met This Shift     Problem: Airway Clearance - Ineffective  Goal: Achieve or maintain patent airway  Outcome: Met This Shift     Problem: Gas Exchange - Impaired  Goal: Absence of hypoxia  Outcome: Met This Shift  Goal: Promote optimal lung function  Outcome: Met This Shift     Problem: Breathing Pattern - Ineffective  Goal: Ability to achieve and maintain a regular respiratory rate  Outcome: Met This Shift     Problem: Isolation Precautions - Risk of Spread of Infection  Goal: Prevent transmission of infection  Outcome: Met This Shift     Problem: Nutrition Deficits  Goal: Optimize nutritional status  Outcome: Met This Shift     Problem: Risk for Fluid Volume Deficit  Goal: Maintain normal heart rhythm  Outcome: Met This Shift  Goal: Maintain absence of muscle cramping  Outcome: Met This Shift  Goal: Maintain normal serum potassium, sodium, calcium, phosphorus, and pH  Outcome: Met This Shift

## 2021-08-04 NOTE — PROGRESS NOTES
Pulmonary Progress Note    Admit Date: 2021  Hospital day                               PCP: Malou Engle MD    Chief Complaint (s):  Patient Active Problem List   Diagnosis    Abnormal Pap smear    Anemia    Threatened , antepartum    Vaginal bleeding before 22 weeks gestation    Right ovarian cyst     contractions    Threatened premature labor, third trimester    Maternal morbid obesity in third trimester, antepartum ([address]    28 weeks gestation of pregnancy    Suspected shortening of cervix not found    Previous  delivery in third trimester, antepartum    Encounter for fetal anatomic survey    Urinary tract infection during pregnancy, third trimester    Spotting complicating pregnancy, third trimester    Upper respiratory symptom    39 weeks gestation of pregnancy    Pneumonia due to COVID-19 virus       Subjective:  · Called early this a.m. for worsening hypoxia, saturations in the 50s and a D-dimer exceeding 1900. A CT-a of the chest was ordered and was just completed. Advancing disease is noted. Inflammatory markers are surprisingly low.       Vitals:  VITALS:  BP (!) 164/79   Pulse 91   Temp 98.4 °F (36.9 °C) (Axillary)   Resp 27   Ht 5' 2\" (1.575 m)   Wt 239 lb (108.4 kg)   SpO2 97%   BMI 43.71 kg/m²     24HR INTAKE/OUTPUT:      Intake/Output Summary (Last 24 hours) at 2021 1144  Last data filed at 2021 0830  Gross per 24 hour   Intake 160 ml   Output 1200 ml   Net -1040 ml       24HR PULSE OXIMETRY RANGE:    SpO2  Av.9 %  Min: 90 %  Max: 100 %    Medications:  IV:      Scheduled Meds:   enoxaparin  40 mg Subcutaneous BID    budesonide-formoterol  2 puff Inhalation BID    sodium chloride flush  10 mL Intravenous BID    remdesivir IVPB  100 mg Intravenous Q24H    vitamin C  500 mg Oral Daily    aspirin  81 mg Oral BID    vitamin D  50,000 Units Oral Weekly    escitalopram  10 mg Oral Daily    ferrous sulfate  325 mg Oral Daily with breakfast    fluticasone  1 spray Each Nostril Daily    montelukast  10 mg Oral Nightly    traZODone  50 mg Oral Nightly    dexamethasone  6 mg Intravenous Q12H    pantoprazole  40 mg Oral Daily       Diet:   ADULT DIET; Regular  Adult Oral Nutrition Supplement; Low Calorie/High Protein Oral Supplement  Adult Oral Nutrition Supplement; Frozen Oral Supplement     EXAM:  General: No distress. Alert. On BiPAP  Eyes: PERRL. No sclera icterus. No conjunctival injection. ENT: No discharge. Pharynx clear. Neck: Trachea midline. Normal thyroid. Resp: No accessory muscle use. Diffuse rales. No wheezing. No rhonchi. CV: Regular rate. Regular rhythm. No murmur or rub. Abd: Non-tender. Non-distended. No masses. No organomegaly. Normal bowel sounds. Skin: Warm and dry. No nodule on exposed extremities. No rash on exposed extremities. Ext: No cyanosis, clubbing, edema  Lymph: No cervical LAD. No supraclavicular LAD. M/S: No cyanosis. No joint deformity. No clubbing. Neuro: Awake. Follows commands. Positive pupils/gag/corneals. Normal pain response. Results:  CBC:   Recent Labs     08/03/21  1637 08/04/21  0625   WBC 8.8 9.4   HGB 11.1* 10.9*   HCT 36.6 35.8   MCV 76.1* 75.8*    272     BMP:   Recent Labs     08/04/21  0625      K 4.3      CO2 27   BUN 17   CREATININE 0.5     LIVER PROFILE: No results for input(s): AST, ALT, LIPASE, BILIDIR, BILITOT, ALKPHOS in the last 72 hours. Invalid input(s): AMYLASE,  ALB  PT/INR: No results for input(s): PROTIME, INR in the last 72 hours. APTT: No results for input(s): APTT in the last 72 hours. Pathology:  1. N/A      Microbiology:  1. None    Recent ABG:   No results for input(s): PH, PO2, PCO2, HCO3, BE, O2SAT, METHB, O2HB, COHB, O2CON, HHB, THB in the last 72 hours. FiO2 : 60 %       Recent Films:  XR CHEST PORTABLE   Final Result   1.  There is no interval change in the significant multifocal bilateral airspace disease. CTA CHEST W CONTRAST   Final Result   Significantly limited examination due to poor contrast bolus timing. This   renders the examination nondiagnostic for detection of pulmonary emboli along   multiple segmental and subsegmental pulmonary artery distributions. No   central pulmonary embolism or right heart strain is appreciated. Significant interval progression in the previously described consolidative   and ground-glass opacities throughout both lungs, in keeping with the known   history of COVID pneumonia. Suspect hepatic steatosis. CTA PULMONARY W CONTRAST   Final Result   1. There is no evidence of a pulmonary embolus. 2. Bilateral multifocal ground-glass and semi solid pulmonary infiltrates   most consistent with COVID pneumonia. Assessment:  1. Worsening COVID-19 pneumonia. Review of the CT films suggest advancing disease without thromboembolic disease underlying    Plan:  1. 1 dose of tocilizumab  2. ICU transfer for close monitoring, discussed with intensivist  3. Continue remdesivir, steroids and BiPAP    Time at the bedside, reviewing labs and radiographs, reviewing updated notes and consultations, discussing with staff and family was more than 45 minutes. Please note that voice recognition technology was used in the preparation of this note and make therefore it may contain inadvertent transcription errors. If the patient is a COVID 19 isolation patient, the above physical exam reflects that of the examining physician for the day. Gabi Moore MD,  M.D., F.C.C.P.     [facility], [address] [place] [address], [address] [place] [address]

## 2021-08-04 NOTE — PROGRESS NOTES
Pulmonary Progress Note    Admit Date: 2021  Hospital day                               PCP: Linsey Diaz MD    Chief Complaint (s):  Patient Active Problem List   Diagnosis    Abnormal Pap smear    Anemia    Threatened , antepartum    Vaginal bleeding before 22 weeks gestation    Right ovarian cyst     contractions    Threatened premature labor, third trimester    Maternal morbid obesity in third trimester, antepartum ([address]    28 weeks gestation of pregnancy    Suspected shortening of cervix not found    Previous  delivery in third trimester, antepartum    Encounter for fetal anatomic survey    Urinary tract infection during pregnancy, third trimester    Spotting complicating pregnancy, third trimester    Upper respiratory symptom    39 weeks gestation of pregnancy    Pneumonia due to COVID-19 virus       Subjective:  · Transferred back to the Covid unit, oxygenation has improved.   This p.m., the patient appears to be resting comfortably on an air Vo      Vitals:  VITALS:  BP (!) 153/87   Pulse 90   Temp 99.7 °F (37.6 °C) (Oral)   Resp 20   Ht 5' 2\" (1.575 m)   Wt 239 lb (108.4 kg)   SpO2 94%   BMI 43.71 kg/m²     24HR INTAKE/OUTPUT:      Intake/Output Summary (Last 24 hours) at 2021 1341  Last data filed at 2021 0830  Gross per 24 hour   Intake 160 ml   Output 1200 ml   Net -1040 ml       24HR PULSE OXIMETRY RANGE:    SpO2  Av.5 %  Min: 90 %  Max: 100 %    Medications:  IV:      Scheduled Meds:   enoxaparin  40 mg Subcutaneous BID    budesonide-formoterol  2 puff Inhalation BID    sodium chloride flush  10 mL Intravenous BID    remdesivir IVPB  100 mg Intravenous Q24H    vitamin C  500 mg Oral Daily    aspirin  81 mg Oral BID    vitamin D  50,000 Units Oral Weekly    escitalopram  10 mg Oral Daily    ferrous sulfate  325 mg Oral Daily with breakfast    fluticasone  1 spray Each Nostril Daily    montelukast  10 mg Oral Nightly    traZODone  50 mg Oral Nightly    dexamethasone  6 mg Intravenous Q12H    pantoprazole  40 mg Oral Daily       Diet:   ADULT DIET; Regular  Adult Oral Nutrition Supplement; Low Calorie/High Protein Oral Supplement  Adult Oral Nutrition Supplement; Frozen Oral Supplement     EXAM:  General: No distress. Alert. Eyes: PERRL. No sclera icterus. No conjunctival injection. ENT: No discharge. Pharynx clear. Neck: Trachea midline. Normal thyroid. Resp: No accessory muscle use. Diffuse rales. No wheezing. No rhonchi. CV: Regular rate. Regular rhythm. No murmur or rub. Abd: Non-tender. Non-distended. No masses. No organomegaly. Normal bowel sounds. Skin: Warm and dry. No nodule on exposed extremities. No rash on exposed extremities. Ext: No cyanosis, clubbing, edema  Lymph: No cervical LAD. No supraclavicular LAD. M/S: No cyanosis. No joint deformity. No clubbing. Neuro: Awake. Follows commands. Positive pupils/gag/corneals. Normal pain response. Results:  CBC:   Recent Labs     08/03/21  1637 08/04/21  0625   WBC 8.8 9.4   HGB 11.1* 10.9*   HCT 36.6 35.8   MCV 76.1* 75.8*    272     BMP:   Recent Labs     08/04/21  0625      K 4.3      CO2 27   BUN 17   CREATININE 0.5     LIVER PROFILE: No results for input(s): AST, ALT, LIPASE, BILIDIR, BILITOT, ALKPHOS in the last 72 hours. Invalid input(s): AMYLASE,  ALB  PT/INR: No results for input(s): PROTIME, INR in the last 72 hours. APTT: No results for input(s): APTT in the last 72 hours. Pathology:  1. N/A      Microbiology:  1. None    Recent ABG:   No results for input(s): PH, PO2, PCO2, HCO3, BE, O2SAT, METHB, O2HB, COHB, O2CON, HHB, THB in the last 72 hours. FiO2 : (S) 60 %       Recent Films:  XR CHEST PORTABLE   Final Result   1. There is no interval change in the significant multifocal bilateral   airspace disease.          CTA CHEST W CONTRAST   Final Result   Significantly limited examination due to poor contrast bolus timing. This   renders the examination nondiagnostic for detection of pulmonary emboli along   multiple segmental and subsegmental pulmonary artery distributions. No   central pulmonary embolism or right heart strain is appreciated. Significant interval progression in the previously described consolidative   and ground-glass opacities throughout both lungs, in keeping with the known   history of COVID pneumonia. Suspect hepatic steatosis. CTA PULMONARY W CONTRAST   Final Result   1. There is no evidence of a pulmonary embolus. 2. Bilateral multifocal ground-glass and semi solid pulmonary infiltrates   most consistent with COVID pneumonia. Assessment:  1. Worsening COVID-19 pneumonia. Review of the CT films suggest advancing disease without thromboembolic disease underlying    Plan:  1. Wean FiO2 as tolerated  2. Continue steroids and remdesivir through completion      Time at the bedside, reviewing labs and radiographs, reviewing updated notes and consultations, discussing with staff and family was more than 45 minutes. Please note that voice recognition technology was used in the preparation of this note and make therefore it may contain inadvertent transcription errors. If the patient is a COVID 19 isolation patient, the above physical exam reflects that of the examining physician for the day. Brooke Cutler MD,  M.WILEY., F.C.C.P.     [facility], [address] [place] [address], [address] [place] [address]

## 2021-08-04 NOTE — PROGRESS NOTES
Admit Date: 7/31/2021    Subjective: All event reviewed transferred to ICU due to respiratory failure hypoxia   On BIPAP O2 down to 60%     Objective:     Patient Vitals for the past 8 hrs:   BP Temp Temp src Pulse Resp SpO2   08/04/21 0600 134/75 -- -- 77 (!) 40 96 %   08/04/21 0500 116/63 -- -- 72 (!) 42 96 %   08/04/21 0422 -- -- -- -- (!) 41 99 %   08/04/21 0400 119/68 -- -- 104 (!) 43 93 %   08/04/21 0300 -- -- -- 73 (!) 36 97 %   08/04/21 0200 -- -- -- 75 (!) 35 90 %   08/04/21 0100 (!) 149/73 -- -- 89 (!) 33 92 %   08/04/21 0000 (!) 149/73 98.4 °F (36.9 °C) Axillary 80 (!) 45 98 %     I/O last 3 completed shifts: In: 160 [I.V.:160]  Out: 800 [Urine:800]  No intake/output data recorded.         Scheduled Meds:   enoxaparin  40 mg Subcutaneous BID    budesonide-formoterol  2 puff Inhalation BID    sodium chloride flush  10 mL Intravenous BID    remdesivir IVPB  100 mg Intravenous Q24H    vitamin C  500 mg Oral Daily    aspirin  81 mg Oral BID    vitamin D  50,000 Units Oral Weekly    escitalopram  10 mg Oral Daily    ferrous sulfate  325 mg Oral Daily with breakfast    fluticasone  1 spray Each Nostril Daily    montelukast  10 mg Oral Nightly    traZODone  50 mg Oral Nightly    dexamethasone  6 mg Intravenous Q12H    pantoprazole  40 mg Oral Daily     Continuous Infusions:    CBC with Differential:    Lab Results   Component Value Date    WBC 9.4 08/04/2021    RBC 4.72 08/04/2021    HGB 10.9 08/04/2021    HCT 35.8 08/04/2021     08/04/2021    MCV 75.8 08/04/2021    MCH 23.1 08/04/2021    MCHC 30.4 08/04/2021    RDW 17.2 08/04/2021    NRBC 2.0 08/03/2021    SEGSPCT 75 09/03/2011    LYMPHOPCT 5.0 08/03/2021    MONOPCT 8.0 08/03/2021    BASOPCT 0.0 08/03/2021    MONOSABS 0.70 08/03/2021    LYMPHSABS 0.70 08/03/2021    EOSABS 0.00 08/03/2021    BASOSABS 0.00 08/03/2021     CMP:    Lab Results   Component Value Date     08/01/2021    K 4.2 08/01/2021    K 3.4 07/31/2021

## 2021-08-05 DIAGNOSIS — Z12.31 ENCOUNTER FOR SCREENING MAMMOGRAM FOR HIGH-RISK PATIENT: Primary | ICD-10-CM

## 2021-08-05 LAB
ALBUMIN SERPL-MCNC: 3.3 G/DL (ref 3.5–5.2)
ALP BLD-CCNC: 89 U/L (ref 35–104)
ALT SERPL-CCNC: 10 U/L (ref 0–32)
ANION GAP SERPL CALCULATED.3IONS-SCNC: 9 MMOL/L (ref 7–16)
ANISOCYTOSIS: ABNORMAL
AST SERPL-CCNC: 15 U/L (ref 0–31)
ATYPICAL LYMPHOCYTE RELATIVE PERCENT: 1 % (ref 0–4)
BASOPHILS ABSOLUTE: 0 E9/L (ref 0–0.2)
BASOPHILS RELATIVE PERCENT: 0 % (ref 0–2)
BILIRUB SERPL-MCNC: 0.2 MG/DL (ref 0–1.2)
BLOOD CULTURE, ROUTINE: NORMAL
BUN BLDV-MCNC: 21 MG/DL (ref 6–20)
CALCIUM SERPL-MCNC: 8 MG/DL (ref 8.6–10.2)
CHLORIDE BLD-SCNC: 103 MMOL/L (ref 98–107)
CO2: 26 MMOL/L (ref 22–29)
CREAT SERPL-MCNC: 0.5 MG/DL (ref 0.5–1)
CULTURE, BLOOD 2: NORMAL
EOSINOPHILS ABSOLUTE: 0 E9/L (ref 0.05–0.5)
EOSINOPHILS RELATIVE PERCENT: 0 % (ref 0–6)
GFR AFRICAN AMERICAN: >60
GFR NON-AFRICAN AMERICAN: >60 ML/MIN/1.73
GLUCOSE BLD-MCNC: 118 MG/DL (ref 74–99)
HCT VFR BLD CALC: 37.3 % (ref 34–48)
HEMOGLOBIN: 11.4 G/DL (ref 11.5–15.5)
HYPOCHROMIA: ABNORMAL
LYMPHOCYTES ABSOLUTE: 1.3 E9/L (ref 1.5–4)
LYMPHOCYTES RELATIVE PERCENT: 12 % (ref 20–42)
MCH RBC QN AUTO: 22.9 PG (ref 26–35)
MCHC RBC AUTO-ENTMCNC: 30.6 % (ref 32–34.5)
MCV RBC AUTO: 75.1 FL (ref 80–99.9)
METAMYELOCYTES RELATIVE PERCENT: 2 % (ref 0–1)
MONOCYTES ABSOLUTE: 0.7 E9/L (ref 0.1–0.95)
MONOCYTES RELATIVE PERCENT: 7 % (ref 2–12)
MRSA CULTURE ONLY: NORMAL
MYELOCYTE PERCENT: 3 % (ref 0–0)
NEUTROPHILS ABSOLUTE: 8 E9/L (ref 1.8–7.3)
NEUTROPHILS RELATIVE PERCENT: 75 % (ref 43–80)
PDW BLD-RTO: 17 FL (ref 11.5–15)
PLATELET # BLD: 287 E9/L (ref 130–450)
PMV BLD AUTO: 8.5 FL (ref 7–12)
POLYCHROMASIA: ABNORMAL
POTASSIUM SERPL-SCNC: 4.7 MMOL/L (ref 3.5–5)
RBC # BLD: 4.97 E12/L (ref 3.5–5.5)
SODIUM BLD-SCNC: 138 MMOL/L (ref 132–146)
TOTAL PROTEIN: 6.6 G/DL (ref 6.4–8.3)
WBC # BLD: 10 E9/L (ref 4.5–11.5)

## 2021-08-05 PROCEDURE — 36415 COLL VENOUS BLD VENIPUNCTURE: CPT

## 2021-08-05 PROCEDURE — 80053 COMPREHEN METABOLIC PANEL: CPT

## 2021-08-05 PROCEDURE — 6360000002 HC RX W HCPCS: Performed by: INTERNAL MEDICINE

## 2021-08-05 PROCEDURE — 2700000000 HC OXYGEN THERAPY PER DAY

## 2021-08-05 PROCEDURE — 2500000003 HC RX 250 WO HCPCS: Performed by: INTERNAL MEDICINE

## 2021-08-05 PROCEDURE — 94640 AIRWAY INHALATION TREATMENT: CPT

## 2021-08-05 PROCEDURE — 2580000003 HC RX 258: Performed by: INTERNAL MEDICINE

## 2021-08-05 PROCEDURE — 2060000000 HC ICU INTERMEDIATE R&B

## 2021-08-05 PROCEDURE — 94660 CPAP INITIATION&MGMT: CPT

## 2021-08-05 PROCEDURE — 85025 COMPLETE CBC W/AUTO DIFF WBC: CPT

## 2021-08-05 PROCEDURE — 6370000000 HC RX 637 (ALT 250 FOR IP): Performed by: INTERNAL MEDICINE

## 2021-08-05 RX ADMIN — ALPRAZOLAM 0.5 MG: 0.25 TABLET ORAL at 10:19

## 2021-08-05 RX ADMIN — FLUTICASONE PROPIONATE 1 SPRAY: 50 SPRAY, METERED NASAL at 20:56

## 2021-08-05 RX ADMIN — REMDESIVIR 100 MG: 100 INJECTION, POWDER, LYOPHILIZED, FOR SOLUTION INTRAVENOUS at 15:50

## 2021-08-05 RX ADMIN — FERROUS SULFATE TAB 325 MG (65 MG ELEMENTAL FE) 325 MG: 325 (65 FE) TAB at 10:19

## 2021-08-05 RX ADMIN — ENOXAPARIN SODIUM 40 MG: 40 INJECTION SUBCUTANEOUS at 20:55

## 2021-08-05 RX ADMIN — ESCITALOPRAM 10 MG: 10 TABLET, FILM COATED ORAL at 10:20

## 2021-08-05 RX ADMIN — ASPIRIN 81 MG: 81 TABLET, COATED ORAL at 10:19

## 2021-08-05 RX ADMIN — OXYCODONE HYDROCHLORIDE AND ACETAMINOPHEN 500 MG: 500 TABLET ORAL at 10:20

## 2021-08-05 RX ADMIN — ASPIRIN 81 MG: 81 TABLET, COATED ORAL at 20:54

## 2021-08-05 RX ADMIN — ALPRAZOLAM 0.5 MG: 0.25 TABLET ORAL at 21:32

## 2021-08-05 RX ADMIN — SODIUM CHLORIDE, PRESERVATIVE FREE 10 ML: 5 INJECTION INTRAVENOUS at 15:51

## 2021-08-05 RX ADMIN — Medication 10 ML: at 20:56

## 2021-08-05 RX ADMIN — BUDESONIDE AND FORMOTEROL FUMARATE DIHYDRATE 2 PUFF: 160; 4.5 AEROSOL RESPIRATORY (INHALATION) at 09:41

## 2021-08-05 RX ADMIN — BUDESONIDE AND FORMOTEROL FUMARATE DIHYDRATE 2 PUFF: 160; 4.5 AEROSOL RESPIRATORY (INHALATION) at 20:55

## 2021-08-05 RX ADMIN — ENOXAPARIN SODIUM 40 MG: 40 INJECTION SUBCUTANEOUS at 10:22

## 2021-08-05 RX ADMIN — DEXAMETHASONE SODIUM PHOSPHATE 6 MG: 4 INJECTION, SOLUTION INTRAMUSCULAR; INTRAVENOUS at 20:54

## 2021-08-05 RX ADMIN — DEXAMETHASONE SODIUM PHOSPHATE 6 MG: 4 INJECTION, SOLUTION INTRAMUSCULAR; INTRAVENOUS at 10:20

## 2021-08-05 RX ADMIN — PANTOPRAZOLE SODIUM 40 MG: 40 TABLET, DELAYED RELEASE ORAL at 10:20

## 2021-08-05 RX ADMIN — MONTELUKAST SODIUM 10 MG: 10 TABLET, FILM COATED ORAL at 20:54

## 2021-08-05 RX ADMIN — Medication 10 ML: at 10:19

## 2021-08-05 ASSESSMENT — PAIN SCALES - GENERAL
PAINLEVEL_OUTOF10: 0

## 2021-08-05 NOTE — PROGRESS NOTES
0.5 08/05/2021    GFRAA >60 08/05/2021    LABGLOM >60 08/05/2021    PROT 6.6 08/05/2021    LABALBU 3.3 08/05/2021    CALCIUM 8.0 08/05/2021    BILITOT 0.2 08/05/2021    ALKPHOS 89 08/05/2021    AST 15 08/05/2021    ALT 10 08/05/2021     PT/INR:    Lab Results   Component Value Date    PROTIME 12.0 07/31/2021    INR 1.1 07/31/2021       Assessment:     Active Problems:    Pneumonia due to COVID-19 virus    Hypoxic respiratory failure       Plan:   Improvement,continue same

## 2021-08-05 NOTE — PROGRESS NOTES
Date: 8/5/2021    Time: 12:06 AM    Patient Placed On BIPAP/CPAP/ Non-Invasive Ventilation? No    If no must comment. Facial area red/color change? No           If YES are Blister/Lesion present? No   If yes must notify nursing staff  BIPAP/CPAP skin barrier? Yes    Skin barrier type:mepilexlite       Comments: Pt remains on BiPAP at this time.         Job Wong, ADAM     08/05/21 0006   NIV Type   Mode Bilevel   Mask Type Full face mask   Mask Size Medium   Settings/Measurements   IPAP 12 cmH20   CPAP/EPAP 6 cmH2O   Rate Ordered 12   Resp (!) 31   FiO2  60 %   Vt Exhaled 340 mL   Minute Volume 11.7 Liters   Mask Leak (lpm) 13 lpm   Comfort Level Good   Using Accessory Muscles No

## 2021-08-05 NOTE — PROGRESS NOTES
Pulmonary Progress Note    Admit Date: 2021  Hospital day                               PCP: Aleksandr Jordan MD    Chief Complaint (s):  Patient Active Problem List   Diagnosis    Abnormal Pap smear    Anemia    Threatened , antepartum    Vaginal bleeding before 22 weeks gestation    Right ovarian cyst     contractions    Threatened premature labor, third trimester    Maternal morbid obesity in third trimester, antepartum ([address]    28 weeks gestation of pregnancy    Suspected shortening of cervix not found    Previous  delivery in third trimester, antepartum    Encounter for fetal anatomic survey    Urinary tract infection during pregnancy, third trimester    Spotting complicating pregnancy, third trimester    Upper respiratory symptom    39 weeks gestation of pregnancy    Pneumonia due to COVID-19 virus       Subjective:  · Awake and alert, still on a good amount of oxygen. Vitals:  VITALS:  /82   Pulse 75   Temp 98.5 °F (36.9 °C) (Oral)   Resp 22   Ht 5' 2\" (1.575 m)   Wt 239 lb (108.4 kg)   SpO2 97%   BMI 43.71 kg/m²     24HR INTAKE/OUTPUT:    No intake or output data in the 24 hours ending 21 1902    24HR PULSE OXIMETRY RANGE:    SpO2  Av.7 %  Min: 92 %  Max: 99 %    Medications:  IV:      Scheduled Meds:   amLODIPine  5 mg Oral Daily    enoxaparin  40 mg Subcutaneous BID    budesonide-formoterol  2 puff Inhalation BID    sodium chloride flush  10 mL Intravenous BID    vitamin C  500 mg Oral Daily    aspirin  81 mg Oral BID    vitamin D  50,000 Units Oral Weekly    escitalopram  10 mg Oral Daily    ferrous sulfate  325 mg Oral Daily with breakfast    fluticasone  1 spray Each Nostril Daily    montelukast  10 mg Oral Nightly    traZODone  50 mg Oral Nightly    dexamethasone  6 mg Intravenous Q12H    pantoprazole  40 mg Oral Daily       Diet:   ADULT DIET; Regular  Adult Oral Nutrition Supplement;  Low Calorie/High Protein Oral Supplement  Adult Oral Nutrition Supplement; Frozen Oral Supplement     EXAM:  General: No distress. Alert. Eyes: PERRL. No sclera icterus. No conjunctival injection. ENT: No discharge. Pharynx clear. Neck: Trachea midline. Normal thyroid. Resp: No accessory muscle use. Diffuse rales. No wheezing. No rhonchi. CV: Regular rate. Regular rhythm. No murmur or rub. Abd: Non-tender. Non-distended. No masses. No organomegaly. Normal bowel sounds. Skin: Warm and dry. No nodule on exposed extremities. No rash on exposed extremities. Ext: No cyanosis, clubbing, edema  Lymph: No cervical LAD. No supraclavicular LAD. M/S: No cyanosis. No joint deformity. No clubbing. Neuro: Awake. Follows commands. Positive pupils/gag/corneals. Normal pain response. Results:  CBC:   Recent Labs     08/03/21  1637 08/04/21  0625 08/05/21  0605   WBC 8.8 9.4 10.0   HGB 11.1* 10.9* 11.4*   HCT 36.6 35.8 37.3   MCV 76.1* 75.8* 75.1*    272 287     BMP:   Recent Labs     08/04/21  0625 08/05/21  0605    138   K 4.3 4.7    103   CO2 27 26   BUN 17 21*   CREATININE 0.5 0.5     LIVER PROFILE:   Recent Labs     08/05/21  0605   AST 15   ALT 10   BILITOT 0.2   ALKPHOS 89     PT/INR: No results for input(s): PROTIME, INR in the last 72 hours. APTT: No results for input(s): APTT in the last 72 hours. Pathology:  1. N/A      Microbiology:  1. None    Recent ABG:   No results for input(s): PH, PO2, PCO2, HCO3, BE, O2SAT, METHB, O2HB, COHB, O2CON, HHB, THB in the last 72 hours. FiO2 : (S) 40 %       Recent Films:  XR CHEST PORTABLE   Final Result   1. There is no interval change in the significant multifocal bilateral   airspace disease. CTA CHEST W CONTRAST   Final Result   Significantly limited examination due to poor contrast bolus timing.   This   renders the examination nondiagnostic for detection of pulmonary emboli along   multiple segmental and subsegmental pulmonary artery distributions. No   central pulmonary embolism or right heart strain is appreciated. Significant interval progression in the previously described consolidative   and ground-glass opacities throughout both lungs, in keeping with the known   history of COVID pneumonia. Suspect hepatic steatosis. CTA PULMONARY W CONTRAST   Final Result   1. There is no evidence of a pulmonary embolus. 2. Bilateral multifocal ground-glass and semi solid pulmonary infiltrates   most consistent with COVID pneumonia. Assessment:  1. Worsening COVID-19 pneumonia. Review of the CT films suggest advancing disease without thromboembolic disease underlying    Plan:  1. Wean FiO2 as tolerated  2. Continue steroids and remdesivir through completion  3. Okay to LTAC. Time at the bedside, reviewing labs and radiographs, reviewing updated notes and consultations, discussing with staff and family was more than 45 minutes. Please note that voice recognition technology was used in the preparation of this note and make therefore it may contain inadvertent transcription errors. If the patient is a COVID 19 isolation patient, the above physical exam reflects that of the examining physician for the day. Brooke Cutler MD,  M.WILEY., F.C.C.P.     [facility], [address] [place] [address], [address] [place] [address]

## 2021-08-06 LAB
ALBUMIN SERPL-MCNC: 3.4 G/DL (ref 3.5–5.2)
ALP BLD-CCNC: 75 U/L (ref 35–104)
ALT SERPL-CCNC: 14 U/L (ref 0–32)
ANION GAP SERPL CALCULATED.3IONS-SCNC: 10 MMOL/L (ref 7–16)
ANISOCYTOSIS: ABNORMAL
AST SERPL-CCNC: 14 U/L (ref 0–31)
BASOPHILS ABSOLUTE: 0 E9/L (ref 0–0.2)
BASOPHILS RELATIVE PERCENT: 0 % (ref 0–2)
BILIRUB SERPL-MCNC: 0.2 MG/DL (ref 0–1.2)
BUN BLDV-MCNC: 21 MG/DL (ref 6–20)
CALCIUM SERPL-MCNC: 8.4 MG/DL (ref 8.6–10.2)
CHLORIDE BLD-SCNC: 102 MMOL/L (ref 98–107)
CO2: 25 MMOL/L (ref 22–29)
CREAT SERPL-MCNC: 0.5 MG/DL (ref 0.5–1)
EOSINOPHILS ABSOLUTE: 0 E9/L (ref 0.05–0.5)
EOSINOPHILS RELATIVE PERCENT: 0 % (ref 0–6)
GFR AFRICAN AMERICAN: >60
GFR NON-AFRICAN AMERICAN: >60 ML/MIN/1.73
GLUCOSE BLD-MCNC: 143 MG/DL (ref 74–99)
HCT VFR BLD CALC: 37 % (ref 34–48)
HEMOGLOBIN: 11.3 G/DL (ref 11.5–15.5)
LYMPHOCYTES ABSOLUTE: 0.72 E9/L (ref 1.5–4)
LYMPHOCYTES RELATIVE PERCENT: 7 % (ref 20–42)
MCH RBC QN AUTO: 22.8 PG (ref 26–35)
MCHC RBC AUTO-ENTMCNC: 30.5 % (ref 32–34.5)
MCV RBC AUTO: 74.7 FL (ref 80–99.9)
METAMYELOCYTES RELATIVE PERCENT: 3.5 % (ref 0–1)
MONOCYTES ABSOLUTE: 0.52 E9/L (ref 0.1–0.95)
MONOCYTES RELATIVE PERCENT: 5.3 % (ref 2–12)
MYELOCYTE PERCENT: 0.9 % (ref 0–0)
NEUTROPHILS ABSOLUTE: 9.06 E9/L (ref 1.8–7.3)
NEUTROPHILS RELATIVE PERCENT: 83.3 % (ref 43–80)
NUCLEATED RED BLOOD CELLS: 0 /100 WBC
PDW BLD-RTO: 17 FL (ref 11.5–15)
PLATELET # BLD: 329 E9/L (ref 130–450)
PMV BLD AUTO: 9 FL (ref 7–12)
POTASSIUM SERPL-SCNC: 4.6 MMOL/L (ref 3.5–5)
RBC # BLD: 4.95 E12/L (ref 3.5–5.5)
SODIUM BLD-SCNC: 137 MMOL/L (ref 132–146)
TOTAL PROTEIN: 6.3 G/DL (ref 6.4–8.3)
WBC # BLD: 10.3 E9/L (ref 4.5–11.5)

## 2021-08-06 PROCEDURE — 2060000000 HC ICU INTERMEDIATE R&B

## 2021-08-06 PROCEDURE — 36415 COLL VENOUS BLD VENIPUNCTURE: CPT

## 2021-08-06 PROCEDURE — 6360000002 HC RX W HCPCS: Performed by: INTERNAL MEDICINE

## 2021-08-06 PROCEDURE — 2580000003 HC RX 258: Performed by: INTERNAL MEDICINE

## 2021-08-06 PROCEDURE — 94761 N-INVAS EAR/PLS OXIMETRY MLT: CPT

## 2021-08-06 PROCEDURE — 94660 CPAP INITIATION&MGMT: CPT

## 2021-08-06 PROCEDURE — 85025 COMPLETE CBC W/AUTO DIFF WBC: CPT

## 2021-08-06 PROCEDURE — 2700000000 HC OXYGEN THERAPY PER DAY

## 2021-08-06 PROCEDURE — 80053 COMPREHEN METABOLIC PANEL: CPT

## 2021-08-06 PROCEDURE — 6370000000 HC RX 637 (ALT 250 FOR IP): Performed by: INTERNAL MEDICINE

## 2021-08-06 RX ADMIN — PANTOPRAZOLE SODIUM 40 MG: 40 TABLET, DELAYED RELEASE ORAL at 10:53

## 2021-08-06 RX ADMIN — TRAZODONE HYDROCHLORIDE 50 MG: 50 TABLET ORAL at 21:15

## 2021-08-06 RX ADMIN — DEXAMETHASONE SODIUM PHOSPHATE 6 MG: 4 INJECTION, SOLUTION INTRAMUSCULAR; INTRAVENOUS at 21:16

## 2021-08-06 RX ADMIN — ASPIRIN 81 MG: 81 TABLET, COATED ORAL at 21:15

## 2021-08-06 RX ADMIN — ESCITALOPRAM 10 MG: 10 TABLET, FILM COATED ORAL at 10:53

## 2021-08-06 RX ADMIN — BUDESONIDE AND FORMOTEROL FUMARATE DIHYDRATE 2 PUFF: 160; 4.5 AEROSOL RESPIRATORY (INHALATION) at 21:16

## 2021-08-06 RX ADMIN — ALPRAZOLAM 0.5 MG: 0.25 TABLET ORAL at 10:52

## 2021-08-06 RX ADMIN — FLUTICASONE PROPIONATE 1 SPRAY: 50 SPRAY, METERED NASAL at 21:17

## 2021-08-06 RX ADMIN — ENOXAPARIN SODIUM 40 MG: 40 INJECTION SUBCUTANEOUS at 10:55

## 2021-08-06 RX ADMIN — MONTELUKAST SODIUM 10 MG: 10 TABLET, FILM COATED ORAL at 21:15

## 2021-08-06 RX ADMIN — ASPIRIN 81 MG: 81 TABLET, COATED ORAL at 10:53

## 2021-08-06 RX ADMIN — Medication 10 ML: at 10:53

## 2021-08-06 RX ADMIN — FERROUS SULFATE TAB 325 MG (65 MG ELEMENTAL FE) 325 MG: 325 (65 FE) TAB at 10:52

## 2021-08-06 RX ADMIN — Medication 10 ML: at 21:29

## 2021-08-06 RX ADMIN — OXYCODONE HYDROCHLORIDE AND ACETAMINOPHEN 500 MG: 500 TABLET ORAL at 10:53

## 2021-08-06 RX ADMIN — BUDESONIDE AND FORMOTEROL FUMARATE DIHYDRATE 2 PUFF: 160; 4.5 AEROSOL RESPIRATORY (INHALATION) at 10:53

## 2021-08-06 RX ADMIN — ACETAMINOPHEN 1000 MG: 500 TABLET ORAL at 10:52

## 2021-08-06 RX ADMIN — DEXAMETHASONE SODIUM PHOSPHATE 6 MG: 4 INJECTION, SOLUTION INTRAMUSCULAR; INTRAVENOUS at 10:53

## 2021-08-06 RX ADMIN — ENOXAPARIN SODIUM 40 MG: 40 INJECTION SUBCUTANEOUS at 21:15

## 2021-08-06 ASSESSMENT — PAIN SCALES - GENERAL
PAINLEVEL_OUTOF10: 3
PAINLEVEL_OUTOF10: 0
PAINLEVEL_OUTOF10: 5
PAINLEVEL_OUTOF10: 0

## 2021-08-06 ASSESSMENT — PAIN DESCRIPTION - FREQUENCY: FREQUENCY: CONTINUOUS

## 2021-08-06 ASSESSMENT — PAIN DESCRIPTION - ONSET: ONSET: GRADUAL

## 2021-08-06 ASSESSMENT — PAIN DESCRIPTION - DESCRIPTORS: DESCRIPTORS: ACHING

## 2021-08-06 ASSESSMENT — PAIN DESCRIPTION - LOCATION: LOCATION: BACK

## 2021-08-06 ASSESSMENT — PAIN DESCRIPTION - PAIN TYPE: TYPE: ACUTE PAIN

## 2021-08-06 ASSESSMENT — PAIN DESCRIPTION - ORIENTATION: ORIENTATION: LOWER

## 2021-08-06 ASSESSMENT — PAIN DESCRIPTION - PROGRESSION: CLINICAL_PROGRESSION: OTHER (COMMENT)

## 2021-08-06 ASSESSMENT — PAIN - FUNCTIONAL ASSESSMENT: PAIN_FUNCTIONAL_ASSESSMENT: ACTIVITIES ARE NOT PREVENTED

## 2021-08-06 NOTE — PLAN OF CARE
Problem: Pain:  Goal: Pain level will decrease  Description: Pain level will decrease  Outcome: Met This Shift  Goal: Control of acute pain  Description: Control of acute pain  Outcome: Met This Shift  Goal: Control of chronic pain  Description: Control of chronic pain  Outcome: Met This Shift     Problem: Airway Clearance - Ineffective  Goal: Achieve or maintain patent airway  Outcome: Met This Shift     Problem: Gas Exchange - Impaired  Goal: Absence of hypoxia  Outcome: Met This Shift  Goal: Promote optimal lung function  Outcome: Met This Shift     Problem: Breathing Pattern - Ineffective  Goal: Ability to achieve and maintain a regular respiratory rate  Outcome: Met This Shift

## 2021-08-06 NOTE — PROGRESS NOTES
Admit Date: 7/31/2021    Subjective:     Sleeping comfortably still on high O2     Objective:     Patient Vitals for the past 8 hrs:   BP Temp Temp src Pulse Resp SpO2   08/06/21 0525 (!) 163/92 99.1 °F (37.3 °C) Axillary 68 26 98 %   08/06/21 0206 -- -- -- -- (!) 31 --   08/06/21 0100 129/76 98 °F (36.7 °C) Oral 79 18 97 %     No intake/output data recorded. I/O this shift:  In: -   Out: 1 [Stool:1]    HEENT: Normal  NECK: Thyroid normal. No carotid bruit. No lymphphadenopathy. CVS: RRR  RS: diffuse rales   ABD: Soft. Non tender. No mass. Normal BS. EXT: No edema. Non tender. Pulses present.          Scheduled Meds:   amLODIPine  5 mg Oral Daily    enoxaparin  40 mg Subcutaneous BID    budesonide-formoterol  2 puff Inhalation BID    sodium chloride flush  10 mL Intravenous BID    vitamin C  500 mg Oral Daily    aspirin  81 mg Oral BID    vitamin D  50,000 Units Oral Weekly    escitalopram  10 mg Oral Daily    ferrous sulfate  325 mg Oral Daily with breakfast    fluticasone  1 spray Each Nostril Daily    montelukast  10 mg Oral Nightly    traZODone  50 mg Oral Nightly    dexamethasone  6 mg Intravenous Q12H    pantoprazole  40 mg Oral Daily     Continuous Infusions:    CBC with Differential:    Lab Results   Component Value Date    WBC 10.3 08/06/2021    RBC 4.95 08/06/2021    HGB 11.3 08/06/2021    HCT 37.0 08/06/2021     08/06/2021    MCV 74.7 08/06/2021    MCH 22.8 08/06/2021    MCHC 30.5 08/06/2021    RDW 17.0 08/06/2021    NRBC 0.0 08/06/2021    SEGSPCT 75 09/03/2011    METASPCT 3.5 08/06/2021    LYMPHOPCT 7.0 08/06/2021    MONOPCT 5.3 08/06/2021    MYELOPCT 0.9 08/06/2021    BASOPCT 0.0 08/06/2021    MONOSABS 0.52 08/06/2021    LYMPHSABS 0.72 08/06/2021    EOSABS 0.00 08/06/2021    BASOSABS 0.00 08/06/2021     CMP:    Lab Results   Component Value Date     08/06/2021    K 4.6 08/06/2021    K 3.4 07/31/2021     08/06/2021    CO2 25 08/06/2021    BUN 21 08/06/2021    CREATININE 0.5 08/06/2021    GFRAA >60 08/06/2021    LABGLOM >60 08/06/2021    PROT 6.3 08/06/2021    LABALBU 3.4 08/06/2021    CALCIUM 8.4 08/06/2021    BILITOT 0.2 08/06/2021    ALKPHOS 75 08/06/2021    AST 14 08/06/2021    ALT 14 08/06/2021     PT/INR:    Lab Results   Component Value Date    PROTIME 12.0 07/31/2021    INR 1.1 07/31/2021       Assessment:     Active Problems:    Pneumonia due to COVID-19 virus    Hypoxic respiratory failure       Plan:   Continue same ? LTAC

## 2021-08-06 NOTE — CARE COORDINATION
Received notification from Fazal Larios at [facility] that [address] [place] [address] Street denied her for [employer]. Pt now on 8 L oxygen. Spoke with patient- discharge plan is HOME, where she resides with her 3 children and their father. She denies discharge needs. Confirmed that she will have a ride home upon discharge. Pt would like to use [facility] for home O2 needs. Referral made to [facility] at [facility]. Pulse ox testing and DME O2 order will be needed upon discharge.

## 2021-08-06 NOTE — PLAN OF CARE
Problem: Pain:  Goal: Pain level will decrease  Description: Pain level will decrease  Outcome: Met This Shift  Goal: Control of acute pain  Description: Control of acute pain  Outcome: Met This Shift  Goal: Control of chronic pain  Description: Control of chronic pain  Outcome: Met This Shift     Problem: Airway Clearance - Ineffective  Goal: Achieve or maintain patent airway  Outcome: Met This Shift     Problem: Gas Exchange - Impaired  Goal: Absence of hypoxia  Outcome: Met This Shift  Goal: Promote optimal lung function  Outcome: Met This Shift     Problem: Breathing Pattern - Ineffective  Goal: Ability to achieve and maintain a regular respiratory rate  Outcome: Met This Shift     Problem:  Body Temperature -  Risk of, Imbalanced  Goal: Ability to maintain a body temperature within defined limits  Outcome: Met This Shift  Goal: Will regain or maintain usual level of consciousness  Outcome: Met This Shift  Goal: Complications related to the disease process, condition or treatment will be avoided or minimized  Outcome: Met This Shift     Problem: Isolation Precautions - Risk of Spread of Infection  Goal: Prevent transmission of infection  Outcome: Met This Shift     Problem: Nutrition Deficits  Goal: Optimize nutritional status  Outcome: Met This Shift     Problem: Risk for Fluid Volume Deficit  Goal: Maintain normal heart rhythm  Outcome: Met This Shift  Goal: Maintain absence of muscle cramping  Outcome: Met This Shift  Goal: Maintain normal serum potassium, sodium, calcium, phosphorus, and pH  Outcome: Met This Shift     Problem: Loneliness or Risk for Loneliness  Goal: Demonstrate positive use of time alone when socialization is not possible  Outcome: Met This Shift     Problem: Fatigue  Goal: Verbalize increase energy and improved vitality  Outcome: Met This Shift     Problem: Patient Education: Go to Patient Education Activity  Goal: Patient/Family Education  Outcome: Met This Shift     Problem: Falls -

## 2021-08-06 NOTE — PROGRESS NOTES
Pulmonary Progress Note    Admit Date: 2021  Hospital day                               PCP: Ronald Salinas MD    Chief Complaint (s):  Patient Active Problem List   Diagnosis    Abnormal Pap smear    Anemia    Threatened , antepartum    Vaginal bleeding before 22 weeks gestation    Right ovarian cyst     contractions    Threatened premature labor, third trimester    Maternal morbid obesity in third trimester, antepartum ([address]    28 weeks gestation of pregnancy    Suspected shortening of cervix not found    Previous  delivery in third trimester, antepartum    Encounter for fetal anatomic survey    Urinary tract infection during pregnancy, third trimester    Spotting complicating pregnancy, third trimester    Upper respiratory symptom    39 weeks gestation of pregnancy    Pneumonia due to COVID-19 virus       Subjective:  · LTAC transfer denied. Oxygenation, on the other hand, is improved significantly.       Vitals:  VITALS:  /77   Pulse 91   Temp 98.1 °F (36.7 °C) (Oral)   Resp 20   Ht 5' 2\" (1.575 m)   Wt 239 lb (108.4 kg)   SpO2 95%   BMI 43.71 kg/m²     24HR INTAKE/OUTPUT:      Intake/Output Summary (Last 24 hours) at 2021 1625  Last data filed at 2021 2310  Gross per 24 hour   Intake --   Output 1 ml   Net -1 ml       24HR PULSE OXIMETRY RANGE:    SpO2  Av.1 %  Min: 91 %  Max: 98 %    Medications:  IV:      Scheduled Meds:   amLODIPine  5 mg Oral Daily    enoxaparin  40 mg Subcutaneous BID    budesonide-formoterol  2 puff Inhalation BID    sodium chloride flush  10 mL Intravenous BID    vitamin C  500 mg Oral Daily    aspirin  81 mg Oral BID    vitamin D  50,000 Units Oral Weekly    escitalopram  10 mg Oral Daily    ferrous sulfate  325 mg Oral Daily with breakfast    fluticasone  1 spray Each Nostril Daily    montelukast  10 mg Oral Nightly    traZODone  50 mg Oral Nightly    dexamethasone  6 mg Intravenous examination due to poor contrast bolus timing. This   renders the examination nondiagnostic for detection of pulmonary emboli along   multiple segmental and subsegmental pulmonary artery distributions. No   central pulmonary embolism or right heart strain is appreciated. Significant interval progression in the previously described consolidative   and ground-glass opacities throughout both lungs, in keeping with the known   history of COVID pneumonia. Suspect hepatic steatosis. CTA PULMONARY W CONTRAST   Final Result   1. There is no evidence of a pulmonary embolus. 2. Bilateral multifocal ground-glass and semi solid pulmonary infiltrates   most consistent with COVID pneumonia. Assessment:  1. Worsening COVID-19 pneumonia. Review of the CT films suggest advancing disease without thromboembolic disease underlying    Plan:  1. Wean FiO2 as tolerated  2. Continue steroids and remdesivir through completion  3. Okay to discharge once oxygen needs can be met as an outpatient      Time at the bedside, reviewing labs and radiographs, reviewing updated notes and consultations, discussing with staff and family was more than 45 minutes. Please note that voice recognition technology was used in the preparation of this note and make therefore it may contain inadvertent transcription errors. If the patient is a COVID 19 isolation patient, the above physical exam reflects that of the examining physician for the day. Brooke Cutler MD,  M.D., F.C.C.P.     [facility] and [facility], [address] [place] [address], [address], [facility]

## 2021-08-07 VITALS
WEIGHT: 239 LBS | TEMPERATURE: 98 F | OXYGEN SATURATION: 95 % | BODY MASS INDEX: 43.98 KG/M2 | HEART RATE: 95 BPM | HEIGHT: 62 IN | SYSTOLIC BLOOD PRESSURE: 108 MMHG | RESPIRATION RATE: 18 BRPM | DIASTOLIC BLOOD PRESSURE: 61 MMHG

## 2021-08-07 LAB
ALBUMIN SERPL-MCNC: 3.7 G/DL (ref 3.5–5.2)
ALP BLD-CCNC: 73 U/L (ref 35–104)
ALT SERPL-CCNC: 30 U/L (ref 0–32)
ANION GAP SERPL CALCULATED.3IONS-SCNC: 6 MMOL/L (ref 7–16)
ANISOCYTOSIS: ABNORMAL
AST SERPL-CCNC: 22 U/L (ref 0–31)
BASOPHILS ABSOLUTE: 0 E9/L (ref 0–0.2)
BASOPHILS RELATIVE PERCENT: 0 % (ref 0–2)
BILIRUB SERPL-MCNC: 0.2 MG/DL (ref 0–1.2)
BUN BLDV-MCNC: 23 MG/DL (ref 6–20)
CALCIUM SERPL-MCNC: 9 MG/DL (ref 8.6–10.2)
CHLORIDE BLD-SCNC: 105 MMOL/L (ref 98–107)
CO2: 27 MMOL/L (ref 22–29)
CREAT SERPL-MCNC: 0.5 MG/DL (ref 0.5–1)
EOSINOPHILS ABSOLUTE: 0 E9/L (ref 0.05–0.5)
EOSINOPHILS RELATIVE PERCENT: 0 % (ref 0–6)
GFR AFRICAN AMERICAN: >60
GFR NON-AFRICAN AMERICAN: >60 ML/MIN/1.73
GLUCOSE BLD-MCNC: 143 MG/DL (ref 74–99)
HCT VFR BLD CALC: 38 % (ref 34–48)
HEMOGLOBIN: 11.8 G/DL (ref 11.5–15.5)
LYMPHOCYTES ABSOLUTE: 1.15 E9/L (ref 1.5–4)
LYMPHOCYTES RELATIVE PERCENT: 7.8 % (ref 20–42)
MCH RBC QN AUTO: 23.2 PG (ref 26–35)
MCHC RBC AUTO-ENTMCNC: 31.1 % (ref 32–34.5)
MCV RBC AUTO: 74.7 FL (ref 80–99.9)
METAMYELOCYTES RELATIVE PERCENT: 3.5 % (ref 0–1)
MONOCYTES ABSOLUTE: 0.43 E9/L (ref 0.1–0.95)
MONOCYTES RELATIVE PERCENT: 2.6 % (ref 2–12)
MYELOCYTE PERCENT: 1.7 % (ref 0–0)
NEUTROPHILS ABSOLUTE: 12.96 E9/L (ref 1.8–7.3)
NEUTROPHILS RELATIVE PERCENT: 84.4 % (ref 43–80)
NUCLEATED RED BLOOD CELLS: 0 /100 WBC
OVALOCYTES: ABNORMAL
PDW BLD-RTO: 17.6 FL (ref 11.5–15)
PLATELET # BLD: 369 E9/L (ref 130–450)
PMV BLD AUTO: 9.3 FL (ref 7–12)
POIKILOCYTES: ABNORMAL
POLYCHROMASIA: ABNORMAL
POTASSIUM SERPL-SCNC: 4.8 MMOL/L (ref 3.5–5)
RBC # BLD: 5.09 E12/L (ref 3.5–5.5)
SCHISTOCYTES: ABNORMAL
SODIUM BLD-SCNC: 138 MMOL/L (ref 132–146)
TARGET CELLS: ABNORMAL
TOTAL PROTEIN: 6.5 G/DL (ref 6.4–8.3)
WBC # BLD: 14.4 E9/L (ref 4.5–11.5)

## 2021-08-07 PROCEDURE — 6360000002 HC RX W HCPCS: Performed by: INTERNAL MEDICINE

## 2021-08-07 PROCEDURE — 2700000000 HC OXYGEN THERAPY PER DAY

## 2021-08-07 PROCEDURE — 94660 CPAP INITIATION&MGMT: CPT

## 2021-08-07 PROCEDURE — 80053 COMPREHEN METABOLIC PANEL: CPT

## 2021-08-07 PROCEDURE — 36415 COLL VENOUS BLD VENIPUNCTURE: CPT

## 2021-08-07 PROCEDURE — 6370000000 HC RX 637 (ALT 250 FOR IP): Performed by: INTERNAL MEDICINE

## 2021-08-07 PROCEDURE — 2580000003 HC RX 258: Performed by: INTERNAL MEDICINE

## 2021-08-07 PROCEDURE — 85025 COMPLETE CBC W/AUTO DIFF WBC: CPT

## 2021-08-07 RX ORDER — DEXAMETHASONE 0.5 MG/1
6 TABLET ORAL 2 TIMES DAILY WITH MEALS
Qty: 72 TABLET | Refills: 0 | Status: SHIPPED | OUTPATIENT
Start: 2021-08-07 | End: 2021-08-10

## 2021-08-07 RX ORDER — AMLODIPINE BESYLATE 5 MG/1
5 TABLET ORAL DAILY
Qty: 30 TABLET | Refills: 0 | Status: SHIPPED | OUTPATIENT
Start: 2021-08-07 | End: 2021-08-07

## 2021-08-07 RX ORDER — AMLODIPINE BESYLATE 5 MG/1
5 TABLET ORAL DAILY
Qty: 30 TABLET | Refills: 0 | Status: SHIPPED | OUTPATIENT
Start: 2021-08-07 | End: 2021-08-18

## 2021-08-07 RX ORDER — DEXAMETHASONE 0.5 MG/1
6 TABLET ORAL 2 TIMES DAILY WITH MEALS
Qty: 72 TABLET | Refills: 0 | Status: SHIPPED | OUTPATIENT
Start: 2021-08-07 | End: 2021-08-07 | Stop reason: SDUPTHER

## 2021-08-07 RX ADMIN — BUDESONIDE AND FORMOTEROL FUMARATE DIHYDRATE 2 PUFF: 160; 4.5 AEROSOL RESPIRATORY (INHALATION) at 09:49

## 2021-08-07 RX ADMIN — PANTOPRAZOLE SODIUM 40 MG: 40 TABLET, DELAYED RELEASE ORAL at 09:45

## 2021-08-07 RX ADMIN — Medication 10 ML: at 09:49

## 2021-08-07 RX ADMIN — OXYCODONE HYDROCHLORIDE AND ACETAMINOPHEN 500 MG: 500 TABLET ORAL at 09:45

## 2021-08-07 RX ADMIN — DEXAMETHASONE SODIUM PHOSPHATE 6 MG: 4 INJECTION, SOLUTION INTRAMUSCULAR; INTRAVENOUS at 09:44

## 2021-08-07 RX ADMIN — ESCITALOPRAM 10 MG: 10 TABLET, FILM COATED ORAL at 09:45

## 2021-08-07 RX ADMIN — ENOXAPARIN SODIUM 40 MG: 40 INJECTION SUBCUTANEOUS at 09:44

## 2021-08-07 RX ADMIN — FLUTICASONE PROPIONATE 1 SPRAY: 50 SPRAY, METERED NASAL at 09:43

## 2021-08-07 RX ADMIN — FERROUS SULFATE TAB 325 MG (65 MG ELEMENTAL FE) 325 MG: 325 (65 FE) TAB at 09:45

## 2021-08-07 RX ADMIN — ASPIRIN 81 MG: 81 TABLET, COATED ORAL at 09:45

## 2021-08-07 ASSESSMENT — PAIN SCALES - GENERAL
PAINLEVEL_OUTOF10: 0
PAINLEVEL_OUTOF10: 0

## 2021-08-07 NOTE — PROGRESS NOTES
Pulsox was _86____% on room air at rest.   Ambulated patient on room air. Oxygen saturation was 87__% on room air while ambulating. Oxygen applied. At 4 liters  Recovery pulsox was 93 ____% on __4__L of oxygen while ambulating.

## 2021-08-07 NOTE — PROGRESS NOTES
Pulmonary Progress Note    Admit Date: 2021  Hospital day                               PCP: Germain Hung MD    Chief Complaint (s):  Patient Active Problem List   Diagnosis    Abnormal Pap smear    Anemia    Threatened , antepartum    Vaginal bleeding before 22 weeks gestation    Right ovarian cyst     contractions    Threatened premature labor, third trimester    Maternal morbid obesity in third trimester, antepartum ([facility]    28 weeks gestation of pregnancy    Suspected shortening of cervix not found    Previous  delivery in third trimester, antepartum    Encounter for fetal anatomic survey    Urinary tract infection during pregnancy, third trimester    Spotting complicating pregnancy, third trimester    Upper respiratory symptom    39 weeks gestation of pregnancy    Pneumonia due to COVID-19 virus       Subjective:  · Awake, alert and doing well.       Vitals:  VITALS:  /61   Pulse 95   Temp 98 °F (36.7 °C) (Oral)   Resp 18   Ht 5' 2\" (1.575 m)   Wt 239 lb (108.4 kg)   SpO2 95%   BMI 43.71 kg/m²     24HR INTAKE/OUTPUT:      Intake/Output Summary (Last 24 hours) at 2021 1421  Last data filed at 2021 0659  Gross per 24 hour   Intake --   Output 1000 ml   Net -1000 ml       24HR PULSE OXIMETRY RANGE:    SpO2  Av.3 %  Min: 90 %  Max: 95 %    Medications:  IV:      Scheduled Meds:   amLODIPine  5 mg Oral Daily    enoxaparin  40 mg Subcutaneous BID    budesonide-formoterol  2 puff Inhalation BID    sodium chloride flush  10 mL Intravenous BID    vitamin C  500 mg Oral Daily    aspirin  81 mg Oral BID    vitamin D  50,000 Units Oral Weekly    escitalopram  10 mg Oral Daily    ferrous sulfate  325 mg Oral Daily with breakfast    fluticasone  1 spray Each Nostril Daily    montelukast  10 mg Oral Nightly    traZODone  50 mg Oral Nightly    dexamethasone  6 mg Intravenous Q12H    pantoprazole  40 mg Oral Daily to poor contrast bolus timing. This   renders the examination nondiagnostic for detection of pulmonary emboli along   multiple segmental and subsegmental pulmonary artery distributions. No   central pulmonary embolism or right heart strain is appreciated. Significant interval progression in the previously described consolidative   and ground-glass opacities throughout both lungs, in keeping with the known   history of COVID pneumonia. Suspect hepatic steatosis. CTA PULMONARY W CONTRAST   Final Result   1. There is no evidence of a pulmonary embolus. 2. Bilateral multifocal ground-glass and semi solid pulmonary infiltrates   most consistent with COVID pneumonia. Assessment:  1. Worsening COVID-19 pneumonia. Review of the CT films suggest advancing disease without thromboembolic disease underlying    Plan:  1. Okay to discharge on oxygen      Time at the bedside, reviewing labs and radiographs, reviewing updated notes and consultations, discussing with staff and family was more than 45 minutes. Please note that voice recognition technology was used in the preparation of this note and make therefore it may contain inadvertent transcription errors. If the patient is a COVID 19 isolation patient, the above physical exam reflects that of the examining physician for the day. Gabi Moore MD,  M.D., F.C.C.P.     [facility], [address] [place] [address], [address] [place] [address]

## 2021-08-07 NOTE — CARE COORDINATION
Social work / Discharge planning:       [address] [address]. Social work contacted [facility] with update that patient is being discharged. Will need to wait for delivery of portable oxygen.   Electronically signed by CHRISTINE Maldonado on 8/7/2021 at 2:55 PM

## 2021-08-07 NOTE — PROGRESS NOTES
Admit Date: 7/31/2021    Subjective:     Feels much better down to 4 L NC O2     Objective:     Patient Vitals for the past 8 hrs:   BP Temp Temp src Pulse Resp SpO2   08/07/21 0500 (!) 143/89 99 °F (37.2 °C) Axillary 75 20 95 %     I/O last 3 completed shifts:  In: -   Out: 1000 [Urine:1000]  No intake/output data recorded. HEENT: Normal  NECK: Thyroid normal. No carotid bruit. No lymphphadenopathy. CVS: RRR  RS: Clear. No wheeze. No rhonchi. ABD: Soft. Non tender. No mass. Normal BS. EXT: No edema. Non tender. Pulses present.    NEURO: Intact      Scheduled Meds:   amLODIPine  5 mg Oral Daily    enoxaparin  40 mg Subcutaneous BID    budesonide-formoterol  2 puff Inhalation BID    sodium chloride flush  10 mL Intravenous BID    vitamin C  500 mg Oral Daily    aspirin  81 mg Oral BID    vitamin D  50,000 Units Oral Weekly    escitalopram  10 mg Oral Daily    ferrous sulfate  325 mg Oral Daily with breakfast    fluticasone  1 spray Each Nostril Daily    montelukast  10 mg Oral Nightly    traZODone  50 mg Oral Nightly    dexamethasone  6 mg Intravenous Q12H    pantoprazole  40 mg Oral Daily     Continuous Infusions:    CBC with Differential:    Lab Results   Component Value Date    WBC 14.4 08/07/2021    RBC 5.09 08/07/2021    HGB 11.8 08/07/2021    HCT 38.0 08/07/2021     08/07/2021    MCV 74.7 08/07/2021    MCH 23.2 08/07/2021    MCHC 31.1 08/07/2021    RDW 17.6 08/07/2021    NRBC 0.0 08/07/2021    SEGSPCT 75 09/03/2011    METASPCT 3.5 08/07/2021    LYMPHOPCT 7.8 08/07/2021    MONOPCT 2.6 08/07/2021    MYELOPCT 1.7 08/07/2021    BASOPCT 0.0 08/07/2021    MONOSABS 0.43 08/07/2021    LYMPHSABS 1.15 08/07/2021    EOSABS 0.00 08/07/2021    BASOSABS 0.00 08/07/2021     CMP:    Lab Results   Component Value Date     08/07/2021    K 4.8 08/07/2021    K 3.4 07/31/2021     08/07/2021    CO2 27 08/07/2021    BUN 23 08/07/2021    CREATININE 0.5 08/07/2021    GFRAA >60 08/07/2021 LABGLOM >60 08/07/2021    PROT 6.5 08/07/2021    LABALBU 3.7 08/07/2021    CALCIUM 9.0 08/07/2021    BILITOT 0.2 08/07/2021    ALKPHOS 73 08/07/2021    AST 22 08/07/2021    ALT 30 08/07/2021     PT/INR:    Lab Results   Component Value Date    PROTIME 12.0 07/31/2021    INR 1.1 07/31/2021       Assessment:     Active Problems:    Pneumonia due to COVID-19 virus    Hypoxia       Plan:   Improvement,home per pulmonary

## 2021-08-08 NOTE — DISCHARGE SUMMARY
Discharge Summary    Date: 8/8/2021  Patient Name: Heron Hinds YOB: 1985 Age: 38 y.o. Admit Date: 7/31/2021  Discharge Date: 8/7/2021  Discharge Condition: Stable    Admission Diagnosis  Pneumonia due to COVID-19 virus (U07.1, J12.82)     Discharge Diagnosis  Active Problems: Pneumonia due to COVID-19 virusResolved Problems: * No resolved hospital problems. [facility] Stay  Narrative of Hospital Course:  Admitted from ER with SOB was hypoxic and had pos. Covid pneumonia treated with steroid Remdesivir deteriorated needed increase O2 supplement BIPAP seen by pulmonary transferred to ICU received dose of Toci improved transferred to step down stabilized discharged home on O2 NC     Consultants:  IP CONSULT TO Osmin Ascencio TO DOSE [address] CONSULT TO PULMONOLOGY    Surgeries/procedures Performed:       Treatments:           Discharge Plan/Disposition:  Home    Hospital/Incidental Findings Requiring Follow Up:    Patient Instructions:    Diet: Regular Diet    Activity:Activity as Tolerated  For number of days (if applicable): Other Instructions:    Provider Follow-Up:   No follow-ups on file. Significant Diagnostic Studies:    Recent Labs:  Admission on 07/31/2021, Discharged on 08/07/2021No results displayed because visit has over 200 results. ------------    Radiology last 7 days:  XR CHEST PORTABLEResult Date: 8/4/20211. There is no interval change in the significant multifocal bilateral airspace disease. CTA CHEST W CONTRASTResult Date: 8/3/2021Significantly limited examination due to poor contrast bolus timing. This renders the examination nondiagnostic for detection of pulmonary emboli along multiple segmental and subsegmental pulmonary artery distributions. No central pulmonary embolism or right heart strain is appreciated.  Significant interval progression in the previously described consolidative and ground-glass opacities throughout both lungs, in keeping with the known history of COVID pneumonia. Suspect hepatic steatosis. [unfilled]    Discharge Medications    Discharge Medication List as of 8/7/2021  4:08 PM    Discharge Medication List as of 8/7/2021  4:08 PMCONTINUE these medications which have CHANGEDamLODIPine (NORVASC) 5 MG tabletTake 1 tablet by mouth daily, Disp-30 tablet, R-0Normaldexamethasone (DECADRON) 0.5 MG tabletTake 12 tablets by mouth 2 times daily (with meals) for 3 days, Disp-72 tablet, R-0Normal    Discharge Medication List as of 8/7/2021  4:08 PMCONTINUE these medications which have NOT Berdine Stager by mouth daily Patient takes OTCHistorical MedAscorbic Acid (VITAMIN C PO)Take by mouthHistorical Medacetaminophen (TYLENOL) 500 MG tabletTake 1,000 mg by mouth every 6 hours as needed for PainHistorical Medaspirin 81 MG EC tabletTake 81 mg by mouth 2 times dailyHistorical MedALPRAZolam (XANAX) 0.5 MG tabletTake 0.5 mg by mouth 2 times daily as needed for Anxiety. Historical MedtraZODone (DESYREL) 50 MG tabletTake 50 mg by mouth nightly Dose: 1/2 tablet; patient takes PRNHistorical Medibuprofen (ADVIL;MOTRIN) 800 MG tabletTake 1 tablet by mouth every 8 hours as needed for Pain, Disp-30 tablet, R-0Normalergocalciferol (ERGOCALCIFEROL) 1.25 MG (13370 UT) capsuleTakes on MondaysHistorical Medmontelukast (SINGULAIR) 10 MG tablettake 1 tablet by mouth once dailyHistorical Medescitalopram (LEXAPRO) 10 MG tablettake 1 tablet by mouth every morningHistorical Medpantoprazole (PROTONIX) 40 MG tablettake 1 tablet by mouth every morningHistorical Medrizatriptan (MAXALT-MLT) 10 MG disintegrating tabletdissolve 1 tablet ON TONGUE AT ONSET OF HEADACHE may repeat in 2 hours IF headache PERSISTSHistorical Medfluticasone (FLONASE) 50 MCG/ACT nasal sprayinstill 1 spray into each nostril dailyHistorical Medbudesonide-formoterol (SYMBICORT) 160-4.5 MCG/ACT AEROInhale 1 puff into the lungs 2 times dailyHistorical Medferrous sulfate (IRON 325) 325 (65 Fe) MG tabletTake 325 mg by mouth daily (with breakfast)Historical Med    Discharge Medication List as of 8/7/2021  4:08 PMSTOP taking these medicationsAzithromycin (Melodie Mohit PO)Comments:Reason for Stopping:oxyCODONE-acetaminophen (PERCOCET) 7.5-325 MG per tabletComments:Reason for Stopping:doxycycline hyclate (VIBRAMYCIN) 100 MG capsuleComments:Reason for Stopping:    Time Spent on Discharge:3E] minutes were spent in patient examination, evaluation, counseling as well as medication reconciliation, prescriptions for required medications, discharge plan, and follow up.     Electronically signed by Geraldine Parson MD on 8/8/21 at 10:28 AM EDT

## 2021-08-09 ENCOUNTER — CARE COORDINATION (OUTPATIENT)
Dept: OTHER | Facility: CLINIC | Age: 36
End: 2021-08-09

## 2021-08-09 NOTE — CARE COORDINATION
[facility] Transitions Initial Follow Up Call    Call within 2 business days of discharge: Yes    Patient: Radha Powell Patient : 1985   MRN: [id number]  Reason for Admission: COVID pneumonia  Discharge Date: 21 RARS: Readmission Risk Score: 11      Last Discharge [facility]       Complaint Diagnosis Description Type Department Provider    21 Positive For Covid-19; Shortness of Breath; Pharyngitis Pneumonia due to COVID-19 virus . .. ED to Hosp-Admission (Discharged) (ADMITTED) HOWARD Owens MD; Gerard Wood. ..            Spoke with: Kit France, patient    Facility: [address] [place] [address],[address]    Non-face-to-face services provided:  Scheduled appointment with PCP-2021 @ 1315  Scheduled appointment with Specialist-Dr. Cheryl Mccormick 2021 @ 1020 am  Obtained and reviewed discharge summary and/or continuity of care documents  Communication with specialists who will assume or re-assume care of the patient's system-specific problems-scheduled follow up with Dr. Luli Phillip (podiatry) re: incision open; called Dr. Tram Bennett (gyn) office re: stress incontinience  Education of patient/family/caregiver/guardian to support self-management-full medication review performed with patient  Assessment and support for treatment adherence and medication management-complete medication review performed with patient   ACM also discussed importance of monitoring BP at home    Care Transitions 24 Hour Call    Schedule Follow Up Appointment with PCP: Completed  Do you have any ongoing symptoms?: Yes  Patient-reported symptoms: Shortness of Breath, Cough  Do you have a copy of your discharge instructions?: Yes  Do you have all of your prescriptions and are they filled?: Yes  Have you scheduled your follow up appointment?: No  Do you feel like you have everything you need to keep you well at home?: Yes  Care Transitions Interventions       Transitions of Care Initial Call    Was this an external left message on nurse line with patient's concerns and to request call back to patient. Patient states unsure if Amlodipine is needed due to when BP checked in the hospital, seemed like wrong cuff size was used and reports BP not high normally. States she knows body has been under a lot of stress recently and picked up Amlodipine but plans to discuss with PCP at follow up. ACM offered to schedule follow up with PCP; patient accepted offer. PCP follow up scheduled for 8/13/21 @ 1315. ACM advised patient to monitor BP at home and log. Patient verbalized understanding and states she has BP monitor at home and will do so. Patient states plans to wash self up at sink upstairs in her home this afternoon. States will pace self and not over do it. Patient states will not shower currently due to open area at incision site. Denies home with [facility] [address] and denies current need for [facility] [address]. ACM reviewed discharge instructions, medical action plan and red flags with patient who verbalized understanding. Patient given an opportunity to ask questions and does not have any further questions or concerns at this time. Were discharge instructions available to patient? Yes. Reviewed appropriate site of care based on symptoms and resources available to patient including: PCP, Specialist, Benefits related nurse triage line and [employer]. The patient agrees to contact the PCP office for questions related to their healthcare. Medication reconciliation was performed with patient, who verbalizes understanding of administration of home medications. Advised obtaining a 90-day supply of all daily and as-needed medications. Covid Risk Education     Educated patient about risk for severe COVID-19 due to risk factors according to CDC guidelines. ACM reviewed discharge instructions, medical action plan and red flag symptoms with the patient who verbalized understanding.  Discussed COVID vaccination status: No. Education provided on COVID-19 vaccination as appropriate. Discussed exposure protocols and quarantine with CDC Guidelines. Patient was given an opportunity to verbalize any questions and concerns and agrees to contact ACM or health care provider for questions related to their healthcare. Reviewed and educated patient on any new and changed medications related to discharge diagnosis. Was patient discharged with a pulse oximeter? Patient has pulse ox at home. Discussed and confirmed pulse oximeter discharge instructions and when to notify provider or seek emergency care. ACM provided contact information. Plan for follow-up call in 5-7 days based on severity of symptoms and risk factors. Plan for next call: self management-monitoring of pulse ox and BP and medication management-finish Decadron    Goals        Patient Stated      My incision to left foot will heal. (pt-stated)       My incision to left foot will heal.    Barriers: none  Plan for overcoming my barriers: N/A  Confidence: 8/10  Anticipated Goal Completion Date: 9/1/2021 7/26/2021: Patient reports incision to left foot open. Patient following up with podiatry. 8/9/2021: Patient states incision still open. States has been cleansing site with betadine and covered with dry sterile dressing. AC called Dr. Roland Bauer office to schedule office visit for tomorrow @ [phone]. Other      Conditions and Symptoms       I will schedule office visits, as directed by my provider. I will keep my appointment or reschedule if I have to cancel. I will notify my provider of any barriers to my plan of care. I will notify my provider of any symptoms that indicate a worsening of my condition. Barriers: overwhelmed by complexity of regimen and stress  Plan for overcoming my barriers: I will work with my ACM to overcome barriers. Confidence: 8/10  Anticipated Goal Completion Date: 9/1/2021 8/9/2021: [facility] called and scheduled appts with PCP, Dr. Jai Singh, Dr. Jhoana Dumont. Also called Dr. Rivas [facility] office to notify of patient c/o stress incontinence. ACM discussed red flags and nurse access line with patient this date. Follow Up  Future Appointments   Date Time Provider Rose Ga   8/25/2021  9:30 AM [facility] RM 1 SE[facility]   8/25/2021 10:30 AM KRISTYN Brian - CNP [facility]   10/12/2021  9:30 AM DO XIN Meyer [facility]     Follow-up With  Details  Why  Contact Info   Cali De La Paz MD  On 8/13/2021  @ [phone], hospital follow up; PCP  [address] [place] [address]  Maritza Jarrett MD  On 8/17/2021  @ 1020 am with Rex Mathur CNP, hospital follow up; pulmonology  [address] [place] [address]   Key Raya DPM  On 8/10/2021  @ [phone]; podiatry  [address]  [address]     Geeta Mora RN BSN  Associate Care Manager  Phone: [phone]  Email: [email or web address]

## 2021-08-17 ENCOUNTER — CARE COORDINATION (OUTPATIENT)
Dept: OTHER | Facility: CLINIC | Age: 36
End: 2021-08-17

## 2021-08-17 NOTE — CARE COORDINATION
[facility] Transitions Follow Up Call    2021    Patient: Stephan Morales  Patient : 1985   MRN: [id number]  Reason for Admission: COVID pneumonia  Discharge Date: 21 RARS: Readmission Risk Score: 6         Spoke with: Eddy Juarez, patient    Care Transitions Subsequent and Final Call    Subsequent and Final Calls  Do you have any ongoing symptoms?: Yes  Onset of Patient-reported symptoms: Other  Patient-reported symptoms: Cough  Do you have any questions related to your medications?: No  Identified Barriers: Stress  Care Transitions Interventions  Other Interventions:         Associate Care Manager (ACM) called patient for CT inpatient follow up. Patient states she had surgery scheduled with Dr. Carmela Morales for non-healing post-op site to tendon. [facility] PCP did not want to give pre-op clearance due to patient with hypotension (86/52) in office. [facility] site is improving in appearance. Patient states she is currently at office visit with Dr. Carmela Morales. ACM offered to call patient back tomorrow for CT outreach. Patient accepted offer. ACM to call back tomorrow. Follow Up  Future Appointments   Date Time Provider Rose Ga   2021  9:30 AM [facility]   2021 10:30 AM [address], APRN - CNP [facility]   10/12/2021  9:30 AM DO XIN Luna [facility]     Follow-up With  Details  Why  Contact Info   Xiomara Jean Baptiste DPM  On 2021  podiatry office visit  [address]  [address] [place] [address] [place]     Gene Chirinos RN BSN  Associate Care Manager  Phone: [phone]  Email: [email or web address]

## 2021-08-18 ENCOUNTER — CARE COORDINATION (OUTPATIENT)
Dept: OTHER | Facility: CLINIC | Age: 36
End: 2021-08-18

## 2021-08-18 NOTE — CARE COORDINATION
[facility] Transitions Follow Up Call    2021    Patient: Tommy Cerda  Patient : 1985   MRN: [id number]  Reason for Admission: COVID pneumonia  Discharge Date: 21 RARS: Readmission Risk Score: 6         Spoke with: James Fraser, patient    Care Transitions Subsequent and Final Call    Subsequent and Final Calls  Do you have any ongoing symptoms?: Yes  Onset of Patient-reported symptoms: Other  Patient-reported symptoms: Shortness of Breath, Cough, Fatigue, Pain  Have your medications changed?: Yes  Patient Reports: PCP stopped Amlodipine   Do you have any questions related to your medications?: No  Do you currently have any active services?: No  Do you have any needs or concerns that I can assist you with?: No  Identified Barriers: Stress  Care Transitions Interventions  Other Interventions:         Care Transitions Follow Up Call    Needs to be reviewed by the provider   Additional needs identified to be addressed with provider: No  none             Method of communication with provider : none      [facility]) contacted the patient by telephone to follow up after admission on 2021. Verified name and  with patient as identifiers. ACM called patient for CT follow up. Patient reports after appointment with Dr. Raúl Butcher yesterday it was determined that she will have EpiFix treatment done instead of surgery. Patient states she was told she can have EpiFix done in Dr. Priscila Ghosh office. States office is working on obtaining prior authorization for the procedure. Patient states PCP did not clear patient for surgery due to patient still on supplemental oxygen and BP running low. States when she was in PCP office for follow up appt, BP was 80s/50s. States she had not been taking Amlodipine after recent hospital discharge. States PCP instructed her to discontinue Amlodipine.      Patient states PCP referred her to pulmonology for office visit and pre-op clearance initially. [facility] cancelled her office visit due to office policy of have to be 6 weeks s/p COVID positive result. [facility] [place] [facility] initial pulmonology office visit now scheduled for 9/1/2021. Patient  happy PCP referred to pulmonology as she would like to discuss getting RX for rescue inhaler. States she has been using maintenance inhaler but has noticed breathing heavy and shortness of breath at times.  she has been wearing supplemental oxygen around the clock.  had oxygen off yesterday due to had her child take oxygen tank to the car as she was getting ready to go to the doctor.  she did not have the oxygen off long at all but by the time she got to the car, pulse ox 77%. States she knows to now wear supplemental oxygen around the clock. Patient  spoke to her psychiatrist recently regarding increase in anxiety with being sick recently. States she requested to have Xanax increased which she states was okayed by provider. Patient  normally tries not to take Xanax but due to increased anxiety, she has been taking med. States she plans to take med prior to anxiety getting out of control. Patient states still getting short of breath with exertion/activity.  has been using incentive spirometer and getting to 1,000 mL. [facility] encouraged patient to continue regular incentive spirometer use. Patient  has noticed achilles site still painful to touch and reports taking Tylenol for pain with some decrease in pain. Patient  called to reschedule mammogram (scheduled 8/25/21) due to currently still recovering from pneumonia and COVID. [facility] office to call her to reschedule. Patient  has been working to stay positive through her current recovery.  her children and father of her children have been assisting since she has been home.      Patient  BP monitor recently broke and she is hoping to get replacement monitor this evening as she knows she needs to continue to monitor BP at home. [facility] has been working on increased fluid intake. Patient [facility] has follow up with PCP week after next week. States current with short term disability. Addressed changes since last contact: self management-monitoring of pulse ox and BP and medication management-finish Decadron  Discussed follow-up appointments. If no appointment was previously scheduled, appointment scheduling offered: Yes. Is follow up appointment scheduled within 7 days of discharge? Yes. Advance Care Planning:   Does patient have an Advance Directive: not reviewed this date. ACM reviewed discharge instructions, medical action plan and red flags with patient and discussed any barriers to care and/or understanding of plan of care after discharge. Discussed appropriate site of care based on symptoms and resources available to patient including: PCP, Specialist and Benefits related nurse triage line. The patient agrees to contact the PCP office for questions related to their healthcare. Patients top risk factors for readmission: functional physical ability, medical condition-recently hospitalized for COVID pneumonia, multiple health system providers, stages of grief and anxiety  Interventions to address risk factors: ACM reinforced importance of regular follow up appts      [facility] follow up appointment(s): Dr. Barbara Adair 8/26/2021    ACM provided contact information for future needs. Plan for follow-up call in 7-10 days based on severity of symptoms and risk factors. Plan for next call: replacement BP monitor, Dr. Barbara Adair 8/26/2021, pulse ox readings     Goals        Patient Shara Banks  My incision to left foot will heal. (pt-stated)       My incision to left foot will heal.    Barriers: none  Plan for overcoming my barriers: N/A  Confidence: 8/10  Anticipated Goal Completion Date: 9/1/2021 7/26/2021: Patient reports incision to left foot open. Patient following up with podiatry.      8/9/2021: Patient states incision still open. States has been cleansing site with betadine and covered with dry sterile dressing. AC called Dr. Neeru Lopez office to schedule office visit for tomorrow @ [phone].    8/18/2021: Patient reports achilles incision improving. States Dr. Avelina Flannery had planned for I&D surgery but decision was made to do in office procedure instead. Other      Conditions and Symptoms       I will schedule office visits, as directed by my provider. I will keep my appointment or reschedule if I have to cancel. I will notify my provider of any barriers to my plan of care. I will notify my provider of any symptoms that indicate a worsening of my condition. Barriers: overwhelmed by complexity of regimen and stress  Plan for overcoming my barriers: I will work with my ACM to overcome barriers. Confidence: 8/10  Anticipated Goal Completion Date: 9/1/2021 8/9/2021: ACM called and scheduled appts with PCP, Dr. Avelina Flannery, Dr. Geoffrey Mayo. Also called Dr. Roberto Wan office to notify of patient c/o stress incontinence. ACM discussed red flags and nurse access line with patient this date. 8/18/2021: Patient states had follow up with PCP recently. Also has had follow ups with Dr. Avelina Flannery weekly for a couple weeks. Next appt with Dr. Avelina Flannery is next week. Follow Up  Future Appointments   Date Time Provider Rose Ga   8/25/2021  9:30 AM [facility] [address] [facility] 1 SEY[facility]   8/25/2021 10:30 AM KRISTYN Lamb - CNP [facility]   10/12/2021  9:30 AM DO XIN Wiggins [facility]     Lisbet Cornejo RN BSN  Associate Care Manager  Phone: [phone]  Email: [email or web address]

## 2021-08-19 ENCOUNTER — TELEPHONE (OUTPATIENT)
Dept: BREAST CENTER | Age: 36
End: 2021-08-19

## 2021-08-19 NOTE — TELEPHONE ENCOUNTER
Patient returned call. She requested her upcoming appointment be moved to September due to other health issues. Appointment given for 9/22/2021 with mammogram prior.

## 2021-08-24 ENCOUNTER — CARE COORDINATION (OUTPATIENT)
Dept: OTHER | Facility: CLINIC | Age: 36
End: 2021-08-24

## 2021-08-24 NOTE — CARE COORDINATION
[facility] Transitions Follow Up Call    2021    Patient: Heron Hinds  Patient : 1985   MRN: [id number]  Reason for Admission: COVID pneumonia  Discharge Date: 21 RARS: Readmission Risk Score: 11         Spoke with: N/A    ACM attempted to reach patient for follow up call. No answer; unable to leave voicemail message due to mailbox full. Will continue to follow. Follow Up  Future Appointments   Date Time Provider Rose Ga   2021 10:30 AM [facility] 1 [facility]   2021 11:30 AM KRISTYN Garza - CNP [facility]   10/12/2021  9:30 AM DO XIN Dumont [facility] Luis Alberto Zarco RN BSN  Associate Care Manager  Phone: [phone]  Email: [email or web address]

## 2021-08-27 ENCOUNTER — TELEPHONE (OUTPATIENT)
Dept: OTHER | Facility: CLINIC | Age: 36
End: 2021-08-27

## 2021-08-27 ENCOUNTER — APPOINTMENT (OUTPATIENT)
Dept: GENERAL RADIOLOGY | Age: 36
DRG: 193 | End: 2021-08-27
Payer: COMMERCIAL

## 2021-08-27 ENCOUNTER — APPOINTMENT (OUTPATIENT)
Dept: CT IMAGING | Age: 36
DRG: 193 | End: 2021-08-27
Payer: COMMERCIAL

## 2021-08-27 ENCOUNTER — HOSPITAL ENCOUNTER (INPATIENT)
Age: 36
LOS: 8 days | Discharge: HOME | DRG: 193 | End: 2021-09-04
Attending: EMERGENCY MEDICINE | Admitting: INTERNAL MEDICINE
Payer: COMMERCIAL

## 2021-08-27 ENCOUNTER — APPOINTMENT (OUTPATIENT)
Dept: ULTRASOUND IMAGING | Age: 36
DRG: 193 | End: 2021-08-27
Payer: COMMERCIAL

## 2021-08-27 DIAGNOSIS — J12.82 PNEUMONIA DUE TO 2019 NOVEL CORONAVIRUS: Primary | ICD-10-CM

## 2021-08-27 DIAGNOSIS — U07.1 PNEUMONIA DUE TO 2019 NOVEL CORONAVIRUS: Primary | ICD-10-CM

## 2021-08-27 DIAGNOSIS — J96.01 ACUTE RESPIRATORY FAILURE WITH HYPOXIA (HCC): ICD-10-CM

## 2021-08-27 DIAGNOSIS — R09.02 HYPOXIA: ICD-10-CM

## 2021-08-27 PROBLEM — J18.9 PNEUMONIA: Status: ACTIVE | Noted: 2021-08-27

## 2021-08-27 LAB
ALBUMIN SERPL-MCNC: 3.6 G/DL (ref 3.5–5.2)
ALP BLD-CCNC: 72 U/L (ref 35–104)
ALT SERPL-CCNC: 9 U/L (ref 0–32)
ANION GAP SERPL CALCULATED.3IONS-SCNC: 12 MMOL/L (ref 7–16)
APTT: 25.9 SEC (ref 24.5–35.1)
AST SERPL-CCNC: 13 U/L (ref 0–31)
B.E.: -0.3 MMOL/L (ref -3–3)
BASOPHILS ABSOLUTE: 0.03 E9/L (ref 0–0.2)
BASOPHILS RELATIVE PERCENT: 0.2 % (ref 0–2)
BILIRUB SERPL-MCNC: 0.4 MG/DL (ref 0–1.2)
BUN BLDV-MCNC: 5 MG/DL (ref 6–20)
CALCIUM SERPL-MCNC: 8.4 MG/DL (ref 8.6–10.2)
CHLORIDE BLD-SCNC: 103 MMOL/L (ref 98–107)
CO2: 25 MMOL/L (ref 22–29)
COHB: 0.9 % (ref 0–1.5)
CREAT SERPL-MCNC: 0.5 MG/DL (ref 0.5–1)
CRITICAL: ABNORMAL
DATE ANALYZED: ABNORMAL
DATE OF COLLECTION: ABNORMAL
EKG ATRIAL RATE: 128 BPM
EKG P AXIS: 46 DEGREES
EKG P-R INTERVAL: 148 MS
EKG Q-T INTERVAL: 302 MS
EKG QRS DURATION: 78 MS
EKG QTC CALCULATION (BAZETT): 440 MS
EKG R AXIS: 74 DEGREES
EKG T AXIS: 51 DEGREES
EKG VENTRICULAR RATE: 128 BPM
EOSINOPHILS ABSOLUTE: 0.15 E9/L (ref 0.05–0.5)
EOSINOPHILS RELATIVE PERCENT: 1.2 % (ref 0–6)
GFR AFRICAN AMERICAN: >60
GFR NON-AFRICAN AMERICAN: >60 ML/MIN/1.73
GLUCOSE BLD-MCNC: 125 MG/DL (ref 74–99)
HCO3: 23.8 MMOL/L (ref 22–26)
HCT VFR BLD CALC: 37.5 % (ref 34–48)
HEMOGLOBIN: 11.4 G/DL (ref 11.5–15.5)
HHB: 6.9 % (ref 0–5)
IMMATURE GRANULOCYTES #: 0.07 E9/L
IMMATURE GRANULOCYTES %: 0.6 % (ref 0–5)
INR BLD: 1.2
LAB: ABNORMAL
LACTIC ACID: 1.2 MMOL/L (ref 0.5–2.2)
LYMPHOCYTES ABSOLUTE: 1.32 E9/L (ref 1.5–4)
LYMPHOCYTES RELATIVE PERCENT: 10.7 % (ref 20–42)
Lab: ABNORMAL
MCH RBC QN AUTO: 23.5 PG (ref 26–35)
MCHC RBC AUTO-ENTMCNC: 30.4 % (ref 32–34.5)
MCV RBC AUTO: 77.3 FL (ref 80–99.9)
METHB: 0.1 % (ref 0–1.5)
MODE: ABNORMAL
MONOCYTES ABSOLUTE: 1.49 E9/L (ref 0.1–0.95)
MONOCYTES RELATIVE PERCENT: 12.1 % (ref 2–12)
NEUTROPHILS ABSOLUTE: 9.29 E9/L (ref 1.8–7.3)
NEUTROPHILS RELATIVE PERCENT: 75.2 % (ref 43–80)
O2 CONTENT: 15.2 ML/DL
O2 SATURATION: 93 % (ref 92–98.5)
O2HB: 92.1 % (ref 94–97)
OPERATOR ID: 7294
PATIENT TEMP: 37 C
PCO2: 36.8 MMHG (ref 35–45)
PDW BLD-RTO: 20 FL (ref 11.5–15)
PH BLOOD GAS: 7.43 (ref 7.35–7.45)
PLATELET # BLD: 183 E9/L (ref 130–450)
PMV BLD AUTO: 10.1 FL (ref 7–12)
PO2: 68.6 MMHG (ref 75–100)
POTASSIUM SERPL-SCNC: 3.2 MMOL/L (ref 3.5–5)
PRO-BNP: 209 PG/ML (ref 0–125)
PROCALCITONIN: 0.06 NG/ML (ref 0–0.08)
PROTHROMBIN TIME: 12.7 SEC (ref 9.3–12.4)
RBC # BLD: 4.85 E12/L (ref 3.5–5.5)
SODIUM BLD-SCNC: 140 MMOL/L (ref 132–146)
SOURCE, BLOOD GAS: ABNORMAL
THB: 11.7 G/DL (ref 11.5–16.5)
TIME ANALYZED: 1054
TOTAL PROTEIN: 6.6 G/DL (ref 6.4–8.3)
TROPONIN, HIGH SENSITIVITY: 6 NG/L (ref 0–9)
WBC # BLD: 12.4 E9/L (ref 4.5–11.5)

## 2021-08-27 PROCEDURE — 96365 THER/PROPH/DIAG IV INF INIT: CPT

## 2021-08-27 PROCEDURE — 85025 COMPLETE CBC W/AUTO DIFF WBC: CPT

## 2021-08-27 PROCEDURE — 99285 EMERGENCY DEPT VISIT HI MDM: CPT

## 2021-08-27 PROCEDURE — 6360000004 HC RX CONTRAST MEDICATION: Performed by: RADIOLOGY

## 2021-08-27 PROCEDURE — 2060000000 HC ICU INTERMEDIATE R&B

## 2021-08-27 PROCEDURE — 83605 ASSAY OF LACTIC ACID: CPT

## 2021-08-27 PROCEDURE — 83880 ASSAY OF NATRIURETIC PEPTIDE: CPT

## 2021-08-27 PROCEDURE — 6370000000 HC RX 637 (ALT 250 FOR IP): Performed by: EMERGENCY MEDICINE

## 2021-08-27 PROCEDURE — 2580000003 HC RX 258: Performed by: EMERGENCY MEDICINE

## 2021-08-27 PROCEDURE — 85730 THROMBOPLASTIN TIME PARTIAL: CPT

## 2021-08-27 PROCEDURE — 82805 BLOOD GASES W/O2 SATURATION: CPT

## 2021-08-27 PROCEDURE — 87040 BLOOD CULTURE FOR BACTERIA: CPT

## 2021-08-27 PROCEDURE — 80053 COMPREHEN METABOLIC PANEL: CPT

## 2021-08-27 PROCEDURE — 6370000000 HC RX 637 (ALT 250 FOR IP): Performed by: INTERNAL MEDICINE

## 2021-08-27 PROCEDURE — 71045 X-RAY EXAM CHEST 1 VIEW: CPT

## 2021-08-27 PROCEDURE — 84484 ASSAY OF TROPONIN QUANT: CPT

## 2021-08-27 PROCEDURE — 93005 ELECTROCARDIOGRAM TRACING: CPT | Performed by: EMERGENCY MEDICINE

## 2021-08-27 PROCEDURE — 6360000002 HC RX W HCPCS: Performed by: EMERGENCY MEDICINE

## 2021-08-27 PROCEDURE — 71275 CT ANGIOGRAPHY CHEST: CPT

## 2021-08-27 PROCEDURE — 93971 EXTREMITY STUDY: CPT

## 2021-08-27 PROCEDURE — 85610 PROTHROMBIN TIME: CPT

## 2021-08-27 PROCEDURE — 84145 PROCALCITONIN (PCT): CPT

## 2021-08-27 RX ORDER — ACETAMINOPHEN 500 MG
1000 TABLET ORAL EVERY 6 HOURS PRN
Status: DISCONTINUED | OUTPATIENT
Start: 2021-08-27 | End: 2021-09-04 | Stop reason: HOSPADM

## 2021-08-27 RX ORDER — ESCITALOPRAM OXALATE 10 MG/1
10 TABLET ORAL DAILY
Status: DISCONTINUED | OUTPATIENT
Start: 2021-08-27 | End: 2021-09-04 | Stop reason: HOSPADM

## 2021-08-27 RX ORDER — DOXYCYCLINE HYCLATE 100 MG/1
100 CAPSULE ORAL ONCE
Status: COMPLETED | OUTPATIENT
Start: 2021-08-27 | End: 2021-08-27

## 2021-08-27 RX ORDER — SUMATRIPTAN 50 MG/1
100 TABLET, FILM COATED ORAL ONCE
Status: DISCONTINUED | OUTPATIENT
Start: 2021-08-27 | End: 2021-08-27

## 2021-08-27 RX ORDER — IBUPROFEN 800 MG/1
800 TABLET ORAL EVERY 8 HOURS PRN
Status: DISCONTINUED | OUTPATIENT
Start: 2021-08-27 | End: 2021-09-04 | Stop reason: HOSPADM

## 2021-08-27 RX ORDER — MONTELUKAST SODIUM 10 MG/1
10 TABLET ORAL NIGHTLY
Status: DISCONTINUED | OUTPATIENT
Start: 2021-08-27 | End: 2021-09-04 | Stop reason: HOSPADM

## 2021-08-27 RX ORDER — ERGOCALCIFEROL 1.25 MG/1
50000 CAPSULE ORAL WEEKLY
Status: DISCONTINUED | OUTPATIENT
Start: 2021-08-30 | End: 2021-09-04 | Stop reason: HOSPADM

## 2021-08-27 RX ORDER — FLUTICASONE PROPIONATE 50 MCG
1 SPRAY, SUSPENSION (ML) NASAL DAILY PRN
Status: DISCONTINUED | OUTPATIENT
Start: 2021-08-27 | End: 2021-09-04 | Stop reason: HOSPADM

## 2021-08-27 RX ORDER — PANTOPRAZOLE SODIUM 40 MG/1
40 TABLET, DELAYED RELEASE ORAL DAILY PRN
Status: DISCONTINUED | OUTPATIENT
Start: 2021-08-27 | End: 2021-09-04 | Stop reason: HOSPADM

## 2021-08-27 RX ORDER — ASCORBIC ACID 500 MG
500 TABLET ORAL DAILY
Status: DISCONTINUED | OUTPATIENT
Start: 2021-08-27 | End: 2021-09-04 | Stop reason: HOSPADM

## 2021-08-27 RX ORDER — DEXAMETHASONE 4 MG/1
10 TABLET ORAL ONCE
Status: COMPLETED | OUTPATIENT
Start: 2021-08-27 | End: 2021-08-27

## 2021-08-27 RX ORDER — ZINC SULFATE 50(220)MG
50 CAPSULE ORAL DAILY
Status: DISCONTINUED | OUTPATIENT
Start: 2021-08-27 | End: 2021-09-04 | Stop reason: HOSPADM

## 2021-08-27 RX ORDER — ALPRAZOLAM 0.25 MG/1
0.5 TABLET ORAL 3 TIMES DAILY PRN
Status: DISCONTINUED | OUTPATIENT
Start: 2021-08-27 | End: 2021-09-04 | Stop reason: HOSPADM

## 2021-08-27 RX ORDER — BUDESONIDE AND FORMOTEROL FUMARATE DIHYDRATE 160; 4.5 UG/1; UG/1
1 AEROSOL RESPIRATORY (INHALATION) 2 TIMES DAILY
Status: DISCONTINUED | OUTPATIENT
Start: 2021-08-27 | End: 2021-09-04 | Stop reason: HOSPADM

## 2021-08-27 RX ADMIN — OXYCODONE HYDROCHLORIDE AND ACETAMINOPHEN 500 MG: 500 TABLET ORAL at 17:47

## 2021-08-27 RX ADMIN — CEFEPIME HYDROCHLORIDE 2000 MG: 2 INJECTION, POWDER, FOR SOLUTION INTRAVENOUS at 10:06

## 2021-08-27 RX ADMIN — IOPAMIDOL 75 ML: 755 INJECTION, SOLUTION INTRAVENOUS at 08:28

## 2021-08-27 RX ADMIN — ZINC SULFATE 220 MG (50 MG) CAPSULE 50 MG: CAPSULE at 17:46

## 2021-08-27 RX ADMIN — ESCITALOPRAM 10 MG: 10 TABLET, FILM COATED ORAL at 17:46

## 2021-08-27 RX ADMIN — DOXYCYCLINE HYCLATE 100 MG: 100 CAPSULE ORAL at 09:48

## 2021-08-27 RX ADMIN — MONTELUKAST SODIUM 10 MG: 10 TABLET, FILM COATED ORAL at 21:28

## 2021-08-27 RX ADMIN — DEXAMETHASONE 10 MG: 4 TABLET ORAL at 09:48

## 2021-08-27 RX ADMIN — BUDESONIDE AND FORMOTEROL FUMARATE DIHYDRATE 1 PUFF: 160; 4.5 AEROSOL RESPIRATORY (INHALATION) at 21:28

## 2021-08-27 RX ADMIN — ALPRAZOLAM 0.5 MG: 0.25 TABLET ORAL at 21:28

## 2021-08-27 SDOH — ECONOMIC STABILITY: TRANSPORTATION INSECURITY
IN THE PAST 12 MONTHS, HAS THE LACK OF TRANSPORTATION KEPT YOU FROM MEDICAL APPOINTMENTS OR FROM GETTING MEDICATIONS?: NO

## 2021-08-27 SDOH — SOCIAL STABILITY: SOCIAL INSECURITY: WITHIN THE LAST YEAR, HAVE YOU BEEN AFRAID OF YOUR PARTNER OR EX-PARTNER?: NO

## 2021-08-27 SDOH — SOCIAL STABILITY: SOCIAL NETWORK: HOW OFTEN DO YOU ATTEND CHURCH OR RELIGIOUS SERVICES?: NEVER

## 2021-08-27 SDOH — ECONOMIC STABILITY: HOUSING INSECURITY: IN THE LAST 12 MONTHS, HOW MANY PLACES HAVE YOU LIVED?: 1

## 2021-08-27 SDOH — SOCIAL STABILITY: SOCIAL NETWORK: ARE YOU MARRIED, WIDOWED, DIVORCED, SEPARATED, NEVER MARRIED, OR LIVING WITH A PARTNER?: NEVER MARRIED

## 2021-08-27 SDOH — HEALTH STABILITY: PHYSICAL HEALTH: ON AVERAGE, HOW MANY DAYS PER WEEK DO YOU ENGAGE IN MODERATE TO STRENUOUS EXERCISE (LIKE A BRISK WALK)?: 0 DAYS

## 2021-08-27 SDOH — SOCIAL STABILITY: SOCIAL NETWORK: HOW OFTEN DO YOU GET TOGETHER WITH FRIENDS OR RELATIVES?: ONCE A WEEK

## 2021-08-27 SDOH — ECONOMIC STABILITY: FOOD INSECURITY: WITHIN THE PAST 12 MONTHS, THE FOOD YOU BOUGHT JUST DIDN'T LAST AND YOU DIDN'T HAVE MONEY TO GET MORE.: NEVER TRUE

## 2021-08-27 SDOH — ECONOMIC STABILITY: HOUSING INSECURITY
IN THE LAST 12 MONTHS, WAS THERE A TIME WHEN YOU DID NOT HAVE A STEADY PLACE TO SLEEP OR SLEPT IN A SHELTER (INCLUDING NOW)?: NO

## 2021-08-27 SDOH — SOCIAL STABILITY: SOCIAL INSECURITY
WITHIN THE LAST YEAR, HAVE TO BEEN RAPED OR FORCED TO HAVE ANY KIND OF SEXUAL ACTIVITY BY YOUR PARTNER OR EX-PARTNER?: NO

## 2021-08-27 SDOH — HEALTH STABILITY: PHYSICAL HEALTH: ON AVERAGE, HOW MANY MINUTES DO YOU ENGAGE IN EXERCISE AT THIS LEVEL?: 0 MIN

## 2021-08-27 SDOH — HEALTH STABILITY: MENTAL HEALTH
STRESS IS WHEN SOMEONE FEELS TENSE, NERVOUS, ANXIOUS, OR CAN'T SLEEP AT NIGHT BECAUSE THEIR MIND IS TROUBLED. HOW STRESSED ARE YOU?: RATHER MUCH

## 2021-08-27 SDOH — SOCIAL STABILITY: SOCIAL NETWORK: HOW OFTEN DO YOU ATTENT MEETINGS OF THE CLUB OR ORGANIZATION YOU BELONG TO?: NEVER

## 2021-08-27 SDOH — HEALTH STABILITY: MENTAL HEALTH: HOW MANY STANDARD DRINKS CONTAINING ALCOHOL DO YOU HAVE ON A TYPICAL DAY?: 1 OR 2

## 2021-08-27 SDOH — HEALTH STABILITY: MENTAL HEALTH: HOW OFTEN DO YOU HAVE A DRINK CONTAINING ALCOHOL?: MONTHLY OR LESS

## 2021-08-27 SDOH — ECONOMIC STABILITY: TRANSPORTATION INSECURITY
IN THE PAST 12 MONTHS, HAS LACK OF TRANSPORTATION KEPT YOU FROM MEETINGS, WORK, OR FROM GETTING THINGS NEEDED FOR DAILY LIVING?: NO

## 2021-08-27 SDOH — ECONOMIC STABILITY: FOOD INSECURITY: WITHIN THE PAST 12 MONTHS, YOU WORRIED THAT YOUR FOOD WOULD RUN OUT BEFORE YOU GOT MONEY TO BUY MORE.: NEVER TRUE

## 2021-08-27 SDOH — SOCIAL STABILITY: SOCIAL NETWORK
DO YOU BELONG TO ANY CLUBS OR ORGANIZATIONS SUCH AS CHURCH GROUPS UNIONS, FRATERNAL OR ATHLETIC GROUPS, OR SCHOOL GROUPS?: NO

## 2021-08-27 SDOH — ECONOMIC STABILITY: INCOME INSECURITY: IN THE LAST 12 MONTHS, WAS THERE A TIME WHEN YOU WERE NOT ABLE TO PAY THE MORTGAGE OR RENT ON TIME?: NO

## 2021-08-27 SDOH — SOCIAL STABILITY: SOCIAL NETWORK
IN A TYPICAL WEEK, HOW MANY TIMES DO YOU TALK ON THE PHONE WITH FAMILY, FRIENDS, OR NEIGHBORS?: MORE THAN THREE TIMES A WEEK

## 2021-08-27 SDOH — SOCIAL STABILITY: SOCIAL INSECURITY
WITHIN THE LAST YEAR, HAVE YOU BEEN KICKED, HIT, SLAPPED, OR OTHERWISE PHYSICALLY HURT BY YOUR PARTNER OR EX-PARTNER?: NO

## 2021-08-27 SDOH — SOCIAL STABILITY: SOCIAL INSECURITY: WITHIN THE LAST YEAR, HAVE YOU BEEN HUMILIATED OR EMOTIONALLY ABUSED IN OTHER WAYS BY YOUR PARTNER OR EX-PARTNER?: NO

## 2021-08-27 SDOH — ECONOMIC STABILITY: INCOME INSECURITY: HOW HARD IS IT FOR YOU TO PAY FOR THE VERY BASICS LIKE FOOD, HOUSING, MEDICAL CARE, AND HEATING?: SOMEWHAT HARD

## 2021-08-27 ASSESSMENT — PAIN SCALES - GENERAL
PAINLEVEL_OUTOF10: 0
PAINLEVEL_OUTOF10: 0

## 2021-08-27 NOTE — CARE COORDINATION
Social Work/Transition of Care:    Pt presents to the ED due to SOB. SW completed readmission assessment, ACP documentation. Pt reports she was Covid positive on 7/27, Prior to Colin Rao pt was at home and is currently [facility] by [employer] Pt reports she was discharged home with O2 provided by [facility]. Pt Goal is to return HOME with her 3 children and their father.     Electronically signed by Bhumi Aguilar on 8/69/4120 at 10:18 AM

## 2021-08-27 NOTE — ACP (ADVANCE CARE PLANNING)
Advance Care Planning     Advance Care Planning Activator (Inpatient)  Conversation Note      Date of ACP Conversation: 8/27/2021     Conversation Conducted with: Patient with Decision Making Capacity    ACP Activator: CHRISTINE haney    Health Care Decision Maker:     Current Designated Health Care Decision Maker:     Primary Decision Maker: Ken Jean - Parent - [phone]    Secondary Decision Maker: Andre Bal - Brittney - [phone]  Click here to complete Healthcare Decision Makers including section of the Healthcare Decision Maker Relationship (ie \"Primary\")  Today we documented Decision Maker(s) consistent with Legal Next of Kin hierarchy. Care Preferences    Ventilation: \"If you were in your present state of health and suddenly became very ill and were unable to breathe on your own, what would your preference be about the use of a ventilator (breathing machine) if it were available to you? \"      Would the patient desire the use of ventilator (breathing machine)?: yes    \"If your health worsens and it becomes clear that your chance of recovery is unlikely, what would your preference be about the use of a ventilator (breathing machine) if it were available to you? \"     Would the patient desire the use of ventilator (breathing machine)?: Yes      Resuscitation  \"CPR works best to restart the heart when there is a sudden event, like a heart attack, in someone who is otherwise healthy. Unfortunately, CPR does not typically restart the heart for people who have serious health conditions or who are very sick. \"    \"In the event your heart stopped as a result of an underlying serious health condition, would you want attempts to be made to restart your heart (answer \"yes\" for attempt to resuscitate) or would you prefer a natural death (answer \"no\" for do not attempt to resuscitate)? \" yes       [] Yes   [] No   Educated Patient / Decision Maker regarding differences between Advance Directives and portable DNR orders.     Length of ACP Conversation in minutes:  10    Conversation Outcomes:  [x] ACP discussion completed  [] Existing advance directive reviewed with patient; no changes to patient's previously recorded wishes  [] New Advance Directive completed  [] Portable Do Not Rescitate prepared for Provider review and signature  [] POLST/POST/MOLST/MOST prepared for Provider review and signature      Follow-up plan:    [] Schedule follow-up conversation to continue planning  [x] Referred individual to Provider for additional questions/concerns   [] Advised patient/agent/surrogate to review completed ACP document and update if needed with changes in condition, patient preferences or care setting    [] This note routed to one or more involved healthcare providers

## 2021-08-27 NOTE — ED NOTES
Pt placed on 15 L NC by RT. Pt tolerating well. o2 95% at this time. Blood sent top lab no needs at this time. Call light in reach.  Will continue to monitor     Abisai Gregorio RN  08/27/21 [id number]

## 2021-08-27 NOTE — TELEPHONE ENCOUNTER
Writer contacted ED provider Rebecca Fay   to inform of 30 day readmission risk. Writer's attempt to contact ED provider was unsuccessful. No Decision on disposition at this time.

## 2021-08-27 NOTE — ED NOTES
Radiology Procedure Waiver   Name: Samuel Hopkins  : 1985  MRN: [id number]    Date:  21    Time: 7:06 AM EDT    Benefits of immediately proceeding with Radiology exam(s) without pre-testing outweigh the risks or are not indicated as specified below and therefore the following is/are being waived:    [x] Pregnancy test   [x] Patients LMP on-time and regular.   [] Patient had Tubal Ligation or has other Contraception Device. [] Patient  is Menopausal or Premenarcheal.    [] Patient had Full or Partial Hysterectomy. [] Protocol for Iodine allergy    [] MRI Questionnaire     [] BUN/Creatinine   [] Patient age w/no hx of renal dysfunction. [] Patient on Dialysis. [] Recent Normal Labs.   Electronically signed by Winston Raymundo DO on 21 at 7:06 AM EDT             Winston Raymundo DO  21 [id number]

## 2021-08-27 NOTE — ED PROVIDER NOTES
HPI:  8/27/21, Time: 6:27 AM EDT         Onel Escalera is a 38 y.o. female presenting to the ED for shortness of breath with dyspnea on exertion and chest pain, beginning several days ago. The complaint has been persistent, severe in severity, and worsened by light exertion, deep breath, cough, and conversation. Patient was recently diagnosed with COVID-19 July 26. She was inpatient on rhythm severe oxygen and steroids. She was recently discharged home on 4 L nasal cannula. Patient states she been having worsening shortness of breath increased her oxygen to 6 L. She was found to be hypoxic by EMS with a sat 64%. She was switched to nonrebreather and brought into the emergency department. Patient states she had surgery on her left foot for chronic infection in June. She has been wearing a foot and ankle brace. She does report intermittent swelling of her left leg. No history of venous thrombus embolism. She denies hemoptysis. She has no fever. She is currently on no anticoagulation. She reports shortness of breath with conversation and minimal exertion. She has no nausea vomiting abdominal pain. She denies any urinary complaints. ROS:   Pertinent positives and negatives are stated within HPI, all other systems reviewed and are negative.  --------------------------------------------- PAST HISTORY ---------------------------------------------  Past Medical History:  has a past medical history of Anemia, Anxiety, Arthritis, Diabetes mellitus ([facility] [address] History of palpitations, Irregular menses, Left ankle injury, Migraines, and Uses crutches. Past Surgical History:  has a past surgical history that includes hernia repair; Salpingo-oophorectomy (Left, 03/27/2016); Dilation and curettage of uterus (N/A, 6/9/2021); and Achilles tendon surgery (Left, 6/18/2021). Social History:  reports that she has never smoked. She has never used smokeless tobacco. She reports current alcohol use.  She reports that she does not use drugs. Family History: family history includes Breast Cancer in her maternal uncle; Breast Cancer (age of onset: 48) in her maternal aunt and maternal aunt; Breast Cancer (age of onset: 80) in her maternal grandmother; Hypertension in her mother. The patients home medications have been reviewed. Allergies: Norco [hydrocodone-acetaminophen] and Pcn [penicillins]    ---------------------------------------------------PHYSICAL EXAM--------------------------------------    Constitutional/General: Alert and oriented x3, patient is ill appearing. BMI 43  Head: Normocephalic and atraumatic  Eyes: PERRL, EOMI  Mouth: Oropharynx clear, handling secretions, no trismus  Neck: Supple, full ROM, non tender to palpation in the midline, no stridor, no crepitus, no meningeal signs  Pulmonary: Lungs clear to auscultation bilaterally, no wheezes, rales, or rhonchi. Patient is tachypneic has 3-4 word conversational dyspnea. Cardiovascular:  Regular tachycardic rate, 130 bpm. Regular rhythm. No murmurs, gallops, or rubs. 2+ distal pulses  Chest: no chest wall tenderness  Abdomen: Soft. Non tender. Non distended. +BS. No rebound, guarding, or rigidity. No pulsatile masses appreciated. Musculoskeletal: Moves all extremities x 4. Warm and well perfused, no clubbing, cyanosis, or edema. Capillary refill <3 seconds, patient has a foot ankle boot on the left leg. Left calf is swollen tender there is no redness noted. Skin: warm and dry. No rashes. Neurologic: GCS 15, CN 2-12 grossly intact, no focal deficits, symmetric strength 5/5 in the upper and lower extremities bilaterally  Psych: Normal Affect    -------------------------------------------------- RESULTS -------------------------------------------------  I have personally reviewed all laboratory and imaging results for this patient. Results are listed below.      LABS:  Results for orders placed or performed during the hospital encounter of 08/27/21   Culture, Blood 1    Specimen: Blood   Result Value Ref Range    Blood Culture, Routine 24 Hours no growth    Culture, Blood 2    Specimen: Blood   Result Value Ref Range    Culture, Blood 2 24 Hours no growth    CBC Auto Differential   Result Value Ref Range    WBC 12.4 (H) 4.5 - 11.5 E9/L    RBC 4.85 3.50 - 5.50 E12/L    Hemoglobin 11.4 (L) 11.5 - 15.5 g/dL    Hematocrit 37.5 34.0 - 48.0 %    MCV 77.3 (L) 80.0 - 99.9 fL    MCH 23.5 (L) 26.0 - 35.0 pg    MCHC 30.4 (L) 32.0 - 34.5 %    RDW 20.0 (H) 11.5 - 15.0 fL    Platelets 764 451 - 002 E9/L    MPV 10.1 7.0 - 12.0 fL    Neutrophils % 75.2 43.0 - 80.0 %    Immature Granulocytes % 0.6 0.0 - 5.0 %    Lymphocytes % 10.7 (L) 20.0 - 42.0 %    Monocytes % 12.1 (H) 2.0 - 12.0 %    Eosinophils % 1.2 0.0 - 6.0 %    Basophils % 0.2 0.0 - 2.0 %    Neutrophils Absolute 9.29 (H) 1.80 - 7.30 E9/L    Immature Granulocytes # 0.07 E9/L    Lymphocytes Absolute 1.32 (L) 1.50 - 4.00 E9/L    Monocytes Absolute 1.49 (H) 0.10 - 0.95 E9/L    Eosinophils Absolute 0.15 0.05 - 0.50 E9/L    Basophils Absolute 0.03 0.00 - 0.20 E9/L   Troponin   Result Value Ref Range    Troponin, High Sensitivity 6 0 - 9 ng/L   APTT   Result Value Ref Range    aPTT 25.9 24.5 - 35.1 sec   Protime-INR   Result Value Ref Range    Protime 12.7 (H) 9.3 - 12.4 sec    INR 1.2    Comprehensive Metabolic Panel   Result Value Ref Range    Sodium 140 132 - 146 mmol/L    Potassium 3.2 (L) 3.5 - 5.0 mmol/L    Chloride 103 98 - 107 mmol/L    CO2 25 22 - 29 mmol/L    Anion Gap 12 7 - 16 mmol/L    Glucose 125 (H) 74 - 99 mg/dL    BUN 5 (L) 6 - 20 mg/dL    CREATININE 0.5 0.5 - 1.0 mg/dL    GFR Non-African American >60 >=60 mL/min/1.73    GFR African American >60     Calcium 8.4 (L) 8.6 - 10.2 mg/dL    Total Protein 6.6 6.4 - 8.3 g/dL    Albumin 3.6 3.5 - 5.2 g/dL    Total Bilirubin 0.4 0.0 - 1.2 mg/dL    Alkaline Phosphatase 72 35 - 104 U/L    ALT 9 0 - 32 U/L    AST 13 0 - 31 U/L   Lactic Acid, Plasma   Result Value Ref Range    Lactic Acid 1.2 0.5 - 2.2 mmol/L   Brain Natriuretic Peptide   Result Value Ref Range    Pro- (H) 0 - 125 pg/mL   Procalcitonin   Result Value Ref Range    Procalcitonin 0.06 0.00 - 0.08 ng/mL   Blood Gas, Arterial   Result Value Ref Range    Date Analyzed [id number]     Time Analyzed 1054     Source: Blood Arterial     pH, Blood Gas 7.428 7.350 - 7.450    PCO2 36.8 35.0 - 45.0 mmHg    PO2 68.6 (L) 75.0 - 100.0 mmHg    HCO3 23.8 22.0 - 26.0 mmol/L    B.E. -0.3 -3.0 - 3.0 mmol/L    O2 Sat 93.0 92.0 - 98.5 %    O2Hb 92.1 (L) 94.0 - 97.0 %    COHb 0.9 0.0 - 1.5 %    MetHb 0.1 0.0 - 1.5 %    O2 Content 15.2 mL/dL    HHb 6.9 (H) 0.0 - 5.0 %    tHb (est) 11.7 11.5 - 16.5 g/dL    Mode HFNC  15 L     Date Of Collection      Time Collected      Pt Temp 37.0 C     ID [id number]     Lab [id number]     Critical(s) Notified .  No Critical Values    CBC WITH AUTO DIFFERENTIAL   Result Value Ref Range    WBC 11.7 (H) 4.5 - 11.5 E9/L    RBC 4.44 3.50 - 5.50 E12/L    Hemoglobin 10.5 (L) 11.5 - 15.5 g/dL    Hematocrit 34.1 34.0 - 48.0 %    MCV 76.8 (L) 80.0 - 99.9 fL    MCH 23.6 (L) 26.0 - 35.0 pg    MCHC 30.8 (L) 32.0 - 34.5 %    RDW 19.8 (H) 11.5 - 15.0 fL    Platelets 733 785 - 683 E9/L    MPV 9.5 7.0 - 12.0 fL    Neutrophils % 76.9 43.0 - 80.0 %    Immature Granulocytes % 0.6 0.0 - 5.0 %    Lymphocytes % 11.9 (L) 20.0 - 42.0 %    Monocytes % 10.3 2.0 - 12.0 %    Eosinophils % 0.1 0.0 - 6.0 %    Basophils % 0.2 0.0 - 2.0 %    Neutrophils Absolute 8.98 (H) 1.80 - 7.30 E9/L    Immature Granulocytes # 0.07 E9/L    Lymphocytes Absolute 1.39 (L) 1.50 - 4.00 E9/L    Monocytes Absolute 1.20 (H) 0.10 - 0.95 E9/L    Eosinophils Absolute 0.01 (L) 0.05 - 0.50 E9/L    Basophils Absolute 0.02 0.00 - 0.20 W9/G   BASIC METABOLIC PANEL   Result Value Ref Range    Sodium 136 132 - 146 mmol/L    Potassium 3.9 3.5 - 5.0 mmol/L    Chloride 103 98 - 107 mmol/L    CO2 25 22 - 29 mmol/L    Anion Gap 8 7 - 16 mmol/L    Glucose 111 (H) 74 - 99 mg/dL    BUN 10 6 - 20 mg/dL    CREATININE 0.5 0.5 - 1.0 mg/dL    GFR Non-African American >60 >=60 mL/min/1.73    GFR African American >60     Calcium 8.1 (L) 8.6 - 10.2 mg/dL   EKG 12 Lead   Result Value Ref Range    Ventricular Rate 128 BPM    Atrial Rate 128 BPM    P-R Interval 148 ms    QRS Duration 78 ms    Q-T Interval 302 ms    QTc Calculation (Bazett) 440 ms    P Axis 46 degrees    R Axis 74 degrees    T Axis 51 degrees       RADIOLOGY:  Interpreted by Radiologist.  [address] DUP LOWER EXTREMITY LEFT TONJA   Final Result   No evidence of DVT in the left lower extremity. CTA PULMONARY W CONTRAST   Final Result   No evidence of pulmonary emboli. Significantly worsening bilateral pulmonary airspace infiltrates compared to   the prior study, left greater than right, with areas of pulmonary airspace   consolidation on the current exam.         XR CHEST PORTABLE   Final Result   Worsening multifocal bilateral pneumonia when compared with the prior study   of 08/04/2021. EKG Interpretation  Interpreted by emergency department physician    Rhythm: sinus tachycardia  Rate: tachycardia 128   Axis: normal  Conduction: normal  ST Segments: nonspecific changes  T Waves: non specific changes    Clinical Impression: non-specific EKG and sinus tachycardia  Comparison to prior EKG: None      ------------------------- NURSING NOTES AND VITALS REVIEWED ---------------------------   The nursing notes within the ED encounter and vital signs as below have been reviewed by myself. /80   Pulse 118   Temp 98.7 °F (37.1 °C) (Oral)   Resp 22   Ht 5' 6\" (1.676 m)   Wt 236 lb (107 kg)   LMP 08/27/2021 (Exact Date)   SpO2 93%   BMI 38.09 kg/m²   Oxygen Saturation Interpretation: Abnormal, reported 64% on 6 L nasal cannula. Currently 94% on 15 L nonrebreather. The patients available past medical records and past encounters were reviewed.         ------------------------------ ED COURSE/MEDICAL DECISION MAKING----------------------  Medications   acetaminophen (TYLENOL) tablet 1,000 mg (has no administration in time range)   ALPRAZolam (XANAX) tablet 0.5 mg (0.5 mg Oral Given 8/27/21 2128)   ascorbic acid (VITAMIN C) tablet 500 mg (500 mg Oral Given 8/28/21 1006)   budesonide-formoterol (SYMBICORT) 160-4.5 MCG/ACT inhaler 1 puff (1 puff Inhalation Given 8/28/21 1005)   vitamin D (ERGOCALCIFEROL) capsule 50,000 Units (has no administration in time range)   escitalopram (LEXAPRO) tablet 10 mg (10 mg Oral Given 8/28/21 1006)   fluticasone (FLONASE) 50 MCG/ACT nasal spray 1 spray (has no administration in time range)   ibuprofen (ADVIL;MOTRIN) tablet 800 mg (has no administration in time range)   montelukast (SINGULAIR) tablet 10 mg (10 mg Oral Given 8/27/21 2128)   pantoprazole (PROTONIX) tablet 40 mg (has no administration in time range)   zinc sulfate (ZINCATE) capsule 50 mg (50 mg Oral Given 8/28/21 1006)   dexamethasone (DECADRON) injection 6 mg (has no administration in time range)   albuterol sulfate  (90 Base) MCG/ACT inhaler 2 puff (has no administration in time range)   cefTRIAXone (ROCEPHIN) 1,000 mg in sterile water 10 mL IV syringe (has no administration in time range)   doxycycline (VIBRAMYCIN) 100 mg in dextrose 5 % 100 mL IVPB (has no administration in time range)   iopamidol (ISOVUE-370) 76 % injection 75 mL (75 mLs IntraVENous Given 8/27/21 0828)   dexamethasone (DECADRON) tablet 10 mg (10 mg Oral Given 8/27/21 0948)   cefepime (MAXIPIME) 2000 mg IVPB minibag (0 mg IntraVENous Stopped 8/27/21 1108)   doxycycline hyclate (VIBRAMYCIN) capsule 100 mg (100 mg Oral Given 8/27/21 0948)   enoxaparin (LOVENOX) injection 40 mg (40 mg Subcutaneous Given 8/28/21 1205)             Medical Decision Making:    Patient recently diagnosed with COVID-19 end of July. Recent surgery of her left foot. Patient presents with severe hypoxia requiring high flow oxygen.   Very concerned of DVT and pulmonary emboli. Labs EKG troponin BMP CTA of the chest.  Patient was placed on high flow nasal cannula at 15 L. CTA demonstrated no PE however there is worsening bilateral infiltrates concerning for worsening Covid pneumonia versus secondary bacterial infection. Procalcitonin was ordered as well as blood cultures. Patient will be started on dexamethasone cefepime and doxycycline. Re-Evaluations:             Re-evaluation. Patients symptoms show no change      Consultations:             Consultation was made with Dr. Sherman Pallas he accepted admission to the Covid floor. Critical Care:   CRITICAL CARE:   30 MINUTES. Please note that the withdrawal or failure to initiate urgent interventions for this patient would likely result in a life threatening deterioration or permanent disability. Accordingly this patient received the above mentioned time, excluding separately billable procedures. This patient's ED course included: a personal history and physicial examination, re-evaluation prior to disposition, multiple bedside re-evaluations, IV medications, cardiac monitoring, continuous pulse oximetry and a personal history and physicial eaxmination    This patient has remained hemodynamically stable, remained unchanged and been closely monitored during their ED course. Counseling: The emergency provider has spoken with the patient and discussed todays results, in addition to providing specific details for the plan of care and counseling regarding the diagnosis and prognosis. Questions are answered at this time and they are agreeable with the plan.       --------------------------------- IMPRESSION AND DISPOSITION ---------------------------------    IMPRESSION  1. Pneumonia due to 2019 novel coronavirus    2. Hypoxia    3.  Acute respiratory failure with hypoxia (HCC)        DISPOSITION  Disposition: Admit to telemetry COVID floor  Patient condition is serious        NOTE: This report was

## 2021-08-27 NOTE — ED NOTES
Bed: 10  Expected date:   Expected time:   Means of arrival:   Comments:  EMS     Vincent Olvera RN  08/27/21 [id number]

## 2021-08-27 NOTE — PROGRESS NOTES
Dr Cruz Semen on call for Dr Patrizia Kerr and notified of consult.  Pt stable Is not coughing any more     Please advise

## 2021-08-28 LAB
ANION GAP SERPL CALCULATED.3IONS-SCNC: 8 MMOL/L (ref 7–16)
BASOPHILS ABSOLUTE: 0.02 E9/L (ref 0–0.2)
BASOPHILS RELATIVE PERCENT: 0.2 % (ref 0–2)
BUN BLDV-MCNC: 10 MG/DL (ref 6–20)
CALCIUM SERPL-MCNC: 8.1 MG/DL (ref 8.6–10.2)
CHLORIDE BLD-SCNC: 103 MMOL/L (ref 98–107)
CO2: 25 MMOL/L (ref 22–29)
CREAT SERPL-MCNC: 0.5 MG/DL (ref 0.5–1)
EOSINOPHILS ABSOLUTE: 0.01 E9/L (ref 0.05–0.5)
EOSINOPHILS RELATIVE PERCENT: 0.1 % (ref 0–6)
GFR AFRICAN AMERICAN: >60
GFR NON-AFRICAN AMERICAN: >60 ML/MIN/1.73
GLUCOSE BLD-MCNC: 111 MG/DL (ref 74–99)
HCT VFR BLD CALC: 34.1 % (ref 34–48)
HEMOGLOBIN: 10.5 G/DL (ref 11.5–15.5)
IMMATURE GRANULOCYTES #: 0.07 E9/L
IMMATURE GRANULOCYTES %: 0.6 % (ref 0–5)
LYMPHOCYTES ABSOLUTE: 1.39 E9/L (ref 1.5–4)
LYMPHOCYTES RELATIVE PERCENT: 11.9 % (ref 20–42)
MCH RBC QN AUTO: 23.6 PG (ref 26–35)
MCHC RBC AUTO-ENTMCNC: 30.8 % (ref 32–34.5)
MCV RBC AUTO: 76.8 FL (ref 80–99.9)
MONOCYTES ABSOLUTE: 1.2 E9/L (ref 0.1–0.95)
MONOCYTES RELATIVE PERCENT: 10.3 % (ref 2–12)
NEUTROPHILS ABSOLUTE: 8.98 E9/L (ref 1.8–7.3)
NEUTROPHILS RELATIVE PERCENT: 76.9 % (ref 43–80)
PDW BLD-RTO: 19.8 FL (ref 11.5–15)
PLATELET # BLD: 168 E9/L (ref 130–450)
PMV BLD AUTO: 9.5 FL (ref 7–12)
POTASSIUM SERPL-SCNC: 3.9 MMOL/L (ref 3.5–5)
RBC # BLD: 4.44 E12/L (ref 3.5–5.5)
SARS-COV-2, NAAT: NOT DETECTED
SODIUM BLD-SCNC: 136 MMOL/L (ref 132–146)
WBC # BLD: 11.7 E9/L (ref 4.5–11.5)

## 2021-08-28 PROCEDURE — 87449 NOS EACH ORGANISM AG IA: CPT

## 2021-08-28 PROCEDURE — 2700000000 HC OXYGEN THERAPY PER DAY

## 2021-08-28 PROCEDURE — 6360000002 HC RX W HCPCS: Performed by: INTERNAL MEDICINE

## 2021-08-28 PROCEDURE — 80048 BASIC METABOLIC PNL TOTAL CA: CPT

## 2021-08-28 PROCEDURE — 36415 COLL VENOUS BLD VENIPUNCTURE: CPT

## 2021-08-28 PROCEDURE — 2500000003 HC RX 250 WO HCPCS: Performed by: INTERNAL MEDICINE

## 2021-08-28 PROCEDURE — 6370000000 HC RX 637 (ALT 250 FOR IP): Performed by: INTERNAL MEDICINE

## 2021-08-28 PROCEDURE — 2060000000 HC ICU INTERMEDIATE R&B

## 2021-08-28 PROCEDURE — 87635 SARS-COV-2 COVID-19 AMP PRB: CPT

## 2021-08-28 PROCEDURE — 85025 COMPLETE CBC W/AUTO DIFF WBC: CPT

## 2021-08-28 PROCEDURE — 2580000003 HC RX 258: Performed by: INTERNAL MEDICINE

## 2021-08-28 RX ORDER — ALBUTEROL SULFATE 90 UG/1
2 AEROSOL, METERED RESPIRATORY (INHALATION) 3 TIMES DAILY
Status: DISCONTINUED | OUTPATIENT
Start: 2021-08-28 | End: 2021-09-04 | Stop reason: HOSPADM

## 2021-08-28 RX ORDER — DEXAMETHASONE SODIUM PHOSPHATE 4 MG/ML
6 INJECTION, SOLUTION INTRA-ARTICULAR; INTRALESIONAL; INTRAMUSCULAR; INTRAVENOUS; SOFT TISSUE EVERY 8 HOURS
Status: DISCONTINUED | OUTPATIENT
Start: 2021-08-28 | End: 2021-08-30

## 2021-08-28 RX ADMIN — ALBUTEROL SULFATE 2 PUFF: 90 AEROSOL, METERED RESPIRATORY (INHALATION) at 14:14

## 2021-08-28 RX ADMIN — DOXYCYCLINE 100 MG: 100 INJECTION, POWDER, LYOPHILIZED, FOR SOLUTION INTRAVENOUS at 14:13

## 2021-08-28 RX ADMIN — BUDESONIDE AND FORMOTEROL FUMARATE DIHYDRATE 1 PUFF: 160; 4.5 AEROSOL RESPIRATORY (INHALATION) at 21:22

## 2021-08-28 RX ADMIN — ZINC SULFATE 220 MG (50 MG) CAPSULE 50 MG: CAPSULE at 10:06

## 2021-08-28 RX ADMIN — OXYCODONE HYDROCHLORIDE AND ACETAMINOPHEN 500 MG: 500 TABLET ORAL at 10:06

## 2021-08-28 RX ADMIN — BUDESONIDE AND FORMOTEROL FUMARATE DIHYDRATE 1 PUFF: 160; 4.5 AEROSOL RESPIRATORY (INHALATION) at 10:05

## 2021-08-28 RX ADMIN — ESCITALOPRAM 10 MG: 10 TABLET, FILM COATED ORAL at 10:06

## 2021-08-28 RX ADMIN — ALPRAZOLAM 0.5 MG: 0.25 TABLET ORAL at 21:22

## 2021-08-28 RX ADMIN — MONTELUKAST SODIUM 10 MG: 10 TABLET, FILM COATED ORAL at 21:22

## 2021-08-28 RX ADMIN — ALBUTEROL SULFATE 2 PUFF: 90 AEROSOL, METERED RESPIRATORY (INHALATION) at 21:22

## 2021-08-28 RX ADMIN — DEXAMETHASONE SODIUM PHOSPHATE 6 MG: 4 INJECTION, SOLUTION INTRA-ARTICULAR; INTRALESIONAL; INTRAMUSCULAR; INTRAVENOUS; SOFT TISSUE at 21:48

## 2021-08-28 RX ADMIN — DEXAMETHASONE SODIUM PHOSPHATE 6 MG: 4 INJECTION, SOLUTION INTRA-ARTICULAR; INTRALESIONAL; INTRAMUSCULAR; INTRAVENOUS; SOFT TISSUE at 14:13

## 2021-08-28 RX ADMIN — ENOXAPARIN SODIUM 40 MG: 40 INJECTION SUBCUTANEOUS at 12:05

## 2021-08-28 RX ADMIN — WATER 1000 MG: 1 INJECTION INTRAMUSCULAR; INTRAVENOUS; SUBCUTANEOUS at 14:13

## 2021-08-28 ASSESSMENT — PAIN SCALES - GENERAL
PAINLEVEL_OUTOF10: 0
PAINLEVEL_OUTOF10: 1
PAINLEVEL_OUTOF10: 0
PAINLEVEL_OUTOF10: 0

## 2021-08-28 NOTE — PROGRESS NOTES
Comprehensive Nutrition Assessment    Type and Reason for Visit:  Initial, Positive Nutrition Screen, Wound    Nutrition Recommendations/Plan: Continue current diet and added ONS for wound healing. Nutrition Assessment:  Pt came into ED with SOB. Pt adm with hypoxia. Pt  also has open wound on ankle. Pt adm 3 weeks ago for COVID pneumonia. Pt at risk d/t wound will start ONS for wound healing. Malnutrition Assessment:  Malnutrition Status:  No malnutrition    Context:  Acute Illness     Findings of the 6 clinical characteristics of malnutrition:  Energy Intake:  No significant decrease in energy intake  Weight Loss:  No significant weight loss     Body Fat Loss:  No significant body fat loss     Muscle Mass Loss:  No significant muscle mass loss    Fluid Accumulation:  No significant fluid accumulation     Strength:  Not Performed    Estimated Daily Nutrient Needs:  Energy (kcal):  16-1900kcal (15-18kcal/kg CBW); Weight Used for Energy Requirements:  Current     Protein (g):  120-135g (2.0-2.3g/kg IBW); Weight Used for Protein Requirements:  Ideal        Fluid (ml/day):  ; Method Used for Fluid Requirements:  1 ml/kcal      Nutrition Related Findings:  A&Ox4, Abd WDL, I/Os UTO per EMR, Edema +1 non pitting      Wounds:  Open Wounds, Wound Consult Pending       Current Nutrition Therapies:    ADULT DIET;  Regular    Anthropometric Measures:  · Height: 5' 6\" (167.6 cm)  · Current Body Weight: 236 lb (107 kg) (8/27)       · Usual Body Weight: 238 lb 3.2 oz (108 kg) (7/9/21 actual)     · Ideal Body Weight: 130 lbs; % Ideal Body Weight 181.5 %   · BMI: 38.1  · Adjusted Body Weight:  ; No Adjustment       · BMI Categories: Obese Class 2 (BMI 35.0 -39.9)       Nutrition Diagnosis:   · Increased nutrient needs related to increase demand for energy/nutrients as evidenced by wounds    Nutrition Interventions:   Food and/or Nutrient Delivery:  Continue Current Diet, Start Oral Nutrition Supplement (Michael Lal BID)  Nutrition Education/Counseling:  Education initiated (discussed importance of ONS for wound healing)   Coordination of Nutrition Care:  Continue to monitor while inpatient    Goals:  >75% of meals and ONS consumed       Nutrition Monitoring and Evaluation:   Behavioral-Environmental Outcomes:  None Identified   Food/Nutrient Intake Outcomes:  Food and Nutrient Intake, Supplement Intake  Physical Signs/Symptoms Outcomes:  Biochemical Data, Fluid Status or Edema, Nutrition Focused Physical Findings, Skin, Weight     Discharge Planning:     Too soon to determine     Electronically signed by Christine Aaron RD, LD on 8/28/21 at 1:27 PM EDT    Contact: [phone]

## 2021-08-28 NOTE — PROGRESS NOTES
Date    WBC 11.7 08/28/2021    RBC 4.44 08/28/2021    HGB 10.5 08/28/2021    HCT 34.1 08/28/2021    MCV 76.8 08/28/2021    MCH 23.6 08/28/2021    MCHC 30.8 08/28/2021    RDW 19.8 08/28/2021     08/28/2021    MPV 9.5 08/28/2021     BMP:    Lab Results   Component Value Date     08/28/2021    K 3.9 08/28/2021    K 3.4 07/31/2021     08/28/2021    CO2 25 08/28/2021    BUN 10 08/28/2021    CREATININE 0.5 08/28/2021    CALCIUM 8.1 08/28/2021    GFRAA >60 08/28/2021    LABGLOM >60 08/28/2021    GLUCOSE 111 08/28/2021    GLUCOSE 94 09/03/2011     PT/INR:  No results found for: PTINR  Last 3 Troponin:  No components found for: TROPININI      ASSESSMENT:      Active Problems:    Pneumonia due to COVID-19 virus    Pneumonia  Resolved Problems:    * No resolved hospital problems.  *  HYPOXIC   OBESITY  BY HX DM 2          PLAN:  CONT HOME MED  TITRATE OXY  DVT PROPHY  DR STEWART CONSULT---SINCE HE FOLLOWED PATIENT RECENTLY      Electronically signed by Shonda Palacio MD on 8/28/2021 at 10:27 AM

## 2021-08-28 NOTE — H&P
[address] [address]                              HISTORY AND PHYSICAL    PATIENT NAME: Misti Navarrete                 :        1985  MED REC NO:   [id number]                            ROOM:       [id number]  ACCOUNT NO:   [de-identified]                           ADMIT DATE: 2021  PROVIDER:     Harley Edwards MD    CHIEF COMPLAINT:  Shortness of breath. HISTORY OF PRESENT ILLNESS:  The patient is a 59-year-old lady who was  discharged from the hospital 3 weeks ago following bilateral COVID  pneumonia. The patient's GTIPW-22 was slightly getting better, but  again started having shortness of breath again and gradually got worse  and decided to come to the emergency room. Once in the emergency room,  the patient was hypoxic and thereon maintained 92% oxygen saturation on  15 liters of oxygen. Chest x-ray showed bilateral patchy lesions again  with an elevated absolute neutrophil count and the patient was admitted  for further management. PAST MEDICAL HISTORY:  Chronic anemia, diabetes mellitus type 2,  episodic palpitation, migraine headache, irregular menses, and  salpingo-oophorectomy on the left. SOCIAL HISTORY:  The patient denies any tobacco, has had occasional  alcohol drink and does not do drugs. MEDICATIONS:  See list.    FAMILY HISTORY:  Positive for breast cancer and hypertension. ALLERGIES:  [address] and [address]. REVIEW OF SYSTEMS:  Not relevant. PHYSICAL EXAMINATION:  GENERAL:  Height 5 feet 2 inches, weight 239 pounds, 59-year-old lady,  seems comfortable with 15 liters of oxygen on. VITAL SIGNS:  Blood pressure was 140/98, temperature 99.4, respiratory  rate 15, and pulse at 110 to 115. HEENT:  Normal.  NECK:  Supple. CARDIOVASCULAR SYSTEM:  Slight tachycardia 2/4, but regular. RESPIRATORY SYSTEM:  Decreased air flow bilaterally.   No obvious wheeze  appreciated. ABDOMEN:  Soft and nontender. Bowel sounds present. EXTREMITIES:  No edema. Good peripheral pulses. LABORATORY DATA:  Labs value show patchy infiltrate in both lung fields. A white cell count 12.4. IMPRESSION:  Recurrence of bilateral COVID pneumonia, obese, hypoxic by  history, and diabetes mellitus type 2. PLAN:  I will continue home meds, start DVT prophylaxis. We will get  Dr. Kerwin Bolton to consult since he saw the patient during the last visit. We  will ask Dr. Kerwin Bolton to initiate the remdesivir and prednisone at this  time.         Deb Hui MD    D: 08/28/2021 10:50:18       T: 08/28/2021 12:09:51     [email or web address]  Job#: [id number]     Doc#: [id number]    CC:

## 2021-08-29 LAB
L. PNEUMOPHILA SEROGP 1 UR AG: NORMAL
PROCALCITONIN: 0.03 NG/ML (ref 0–0.08)
STREP PNEUMONIAE ANTIGEN, URINE: NORMAL

## 2021-08-29 PROCEDURE — 6370000000 HC RX 637 (ALT 250 FOR IP): Performed by: INTERNAL MEDICINE

## 2021-08-29 PROCEDURE — 2500000003 HC RX 250 WO HCPCS: Performed by: INTERNAL MEDICINE

## 2021-08-29 PROCEDURE — 36415 COLL VENOUS BLD VENIPUNCTURE: CPT

## 2021-08-29 PROCEDURE — 2700000000 HC OXYGEN THERAPY PER DAY

## 2021-08-29 PROCEDURE — 6360000002 HC RX W HCPCS: Performed by: INTERNAL MEDICINE

## 2021-08-29 PROCEDURE — 2580000003 HC RX 258: Performed by: INTERNAL MEDICINE

## 2021-08-29 PROCEDURE — 2060000000 HC ICU INTERMEDIATE R&B

## 2021-08-29 PROCEDURE — 84145 PROCALCITONIN (PCT): CPT

## 2021-08-29 RX ADMIN — ACETAMINOPHEN 1000 MG: 500 TABLET ORAL at 05:27

## 2021-08-29 RX ADMIN — BUDESONIDE AND FORMOTEROL FUMARATE DIHYDRATE 1 PUFF: 160; 4.5 AEROSOL RESPIRATORY (INHALATION) at 08:33

## 2021-08-29 RX ADMIN — ALBUTEROL SULFATE 2 PUFF: 90 AEROSOL, METERED RESPIRATORY (INHALATION) at 08:33

## 2021-08-29 RX ADMIN — WATER 1000 MG: 1 INJECTION INTRAMUSCULAR; INTRAVENOUS; SUBCUTANEOUS at 13:20

## 2021-08-29 RX ADMIN — ACETAMINOPHEN 1000 MG: 500 TABLET ORAL at 20:43

## 2021-08-29 RX ADMIN — DOXYCYCLINE 100 MG: 100 INJECTION, POWDER, LYOPHILIZED, FOR SOLUTION INTRAVENOUS at 00:52

## 2021-08-29 RX ADMIN — ZINC SULFATE 220 MG (50 MG) CAPSULE 50 MG: CAPSULE at 08:32

## 2021-08-29 RX ADMIN — ALBUTEROL SULFATE 2 PUFF: 90 AEROSOL, METERED RESPIRATORY (INHALATION) at 13:39

## 2021-08-29 RX ADMIN — DOXYCYCLINE 100 MG: 100 INJECTION, POWDER, LYOPHILIZED, FOR SOLUTION INTRAVENOUS at 13:20

## 2021-08-29 RX ADMIN — FLUTICASONE PROPIONATE 1 SPRAY: 50 SPRAY, METERED NASAL at 08:33

## 2021-08-29 RX ADMIN — DEXAMETHASONE SODIUM PHOSPHATE 6 MG: 4 INJECTION, SOLUTION INTRA-ARTICULAR; INTRALESIONAL; INTRAMUSCULAR; INTRAVENOUS; SOFT TISSUE at 13:20

## 2021-08-29 RX ADMIN — ALBUTEROL SULFATE 2 PUFF: 90 AEROSOL, METERED RESPIRATORY (INHALATION) at 20:48

## 2021-08-29 RX ADMIN — DEXAMETHASONE SODIUM PHOSPHATE 6 MG: 4 INJECTION, SOLUTION INTRA-ARTICULAR; INTRALESIONAL; INTRAMUSCULAR; INTRAVENOUS; SOFT TISSUE at 20:45

## 2021-08-29 RX ADMIN — DEXAMETHASONE SODIUM PHOSPHATE 6 MG: 4 INJECTION, SOLUTION INTRA-ARTICULAR; INTRALESIONAL; INTRAMUSCULAR; INTRAVENOUS; SOFT TISSUE at 00:44

## 2021-08-29 RX ADMIN — OXYCODONE HYDROCHLORIDE AND ACETAMINOPHEN 500 MG: 500 TABLET ORAL at 08:32

## 2021-08-29 RX ADMIN — BUDESONIDE AND FORMOTEROL FUMARATE DIHYDRATE 1 PUFF: 160; 4.5 AEROSOL RESPIRATORY (INHALATION) at 20:48

## 2021-08-29 RX ADMIN — IBUPROFEN 800 MG: 800 TABLET, FILM COATED ORAL at 08:32

## 2021-08-29 RX ADMIN — ALPRAZOLAM 0.5 MG: 0.25 TABLET ORAL at 20:42

## 2021-08-29 RX ADMIN — MONTELUKAST SODIUM 10 MG: 10 TABLET, FILM COATED ORAL at 20:43

## 2021-08-29 RX ADMIN — DEXAMETHASONE SODIUM PHOSPHATE 6 MG: 4 INJECTION, SOLUTION INTRA-ARTICULAR; INTRALESIONAL; INTRAMUSCULAR; INTRAVENOUS; SOFT TISSUE at 04:40

## 2021-08-29 RX ADMIN — SALINE NASAL SPRAY 1 SPRAY: 1.5 SOLUTION NASAL at 17:20

## 2021-08-29 RX ADMIN — ALPRAZOLAM 0.5 MG: 0.25 TABLET ORAL at 08:43

## 2021-08-29 RX ADMIN — ESCITALOPRAM 10 MG: 10 TABLET, FILM COATED ORAL at 08:32

## 2021-08-29 ASSESSMENT — PAIN SCALES - GENERAL
PAINLEVEL_OUTOF10: 0
PAINLEVEL_OUTOF10: 5
PAINLEVEL_OUTOF10: 0
PAINLEVEL_OUTOF10: 1
PAINLEVEL_OUTOF10: 0
PAINLEVEL_OUTOF10: 4
PAINLEVEL_OUTOF10: 5

## 2021-08-29 ASSESSMENT — PAIN DESCRIPTION - LOCATION: LOCATION: THROAT

## 2021-08-29 ASSESSMENT — PAIN - FUNCTIONAL ASSESSMENT: PAIN_FUNCTIONAL_ASSESSMENT: ACTIVITIES ARE NOT PREVENTED

## 2021-08-29 ASSESSMENT — PAIN DESCRIPTION - FREQUENCY: FREQUENCY: CONTINUOUS

## 2021-08-29 ASSESSMENT — PAIN DESCRIPTION - PAIN TYPE: TYPE: ACUTE PAIN

## 2021-08-29 ASSESSMENT — PAIN DESCRIPTION - DESCRIPTORS: DESCRIPTORS: DISCOMFORT

## 2021-08-29 NOTE — PROGRESS NOTES
Progress Note      SUBJECTIVE: recent adm with covd pneumonia  Now adm with pneumonia/covid neg  BREATING BETTER  STILL ON 15 LTS O2  NO COMPLAINT/NO FEVER    OBJECTIVE:      Medications    Infusion Medications   Scheduled Medications    dexamethasone  6 mg IntraVENous Q8H    albuterol sulfate HFA  2 puff Inhalation TID    cefTRIAXone (ROCEPHIN) IV  1,000 mg IntraVENous Q24H    doxycycline (VIBRAMYCIN) IV  100 mg IntraVENous Q12H    vitamin C  500 mg Oral Daily    budesonide-formoterol  1 puff Inhalation BID    [START ON 2021] vitamin D  50,000 Units Oral Weekly    escitalopram  10 mg Oral Daily    montelukast  10 mg Oral Nightly    zinc sulfate  50 mg Oral Daily       Physical  Temperature:  Current - Temp: 98.3 °F (36.8 °C); Max - Temp  Av.3 °F (36.8 °C)  Min: 98.2 °F (36.8 °C)  Max: 98.4 °F (36.9 °C)    Respiratory Rate : Resp  Av  Min: 18  Max: 22    Pulse Range: Pulse  Av  Min: 102  Max: 112    Blood Presuure Range:  Systolic (66YJG), NBX:658 , Min:131 , LWX:846   ; Diastolic (56AJH), JAJ:57, Min:84, Max:92      Pulse ox Range: SpO2  Av.7 %  Min: 87 %  Max: 94 %    24hr I & O:  No intake or output data in the 24 hours ending 21 0948    Constitutional:  awake, alert, cooperative, no apparent distress, and appears stated age  Eyes:  pupils equal, round and reactive to light  ENT:  normocepalic, without obvious abnormality  NECK:  supple, symmetrical, trachea midline  HEMATOLOGIC/LYMPHATICS:  no cervical lymphadenopathy  LUNGS:  POOR AIR FLOW no wheeze, no rhonchi  CARDIOVASCULAR:  Normal apical impulse, regular rate and rhythm, normal S1 and S2, no S3 or S4, and no murmur noted  ABDOMEN:  normal bowel sounds, non-distended, non-tender, no masses palpated  MUSCULOSKELETAL:  There is no redness, warmth, or swelling of the joints. Full range of motion noted. Motor strength is 5 out of 5 all extremities bilaterally.   Tone is normal.  SKIN:  normal skin color, texture, turgor  OBESE  Data      CBC:   Lab Results   Component Value Date    WBC 11.7 08/28/2021    RBC 4.44 08/28/2021    HGB 10.5 08/28/2021    HCT 34.1 08/28/2021    MCV 76.8 08/28/2021    MCH 23.6 08/28/2021    MCHC 30.8 08/28/2021    RDW 19.8 08/28/2021     08/28/2021    MPV 9.5 08/28/2021     BMP:    Lab Results   Component Value Date     08/28/2021    K 3.9 08/28/2021    K 3.4 07/31/2021     08/28/2021    CO2 25 08/28/2021    BUN 10 08/28/2021    CREATININE 0.5 08/28/2021    CALCIUM 8.1 08/28/2021    GFRAA >60 08/28/2021    LABGLOM >60 08/28/2021    GLUCOSE 111 08/28/2021    GLUCOSE 94 09/03/2011     PT/INR:  No results found for: PTINR  Last 3 Troponin:  No components found for: TROPININI      ASSESSMENT:      Active Problems:    Pneumonia due to COVID-19 virus    Pneumonia  Resolved Problems:    * No resolved hospital problems.  *  BY HX RECENT COVID PNEU  OBESITY  DM 2          PLAN:  STABLE  ONGOING IV ANTIBIO  DVT PROPHY        Electronically signed by Mamadou Valle MD on 8/29/2021 at 9:48 AM

## 2021-08-29 NOTE — PROGRESS NOTES
[facility] [address]  [address] [facility], [address], [address] [facility]      Pulmonary Progress Note      SUBJECTIVE:  Claims feeling slightly better with breathing, still on 15 li NC. Does complain of stuffy nose, has flonase at bedside which she is using daily, minimal cough and not much sputum production.     OBJECTIVE    Medications    Continuous Infusions:    Scheduled Meds:   dexamethasone  6 mg IntraVENous Q8H    albuterol sulfate HFA  2 puff Inhalation TID    cefTRIAXone (ROCEPHIN) IV  1,000 mg IntraVENous Q24H    doxycycline (VIBRAMYCIN) IV  100 mg IntraVENous Q12H    vitamin C  500 mg Oral Daily    budesonide-formoterol  1 puff Inhalation BID    [START ON 2021] vitamin D  50,000 Units Oral Weekly    escitalopram  10 mg Oral Daily    montelukast  10 mg Oral Nightly    zinc sulfate  50 mg Oral Daily       PRN Meds:acetaminophen, ALPRAZolam, fluticasone, ibuprofen, pantoprazole    Physical    VITALS:  /84   Pulse 110   Temp 98.3 °F (36.8 °C) (Oral)   Resp 18   Ht 5' 6\" (1.676 m)   Wt 238 lb 8 oz (108.2 kg)   LMP 2021 (Exact Date)   SpO2 91%   BMI 38.49 kg/m²     24HR INTAKE/OUTPUT:    No intake or output data in the 24 hours ending 21 1236    24HR PULSE OXIMETRY RANGE:    SpO2  Av.7 %  Min: 87 %  Max: 94 %    General appearance: alert, appears stated age and cooperative, obese  Lungs: rhonchi bilaterally  Heart: regular rate and rhythm, S1, S2 normal, no murmur, click, rub or gallop  Abdomen: soft, non-tender; bowel sounds normal; no masses,  no organomegaly  Extremities: extremities normal, atraumatic, no cyanosis or edema  Neurologic: Mental status: Alert, oriented, thought content appropriate    Data    CBC:   Recent Labs     21  0641 21  0404   WBC 12.4* 11.7*   HGB 11.4* 10.5*   HCT 37.5 34.1   MCV 77.3* 76.8*    168       BMP:  Recent Labs     21  0641 21  0404    136 K 3.2* 3.9    103   CO2 25 25   BUN 5* 10   CREATININE 0.5 0.5    ALB:3,BILIDIR:3,BILITOT:3,ALKPHOS:3)@    PT/INR:   Recent Labs     08/27/21  0641   PROTIME 12.7*   INR 1.2       ABG:   Recent Labs     08/27/21  1054   PH 7.428   PO2 68.6*   PCO2 36.8   HCO3 23.8   BE -0.3   O2SAT 93.0   METHB 0.1   O2HB 92.1*   COHB 0.9   O2CON 15.2   HHB 6.9*   THB 11.7             Radiology/Other tests reviewed: none    Assessment:     Active Problems:    Pneumonia due to COVID-19 virus    Pneumonia  Resolved Problems:    * No resolved hospital problems. *      Plan:       1. Cont with steroids, taper as tolerated  2. CXR tomorrow and reassess lung parenchyma  3. Cont with nebs  4. Cont with oxygen, taper as tolerated  5. Nasal saline bid and see if helps with clearing nose up, cont with flonase  6. Cont with antibiotics for now  7. Cont with incentive spirometer use  8. OOB to chair as tolerated      Time at the bedside, reviewing labs and radiographs, reviewing notes and consultations, discussing with staff and family was more than 35 minutes. Thanks for letting us see this patient in consultation. Please contact us with any questions. Office [phone] or after hours through [facility], x [phone]. complains of pain/discomfort

## 2021-08-30 ENCOUNTER — APPOINTMENT (OUTPATIENT)
Dept: GENERAL RADIOLOGY | Age: 36
DRG: 193 | End: 2021-08-30
Payer: COMMERCIAL

## 2021-08-30 PROCEDURE — 6360000002 HC RX W HCPCS: Performed by: INTERNAL MEDICINE

## 2021-08-30 PROCEDURE — 6370000000 HC RX 637 (ALT 250 FOR IP): Performed by: SPECIALIST

## 2021-08-30 PROCEDURE — 2700000000 HC OXYGEN THERAPY PER DAY

## 2021-08-30 PROCEDURE — 6370000000 HC RX 637 (ALT 250 FOR IP): Performed by: INTERNAL MEDICINE

## 2021-08-30 PROCEDURE — 2500000003 HC RX 250 WO HCPCS: Performed by: INTERNAL MEDICINE

## 2021-08-30 PROCEDURE — 2060000000 HC ICU INTERMEDIATE R&B

## 2021-08-30 PROCEDURE — 71045 X-RAY EXAM CHEST 1 VIEW: CPT

## 2021-08-30 PROCEDURE — 2580000003 HC RX 258: Performed by: INTERNAL MEDICINE

## 2021-08-30 RX ORDER — METHYLPREDNISOLONE SODIUM SUCCINATE 125 MG/2ML
60 INJECTION, POWDER, LYOPHILIZED, FOR SOLUTION INTRAMUSCULAR; INTRAVENOUS EVERY 6 HOURS
Status: DISCONTINUED | OUTPATIENT
Start: 2021-08-30 | End: 2021-09-01

## 2021-08-30 RX ADMIN — BUDESONIDE AND FORMOTEROL FUMARATE DIHYDRATE 1 PUFF: 160; 4.5 AEROSOL RESPIRATORY (INHALATION) at 21:56

## 2021-08-30 RX ADMIN — DOXYCYCLINE 100 MG: 100 INJECTION, POWDER, LYOPHILIZED, FOR SOLUTION INTRAVENOUS at 18:07

## 2021-08-30 RX ADMIN — ALPRAZOLAM 0.5 MG: 0.25 TABLET ORAL at 09:28

## 2021-08-30 RX ADMIN — ERGOCALCIFEROL 50000 UNITS: 1.25 CAPSULE ORAL at 09:28

## 2021-08-30 RX ADMIN — LEVOFLOXACIN 750 MG: 500 TABLET, FILM COATED ORAL at 14:08

## 2021-08-30 RX ADMIN — IBUPROFEN 800 MG: 800 TABLET, FILM COATED ORAL at 03:54

## 2021-08-30 RX ADMIN — DEXAMETHASONE SODIUM PHOSPHATE 6 MG: 4 INJECTION, SOLUTION INTRA-ARTICULAR; INTRALESIONAL; INTRAMUSCULAR; INTRAVENOUS; SOFT TISSUE at 09:29

## 2021-08-30 RX ADMIN — ESCITALOPRAM 10 MG: 10 TABLET, FILM COATED ORAL at 09:28

## 2021-08-30 RX ADMIN — MONTELUKAST SODIUM 10 MG: 10 TABLET, FILM COATED ORAL at 21:57

## 2021-08-30 RX ADMIN — METHYLPREDNISOLONE SODIUM SUCCINATE 60 MG: 125 INJECTION, POWDER, LYOPHILIZED, FOR SOLUTION INTRAMUSCULAR; INTRAVENOUS at 23:45

## 2021-08-30 RX ADMIN — METHYLPREDNISOLONE SODIUM SUCCINATE 60 MG: 125 INJECTION, POWDER, LYOPHILIZED, FOR SOLUTION INTRAMUSCULAR; INTRAVENOUS at 18:11

## 2021-08-30 RX ADMIN — ALBUTEROL SULFATE 2 PUFF: 90 AEROSOL, METERED RESPIRATORY (INHALATION) at 21:56

## 2021-08-30 RX ADMIN — ENOXAPARIN SODIUM 60 MG: 60 INJECTION SUBCUTANEOUS at 14:09

## 2021-08-30 RX ADMIN — DOXYCYCLINE 100 MG: 100 INJECTION, POWDER, LYOPHILIZED, FOR SOLUTION INTRAVENOUS at 03:18

## 2021-08-30 RX ADMIN — OXYCODONE HYDROCHLORIDE AND ACETAMINOPHEN 500 MG: 500 TABLET ORAL at 09:28

## 2021-08-30 RX ADMIN — ZINC SULFATE 220 MG (50 MG) CAPSULE 50 MG: CAPSULE at 09:28

## 2021-08-30 RX ADMIN — ACETAMINOPHEN 1000 MG: 500 TABLET ORAL at 22:04

## 2021-08-30 RX ADMIN — ALPRAZOLAM 0.5 MG: 0.25 TABLET ORAL at 19:04

## 2021-08-30 ASSESSMENT — PAIN SCALES - GENERAL
PAINLEVEL_OUTOF10: 0
PAINLEVEL_OUTOF10: 0
PAINLEVEL_OUTOF10: 6
PAINLEVEL_OUTOF10: 0
PAINLEVEL_OUTOF10: 4

## 2021-08-30 NOTE — PROGRESS NOTES
Pulmonary Progress Note    Admit Date: 2021  Hospital day                               PCP: Tobin Berman MD    Chief Complaint (s):  Patient Active Problem List   Diagnosis    Abnormal Pap smear    Anemia    Threatened , antepartum    Vaginal bleeding before 22 weeks gestation    Right ovarian cyst     contractions    Threatened premature labor, third trimester    Maternal morbid obesity in third trimester, antepartum ([facility] [address]    28 weeks gestation of pregnancy    Suspected shortening of cervix not found    Previous  delivery in third trimester, antepartum    Encounter for fetal anatomic survey    Urinary tract infection during pregnancy, third trimester    Spotting complicating pregnancy, third trimester    Upper respiratory symptom    39 weeks gestation of pregnancy    Pneumonia due to COVID-19 virus    Pneumonia       Subjective:  · Awake and alert, chest radiograph shows modest clearing. Infectious disease consultation appreciated. Vitals:  VITALS:  /77   Pulse 85   Temp 97.8 °F (36.6 °C) (Oral)   Resp 20   Ht 5' 6\" (1.676 m)   Wt 238 lb 8 oz (108.2 kg)   LMP 2021 (Exact Date)   SpO2 98%   BMI 38.49 kg/m²     24HR INTAKE/OUTPUT:    No intake or output data in the 24 hours ending 21 1217    24HR PULSE OXIMETRY RANGE:    SpO2  Av.8 %  Min: 91 %  Max: 98 %    Medications:  IV:      Scheduled Meds:   sodium chloride  1 spray Each Nostril BID    dexamethasone  6 mg IntraVENous Q8H    albuterol sulfate HFA  2 puff Inhalation TID    cefTRIAXone (ROCEPHIN) IV  1,000 mg IntraVENous Q24H    doxycycline (VIBRAMYCIN) IV  100 mg IntraVENous Q12H    vitamin C  500 mg Oral Daily    budesonide-formoterol  1 puff Inhalation BID    vitamin D  50,000 Units Oral Weekly    escitalopram  10 mg Oral Daily    montelukast  10 mg Oral Nightly    zinc sulfate  50 mg Oral Daily       Diet:   ADULT DIET;  Regular  Adult Oral Nutrition Supplement; Wound Healing Oral Supplement     EXAM:  General: No distress. Alert. Eyes: PERRL. No sclera icterus. No conjunctival injection. ENT: No discharge. Pharynx clear. Neck: Trachea midline. Normal thyroid. Resp: No accessory muscle use. diffuse rales. No wheezing. no rhonchi. CV: Regular rate. Regular rhythm. No murmur or rub. Abd: Non-tender. Non-distended. No masses. No organomegaly. Normal bowel sounds. Skin: Warm and dry. No nodule on exposed extremities. No rash on exposed extremities. Ext: No cyanosis, clubbing, edema  Lymph: No cervical LAD. No supraclavicular LAD. M/S: No cyanosis. No joint deformity. No clubbing. Neuro: Awake. Follows commands. Positive pupils/gag/corneals. Normal pain response. Results:  CBC:   Recent Labs     08/28/21  0404   WBC 11.7*   HGB 10.5*   HCT 34.1   MCV 76.8*        BMP:   Recent Labs     08/28/21  0404      K 3.9      CO2 25   BUN 10   CREATININE 0.5     LIVER PROFILE: No results for input(s): AST, ALT, LIPASE, BILIDIR, BILITOT, ALKPHOS in the last 72 hours. Invalid input(s): AMYLASE,  ALB  PT/INR: No results for input(s): PROTIME, INR in the last 72 hours. APTT: No results for input(s): APTT in the last 72 hours. Pathology:  1. N/A      Microbiology:  1. None    Recent ABG:   No results for input(s): PH, PO2, PCO2, HCO3, BE, O2SAT, METHB, O2HB, COHB, O2CON, HHB, THB in the last 72 hours. Recent Films:  XR CHEST PORTABLE   Final Result   1. Minimal partial interval clearing of the right pulmonary infiltrates   2. The left lung is unchanged. Lew Bobo US DUP LOWER EXTREMITY LEFT TONJA   Final Result   No evidence of DVT in the left lower extremity. CTA PULMONARY W CONTRAST   Final Result   No evidence of pulmonary emboli.       Significantly worsening bilateral pulmonary airspace infiltrates compared to   the prior study, left greater than right, with areas of pulmonary airspace   consolidation on the current exam.         XR CHEST PORTABLE   Final Result   Worsening multifocal bilateral pneumonia when compared with the prior study   of 08/04/2021. Assessment:  1. Post Covid inflammation, not yet fibrosis. Plan:  1. Increase dose of steroids  2. Antibiotics per infectious disease    Time at the bedside, reviewing labs and radiographs, reviewing updated notes and consultations, discussing with staff and family was more than 35 minutes. Please note that voice recognition technology was used in the preparation of this note and make therefore it may contain inadvertent transcription errors. If the patient is a COVID 19 isolation patient, the above physical exam reflects that of the examining physician for the day. Mora Earl MD,  M.D., F.C.C.P.     [facility] [address] [facility], [address] [place] [address], [address] [place] [address]

## 2021-08-30 NOTE — FLOWSHEET NOTE
Inpatient Wound Care    Admit Date: 8/27/2021  6:16 AM    Reason for consult:  L foot    Significant history:  Admitted with Pneumonia due to COVID-19. History of anemia and DM. Wound history: POA    Findings:       08/30/21 1159   Wound 08/27/21 Ankle Left;Posterior   Date First Assessed/Time First Assessed: 08/27/21 1729   Present on Hospital Admission: Yes  Wound Approximate Age at First Assessment (Weeks): 4 weeks  Primary Wound Type: (c) Other (comment)  Location: Ankle  Wound Location Orientation: Left;Posterior   Wound Image    Wound Etiology Surgical   Dressing Status New dressing applied   Wound Cleansed Cleansed with saline   Dressing/Treatment   (wound gel, Opticel)   Dressing Change Due 08/31/21   Wound Length (cm) 4 cm   Wound Width (cm) 0.4 cm   Wound Depth (cm) 0.4 cm   Wound Surface Area (cm^2) 1.6 cm^2   Change in Wound Size % (l*w) 60   Wound Volume (cm^3) 0.64 cm^3   Wound Healing % -60   Wound Assessment Pink/red   Drainage Amount None   Odor None   Alisson-wound Assessment Intact       Impression:  Open incision on posterior L ankle. Interventions in place:  Wound cleansed with NSS. Wound gel then Opticel applied. Covered with ABD then secured with kerlix. Plan: Daily dressing changes.        Elsa Najjar, RN 8/30/2021 12:01 PM

## 2021-08-30 NOTE — PROGRESS NOTES
Please be aware of the possible interaction of QT-interval prolongation with Levaquin and Lexapro. Monitor the patient closely. Thank Tyrell Car Pharm. D 8/30/2021 12:55 PM

## 2021-08-30 NOTE — CARE COORDINATION
COVID - 8/28( Hx + 7/27). Met w/ patient. Explained role of  and plan of care. Lives w/ her 3 children and their father in a 2 story condo- 3 steps to entrance. Independent PTA. Has knee scooter, crutches but does not use now. Currently requiring O2 14 liters high flow NC- wears home O2 3-4 liters provided by [facility]- pt is requesting an O2 humidifier on discharge. DOES NOT have a nebulizer. No Hx HHC or MIRNA. PCP is Dr. Fili Rico and [facility] [address] [facility] [address] Currently on iv abxs, iv Decadron. Per pt, plan is to return home on discharge- [facility]. States mother or significant other will provide transport home.  Will follow Mat Daley, RN case manager

## 2021-08-30 NOTE — CONSULTS
[address] [place] [address] [facility]  Consultation Note     Admit Date: 8/27/2021  6:16 AM    Reason for Consult:   Lalita Reynoso    Attending Physician:  Ole Ramírez MD    HISTORY OF PRESENT ILLNESS:             The history is obtained from extensive review of available past medical records. The patient is a 38 y.o. female who is not previously known to the ID service. The patient was admitted to the hospital on 7/31/2021 with Covid pneumonia. She was treated with Remdesivir and steroids. She deteriorated and was transferred to the ICU. She was treated with Tocilizumab. ID was not asked to see her. She improved and was discharged on 8/7/2021. The patient comes back to the hospital 8/27/2021 with worsening shortness of breath. She was on 4 L nasal cannula at home. Pulse oximetry was in the 80s. She was treated with Dexamethasone, Ceftriaxone, Cefepime, Doxycycline. CT angiogram was negative for pulmonary embolism. Procalcitonin was 0.03. She was again seen by pulmonary. Ceftriaxone and Doxycycline were continued. Urine antigens and rapid SARS-CoV-2 was negative. Today ID was asked to see her in consultation.     Past Medical History:        Diagnosis Date    Anemia     Iron deficiency    Anxiety     Arthritis     Diabetes mellitus ([address]     GDM-diet controlled    History of palpitations     and tachycardia with anxiety    Irregular menses     Left ankle injury 06/07/2021    Migraines     Uses crutches 06/2021    left foot injury     Past Surgical History:        Procedure Laterality Date    ACHILLES TENDON SURGERY Left 6/18/2021    REPAIR OF ACHILLES TENDON TEAR LEFT SIDE performed by Benjy Wagoner DPM at [address] [facility] [address] N/A 6/9/2021    DILATATION AND CURETTAGE HYSTEROSCOPY NOVASURE ABLATION performed by Baltazar Sinclair DO at [address] [facility]-OOPHORECTOMY Left 03/27/2016    laparoscopic left side     Current Medications:   Scheduled Meds:   sodium chloride  1 spray Each Nostril BID    dexamethasone  6 mg IntraVENous Q8H    albuterol sulfate HFA  2 puff Inhalation TID    cefTRIAXone (ROCEPHIN) IV  1,000 mg IntraVENous Q24H    doxycycline (VIBRAMYCIN) IV  100 mg IntraVENous Q12H    vitamin C  500 mg Oral Daily    budesonide-formoterol  1 puff Inhalation BID    vitamin D  50,000 Units Oral Weekly    escitalopram  10 mg Oral Daily    montelukast  10 mg Oral Nightly    zinc sulfate  50 mg Oral Daily     Continuous Infusions:  PRN Meds:acetaminophen, ALPRAZolam, fluticasone, ibuprofen, pantoprazole    Allergies:  Norco [hydrocodone-acetaminophen] and Pcn [penicillins]    Social History:   Social History     Socioeconomic History    Marital status: Single     Spouse name: None    Number of children: None    Years of education: None    Highest education level: None   Occupational History    None   Tobacco Use    Smoking status: Never Smoker    Smokeless tobacco: Never Used   Vaping Use    Vaping Use: Never used   Substance and Sexual Activity    Alcohol use: Yes     Comment: Social    Drug use: No    Sexual activity: Yes     Partners: Male   Other Topics Concern    None   Social History Narrative    None     Social Determinants of Health     Financial Resource Strain: Medium Risk    Difficulty of Paying Living Expenses: Somewhat hard   Food Insecurity: No Food Insecurity    Worried About Running Out of Food in the Last Year: Never true    Sadie of Food in the Last Year: Never true   Transportation Needs: No Transportation Needs    Lack of Transportation (Medical): No    Lack of Transportation (Non-Medical):  No   Physical Activity: Inactive    Days of Exercise per Week: 0 days    Minutes of Exercise per Session: 0 min   Stress: Stress Concern Present    Feeling of Stress : Rather much   Social Connections: Socially Isolated    Frequency of Communication with Friends and Family: More than three times a week    Frequency of Social Gatherings with Friends and Family: Once a week    Attends Judaism Services: Never    Active Member of Clubs or Organizations: No    Attends Club or Organization Meetings: Never    Marital Status: Never    Intimate Partner Violence: Not At Risk    Fear of Current or Ex-Partner: No    Emotionally Abused: No    Physically Abused: No    Sexually Abused: No      Pets: Dog  Travel: No  The patient was on with her children and their father. She works in home health care    Family History:       Problem Relation Age of Onset    Hypertension Mother     Breast Cancer Maternal Aunt 48        breast cancer    Breast Cancer Maternal Uncle         colon cancer    Breast Cancer Maternal Grandmother 80        breast cancer    Breast Cancer Maternal Aunt 50        breast cancer   . Otherwise non-pertinent to the chief complaint. REVIEW OF SYSTEMS:    Constitutional: Negative for fevers, chills, diaphoresis  Neurologic: Negative   Psychiatric: Negative  Rheumatologic: Negative   Endocrine: Negative  Hematologic: Negative  Immunologic: Negative  ENT: Negative  Respiratory: As in the HPI. No pleuritic chest pain. The cough is nonproductive. She is doing okay on current oxygen  Cardiovascular: Negative  GI: Negative  : Negative  Musculoskeletal: She recently had a left Achilles tendon surgery by podiatry. She has a small wound but it is not draining. Skin: No rashes. PHYSICAL EXAM:    Vitals:   /77   Pulse 85   Temp 97.8 °F (36.6 °C) (Oral)   Resp 20   Ht 5' 6\" (1.676 m)   Wt 238 lb 8 oz (108.2 kg)   LMP 08/27/2021 (Exact Date)   SpO2 98%   BMI 38.49 kg/m²   Constitutional: The patient is awake, alert, and oriented. Sitting up in bed. No distress. O2 by nasal cannula.  13 L. Skin: Warm and dry. No rashes were noted. HEENT: Eyes show round, and reactive pupils. No jaundice. Moist mucous membranes, no ulcerations, no thrush.    Neck: Supple to movements. No lymphadenopathy. Chest: No use of accessory muscles to breathe. Symmetrical expansion. Auscultation reveals no wheezing, crackles, or rhonchi. Cardiovascular: S1 and S2 are rhythmic and regular. No murmurs appreciated. Abdomen: Positive bowel sounds to auscultation. Benign to palpation. No masses felt. No hepatosplenomegaly. Extremities: No clubbing, no cyanosis, no edema. Left Achilles tendon heel wound covered.   Lines: peripheral      CBC+dif:  Recent Labs     08/28/21  0404   WBC 11.7*   HGB 10.5*   HCT 34.1   MCV 76.8*      NEUTROABS 8.98*     Lab Results   Component Value Date    CRP 2.3 (H) 08/03/2021      No results found for: CRPHS  Lab Results   Component Value Date    SEDRATE 25 (H) 08/03/2021     Lab Results   Component Value Date    ALT 9 08/27/2021    AST 13 08/27/2021    ALKPHOS 72 08/27/2021    BILITOT 0.4 08/27/2021     Lab Results   Component Value Date     08/28/2021    K 3.9 08/28/2021    K 3.4 07/31/2021     08/28/2021    CO2 25 08/28/2021    BUN 10 08/28/2021    CREATININE 0.5 08/28/2021    GFRAA >60 08/28/2021    LABGLOM >60 08/28/2021    GLUCOSE 111 08/28/2021    GLUCOSE 94 09/03/2011    PROT 6.6 08/27/2021    LABALBU 3.6 08/27/2021    CALCIUM 8.1 08/28/2021    BILITOT 0.4 08/27/2021    ALKPHOS 72 08/27/2021    AST 13 08/27/2021    ALT 9 08/27/2021       Lab Results   Component Value Date    PROTIME 12.7 08/27/2021    INR 1.2 08/27/2021       Lab Results   Component Value Date    TSH 0.708 01/07/2021       Lab Results   Component Value Date    NITRITE neg 12/08/2020    COLORU Yellow 01/26/2021    PHUR 6.5 01/26/2021    WBCUA 2-5 01/26/2021    WBCUA 10-20 09/03/2011    RBCUA 1-3 01/26/2021    RBCUA 0-1 09/28/2013    TRICHOMONAS FEW 09/03/2011    BACTERIA RARE 01/26/2021    CLARITYU Clear 01/26/2021    SPECGRAV 1.020 01/26/2021    LEUKOCYTESUR TRACE 01/26/2021    UROBILINOGEN 0.2 01/26/2021    BILIRUBINUR Negative 01/26/2021    BILIRUBINUR neg 12/08/2020 BILIRUBINUR NEGATIVE 09/03/2011    BLOODU SMALL 01/26/2021    GLUCOSEU Negative 01/26/2021    GLUCOSEU NEGATIVE 09/03/2011       Lab Results   Component Value Date    HCO3 23.8 08/27/2021    BE -0.3 08/27/2021    O2SAT 93.0 08/27/2021    PH 7.428 08/27/2021    PCO2 36.8 08/27/2021    PO2 68.6 08/27/2021     Radiology:      Microbiology:  Pending  No results for input(s): BC in the last 72 hours. No results for input(s): ORG in the last 72 hours. No results for input(s): Hiren Lav in the last 72 hours. Recent Labs     08/28/21  1825   STREPNEUMAGU Presumptive negative- suggests no current or recent  pneumococcal infection. Infection due to Strep pneumoniae cannot be  ruled out since the antigen present in the sample  may be below the detection limit of the test.  Normal Range:Presumptive Negative       Recent Labs     08/28/21  1825   LP1UAG Presumptive Negative -suggesting no recent or current infections  with Legionella pneumophila serogroup 1. Infection to Legionella cannot be ruled out since other serogroups  and species may cause infection, antigen may not be present in  early infection, or level of antigen may be below the  detection limit. Normal Range: Presumptive Negative       No results for input(s): ASO in the last 72 hours. No results for input(s): CULTRESP in the last 72 hours. Recent Labs     08/29/21  1450   PROCAL 0.03       Assessment:  · SARS-CoV-2 infection with moderate to severe viral pneumonia. The patient presents again with more well defined and more dense infiltrates. This is not an unusual finding. The patient has a normal procalcitonin. It appears that this is most likely not a bacterial superimposed pneumonia    Plan:    · Continue Doxycycline.   This can help as an anti-inflammatory  · Change Ceftriaxone over to Levofloxacin  · Continue high-dose steroid  · CBC with differential, CMP, ESR, CRP tomorrow  · Will follow with you    Thank you for having us see this patient in consultation. I will be discussing this case with the treating physicians. Case discussed with Dr. Mitchell Fletcher.     Francis House MD  12:31 PM  8/30/2021

## 2021-08-30 NOTE — PROGRESS NOTES
Admit Date: 8/27/2021    Subjective:     Week end event reviewed   Awake feels little better still on high flow O2     Objective:     Patient Vitals for the past 8 hrs:   BP Temp Temp src Pulse Resp SpO2   08/30/21 0224 (!) 145/85 98.1 °F (36.7 °C) Oral 103 20 93 %     No intake/output data recorded. No intake/output data recorded. HEENT: Normal  NECK: Thyroid normal. No carotid bruit. No lymphphadenopathy. CVS: RRR  RS: Clear. No wheeze. No rhonchi. ABD: Soft. Non tender. No mass. Normal BS.obese   EXT: No edema. Non tender. Pulses present.    NEURO:no focal deficit       Scheduled Meds:   sodium chloride  1 spray Each Nostril BID    dexamethasone  6 mg IntraVENous Q8H    albuterol sulfate HFA  2 puff Inhalation TID    cefTRIAXone (ROCEPHIN) IV  1,000 mg IntraVENous Q24H    doxycycline (VIBRAMYCIN) IV  100 mg IntraVENous Q12H    vitamin C  500 mg Oral Daily    budesonide-formoterol  1 puff Inhalation BID    vitamin D  50,000 Units Oral Weekly    escitalopram  10 mg Oral Daily    montelukast  10 mg Oral Nightly    zinc sulfate  50 mg Oral Daily     Continuous Infusions:    CBC with Differential:    Lab Results   Component Value Date    WBC 11.7 08/28/2021    RBC 4.44 08/28/2021    HGB 10.5 08/28/2021    HCT 34.1 08/28/2021     08/28/2021    MCV 76.8 08/28/2021    MCH 23.6 08/28/2021    MCHC 30.8 08/28/2021    RDW 19.8 08/28/2021    NRBC 0.0 08/07/2021    SEGSPCT 75 09/03/2011    METASPCT 3.5 08/07/2021    LYMPHOPCT 11.9 08/28/2021    MONOPCT 10.3 08/28/2021    MYELOPCT 1.7 08/07/2021    BASOPCT 0.2 08/28/2021    MONOSABS 1.20 08/28/2021    LYMPHSABS 1.39 08/28/2021    EOSABS 0.01 08/28/2021    BASOSABS 0.02 08/28/2021     CMP:    Lab Results   Component Value Date     08/28/2021    K 3.9 08/28/2021    K 3.4 07/31/2021     08/28/2021    CO2 25 08/28/2021    BUN 10 08/28/2021    CREATININE 0.5 08/28/2021    GFRAA >60 08/28/2021    LABGLOM >60 08/28/2021    PROT 6.6 08/27/2021 LABALBU 3.6 08/27/2021    CALCIUM 8.1 08/28/2021    BILITOT 0.4 08/27/2021    ALKPHOS 72 08/27/2021    AST 13 08/27/2021    ALT 9 08/27/2021     PT/INR:    Lab Results   Component Value Date    PROTIME 12.7 08/27/2021    INR 1.2 08/27/2021       Assessment:     Active Problems:    Pneumonia due to COVID-19 virus    Pneumonia    Hypoxia       Plan:   Continue support wean steroid as tolerated

## 2021-08-30 NOTE — PROGRESS NOTES
Lost IV access on pt twice. Unable to place new IV. Attempted by 3 different RNs. IV team consult placed.

## 2021-08-30 NOTE — PROGRESS NOTES
Physician Progress Note      PATIENT:               Heather Chaidez  CSN #:                  [id number]  :                       1985  ADMIT DATE:       2021 6:16 AM  DISCH DATE:  RESPONDING  PROVIDER #:        Shruti King MD          QUERY TEXT:    Pt admitted with pneumonia. Pt noted to have history of COVID. If possible,   please document in progress notes and discharge summary if you are evaluating   and/or treating any of the following: The medical record reflects the following:  Risk Factors: COVID-19 diagnosed  with hospitalization  Clinical Indicators: COVID-19 negative on . CXR \"Worsening multifocal   bilateral pneumonia when compared with the prior study of 2021. \"  H&P \"Recurrence of bilateral COVID pneumonia. \"  Treatment: decadron, doxycycline and rocephine, symbicort, oxygen, incentive   spirometer    Thank you,  Beatris Sellers RN, CCDS  Clinical Documentation Improvement Specialist  [email or web address]  [phone]  Options provided:  -- Pneumonia is a sequela of prior COVID infection  -- Pneumonia is not a sequela of prior COVID infection  -- Other - I will add my own diagnosis  -- Disagree - Not applicable / Not valid  -- Disagree - Clinically unable to determine / Unknown  -- Refer to Clinical Documentation Reviewer    PROVIDER RESPONSE TEXT:    Pneumonia is a sequela of prior COVID infection.     Query created by: Leonor Zhu on 2021 8:16 AM      Electronically signed by:  Shruti King MD 2021 8:23 AM

## 2021-08-31 LAB
ALBUMIN SERPL-MCNC: 3.8 G/DL (ref 3.5–5.2)
ALP BLD-CCNC: 76 U/L (ref 35–104)
ALT SERPL-CCNC: 15 U/L (ref 0–32)
ANION GAP SERPL CALCULATED.3IONS-SCNC: 10 MMOL/L (ref 7–16)
AST SERPL-CCNC: 10 U/L (ref 0–31)
BASOPHILS ABSOLUTE: 0 E9/L (ref 0–0.2)
BASOPHILS RELATIVE PERCENT: 0 % (ref 0–2)
BILIRUB SERPL-MCNC: 0.2 MG/DL (ref 0–1.2)
BUN BLDV-MCNC: 15 MG/DL (ref 6–20)
C-REACTIVE PROTEIN: 1.1 MG/DL (ref 0–0.4)
CALCIUM SERPL-MCNC: 8.8 MG/DL (ref 8.6–10.2)
CHLORIDE BLD-SCNC: 103 MMOL/L (ref 98–107)
CO2: 25 MMOL/L (ref 22–29)
CREAT SERPL-MCNC: 0.6 MG/DL (ref 0.5–1)
EOSINOPHILS ABSOLUTE: 0 E9/L (ref 0.05–0.5)
EOSINOPHILS RELATIVE PERCENT: 0 % (ref 0–6)
GFR AFRICAN AMERICAN: >60
GFR NON-AFRICAN AMERICAN: >60 ML/MIN/1.73
GLUCOSE BLD-MCNC: 159 MG/DL (ref 74–99)
HCT VFR BLD CALC: 38.6 % (ref 34–48)
HEMOGLOBIN: 11.9 G/DL (ref 11.5–15.5)
LYMPHOCYTES ABSOLUTE: 2.14 E9/L (ref 1.5–4)
LYMPHOCYTES RELATIVE PERCENT: 17 % (ref 20–42)
MCH RBC QN AUTO: 23.6 PG (ref 26–35)
MCHC RBC AUTO-ENTMCNC: 30.8 % (ref 32–34.5)
MCV RBC AUTO: 76.4 FL (ref 80–99.9)
METAMYELOCYTES RELATIVE PERCENT: 1 % (ref 0–1)
MONOCYTES ABSOLUTE: 0.38 E9/L (ref 0.1–0.95)
MONOCYTES RELATIVE PERCENT: 3 % (ref 2–12)
MYELOCYTE PERCENT: 2 % (ref 0–0)
NEUTROPHILS ABSOLUTE: 10.08 E9/L (ref 1.8–7.3)
NEUTROPHILS RELATIVE PERCENT: 77 % (ref 43–80)
NUCLEATED RED BLOOD CELLS: 2 /100 WBC
OVALOCYTES: ABNORMAL
PDW BLD-RTO: 19.4 FL (ref 11.5–15)
PLATELET # BLD: 256 E9/L (ref 130–450)
PMV BLD AUTO: 10 FL (ref 7–12)
POIKILOCYTES: ABNORMAL
POLYCHROMASIA: ABNORMAL
POTASSIUM SERPL-SCNC: 4.5 MMOL/L (ref 3.5–5)
RBC # BLD: 5.05 E12/L (ref 3.5–5.5)
SEDIMENTATION RATE, ERYTHROCYTE: 7 MM/HR (ref 0–20)
SODIUM BLD-SCNC: 138 MMOL/L (ref 132–146)
TOTAL PROTEIN: 6.7 G/DL (ref 6.4–8.3)
WBC # BLD: 12.6 E9/L (ref 4.5–11.5)

## 2021-08-31 PROCEDURE — 94640 AIRWAY INHALATION TREATMENT: CPT

## 2021-08-31 PROCEDURE — 2580000003 HC RX 258: Performed by: INTERNAL MEDICINE

## 2021-08-31 PROCEDURE — 36415 COLL VENOUS BLD VENIPUNCTURE: CPT

## 2021-08-31 PROCEDURE — 2700000000 HC OXYGEN THERAPY PER DAY

## 2021-08-31 PROCEDURE — 2060000000 HC ICU INTERMEDIATE R&B

## 2021-08-31 PROCEDURE — 2500000003 HC RX 250 WO HCPCS: Performed by: INTERNAL MEDICINE

## 2021-08-31 PROCEDURE — 85651 RBC SED RATE NONAUTOMATED: CPT

## 2021-08-31 PROCEDURE — 6360000002 HC RX W HCPCS: Performed by: INTERNAL MEDICINE

## 2021-08-31 PROCEDURE — 86140 C-REACTIVE PROTEIN: CPT

## 2021-08-31 PROCEDURE — 6370000000 HC RX 637 (ALT 250 FOR IP): Performed by: SPECIALIST

## 2021-08-31 PROCEDURE — 6370000000 HC RX 637 (ALT 250 FOR IP): Performed by: INTERNAL MEDICINE

## 2021-08-31 PROCEDURE — 80053 COMPREHEN METABOLIC PANEL: CPT

## 2021-08-31 PROCEDURE — 85025 COMPLETE CBC W/AUTO DIFF WBC: CPT

## 2021-08-31 RX ADMIN — DOXYCYCLINE 100 MG: 100 INJECTION, POWDER, LYOPHILIZED, FOR SOLUTION INTRAVENOUS at 15:34

## 2021-08-31 RX ADMIN — ALPRAZOLAM 0.5 MG: 0.25 TABLET ORAL at 21:29

## 2021-08-31 RX ADMIN — ESCITALOPRAM 10 MG: 10 TABLET, FILM COATED ORAL at 09:22

## 2021-08-31 RX ADMIN — ALBUTEROL SULFATE 2 PUFF: 90 AEROSOL, METERED RESPIRATORY (INHALATION) at 12:55

## 2021-08-31 RX ADMIN — ALPRAZOLAM 0.5 MG: 0.25 TABLET ORAL at 02:46

## 2021-08-31 RX ADMIN — SALINE NASAL SPRAY 1 SPRAY: 1.5 SOLUTION NASAL at 16:50

## 2021-08-31 RX ADMIN — IBUPROFEN 800 MG: 800 TABLET, FILM COATED ORAL at 09:25

## 2021-08-31 RX ADMIN — BUDESONIDE AND FORMOTEROL FUMARATE DIHYDRATE 1 PUFF: 160; 4.5 AEROSOL RESPIRATORY (INHALATION) at 08:30

## 2021-08-31 RX ADMIN — ALBUTEROL SULFATE 2 PUFF: 90 AEROSOL, METERED RESPIRATORY (INHALATION) at 08:30

## 2021-08-31 RX ADMIN — OXYCODONE HYDROCHLORIDE AND ACETAMINOPHEN 500 MG: 500 TABLET ORAL at 09:22

## 2021-08-31 RX ADMIN — ZINC SULFATE 220 MG (50 MG) CAPSULE 50 MG: CAPSULE at 09:22

## 2021-08-31 RX ADMIN — SALINE NASAL SPRAY 1 SPRAY: 1.5 SOLUTION NASAL at 09:22

## 2021-08-31 RX ADMIN — METHYLPREDNISOLONE SODIUM SUCCINATE 60 MG: 125 INJECTION, POWDER, LYOPHILIZED, FOR SOLUTION INTRAMUSCULAR; INTRAVENOUS at 05:39

## 2021-08-31 RX ADMIN — MONTELUKAST SODIUM 10 MG: 10 TABLET, FILM COATED ORAL at 21:29

## 2021-08-31 RX ADMIN — ENOXAPARIN SODIUM 60 MG: 60 INJECTION SUBCUTANEOUS at 13:15

## 2021-08-31 RX ADMIN — ALPRAZOLAM 0.5 MG: 0.25 TABLET ORAL at 15:34

## 2021-08-31 RX ADMIN — LEVOFLOXACIN 750 MG: 500 TABLET, FILM COATED ORAL at 13:15

## 2021-08-31 RX ADMIN — METHYLPREDNISOLONE SODIUM SUCCINATE 60 MG: 125 INJECTION, POWDER, LYOPHILIZED, FOR SOLUTION INTRAMUSCULAR; INTRAVENOUS at 12:21

## 2021-08-31 RX ADMIN — DOXYCYCLINE 100 MG: 100 INJECTION, POWDER, LYOPHILIZED, FOR SOLUTION INTRAVENOUS at 02:46

## 2021-08-31 ASSESSMENT — PAIN SCALES - GENERAL
PAINLEVEL_OUTOF10: 0
PAINLEVEL_OUTOF10: 5

## 2021-08-31 NOTE — PROGRESS NOTES
[address] [place] [address] [facility]  Progress Note    SUBJECTIVE:  Chief Complaint   Patient presents with    Shortness of Breath     The patient is feeling slightly better. Tolerating current antibiotics. No nausea or vomiting. She says she was up all night urinating because she was drinking a lot of fluids. Review of systems:  As stated above in the chief complaint, otherwise negative. Medications:  Scheduled Meds:   enoxaparin  60 mg SubCUTAneous Daily    levoFLOXacin  750 mg Oral Daily    methylPREDNISolone  60 mg IntraVENous Q6H    sodium chloride  1 spray Each Nostril BID    albuterol sulfate HFA  2 puff Inhalation TID    doxycycline (VIBRAMYCIN) IV  100 mg IntraVENous Q12H    vitamin C  500 mg Oral Daily    budesonide-formoterol  1 puff Inhalation BID    vitamin D  50,000 Units Oral Weekly    escitalopram  10 mg Oral Daily    montelukast  10 mg Oral Nightly    zinc sulfate  50 mg Oral Daily     Continuous Infusions:  PRN Meds:acetaminophen, ALPRAZolam, fluticasone, ibuprofen, pantoprazole    OBJECTIVE:  BP (!) 145/74   Pulse 89   Temp 97.8 °F (36.6 °C) (Oral)   Resp 18   Ht 5' 6\" (1.676 m)   Wt 237 lb 4.8 oz (107.6 kg)   LMP 2021 (Exact Date)   SpO2 95%   BMI 38.30 kg/m²   Temp  Av.1 °F (36.7 °C)  Min: 97.7 °F (36.5 °C)  Max: 98.6 °F (37 °C)  Constitutional: The patient is awake, alert, and oriented. Sitting up in bed. He is in some slight respiratory distress. 12 L nasal cannula. Skin: Warm and dry. No rashes were noted. HEENT: Round and reactive pupils. Moist mucous membranes. No ulcerations or thrush. Neck: Supple to movements. Chest: No use of accessory muscles to breathe. Symmetrical expansion. No wheezing, crackles or rhonchi. Cardiovascular: Heart sounds rhythmic and regular. Abdomen: Positive bowel sounds to auscultation. Benign to palpation. Extremities: No edema.   Lines: peripheral    Laboratory and Tests Review:  Lab Results Component Value Date    WBC 12.6 (H) 08/31/2021    WBC 11.7 (H) 08/28/2021    WBC 12.4 (H) 08/27/2021    HGB 11.9 08/31/2021    HCT 38.6 08/31/2021    MCV 76.4 (L) 08/31/2021     08/31/2021     Lab Results   Component Value Date    NEUTROABS 8.98 (H) 08/28/2021    NEUTROABS 9.29 (H) 08/27/2021    NEUTROABS 12.96 (H) 08/07/2021     No results found for: [facility]  Lab Results   Component Value Date    ALT 15 08/31/2021    AST 10 08/31/2021    ALKPHOS 76 08/31/2021    BILITOT 0.2 08/31/2021     Lab Results   Component Value Date     08/31/2021    K 4.5 08/31/2021    K 3.4 07/31/2021     08/31/2021    CO2 25 08/31/2021    BUN 15 08/31/2021    CREATININE 0.6 08/31/2021    CREATININE 0.5 08/28/2021    CREATININE 0.5 08/27/2021    GFRAA >60 08/31/2021    LABGLOM >60 08/31/2021    GLUCOSE 159 08/31/2021    GLUCOSE 94 09/03/2011    PROT 6.7 08/31/2021    LABALBU 3.8 08/31/2021    CALCIUM 8.8 08/31/2021    BILITOT 0.2 08/31/2021    ALKPHOS 76 08/31/2021    AST 10 08/31/2021    ALT 15 08/31/2021     Lab Results   Component Value Date    CRP 2.3 (H) 08/03/2021     Lab Results   Component Value Date    SEDRATE 25 (H) 08/03/2021     Radiology:      Microbiology:   Streptococcus pneumoniae/Legionella urine Ag: negative   Rapid SARS-CoV-2: Negative  Blood cultures 8/27/2021: Negative so far    Recent Labs     08/29/21  1450   PROCAL 0.03       ASSESSMENT:  · SARS-CoV-2 infection with viral pneumonia, treated with Tocilizumab pending Remdesivir her earlier this month  · Pos post viral bacterial pneumonia  · Leukocytosis associated to the above    PLAN:  · Continue Levofloxacin and Doxycycline  · High-dose steroids  · Supportive treatment  · Monitor labs    Spoke with nursing    Jj Wang MD  12:16 PM  8/31/2021

## 2021-08-31 NOTE — PROGRESS NOTES
[address] [place] [address] [facility]  Progress Note    SUBJECTIVE:  Chief Complaint   Patient presents with    Shortness of Breath     Patient is tolerating medications. No reported adverse drug reactions. No nausea, vomiting, diarrhea. Review of systems:  As stated above in the chief complaint, otherwise negative. Medications:  Scheduled Meds:   enoxaparin  60 mg SubCUTAneous Daily    levoFLOXacin  750 mg Oral Daily    methylPREDNISolone  60 mg IntraVENous Q6H    sodium chloride  1 spray Each Nostril BID    albuterol sulfate HFA  2 puff Inhalation TID    doxycycline (VIBRAMYCIN) IV  100 mg IntraVENous Q12H    vitamin C  500 mg Oral Daily    budesonide-formoterol  1 puff Inhalation BID    vitamin D  50,000 Units Oral Weekly    escitalopram  10 mg Oral Daily    montelukast  10 mg Oral Nightly    zinc sulfate  50 mg Oral Daily     Continuous Infusions:  PRN Meds:acetaminophen, ALPRAZolam, fluticasone, ibuprofen, pantoprazole    OBJECTIVE:  BP (!) 145/74   Pulse 89   Temp 97.8 °F (36.6 °C) (Oral)   Resp 18   Ht 5' 6\" (1.676 m)   Wt 237 lb 4.8 oz (107.6 kg)   LMP 2021 (Exact Date)   SpO2 95%   BMI 38.30 kg/m²   Temp  Av.1 °F (36.7 °C)  Min: 97.7 °F (36.5 °C)  Max: 98.6 °F (37 °C)  Constitutional: The patient is awake, alert, and oriented. Skin: Warm and dry. No rashes were noted. HEENT: Round and reactive pupils. Moist mucous membranes. No ulcerations or thrush. Neck: Supple to movements. Chest: No use of accessory muscles to breathe. Symmetrical expansion. No wheezing, crackles or rhonchi. Cardiovascular: S1 and S2 are rhythmic and regular. No murmurs appreciated. Abdomen: Positive bowel sounds to auscultation. Benign to palpation. No masses felt. No hepatosplenomegaly. Extremities: No clubbing, no cyanosis, no edema.   Lines: peripheral    Laboratory and Tests Review:  Lab Results   Component Value Date    WBC 12.6 (H) 2021    WBC 11.7 (H) 08/28/2021    WBC 12.4 (H) 08/27/2021    HGB 11.9 08/31/2021    HCT 38.6 08/31/2021    MCV 76.4 (L) 08/31/2021     08/31/2021     Lab Results   Component Value Date    NEUTROABS 8.98 (H) 08/28/2021    NEUTROABS 9.29 (H) 08/27/2021    NEUTROABS 12.96 (H) 08/07/2021     No results found for: [facility]  Lab Results   Component Value Date    ALT 15 08/31/2021    AST 10 08/31/2021    ALKPHOS 76 08/31/2021    BILITOT 0.2 08/31/2021     Lab Results   Component Value Date     08/31/2021    K 4.5 08/31/2021    K 3.4 07/31/2021     08/31/2021    CO2 25 08/31/2021    BUN 15 08/31/2021    CREATININE 0.6 08/31/2021    CREATININE 0.5 08/28/2021    CREATININE 0.5 08/27/2021    GFRAA >60 08/31/2021    LABGLOM >60 08/31/2021    GLUCOSE 159 08/31/2021    GLUCOSE 94 09/03/2011    PROT 6.7 08/31/2021    LABALBU 3.8 08/31/2021    CALCIUM 8.8 08/31/2021    BILITOT 0.2 08/31/2021    ALKPHOS 76 08/31/2021    AST 10 08/31/2021    ALT 15 08/31/2021     Lab Results   Component Value Date    CRP 2.3 (H) 08/03/2021     Lab Results   Component Value Date    SEDRATE 25 (H) 08/03/2021     Radiology:      Microbiology:   Streptococcus pneumoniae/Legionella urine Ag: negative  Rapid SARS-CoV-2: Not detected    Recent Labs     08/29/21  1450   PROCAL 0.03       ASSESSMENT:  · SARS-CoV-2 infection with moderate to severe pneumonia, treated 4 weeks ago  · Persistent and worsening infiltrates in lungs  · Although superimposed bacterial pneumonia is possible, it seems to be less likely with a normal procalcitonin    PLAN:  · Continue high-dose steroids  · Continue Doxycycline and Levofloxacin  · Check final cultures  · Monitor labs    Manoj Rich MD  10:57 AM  8/31/2021

## 2021-08-31 NOTE — PROGRESS NOTES
consolidation on the current exam.         XR CHEST PORTABLE   Final Result   Worsening multifocal bilateral pneumonia when compared with the prior study   of 08/04/2021. Assessment:  1. Post Covid inflammation, not yet fibrosis. Plan:  1. Same dose of steroids  2. Repeat radiograph  3. Antibiotics per infectious disease    Time at the bedside, reviewing labs and radiographs, reviewing updated notes and consultations, discussing with staff and family was more than 35 minutes. Please note that voice recognition technology was used in the preparation of this note and make therefore it may contain inadvertent transcription errors. If the patient is a COVID 19 isolation patient, the above physical exam reflects that of the examining physician for the day. Gabi Moore MD,  M.D., F.C.C.P.     [facility] [place], [address] [place] [address], [address], [facility]

## 2021-08-31 NOTE — PROGRESS NOTES
Admit Date: 8/27/2021    Subjective:     Feels little better  ID consult appreciated     Objective:     Patient Vitals for the past 8 hrs:   BP Temp Temp src Pulse SpO2 Weight   08/31/21 0306 -- -- -- -- -- 237 lb 4.8 oz (107.6 kg)   08/30/21 2330 129/87 98.2 °F (36.8 °C) Oral 77 96 % --     No intake/output data recorded. No intake/output data recorded. HEENT: Normal  NECK: Thyroid normal. No carotid bruit. No lymphphadenopathy. CVS: RRR  RS: Clear. No wheeze. No rhonchi. Good airflow bilaterally. ABD: Soft. Non tender. No mass. Normal BS.obese   EXT: No edema. Non tender. Pulses present. Skin intact.   NEURO: Intact      Scheduled Meds:   enoxaparin  60 mg SubCUTAneous Daily    levoFLOXacin  750 mg Oral Daily    methylPREDNISolone  60 mg IntraVENous Q6H    sodium chloride  1 spray Each Nostril BID    albuterol sulfate HFA  2 puff Inhalation TID    doxycycline (VIBRAMYCIN) IV  100 mg IntraVENous Q12H    vitamin C  500 mg Oral Daily    budesonide-formoterol  1 puff Inhalation BID    vitamin D  50,000 Units Oral Weekly    escitalopram  10 mg Oral Daily    montelukast  10 mg Oral Nightly    zinc sulfate  50 mg Oral Daily     Continuous Infusions:    CBC with Differential:    Lab Results   Component Value Date    WBC 11.7 08/28/2021    RBC 4.44 08/28/2021    HGB 10.5 08/28/2021    HCT 34.1 08/28/2021     08/28/2021    MCV 76.8 08/28/2021    MCH 23.6 08/28/2021    MCHC 30.8 08/28/2021    RDW 19.8 08/28/2021    NRBC 0.0 08/07/2021    SEGSPCT 75 09/03/2011    METASPCT 3.5 08/07/2021    LYMPHOPCT 11.9 08/28/2021    MONOPCT 10.3 08/28/2021    MYELOPCT 1.7 08/07/2021    BASOPCT 0.2 08/28/2021    MONOSABS 1.20 08/28/2021    LYMPHSABS 1.39 08/28/2021    EOSABS 0.01 08/28/2021    BASOSABS 0.02 08/28/2021     CMP:    Lab Results   Component Value Date     08/28/2021    K 3.9 08/28/2021    K 3.4 07/31/2021     08/28/2021    CO2 25 08/28/2021    BUN 10 08/28/2021    CREATININE 0.5 08/28/2021 GFRAA >60 08/28/2021    LABGLOM >60 08/28/2021    PROT 6.6 08/27/2021    LABALBU 3.6 08/27/2021    CALCIUM 8.1 08/28/2021    BILITOT 0.4 08/27/2021    ALKPHOS 72 08/27/2021    AST 13 08/27/2021    ALT 9 08/27/2021     PT/INR:    Lab Results   Component Value Date    PROTIME 12.7 08/27/2021    INR 1.2 08/27/2021     CXR : little improvement     Assessment:     Active Problems:    Pneumonia due to COVID-19 virus    Hypoxia       Plan:   Little improvement,continue same

## 2021-08-31 NOTE — CARE COORDINATION
8/31/2021  Social Work Discharge Planning: COVID NEG. Pt is independent at home with her children and their father. Currently on 10l o2 and uses 3-4l via [facility]. Wean o2. Pt continues to be on IV ATB-awaiting ATB plan. Pt is declining HHC unless needed for ATB. Parents will transpoert at discharge. Electronically signed by CHRISTINE Hammonds on 8/31/2021 at 9:51 AM

## 2021-08-31 NOTE — PROGRESS NOTES
Physician Progress Note      PATIENT:               Patt Rojas  CSN #:                  [id number]  :                       1985  ADMIT DATE:       2021 6:16 AM  DISCH DATE:  RESPONDING  PROVIDER #:        Vikash Mendes MD          QUERY TEXT:    Pt admitted with pneumonia. Pt noted to wear home O2 at 4L with increase to   6L, have increased respiratory rate, conversational dyspnea, hypoxia. If   possible, please document in the progress notes and discharge summary if you   are evaluating and/or treating any of the following: The medical record reflects the following:  Risk Factors: recent COVID-19, pneumonia  Clinical Indicators: On admission RR 28. Recent covid pneumonia admission with   discharge on 4L. Had to increase to 6L. Per ED \"Patient states she been having worsening shortness of breath increased   her oxygen to 6 L. She was found to be hypoxic by EMS with a sat 64%. She   was switched to nonrebreather. Lew Bobo Patient is tachypneic has 3-4 word   conversational dyspnea. \" pO2 on ABG's on 15L was 68.6  Treatment: oxygen, decadron, atb, incentive spirometer, aerosols    Thank you,  Martita Watson RN, CCDS  Clinical Documentation Improvement Specialist  [email or web address]  [phone]  Options provided:  -- Acute on chronic respiratory failure with hypoxia  -- Acute on chronic respiratory failure with hypercapnia  -- Chronic respiratory failure with hypoxia  -- Other - I will add my own diagnosis  -- Disagree - Not applicable / Not valid  -- Disagree - Clinically unable to determine / Unknown  -- Refer to Clinical Documentation Reviewer    PROVIDER RESPONSE TEXT:    This patient is in acute on chronic respiratory failure with hypoxia.     Query created by: Renetta Birmingham on 2021 8:30 AM      Electronically signed by:  Vikash Mendes MD 2021 6:40 AM

## 2021-09-01 ENCOUNTER — APPOINTMENT (OUTPATIENT)
Dept: GENERAL RADIOLOGY | Age: 36
DRG: 193 | End: 2021-09-01
Payer: COMMERCIAL

## 2021-09-01 LAB
BLOOD CULTURE, ROUTINE: NORMAL
CULTURE, BLOOD 2: NORMAL

## 2021-09-01 PROCEDURE — 6360000002 HC RX W HCPCS: Performed by: INTERNAL MEDICINE

## 2021-09-01 PROCEDURE — 71046 X-RAY EXAM CHEST 2 VIEWS: CPT

## 2021-09-01 PROCEDURE — 2500000003 HC RX 250 WO HCPCS: Performed by: INTERNAL MEDICINE

## 2021-09-01 PROCEDURE — 2060000000 HC ICU INTERMEDIATE R&B

## 2021-09-01 PROCEDURE — 2700000000 HC OXYGEN THERAPY PER DAY

## 2021-09-01 PROCEDURE — 2580000003 HC RX 258: Performed by: INTERNAL MEDICINE

## 2021-09-01 PROCEDURE — 6370000000 HC RX 637 (ALT 250 FOR IP): Performed by: SPECIALIST

## 2021-09-01 PROCEDURE — 2580000003 HC RX 258

## 2021-09-01 PROCEDURE — 6370000000 HC RX 637 (ALT 250 FOR IP): Performed by: INTERNAL MEDICINE

## 2021-09-01 RX ORDER — METHYLPREDNISOLONE SODIUM SUCCINATE 125 MG/2ML
60 INJECTION, POWDER, LYOPHILIZED, FOR SOLUTION INTRAMUSCULAR; INTRAVENOUS EVERY 12 HOURS
Status: DISCONTINUED | OUTPATIENT
Start: 2021-09-02 | End: 2021-09-04

## 2021-09-01 RX ORDER — FUROSEMIDE 10 MG/ML
20 INJECTION INTRAMUSCULAR; INTRAVENOUS ONCE
Status: COMPLETED | OUTPATIENT
Start: 2021-09-01 | End: 2021-09-01

## 2021-09-01 RX ORDER — DOXYCYCLINE HYCLATE 100 MG/1
100 CAPSULE ORAL EVERY 12 HOURS SCHEDULED
Status: DISCONTINUED | OUTPATIENT
Start: 2021-09-01 | End: 2021-09-04 | Stop reason: HOSPADM

## 2021-09-01 RX ORDER — SODIUM CHLORIDE 0.9 % (FLUSH) 0.9 %
SYRINGE (ML) INJECTION
Status: COMPLETED
Start: 2021-09-01 | End: 2021-09-01

## 2021-09-01 RX ADMIN — BUDESONIDE AND FORMOTEROL FUMARATE DIHYDRATE 1 PUFF: 160; 4.5 AEROSOL RESPIRATORY (INHALATION) at 09:00

## 2021-09-01 RX ADMIN — ALPRAZOLAM 0.5 MG: 0.25 TABLET ORAL at 20:49

## 2021-09-01 RX ADMIN — FUROSEMIDE 20 MG: 10 INJECTION, SOLUTION INTRAVENOUS at 13:45

## 2021-09-01 RX ADMIN — MONTELUKAST SODIUM 10 MG: 10 TABLET, FILM COATED ORAL at 20:49

## 2021-09-01 RX ADMIN — METHYLPREDNISOLONE SODIUM SUCCINATE 60 MG: 125 INJECTION, POWDER, LYOPHILIZED, FOR SOLUTION INTRAMUSCULAR; INTRAVENOUS at 05:45

## 2021-09-01 RX ADMIN — OXYCODONE HYDROCHLORIDE AND ACETAMINOPHEN 500 MG: 500 TABLET ORAL at 09:02

## 2021-09-01 RX ADMIN — ACETAMINOPHEN 1000 MG: 500 TABLET ORAL at 20:49

## 2021-09-01 RX ADMIN — METHYLPREDNISOLONE SODIUM SUCCINATE 60 MG: 125 INJECTION, POWDER, LYOPHILIZED, FOR SOLUTION INTRAMUSCULAR; INTRAVENOUS at 12:32

## 2021-09-01 RX ADMIN — FLUTICASONE PROPIONATE 1 SPRAY: 50 SPRAY, METERED NASAL at 09:04

## 2021-09-01 RX ADMIN — BUDESONIDE AND FORMOTEROL FUMARATE DIHYDRATE 1 PUFF: 160; 4.5 AEROSOL RESPIRATORY (INHALATION) at 20:50

## 2021-09-01 RX ADMIN — IBUPROFEN 800 MG: 800 TABLET, FILM COATED ORAL at 15:40

## 2021-09-01 RX ADMIN — ENOXAPARIN SODIUM 60 MG: 60 INJECTION SUBCUTANEOUS at 12:33

## 2021-09-01 RX ADMIN — ESCITALOPRAM 10 MG: 10 TABLET, FILM COATED ORAL at 09:03

## 2021-09-01 RX ADMIN — ALPRAZOLAM 0.5 MG: 0.25 TABLET ORAL at 09:03

## 2021-09-01 RX ADMIN — SALINE NASAL SPRAY 1 SPRAY: 1.5 SOLUTION NASAL at 09:04

## 2021-09-01 RX ADMIN — SODIUM CHLORIDE, PRESERVATIVE FREE 10 ML: 5 INJECTION INTRAVENOUS at 09:04

## 2021-09-01 RX ADMIN — ALBUTEROL SULFATE 2 PUFF: 90 AEROSOL, METERED RESPIRATORY (INHALATION) at 20:50

## 2021-09-01 RX ADMIN — DOXYCYCLINE 100 MG: 100 INJECTION, POWDER, LYOPHILIZED, FOR SOLUTION INTRAVENOUS at 02:35

## 2021-09-01 RX ADMIN — ALBUTEROL SULFATE 2 PUFF: 90 AEROSOL, METERED RESPIRATORY (INHALATION) at 20:51

## 2021-09-01 RX ADMIN — ACETAMINOPHEN 1000 MG: 500 TABLET ORAL at 05:45

## 2021-09-01 RX ADMIN — DOXYCYCLINE HYCLATE 100 MG: 100 CAPSULE ORAL at 20:49

## 2021-09-01 RX ADMIN — SALINE NASAL SPRAY 1 SPRAY: 1.5 SOLUTION NASAL at 20:50

## 2021-09-01 RX ADMIN — METHYLPREDNISOLONE SODIUM SUCCINATE 60 MG: 125 INJECTION, POWDER, LYOPHILIZED, FOR SOLUTION INTRAMUSCULAR; INTRAVENOUS at 00:31

## 2021-09-01 RX ADMIN — ZINC SULFATE 220 MG (50 MG) CAPSULE 50 MG: CAPSULE at 09:03

## 2021-09-01 RX ADMIN — LEVOFLOXACIN 750 MG: 500 TABLET, FILM COATED ORAL at 13:45

## 2021-09-01 ASSESSMENT — PAIN SCALES - GENERAL
PAINLEVEL_OUTOF10: 6
PAINLEVEL_OUTOF10: 6
PAINLEVEL_OUTOF10: 0
PAINLEVEL_OUTOF10: 6

## 2021-09-01 NOTE — PROGRESS NOTES
[address] [place] [address] [facility]  Progress Note    SUBJECTIVE:  Chief Complaint   Patient presents with    Shortness of Breath     The patient feels better today. Tolerating antibiotics. Tolerating steroids    Review of systems:  As stated above in the chief complaint, otherwise negative. Medications:  Scheduled Meds:   enoxaparin  60 mg SubCUTAneous Daily    levoFLOXacin  750 mg Oral Daily    methylPREDNISolone  60 mg IntraVENous Q6H    sodium chloride  1 spray Each Nostril BID    albuterol sulfate HFA  2 puff Inhalation TID    doxycycline (VIBRAMYCIN) IV  100 mg IntraVENous Q12H    vitamin C  500 mg Oral Daily    budesonide-formoterol  1 puff Inhalation BID    vitamin D  50,000 Units Oral Weekly    escitalopram  10 mg Oral Daily    montelukast  10 mg Oral Nightly    zinc sulfate  50 mg Oral Daily     Continuous Infusions:  PRN Meds:acetaminophen, ALPRAZolam, fluticasone, ibuprofen, pantoprazole    OBJECTIVE:  /63   Pulse 97   Temp 98.1 °F (36.7 °C) (Oral)   Resp 24   Ht 5' 6\" (1.676 m)   Wt 237 lb 4.8 oz (107.6 kg)   LMP 2021 (Exact Date)   SpO2 96%   BMI 38.30 kg/m²   Temp  Av.3 °F (36.8 °C)  Min: 98.1 °F (36.7 °C)  Max: 98.7 °F (37.1 °C)  Constitutional: The patient is awake, alert, and oriented. Sitting up in bed. He is in some slight respiratory distress. 7L nasal cannula. Skin: Warm and dry. No rashes were noted. HEENT: Round and reactive pupils. Moist mucous membranes. No ulcerations or thrush. Neck: Supple to movements. Chest: No use of accessory muscles to breathe. Symmetrical expansion. No wheezing, crackles or rhonchi. Cardiovascular: Heart sounds rhythmic and regular. Abdomen: Positive bowel sounds to auscultation. Benign to palpation. Extremities: No edema.   Lines: peripheral    Laboratory and Tests Review:  Lab Results   Component Value Date    WBC 12.6 (H) 2021    WBC 11.7 (H) 2021    WBC 12.4 (H) 2021 HGB 11.9 08/31/2021    HCT 38.6 08/31/2021    MCV 76.4 (L) 08/31/2021     08/31/2021     Lab Results   Component Value Date    NEUTROABS 10.08 (H) 08/31/2021    NEUTROABS 8.98 (H) 08/28/2021    NEUTROABS 9.29 (H) 08/27/2021     No results found for: [facility]  Lab Results   Component Value Date    ALT 15 08/31/2021    AST 10 08/31/2021    ALKPHOS 76 08/31/2021    BILITOT 0.2 08/31/2021     Lab Results   Component Value Date     08/31/2021    K 4.5 08/31/2021    K 3.4 07/31/2021     08/31/2021    CO2 25 08/31/2021    BUN 15 08/31/2021    CREATININE 0.6 08/31/2021    CREATININE 0.5 08/28/2021    CREATININE 0.5 08/27/2021    GFRAA >60 08/31/2021    LABGLOM >60 08/31/2021    GLUCOSE 159 08/31/2021    GLUCOSE 94 09/03/2011    PROT 6.7 08/31/2021    LABALBU 3.8 08/31/2021    CALCIUM 8.8 08/31/2021    BILITOT 0.2 08/31/2021    ALKPHOS 76 08/31/2021    AST 10 08/31/2021    ALT 15 08/31/2021     Lab Results   Component Value Date    CRP 1.1 (H) 08/31/2021    CRP 2.3 (H) 08/03/2021     Lab Results   Component Value Date    SEDRATE 7 08/31/2021    SEDRATE 25 (H) 08/03/2021     Radiology:      Microbiology:   Streptococcus pneumoniae/Legionella urine Ag: negative   Rapid SARS-CoV-2: Negative  Blood cultures 8/27/2021: Negative so far    Recent Labs     08/29/21  1450   PROCAL 0.03       ASSESSMENT:  · SARS-CoV-2 infection with viral pneumonia, treated with Tocilizumab pending Remdesivir her earlier this month  · Pos post viral bacterial pneumonia  · Leukocytosis associated to the above and steroid    PLAN:  · Continue Levofloxacin and Doxycycline  · High-dose steroids  · Supportive treatment    Spoke with nursing    Rebecca Feliciano MD  11:23 AM  9/1/2021

## 2021-09-01 NOTE — PROGRESS NOTES
Admit Date: 8/27/2021    Subjective:     Feels better O2 down to 7L    Objective:     Patient Vitals for the past 8 hrs:   BP Temp Temp src Pulse Resp SpO2   09/01/21 0000 (!) 145/72 98.2 °F (36.8 °C) Oral 70 19 98 %     I/O last 3 completed shifts: In: 240 [P.O.:240]  Out: -   No intake/output data recorded. HEENT: Normal  NECK: Thyroid normal. No carotid bruit. No lymphphadenopathy. CVS: RRR  RS: Clear. No wheeze. No rhonchi. ABD: Soft. Non tender. No mass. Normal BS. EXT: No edema. Non tender. Pulses present.    NEURO: Intact      Scheduled Meds:   enoxaparin  60 mg SubCUTAneous Daily    levoFLOXacin  750 mg Oral Daily    methylPREDNISolone  60 mg IntraVENous Q6H    sodium chloride  1 spray Each Nostril BID    albuterol sulfate HFA  2 puff Inhalation TID    doxycycline (VIBRAMYCIN) IV  100 mg IntraVENous Q12H    vitamin C  500 mg Oral Daily    budesonide-formoterol  1 puff Inhalation BID    vitamin D  50,000 Units Oral Weekly    escitalopram  10 mg Oral Daily    montelukast  10 mg Oral Nightly    zinc sulfate  50 mg Oral Daily     Continuous Infusions:    CBC with Differential:    Lab Results   Component Value Date    WBC 12.6 08/31/2021    RBC 5.05 08/31/2021    HGB 11.9 08/31/2021    HCT 38.6 08/31/2021     08/31/2021    MCV 76.4 08/31/2021    MCH 23.6 08/31/2021    MCHC 30.8 08/31/2021    RDW 19.4 08/31/2021    NRBC 2.0 08/31/2021    SEGSPCT 75 09/03/2011    METASPCT 1.0 08/31/2021    LYMPHOPCT 17.0 08/31/2021    MONOPCT 3.0 08/31/2021    MYELOPCT 2.0 08/31/2021    BASOPCT 0.0 08/31/2021    MONOSABS 0.38 08/31/2021    LYMPHSABS 2.14 08/31/2021    EOSABS 0.00 08/31/2021    BASOSABS 0.00 08/31/2021     CMP:    Lab Results   Component Value Date     08/31/2021    K 4.5 08/31/2021    K 3.4 07/31/2021     08/31/2021    CO2 25 08/31/2021    BUN 15 08/31/2021    CREATININE 0.6 08/31/2021    GFRAA >60 08/31/2021    LABGLOM >60 08/31/2021    PROT 6.7 08/31/2021    LABALBU 3.8 08/31/2021    CALCIUM 8.8 08/31/2021    BILITOT 0.2 08/31/2021    ALKPHOS 76 08/31/2021    AST 10 08/31/2021    ALT 15 08/31/2021     PT/INR:    Lab Results   Component Value Date    PROTIME 12.7 08/27/2021    INR 1.2 08/27/2021       Assessment:     Active Problems:    Pneumonia due to COVID-19 virus    Pneumonia    Hypoxia       Plan:   Continue support weaning steroid per pulmonary ,ATB per ID

## 2021-09-02 PROCEDURE — 6360000002 HC RX W HCPCS: Performed by: INTERNAL MEDICINE

## 2021-09-02 PROCEDURE — 6370000000 HC RX 637 (ALT 250 FOR IP): Performed by: INTERNAL MEDICINE

## 2021-09-02 PROCEDURE — 2060000000 HC ICU INTERMEDIATE R&B

## 2021-09-02 PROCEDURE — 2580000003 HC RX 258

## 2021-09-02 PROCEDURE — 6370000000 HC RX 637 (ALT 250 FOR IP): Performed by: SPECIALIST

## 2021-09-02 PROCEDURE — 2700000000 HC OXYGEN THERAPY PER DAY

## 2021-09-02 RX ADMIN — MONTELUKAST SODIUM 10 MG: 10 TABLET, FILM COATED ORAL at 19:24

## 2021-09-02 RX ADMIN — ALPRAZOLAM 0.5 MG: 0.25 TABLET ORAL at 09:14

## 2021-09-02 RX ADMIN — DOXYCYCLINE HYCLATE 100 MG: 100 CAPSULE ORAL at 09:14

## 2021-09-02 RX ADMIN — ZINC SULFATE 220 MG (50 MG) CAPSULE 50 MG: CAPSULE at 09:14

## 2021-09-02 RX ADMIN — BENZOCAINE AND MENTHOL 1 LOZENGE: 15; 3.6 LOZENGE ORAL at 14:59

## 2021-09-02 RX ADMIN — FLUTICASONE PROPIONATE 1 SPRAY: 50 SPRAY, METERED NASAL at 09:13

## 2021-09-02 RX ADMIN — ALBUTEROL SULFATE 2 PUFF: 90 AEROSOL, METERED RESPIRATORY (INHALATION) at 09:12

## 2021-09-02 RX ADMIN — ALBUTEROL SULFATE 2 PUFF: 90 AEROSOL, METERED RESPIRATORY (INHALATION) at 19:26

## 2021-09-02 RX ADMIN — ESCITALOPRAM 10 MG: 10 TABLET, FILM COATED ORAL at 09:14

## 2021-09-02 RX ADMIN — WATER: 1 INJECTION INTRAMUSCULAR; INTRAVENOUS; SUBCUTANEOUS at 12:45

## 2021-09-02 RX ADMIN — DOXYCYCLINE HYCLATE 100 MG: 100 CAPSULE ORAL at 19:24

## 2021-09-02 RX ADMIN — ENOXAPARIN SODIUM 60 MG: 60 INJECTION SUBCUTANEOUS at 12:48

## 2021-09-02 RX ADMIN — SALINE NASAL SPRAY 1 SPRAY: 1.5 SOLUTION NASAL at 09:13

## 2021-09-02 RX ADMIN — METHYLPREDNISOLONE SODIUM SUCCINATE 60 MG: 125 INJECTION, POWDER, LYOPHILIZED, FOR SOLUTION INTRAMUSCULAR; INTRAVENOUS at 12:48

## 2021-09-02 RX ADMIN — BUDESONIDE AND FORMOTEROL FUMARATE DIHYDRATE 1 PUFF: 160; 4.5 AEROSOL RESPIRATORY (INHALATION) at 09:13

## 2021-09-02 RX ADMIN — ALBUTEROL SULFATE 2 PUFF: 90 AEROSOL, METERED RESPIRATORY (INHALATION) at 14:58

## 2021-09-02 RX ADMIN — SALINE NASAL SPRAY 1 SPRAY: 1.5 SOLUTION NASAL at 16:00

## 2021-09-02 RX ADMIN — BENZOCAINE AND MENTHOL 1 LOZENGE: 15; 3.6 LOZENGE ORAL at 09:13

## 2021-09-02 RX ADMIN — LEVOFLOXACIN 750 MG: 500 TABLET, FILM COATED ORAL at 14:58

## 2021-09-02 RX ADMIN — METHYLPREDNISOLONE SODIUM SUCCINATE 60 MG: 125 INJECTION, POWDER, LYOPHILIZED, FOR SOLUTION INTRAMUSCULAR; INTRAVENOUS at 00:33

## 2021-09-02 RX ADMIN — BUDESONIDE AND FORMOTEROL FUMARATE DIHYDRATE 1 PUFF: 160; 4.5 AEROSOL RESPIRATORY (INHALATION) at 19:26

## 2021-09-02 RX ADMIN — OXYCODONE HYDROCHLORIDE AND ACETAMINOPHEN 500 MG: 500 TABLET ORAL at 09:14

## 2021-09-02 ASSESSMENT — PAIN SCALES - GENERAL
PAINLEVEL_OUTOF10: 0

## 2021-09-02 NOTE — PROGRESS NOTES
[address] [place] [address] [facility]  Progress Note    SUBJECTIVE:  Chief Complaint   Patient presents with    Shortness of Breath     The patient is feeling better. Breathing is better today. Nonproductive cough. Review of systems:  As stated above in the chief complaint, otherwise negative. Medications:  Scheduled Meds:   doxycycline hyclate  100 mg Oral 2 times per day    methylPREDNISolone  60 mg IntraVENous Q12H    enoxaparin  60 mg SubCUTAneous Daily    levoFLOXacin  750 mg Oral Daily    sodium chloride  1 spray Each Nostril BID    albuterol sulfate HFA  2 puff Inhalation TID    vitamin C  500 mg Oral Daily    budesonide-formoterol  1 puff Inhalation BID    vitamin D  50,000 Units Oral Weekly    escitalopram  10 mg Oral Daily    montelukast  10 mg Oral Nightly    zinc sulfate  50 mg Oral Daily     Continuous Infusions:  PRN Meds:benzocaine-menthol, acetaminophen, ALPRAZolam, fluticasone, ibuprofen, pantoprazole    OBJECTIVE:  /66   Pulse 102   Temp 98.3 °F (36.8 °C) (Oral)   Resp 16   Ht 5' 6\" (1.676 m)   Wt 237 lb 4.8 oz (107.6 kg)   LMP 2021 (Exact Date)   SpO2 93%   BMI 38.30 kg/m²   Temp  Av °F (36.7 °C)  Min: 97.5 °F (36.4 °C)  Max: 98.3 °F (36.8 °C)  Constitutional: The patient is awake, alert, and oriented. Sitting up in bed. He is in some slight respiratory distress. 4L nasal cannula. Skin: Warm and dry. No rashes were noted. HEENT: Round and reactive pupils. Moist mucous membranes. No ulcerations or thrush. Neck: Supple to movements. Chest: No respiratory distress. No crackles. Cardiovascular: Heart sounds rhythmic and regular. Abdomen: Positive bowel sounds to auscultation. Benign to palpation. Extremities: No edema.   Lines: peripheral    Laboratory and Tests Review:  Lab Results   Component Value Date    WBC 12.6 (H) 2021    WBC 11.7 (H) 2021    WBC 12.4 (H) 2021    HGB 11.9 2021    HCT 38.6 08/31/2021    MCV 76.4 (L) 08/31/2021     08/31/2021     Lab Results   Component Value Date    NEUTROABS 10.08 (H) 08/31/2021    NEUTROABS 8.98 (H) 08/28/2021    NEUTROABS 9.29 (H) 08/27/2021     No results found for: CRP  Lab Results   Component Value Date    ALT 15 08/31/2021    AST 10 08/31/2021    ALKPHOS 76 08/31/2021    BILITOT 0.2 08/31/2021     Lab Results   Component Value Date     08/31/2021    K 4.5 08/31/2021    K 3.4 07/31/2021     08/31/2021    CO2 25 08/31/2021    BUN 15 08/31/2021    CREATININE 0.6 08/31/2021    CREATININE 0.5 08/28/2021    CREATININE 0.5 08/27/2021    GFRAA >60 08/31/2021    LABGLOM >60 08/31/2021    GLUCOSE 159 08/31/2021    GLUCOSE 94 09/03/2011    PROT 6.7 08/31/2021    LABALBU 3.8 08/31/2021    CALCIUM 8.8 08/31/2021    BILITOT 0.2 08/31/2021    ALKPHOS 76 08/31/2021    AST 10 08/31/2021    ALT 15 08/31/2021     Lab Results   Component Value Date    CRP 1.1 (H) 08/31/2021    CRP 2.3 (H) 08/03/2021     Lab Results   Component Value Date    SEDRATE 7 08/31/2021    SEDRATE 25 (H) 08/03/2021     Radiology:      Microbiology:   Streptococcus pneumoniae/Legionella urine Ag: negative   Rapid SARS-CoV-2: Negative  Blood cultures 8/27/2021: Negative so far    No results for input(s): PROCAL in the last 72 hours.     ASSESSMENT:  · SARS-CoV-2 infection with viral pneumonia, treated with Tocilizumab pending Remdesivir her earlier this month  · Possible post viral bacterial pneumonia  · Leukocytosis associated to the above and steroid    PLAN:  · Continue Levofloxacin and Doxycycline, day 4 of minimum 5  · Steroids  · Supportive treatment    Spoke with nursing    Snow Schwarz MD  11:47 AM  9/2/2021

## 2021-09-02 NOTE — CARE COORDINATION
9/2/2021  Social Work Discharge Planning:COVID NEG. Plan is home with her family. Pt is now at o2 baseline. Uses 4l at home via [facility] DME. Pt is no longer on IV ATB. No needs. Electronically signed by CHRISTINE Jones on 9/2/2021 at 10:39 AM

## 2021-09-02 NOTE — PROGRESS NOTES
Admit Date: 8/27/2021    Subjective:     Feels better comfortable down to 4L O2 NC     Objective:     Patient Vitals for the past 8 hrs:   BP Temp Temp src Pulse Resp SpO2   09/02/21 0030 132/65 97.8 °F (36.6 °C) Oral 79 16 96 %     No intake/output data recorded. No intake/output data recorded. HEENT: Normal  NECK: Thyroid normal. No carotid bruit. No lymphphadenopathy. CVS: RRR  RS: Clear. No wheeze. No rhonchi. Good airflow bilaterally. ABD: Soft. Non tender. No mass. Normal BS.obese   EXT: No edema. Non tender. Pulses present.    NEURO: Intact      Scheduled Meds:   doxycycline hyclate  100 mg Oral 2 times per day    methylPREDNISolone  60 mg IntraVENous Q12H    enoxaparin  60 mg SubCUTAneous Daily    levoFLOXacin  750 mg Oral Daily    sodium chloride  1 spray Each Nostril BID    albuterol sulfate HFA  2 puff Inhalation TID    vitamin C  500 mg Oral Daily    budesonide-formoterol  1 puff Inhalation BID    vitamin D  50,000 Units Oral Weekly    escitalopram  10 mg Oral Daily    montelukast  10 mg Oral Nightly    zinc sulfate  50 mg Oral Daily     Continuous Infusions:    CBC with Differential:    Lab Results   Component Value Date    WBC 12.6 08/31/2021    RBC 5.05 08/31/2021    HGB 11.9 08/31/2021    HCT 38.6 08/31/2021     08/31/2021    MCV 76.4 08/31/2021    MCH 23.6 08/31/2021    MCHC 30.8 08/31/2021    RDW 19.4 08/31/2021    NRBC 2.0 08/31/2021    SEGSPCT 75 09/03/2011    METASPCT 1.0 08/31/2021    LYMPHOPCT 17.0 08/31/2021    MONOPCT 3.0 08/31/2021    MYELOPCT 2.0 08/31/2021    BASOPCT 0.0 08/31/2021    MONOSABS 0.38 08/31/2021    LYMPHSABS 2.14 08/31/2021    EOSABS 0.00 08/31/2021    BASOSABS 0.00 08/31/2021     CMP:    Lab Results   Component Value Date     08/31/2021    K 4.5 08/31/2021    K 3.4 07/31/2021     08/31/2021    CO2 25 08/31/2021    BUN 15 08/31/2021    CREATININE 0.6 08/31/2021    GFRAA >60 08/31/2021    LABGLOM >60 08/31/2021    PROT 6.7 08/31/2021 LABALBU 3.8 08/31/2021    CALCIUM 8.8 08/31/2021    BILITOT 0.2 08/31/2021    ALKPHOS 76 08/31/2021    AST 10 08/31/2021    ALT 15 08/31/2021     PT/INR:    Lab Results   Component Value Date    PROTIME 12.7 08/27/2021    INR 1.2 08/27/2021       Assessment:     Active Problems:    Pneumonia due to COVID-19 virus    Pneumonia   hypoxia       Plan:   Continue support wean steroid per pulmonary

## 2021-09-02 NOTE — PROGRESS NOTES
Pulmonary Progress Note    Admit Date: 2021  Hospital day                               PCP: Brinda Jones MD    Chief Complaint (s):  Patient Active Problem List   Diagnosis    Abnormal Pap smear    Anemia    Threatened , antepartum    Vaginal bleeding before 22 weeks gestation    Right ovarian cyst     contractions    Threatened premature labor, third trimester    Maternal morbid obesity in third trimester, antepartum ([facility] [address]    28 weeks gestation of pregnancy    Suspected shortening of cervix not found    Previous  delivery in third trimester, antepartum    Encounter for fetal anatomic survey    Urinary tract infection during pregnancy, third trimester    Spotting complicating pregnancy, third trimester    Upper respiratory symptom    39 weeks gestation of pregnancy    Pneumonia due to COVID-19 virus    Pneumonia       Subjective:  · Chest radiograph shows significant improvement in aeration of both lungs as seen below.       Vitals:  VITALS:  /66   Pulse 102   Temp 98.3 °F (36.8 °C) (Oral)   Resp 16   Ht 5' 6\" (1.676 m)   Wt 237 lb 4.8 oz (107.6 kg)   LMP 2021 (Exact Date)   SpO2 93%   BMI 38.30 kg/m²     24HR INTAKE/OUTPUT:    No intake or output data in the 24 hours ending 21 1250    24HR PULSE OXIMETRY RANGE:    SpO2  Av.8 %  Min: 93 %  Max: 98 %    Medications:  IV:      Scheduled Meds:   sterile water        doxycycline hyclate  100 mg Oral 2 times per day    methylPREDNISolone  60 mg IntraVENous Q12H    enoxaparin  60 mg SubCUTAneous Daily    levoFLOXacin  750 mg Oral Daily    sodium chloride  1 spray Each Nostril BID    albuterol sulfate HFA  2 puff Inhalation TID    vitamin C  500 mg Oral Daily    budesonide-formoterol  1 puff Inhalation BID    vitamin D  50,000 Units Oral Weekly    escitalopram  10 mg Oral Daily    montelukast  10 mg Oral Nightly    zinc sulfate  50 mg Oral Daily       Diet: ADULT DIET; Regular  Adult Oral Nutrition Supplement; Wound Healing Oral Supplement     EXAM:  General: No distress. Alert. Eyes: PERRL. No sclera icterus. No conjunctival injection. ENT: No discharge. Pharynx clear. Neck: Trachea midline. Normal thyroid. Resp: No accessory muscle use. Scattered rales. No wheezing. no rhonchi. CV: Regular rate. Regular rhythm. No murmur or rub. Abd: Non-tender. Non-distended. No masses. No organomegaly. Normal bowel sounds. Skin: Warm and dry. No nodule on exposed extremities. No rash on exposed extremities. Ext: No cyanosis, clubbing, 2+ edema  Lymph: No cervical LAD. No supraclavicular LAD. M/S: No cyanosis. No joint deformity. No clubbing. Neuro: Awake. Follows commands. Positive pupils/gag/corneals. Normal pain response. Results:  CBC:   Recent Labs     08/31/21  1010   WBC 12.6*   HGB 11.9   HCT 38.6   MCV 76.4*        BMP:   Recent Labs     08/31/21  1010      K 4.5      CO2 25   BUN 15   CREATININE 0.6     LIVER PROFILE:   Recent Labs     08/31/21  1010   AST 10   ALT 15   BILITOT 0.2   ALKPHOS 76     PT/INR: No results for input(s): PROTIME, INR in the last 72 hours. APTT: No results for input(s): APTT in the last 72 hours. Pathology:  1. N/A      Microbiology:  1. None    Recent ABG:   No results for input(s): PH, PO2, PCO2, HCO3, BE, O2SAT, METHB, O2HB, COHB, O2CON, HHB, THB in the last 72 hours. Recent Films:  XR CHEST (2 VW)   Final Result   Improved aeration of the lungs compared with recent chest radiographs. Mild   persistent patchy bilateral airspace opacities remain. XR CHEST PORTABLE   Final Result   1. Minimal partial interval clearing of the right pulmonary infiltrates   2. The left lung is unchanged. [facility] US DUP LOWER EXTREMITY LEFT TONJA   Final Result   No evidence of DVT in the left lower extremity. CTA PULMONARY W CONTRAST   Final Result   No evidence of pulmonary emboli. Significantly worsening bilateral pulmonary airspace infiltrates compared to   the prior study, left greater than right, with areas of pulmonary airspace   consolidation on the current exam.         XR CHEST PORTABLE   Final Result   Worsening multifocal bilateral pneumonia when compared with the prior study   of 08/04/2021. Assessment:  1. Post Covid inflammation, not yet fibrosis. Plan:  1. Continue current dose of steroids. 2. Antibiotics per infectious disease    Time at the bedside, reviewing labs and radiographs, reviewing updated notes and consultations, discussing with staff and family was more than 35 minutes. Please note that voice recognition technology was used in the preparation of this note and make therefore it may contain inadvertent transcription errors. If the patient is a COVID 19 isolation patient, the above physical exam reflects that of the examining physician for the day. Danielle Figueroa MD,  M.D., F.C.C.P.     [facility], [address] [place] [address], [address] [place] [address]

## 2021-09-03 PROCEDURE — 6360000002 HC RX W HCPCS: Performed by: INTERNAL MEDICINE

## 2021-09-03 PROCEDURE — 6370000000 HC RX 637 (ALT 250 FOR IP): Performed by: SPECIALIST

## 2021-09-03 PROCEDURE — 2060000000 HC ICU INTERMEDIATE R&B

## 2021-09-03 PROCEDURE — 6370000000 HC RX 637 (ALT 250 FOR IP): Performed by: INTERNAL MEDICINE

## 2021-09-03 PROCEDURE — 2700000000 HC OXYGEN THERAPY PER DAY

## 2021-09-03 RX ORDER — DOXYCYCLINE HYCLATE 100 MG/1
100 CAPSULE ORAL EVERY 12 HOURS SCHEDULED
Qty: 10 CAPSULE | Refills: 0 | Status: SHIPPED | OUTPATIENT
Start: 2021-09-03 | End: 2021-09-08

## 2021-09-03 RX ADMIN — ENOXAPARIN SODIUM 60 MG: 60 INJECTION SUBCUTANEOUS at 16:41

## 2021-09-03 RX ADMIN — METHYLPREDNISOLONE SODIUM SUCCINATE 60 MG: 125 INJECTION, POWDER, LYOPHILIZED, FOR SOLUTION INTRAMUSCULAR; INTRAVENOUS at 16:41

## 2021-09-03 RX ADMIN — IBUPROFEN 800 MG: 800 TABLET, FILM COATED ORAL at 13:07

## 2021-09-03 RX ADMIN — SALINE NASAL SPRAY 1 SPRAY: 1.5 SOLUTION NASAL at 20:12

## 2021-09-03 RX ADMIN — FLUTICASONE PROPIONATE 1 SPRAY: 50 SPRAY, METERED NASAL at 10:14

## 2021-09-03 RX ADMIN — OXYCODONE HYDROCHLORIDE AND ACETAMINOPHEN 500 MG: 500 TABLET ORAL at 10:14

## 2021-09-03 RX ADMIN — BENZOCAINE AND MENTHOL 1 LOZENGE: 15; 3.6 LOZENGE ORAL at 20:12

## 2021-09-03 RX ADMIN — METHYLPREDNISOLONE SODIUM SUCCINATE 60 MG: 125 INJECTION, POWDER, LYOPHILIZED, FOR SOLUTION INTRAMUSCULAR; INTRAVENOUS at 00:01

## 2021-09-03 RX ADMIN — MONTELUKAST SODIUM 10 MG: 10 TABLET, FILM COATED ORAL at 20:12

## 2021-09-03 RX ADMIN — BENZOCAINE AND MENTHOL 1 LOZENGE: 15; 3.6 LOZENGE ORAL at 10:30

## 2021-09-03 RX ADMIN — ALBUTEROL SULFATE 2 PUFF: 90 AEROSOL, METERED RESPIRATORY (INHALATION) at 20:12

## 2021-09-03 RX ADMIN — BENZOCAINE AND MENTHOL 1 LOZENGE: 15; 3.6 LOZENGE ORAL at 13:09

## 2021-09-03 RX ADMIN — DOXYCYCLINE HYCLATE 100 MG: 100 CAPSULE ORAL at 20:11

## 2021-09-03 RX ADMIN — ZINC SULFATE 220 MG (50 MG) CAPSULE 50 MG: CAPSULE at 10:14

## 2021-09-03 RX ADMIN — BUDESONIDE AND FORMOTEROL FUMARATE DIHYDRATE 1 PUFF: 160; 4.5 AEROSOL RESPIRATORY (INHALATION) at 20:12

## 2021-09-03 RX ADMIN — ALPRAZOLAM 0.5 MG: 0.25 TABLET ORAL at 13:08

## 2021-09-03 RX ADMIN — DOXYCYCLINE HYCLATE 100 MG: 100 CAPSULE ORAL at 10:14

## 2021-09-03 RX ADMIN — ESCITALOPRAM 10 MG: 10 TABLET, FILM COATED ORAL at 10:14

## 2021-09-03 ASSESSMENT — PAIN SCALES - GENERAL
PAINLEVEL_OUTOF10: 0
PAINLEVEL_OUTOF10: 4
PAINLEVEL_OUTOF10: 0

## 2021-09-03 NOTE — PROGRESS NOTES
[address] [place] [address] [facility]  Progress Note    SUBJECTIVE:  Chief Complaint   Patient presents with    Shortness of Breath       The patient feels good. She wants to go home. Tolerating medications. No fevers. Review of systems:  As stated above in the chief complaint, otherwise negative. Medications:  Scheduled Meds:   doxycycline hyclate  100 mg Oral 2 times per day    methylPREDNISolone  60 mg IntraVENous Q12H    enoxaparin  60 mg SubCUTAneous Daily    levoFLOXacin  750 mg Oral Daily    sodium chloride  1 spray Each Nostril BID    albuterol sulfate HFA  2 puff Inhalation TID    vitamin C  500 mg Oral Daily    budesonide-formoterol  1 puff Inhalation BID    vitamin D  50,000 Units Oral Weekly    escitalopram  10 mg Oral Daily    montelukast  10 mg Oral Nightly    zinc sulfate  50 mg Oral Daily     Continuous Infusions:  PRN Meds:benzocaine-menthol, acetaminophen, ALPRAZolam, fluticasone, ibuprofen, pantoprazole    OBJECTIVE:  BP (!) 107/58 Comment: lg cuff  Pulse 70   Temp 97.5 °F (36.4 °C) (Temporal)   Resp 20   Ht 5' 6\" (1.676 m)   Wt 235 lb 3.2 oz (106.7 kg)   LMP 2021 (Exact Date)   SpO2 94%   BMI 37.96 kg/m²   Temp  Av.9 °F (36.6 °C)  Min: 97.5 °F (36.4 °C)  Max: 98.5 °F (36.9 °C)  Constitutional: The patient is awake, alert, and oriented. Sitting up in bed. He is in some slight respiratory distress. 4L nasal cannula. Skin: Warm and dry. No rashes were noted. HEENT: Round and reactive pupils. Moist mucous membranes. No ulcerations or thrush. Neck: Supple to movements. Chest: No respiratory distress. No crackles. Cardiovascular: Heart sounds rhythmic and regular. Abdomen: Positive bowel sounds to auscultation. Benign to palpation. Extremities: No edema.   Lines: peripheral    Laboratory and Tests Review:  Lab Results   Component Value Date    WBC 12.6 (H) 2021    WBC 11.7 (H) 2021    WBC 12.4 (H) 2021    HGB 11.9 08/31/2021    HCT 38.6 08/31/2021    MCV 76.4 (L) 08/31/2021     08/31/2021     Lab Results   Component Value Date    NEUTROABS 10.08 (H) 08/31/2021    NEUTROABS 8.98 (H) 08/28/2021    NEUTROABS 9.29 (H) 08/27/2021     No results found for: CRP  Lab Results   Component Value Date    ALT 15 08/31/2021    AST 10 08/31/2021    ALKPHOS 76 08/31/2021    BILITOT 0.2 08/31/2021     Lab Results   Component Value Date     08/31/2021    K 4.5 08/31/2021    K 3.4 07/31/2021     08/31/2021    CO2 25 08/31/2021    BUN 15 08/31/2021    CREATININE 0.6 08/31/2021    CREATININE 0.5 08/28/2021    CREATININE 0.5 08/27/2021    GFRAA >60 08/31/2021    LABGLOM >60 08/31/2021    GLUCOSE 159 08/31/2021    GLUCOSE 94 09/03/2011    PROT 6.7 08/31/2021    LABALBU 3.8 08/31/2021    CALCIUM 8.8 08/31/2021    BILITOT 0.2 08/31/2021    ALKPHOS 76 08/31/2021    AST 10 08/31/2021    ALT 15 08/31/2021     Lab Results   Component Value Date    CRP 1.1 (H) 08/31/2021    CRP 2.3 (H) 08/03/2021     Lab Results   Component Value Date    SEDRATE 7 08/31/2021    SEDRATE 25 (H) 08/03/2021     Radiology:      Microbiology:   Streptococcus pneumoniae/Legionella urine Ag: negative   Rapid SARS-CoV-2: Negative  Blood cultures 8/27/2021: Negative so far    No results for input(s): PROCAL in the last 72 hours. ASSESSMENT:  · SARS-CoV-2 infection with viral pneumonia, treated with Tocilizumab pending Remdesivir her earlier this month  · Possible post viral bacterial pneumonia  · Leukocytosis associated to the above and steroid    PLAN:  · Continue Doxycycline for another 5 days.   Reconciled  · Stop Levofloxacin  · The patient can be discharged from [address] [place] [address] with nursing    Jean-Paul Hudson MD  12:10 PM  9/3/2021

## 2021-09-03 NOTE — PROGRESS NOTES
Pulmonary Progress Note    Admit Date: 2021  Hospital day                               PCP: Aurea Castro MD    Chief Complaint (s):  Patient Active Problem List   Diagnosis    Abnormal Pap smear    Anemia    Threatened , antepartum    Vaginal bleeding before 22 weeks gestation    Right ovarian cyst     contractions    Threatened premature labor, third trimester    Maternal morbid obesity in third trimester, antepartum ([facility] [address]    28 weeks gestation of pregnancy    Suspected shortening of cervix not found    Previous  delivery in third trimester, antepartum    Encounter for fetal anatomic survey    Urinary tract infection during pregnancy, third trimester    Spotting complicating pregnancy, third trimester    Upper respiratory symptom    39 weeks gestation of pregnancy    Pneumonia due to COVID-19 virus    Pneumonia       Subjective:  · Seen this p.m., doing pretty well. Breathing has improved.       Vitals:  VITALS:  BP (!) 147/67   Pulse 95   Temp 98.5 °F (36.9 °C) (Oral)   Resp 20   Ht 5' 6\" (1.676 m)   Wt 235 lb 3.2 oz (106.7 kg)   LMP 2021 (Exact Date)   SpO2 95%   BMI 37.96 kg/m²     24HR INTAKE/OUTPUT:      Intake/Output Summary (Last 24 hours) at 9/3/2021 1643  Last data filed at 9/3/2021 0800  Gross per 24 hour   Intake 480 ml   Output --   Net 480 ml       24HR PULSE OXIMETRY RANGE:    SpO2  Av %  Min: 94 %  Max: 97 %    Medications:  IV:      Scheduled Meds:   doxycycline hyclate  100 mg Oral 2 times per day    methylPREDNISolone  60 mg IntraVENous Q12H    enoxaparin  60 mg SubCUTAneous Daily    sodium chloride  1 spray Each Nostril BID    albuterol sulfate HFA  2 puff Inhalation TID    vitamin C  500 mg Oral Daily    budesonide-formoterol  1 puff Inhalation BID    vitamin D  50,000 Units Oral Weekly    escitalopram  10 mg Oral Daily    montelukast  10 mg Oral Nightly    zinc sulfate  50 mg Oral Daily Diet:   ADULT DIET; Regular  Adult Oral Nutrition Supplement; Wound Healing Oral Supplement     EXAM:  General: No distress. Alert. Eyes: PERRL. No sclera icterus. No conjunctival injection. ENT: No discharge. Pharynx clear. Neck: Trachea midline. Normal thyroid. Resp: No accessory muscle use. Scattered rales. No wheezing. no rhonchi. CV: Regular rate. Regular rhythm. No murmur or rub. Abd: Non-tender. Non-distended. No masses. No organomegaly. Normal bowel sounds. Skin: Warm and dry. No nodule on exposed extremities. No rash on exposed extremities. Ext: No cyanosis, clubbing, 2+ edema  Lymph: No cervical LAD. No supraclavicular LAD. M/S: No cyanosis. No joint deformity. No clubbing. Neuro: Awake. Follows commands. Positive pupils/gag/corneals. Normal pain response. Results:  CBC:   No results for input(s): WBC, HGB, HCT, MCV, PLT in the last 72 hours. BMP:   No results for input(s): NA, K, CL, CO2, PHOS, BUN, CREATININE in the last 72 hours. Invalid input(s): CA  LIVER PROFILE:   No results for input(s): AST, ALT, LIPASE, BILIDIR, BILITOT, ALKPHOS in the last 72 hours. Invalid input(s): AMYLASE,  ALB  PT/INR: No results for input(s): PROTIME, INR in the last 72 hours. APTT: No results for input(s): APTT in the last 72 hours. Pathology:  1. N/A      Microbiology:  1. None    Recent ABG:   No results for input(s): PH, PO2, PCO2, HCO3, BE, O2SAT, METHB, O2HB, COHB, O2CON, HHB, THB in the last 72 hours. Recent Films:  XR CHEST (2 VW)   Final Result   Improved aeration of the lungs compared with recent chest radiographs. Mild   persistent patchy bilateral airspace opacities remain. XR CHEST PORTABLE   Final Result   1. Minimal partial interval clearing of the right pulmonary infiltrates   2. The left lung is unchanged. [facility] US DUP LOWER EXTREMITY LEFT TONJA   Final Result   No evidence of DVT in the left lower extremity.          CTA PULMONARY W CONTRAST Final Result   No evidence of pulmonary emboli. Significantly worsening bilateral pulmonary airspace infiltrates compared to   the prior study, left greater than right, with areas of pulmonary airspace   consolidation on the current exam.         XR CHEST PORTABLE   Final Result   Worsening multifocal bilateral pneumonia when compared with the prior study   of 08/04/2021. Assessment:  1. Post Covid inflammation, not yet fibrosis. Plan:  1. Continue current dose of steroids. 2. Antibiotics per infectious disease  3. Wean FiO2 as tolerated. Time at the bedside, reviewing labs and radiographs, reviewing updated notes and consultations, discussing with staff and family was more than 35 minutes. Please note that voice recognition technology was used in the preparation of this note and make therefore it may contain inadvertent transcription errors. If the patient is a COVID 19 isolation patient, the above physical exam reflects that of the examining physician for the day. Karley Suh MD,  M.D., F.C.C.P.     [facility], [address] [place] [address], [address] [place] [address]

## 2021-09-03 NOTE — PLAN OF CARE
Problem:  Activity:  Goal: Fatigue will decrease  Description: Fatigue will decrease  Outcome: Met This Shift     Problem: Falls - Risk of:  Goal: Will remain free from falls  Description: Will remain free from falls  Outcome: Met This Shift  Goal: Absence of physical injury  Description: Absence of physical injury  Outcome: Met This Shift

## 2021-09-03 NOTE — PROGRESS NOTES
9/3/2021  4:25 PM      Comprehensive Nutrition Assessment    Type and Reason for Visit:  Reassess    Nutrition Recommendations/Plan: Continue current diet and ONS to promote healing of open incision    Nutrition Assessment:  Pt feeling better now and discharge planning to home in progress. Recommend continue ONS d/t open incision on ankle. Malnutrition Assessment:  Malnutrition Status:  No malnutrition    Context:  Acute Illness     Findings of the 6 clinical characteristics of malnutrition:  Energy Intake:  No significant decrease in energy intake  Weight Loss:  No significant weight loss     Body Fat Loss:  No significant body fat loss     Muscle Mass Loss:  No significant muscle mass loss    Fluid Accumulation:  No significant fluid accumulation     Strength:  Not Performed    Estimated Daily Nutrient Needs:  Energy (kcal):   (15-18 kcal/kg); Weight Used for Energy Requirements:  Current     Protein (g):  80-90 (1.3-1.5 g/kg); Weight Used for Protein Requirements:  Ideal        Fluid (ml/day):  ; Method Used for Fluid Requirements:  1 ml/kcal      Nutrition Related Findings:  A&Ox4, +1 edema, on NC, +BS, I/O WNL      Wounds:  Surgical Incision, Open Wounds (on ankle)       Current Nutrition Therapies:    ADULT DIET; Regular  Adult Oral Nutrition Supplement;  Wound Healing Oral Supplement    Anthropometric Measures:  · Height: 5' 6\" (167.6 cm)  · Current Body Weight: 235 lb 3.2 oz (106.7 kg) (9/3 bedscale)   · Admission Body Weight: 236 lb (107 kg) (8/27 bedscale)    · Usual Body Weight: 238 lb 3.2 oz (108 kg) (7/9/21 actual)     · Ideal Body Weight: 130 lbs; % Ideal Body Weight 181.5 %   · BMI: 38  · BMI Categories: Obese Class 2 (BMI 35.0 -39.9)       Nutrition Diagnosis:   · Increased nutrient needs related to increase demand for energy/nutrients as evidenced by wounds      Nutrition Interventions:   Food and/or Nutrient Delivery:  Continue Oral Nutrition Supplement, Continue Current Diet  Nutrition Education/Counseling:  Education initiated (discussed importance of ONS for wound healing)   Coordination of Nutrition Care:  Continue to monitor while inpatient    Goals:  >75% at meals and ONS       Nutrition Monitoring and Evaluation:   Behavioral-Environmental Outcomes:  None Identified   Food/Nutrient Intake Outcomes:  Food and Nutrient Intake, Supplement Intake  Physical Signs/Symptoms Outcomes:  Biochemical Data, GI Status, Fluid Status or Edema, Nutrition Focused Physical Findings, Skin, Weight     Discharge Planning:    Continue Oral Nutrition Supplement     Electronically signed by Gera Galan RD, CNSC, LD on 9/3/21 at 4:25 PM EDT    Contact: [phone]

## 2021-09-03 NOTE — PROGRESS NOTES
Admit Date: 8/27/2021    Subjective:     Feels better breathing easier down to 4L O2 NC     Objective:     Patient Vitals for the past 8 hrs:   BP Temp Temp src Pulse Resp SpO2 Weight   09/03/21 0602 -- -- -- -- -- -- 235 lb 3.2 oz (106.7 kg)   09/03/21 0000 (!) 159/73 97.5 °F (36.4 °C) Temporal 80 16 97 % --     I/O last 3 completed shifts: In: 240 [P.O.:240]  Out: -   No intake/output data recorded. HEENT: Normal  NECK: Thyroid normal. No carotid bruit. No lymphphadenopathy. CVS: RRR  RS: Clear. No wheeze. No rhonchi. ABD: Soft. Non tender. No mass. Normal BS. EXT: No edema. Non tender. Pulses present. Skin intact.   NEURO: Intact      Scheduled Meds:   doxycycline hyclate  100 mg Oral 2 times per day    methylPREDNISolone  60 mg IntraVENous Q12H    enoxaparin  60 mg SubCUTAneous Daily    levoFLOXacin  750 mg Oral Daily    sodium chloride  1 spray Each Nostril BID    albuterol sulfate HFA  2 puff Inhalation TID    vitamin C  500 mg Oral Daily    budesonide-formoterol  1 puff Inhalation BID    vitamin D  50,000 Units Oral Weekly    escitalopram  10 mg Oral Daily    montelukast  10 mg Oral Nightly    zinc sulfate  50 mg Oral Daily     Continuous Infusions:    CBC with Differential:    Lab Results   Component Value Date    WBC 12.6 08/31/2021    RBC 5.05 08/31/2021    HGB 11.9 08/31/2021    HCT 38.6 08/31/2021     08/31/2021    MCV 76.4 08/31/2021    MCH 23.6 08/31/2021    MCHC 30.8 08/31/2021    RDW 19.4 08/31/2021    NRBC 2.0 08/31/2021    SEGSPCT 75 09/03/2011    METASPCT 1.0 08/31/2021    LYMPHOPCT 17.0 08/31/2021    MONOPCT 3.0 08/31/2021    MYELOPCT 2.0 08/31/2021    BASOPCT 0.0 08/31/2021    MONOSABS 0.38 08/31/2021    LYMPHSABS 2.14 08/31/2021    EOSABS 0.00 08/31/2021    BASOSABS 0.00 08/31/2021     CMP:    Lab Results   Component Value Date     08/31/2021    K 4.5 08/31/2021    K 3.4 07/31/2021     08/31/2021    CO2 25 08/31/2021    BUN 15 08/31/2021    CREATININE 0.6 08/31/2021    GFRAA >60 08/31/2021    LABGLOM >60 08/31/2021    PROT 6.7 08/31/2021    LABALBU 3.8 08/31/2021    CALCIUM 8.8 08/31/2021    BILITOT 0.2 08/31/2021    ALKPHOS 76 08/31/2021    AST 10 08/31/2021    ALT 15 08/31/2021     PT/INR:    Lab Results   Component Value Date    PROTIME 12.7 08/27/2021    INR 1.2 08/27/2021     CXR improvement     Assessment:     Active Problems:    Pneumonia due to COVID-19 virus    Pneumonia    Hypoxia improving       Plan:   Improvement,continue same

## 2021-09-03 NOTE — CARE COORDINATION
9/3/2021  Social Work Discharge Planning:COVID NEG. Plan is home with her family. Pt is at o2 baseline. Uses 4l at home via [facility] D[facility]. Pt is now PO ATB. No needs. . Electronically signed by CHRISTINE Canela on 9/3/2021 at 9:54 AM'

## 2021-09-04 VITALS
RESPIRATION RATE: 20 BRPM | DIASTOLIC BLOOD PRESSURE: 66 MMHG | WEIGHT: 232.6 LBS | BODY MASS INDEX: 37.38 KG/M2 | TEMPERATURE: 98.2 F | OXYGEN SATURATION: 97 % | HEIGHT: 66 IN | SYSTOLIC BLOOD PRESSURE: 124 MMHG | HEART RATE: 93 BPM

## 2021-09-04 PROCEDURE — 6360000002 HC RX W HCPCS: Performed by: INTERNAL MEDICINE

## 2021-09-04 PROCEDURE — 6370000000 HC RX 637 (ALT 250 FOR IP): Performed by: SPECIALIST

## 2021-09-04 PROCEDURE — 6370000000 HC RX 637 (ALT 250 FOR IP): Performed by: INTERNAL MEDICINE

## 2021-09-04 PROCEDURE — 99239 HOSP IP/OBS DSCHRG MGMT >30: CPT | Performed by: INTERNAL MEDICINE

## 2021-09-04 RX ORDER — METHYLPREDNISOLONE SODIUM SUCCINATE 125 MG/2ML
60 INJECTION, POWDER, LYOPHILIZED, FOR SOLUTION INTRAMUSCULAR; INTRAVENOUS EVERY 12 HOURS
Status: COMPLETED | OUTPATIENT
Start: 2021-09-04 | End: 2021-09-04

## 2021-09-04 RX ORDER — PREDNISONE 20 MG/1
20 TABLET ORAL DAILY
Qty: 26 TABLET | Refills: 0 | Status: SHIPPED | OUTPATIENT
Start: 2021-09-04 | End: 2021-09-14

## 2021-09-04 RX ORDER — METHYLPREDNISOLONE SODIUM SUCCINATE 40 MG/ML
40 INJECTION, POWDER, LYOPHILIZED, FOR SOLUTION INTRAMUSCULAR; INTRAVENOUS EVERY 12 HOURS
Status: DISCONTINUED | OUTPATIENT
Start: 2021-09-05 | End: 2021-09-04 | Stop reason: HOSPADM

## 2021-09-04 RX ADMIN — SALINE NASAL SPRAY 1 SPRAY: 1.5 SOLUTION NASAL at 10:00

## 2021-09-04 RX ADMIN — ALBUTEROL SULFATE 2 PUFF: 90 AEROSOL, METERED RESPIRATORY (INHALATION) at 14:09

## 2021-09-04 RX ADMIN — OXYCODONE HYDROCHLORIDE AND ACETAMINOPHEN 500 MG: 500 TABLET ORAL at 09:57

## 2021-09-04 RX ADMIN — BUDESONIDE AND FORMOTEROL FUMARATE DIHYDRATE 1 PUFF: 160; 4.5 AEROSOL RESPIRATORY (INHALATION) at 10:00

## 2021-09-04 RX ADMIN — ENOXAPARIN SODIUM 60 MG: 60 INJECTION SUBCUTANEOUS at 14:07

## 2021-09-04 RX ADMIN — DOXYCYCLINE HYCLATE 100 MG: 100 CAPSULE ORAL at 09:57

## 2021-09-04 RX ADMIN — ESCITALOPRAM 10 MG: 10 TABLET, FILM COATED ORAL at 11:36

## 2021-09-04 RX ADMIN — ZINC SULFATE 220 MG (50 MG) CAPSULE 50 MG: CAPSULE at 09:57

## 2021-09-04 RX ADMIN — FLUTICASONE PROPIONATE 1 SPRAY: 50 SPRAY, METERED NASAL at 10:01

## 2021-09-04 RX ADMIN — METHYLPREDNISOLONE SODIUM SUCCINATE 60 MG: 125 INJECTION, POWDER, LYOPHILIZED, FOR SOLUTION INTRAMUSCULAR; INTRAVENOUS at 14:07

## 2021-09-04 RX ADMIN — METHYLPREDNISOLONE SODIUM SUCCINATE 60 MG: 125 INJECTION, POWDER, LYOPHILIZED, FOR SOLUTION INTRAMUSCULAR; INTRAVENOUS at 01:21

## 2021-09-04 RX ADMIN — ALBUTEROL SULFATE 2 PUFF: 90 AEROSOL, METERED RESPIRATORY (INHALATION) at 10:00

## 2021-09-04 ASSESSMENT — PAIN SCALES - GENERAL: PAINLEVEL_OUTOF10: 0

## 2021-09-04 NOTE — PROGRESS NOTES
[facility]  [address] [facility], [address], [address] [facility]      Pulmonary Progress Note      SUBJECTIVE:  Claims doing some better with breathing, minimal cough and not much sputum production, lying down on her right side on 4 li NC, getting up and moving around and appears to be tolerating    OBJECTIVE    Medications    Continuous Infusions:    Scheduled Meds:   doxycycline hyclate  100 mg Oral 2 times per day    methylPREDNISolone  60 mg IntraVENous Q12H    enoxaparin  60 mg SubCUTAneous Daily    sodium chloride  1 spray Each Nostril BID    albuterol sulfate HFA  2 puff Inhalation TID    vitamin C  500 mg Oral Daily    budesonide-formoterol  1 puff Inhalation BID    vitamin D  50,000 Units Oral Weekly    escitalopram  10 mg Oral Daily    montelukast  10 mg Oral Nightly    zinc sulfate  50 mg Oral Daily       PRN Meds:benzocaine-menthol, acetaminophen, ALPRAZolam, fluticasone, ibuprofen, pantoprazole    Physical    VITALS:  /74   Pulse 100   Temp 99.5 °F (37.5 °C) (Oral)   Resp 16   Ht 5' 6\" (1.676 m)   Wt 232 lb 9.6 oz (105.5 kg)   LMP 2021 (Exact Date)   SpO2 95%   BMI 37.54 kg/m²     24HR INTAKE/OUTPUT:    No intake or output data in the 24 hours ending 21 0941    24HR PULSE OXIMETRY RANGE:    SpO2  Av.7 %  Min: 94 %  Max: 95 %    General appearance: alert, appears stated age and cooperative, obese  Lungs: rhonchi bibasilar minimal  Heart: regular rate and rhythm, S1, S2 normal, no murmur, click, rub or gallop  Abdomen: soft, non-tender; bowel sounds normal; no masses,  no organomegaly  Extremities: extremities normal, atraumatic, no cyanosis or edema  Neurologic: Mental status: Alert, oriented, thought content appropriate    Data    CBC: No results for input(s): WBC, HGB, HCT, MCV, PLT in the last 72 hours.     BMP:No results for input(s): NA, K, CL, CO2, PHOS, BUN, CREATININE in the last 72 hours.    Invalid input(s): CA ALB:3,BILIDIR:3,BILITOT:3,ALKPHOS:3)@    PT/INR: No results for input(s): PROTIME, INR in the last 72 hours. ABG:   No results for input(s): PH, PO2, PCO2, HCO3, BE, O2SAT, METHB, O2HB, COHB, O2CON, HHB, THB in the last 72 hours. Radiology/Other tests reviewed: none    Assessment:     Active Problems:    Pneumonia due to COVID-19 virus    Pneumonia  Resolved Problems:    * No resolved hospital problems. *      Plan:       1. Cont with steroids, will see if can decrease tomorrow if remains stable  2. Cont with oxygen, taper as tolerated, was on 4 li when discharged last time  3. Cont with mdi, observe respiratory function and cough  4. Cont with antibiotics as per ID  5. OOB to chair, ambulate as tolerated  6. (?) possible discharge in a few days      Time at the bedside, reviewing labs and radiographs, reviewing notes and consultations, discussing with staff and family was more than 35 minutes. Thanks for letting us see this patient in consultation. Please contact us with any questions. Office [phone] or after hours through icomply, x [phone].

## 2021-09-04 NOTE — PROGRESS NOTES
Patient SPO2 at rest on 4L NC 98%, Patient SPO2 after getting out of bed, standing up and sitting back down dropped to 81% on 4L O2. Patient Spo2 recovered to 91% on 4L while sitting in bed within 5-10 minutes. ..

## 2021-09-04 NOTE — PROGRESS NOTES
[address] [place] [address] [facility]  MACIEJIDA  Progress Note    SUBJECTIVE:  Chief Complaint   Patient presents with    Shortness of Breath     Patient feels better. Tolerating medications and antibiotics. Wants to go home. Review of systems:  As stated above in the chief complaint, otherwise negative. Medications:  Scheduled Meds:   [START ON 2021] methylPREDNISolone  40 mg IntraVENous Q12H    methylPREDNISolone  60 mg IntraVENous Q12H    doxycycline hyclate  100 mg Oral 2 times per day    enoxaparin  60 mg SubCUTAneous Daily    sodium chloride  1 spray Each Nostril BID    albuterol sulfate HFA  2 puff Inhalation TID    vitamin C  500 mg Oral Daily    budesonide-formoterol  1 puff Inhalation BID    vitamin D  50,000 Units Oral Weekly    escitalopram  10 mg Oral Daily    montelukast  10 mg Oral Nightly    zinc sulfate  50 mg Oral Daily     Continuous Infusions:  PRN Meds:benzocaine-menthol, acetaminophen, ALPRAZolam, fluticasone, ibuprofen, pantoprazole    OBJECTIVE:  BP (!) 158/82   Pulse 72   Temp 98.5 °F (36.9 °C) (Oral)   Resp 20   Ht 5' 6\" (1.676 m)   Wt 232 lb 9.6 oz (105.5 kg)   LMP 2021 (Exact Date)   SpO2 97%   BMI 37.54 kg/m²   Temp  Av.6 °F (37 °C)  Min: 98 °F (36.7 °C)  Max: 99.5 °F (37.5 °C)  Constitutional: The patient is awake, alert, and oriented. Sitting up in bed. He is in some slight respiratory distress. 4L nasal cannula. Skin: Warm and dry. No rashes were noted. HEENT: Round and reactive pupils. Moist mucous membranes. No ulcerations or thrush. Neck: Supple to movements. Chest: No respiratory distress. No crackles. Cardiovascular: Heart sounds rhythmic and regular. Abdomen: Positive bowel sounds to auscultation. Benign to palpation. Extremities: No edema.   Lines: peripheral    Laboratory and Tests Review:  Lab Results   Component Value Date    WBC 12.6 (H) 2021    WBC 11.7 (H) 2021    WBC 12.4 (H) 2021    HGB 11.9 08/31/2021    HCT 38.6 08/31/2021    MCV 76.4 (L) 08/31/2021     08/31/2021     Lab Results   Component Value Date    NEUTROABS 10.08 (H) 08/31/2021    NEUTROABS 8.98 (H) 08/28/2021    NEUTROABS 9.29 (H) 08/27/2021     No results found for: CRP  Lab Results   Component Value Date    ALT 15 08/31/2021    AST 10 08/31/2021    ALKPHOS 76 08/31/2021    BILITOT 0.2 08/31/2021     Lab Results   Component Value Date     08/31/2021    K 4.5 08/31/2021    K 3.4 07/31/2021     08/31/2021    CO2 25 08/31/2021    BUN 15 08/31/2021    CREATININE 0.6 08/31/2021    CREATININE 0.5 08/28/2021    CREATININE 0.5 08/27/2021    GFRAA >60 08/31/2021    LABGLOM >60 08/31/2021    GLUCOSE 159 08/31/2021    GLUCOSE 94 09/03/2011    PROT 6.7 08/31/2021    LABALBU 3.8 08/31/2021    CALCIUM 8.8 08/31/2021    BILITOT 0.2 08/31/2021    ALKPHOS 76 08/31/2021    AST 10 08/31/2021    ALT 15 08/31/2021     Lab Results   Component Value Date    CRP 1.1 (H) 08/31/2021    CRP 2.3 (H) 08/03/2021     Lab Results   Component Value Date    SEDRATE 7 08/31/2021    SEDRATE 25 (H) 08/03/2021     Radiology:      Microbiology:   Streptococcus pneumoniae/Legionella urine Ag: negative   Rapid SARS-CoV-2: Negative  Blood cultures 8/27/2021: Negative so far    No results for input(s): PROCAL in the last 72 hours. ASSESSMENT:  · SARS-CoV-2 infection with viral pneumonia, treated with Tocilizumab pending Remdesivir her earlier this month  · Possible post viral bacterial pneumonia  · Leukocytosis associated to the above and steroid    PLAN:  · Continue Doxycycline for another 4 days.   Reconciled  · The patient can be discharged from [address] [place] [address] with nursing    Daniela Mora MD  10:46 AM  9/4/2021

## 2021-09-04 NOTE — DISCHARGE SUMMARY
[facility] Physician Discharge Summary       Wilfrid Fitzpatrick MD  [address] [place] [address]  [address]  [phone]      As needed    Abimael Lima MD  [address] [facility]  [address] [place] [address]  [address]  [phone]    In 2 days        Activity level: As tolerated     Dispo: Home      Condition on discharge: Stable     Patient ID:  Kaleb Andrews  [id number]  1 y.o.  1985    Admit date: 8/27/2021    Discharge date and time:  9/4/2021  3:06 PM    Admission Diagnoses: Active Problems:    Pneumonia due to COVID-19 virus    Pneumonia  Resolved Problems:    * No resolved hospital problems. *      Discharge Diagnoses: Active Problems:    Pneumonia due to COVID-19 virus    Pneumonia  Resolved Problems:    * No resolved hospital problems. *      Consults:  IP CONSULT TO PRIMARY CARE PROVIDER  IP CONSULT TO PULMONOLOGY  IP CONSULT TO IV TEAM  IP CONSULT TO INFECTIOUS DISEASES    Procedures: none    Hospital Course:   Patient Kaleb Andrews is a 38 y.o. presented with Hypoxia [R09.02]  Pneumonia due to 2019 novel coronavirus [U07.1, J12.82]  Pneumonia due to COVID-19 virus [U07.1, J12.82]  Pneumonia [J259]    42-year-old female presents with post Covid inflammation. She was seen by pulmonology. Patient was started on IV steroids. Infectious disease was also consulted and patient was treated with doxycycline. Patient uses 3 to 4 L at home and is currently at that oxygen level. Patient currently is medically stable for discharge. She will be going home on oxygen, prednisone taper. Patient is to follow-up with neurology as well as PCP.   Patient has been educated to return to the ER should there be fevers worsening shortness of breath    Discharge Exam:    General Appearance: alert and oriented to person, place and time and in no acute distress  Skin: warm and dry  Head: normocephalic and atraumatic  Eyes: pupils equal, round, and reactive to light, extraocular eye movements intact, conjunctivae normal  Neck: neck supple and non tender without mass   Pulmonary/Chest: clear to auscultation bilaterally- no wheezes, rales or rhonchi, normal air movement, no respiratory distress  Cardiovascular: normal rate, normal S1 and S2 and no carotid bruits  Abdomen: soft, non-tender, non-distended, normal bowel sounds, no masses or organomegaly  Extremities: no cyanosis, no clubbing and no edema  Neurologic: no cranial nerve deficit and speech normal    I/O last 3 completed shifts: In: [de-identified] [P.O.:80]  Out: -   No intake/output data recorded. LABS:  No results for input(s): NA, K, CL, CO2, BUN, CREATININE, GLUCOSE, CALCIUM in the last 72 hours. No results for input(s): WBC, RBC, HGB, HCT, MCV, MCH, MCHC, RDW, PLT, MPV in the last 72 hours. No results for input(s): POCGLU in the last 72 hours. Imaging:  XR CHEST PORTABLE    Result Date: 8/27/2021  EXAMINATION: ONE XRAY VIEW OF THE CHEST 8/27/2021 7:02 am COMPARISON: 08/04/2021 and previous CT of the chest of 08/03/2021. HISTORY: ORDERING SYSTEM PROVIDED HISTORY: chest pain TECHNOLOGIST PROVIDED HISTORY: Reason for exam:->chest pain FINDINGS: Multifocal bilateral pulmonary infiltrates are noted. In comparison with the patient's prior study of 08/04/2021 the multifocal bilateral pulmonary infiltrates are now more widespread and are more dense consistent with worsening pneumonia. The cardiac silhouette is within normal limits. Worsening multifocal bilateral pneumonia when compared with the prior study of 08/04/2021. CTA PULMONARY W CONTRAST    Result Date: 8/27/2021  EXAMINATION: CTA OF THE CHEST 8/27/2021 7:27 am TECHNIQUE: CTA of the chest was performed after the administration of intravenous contrast.  Multiplanar reformatted images are provided for review. MIP images are provided for review.  Dose modulation, iterative reconstruction, and/or weight based adjustment of the mA/kV was utilized to reduce the radiation dose to as low as reasonably achievable. COMPARISON: 08/03/2021 HISTORY: ORDERING SYSTEM PROVIDED HISTORY: recent covid infection SOB Sao2 62 % 6 l NC TECHNOLOGIST PROVIDED HISTORY: Reason for exam:->recent covid infection SOB Sao2 62 % 6 l NC Decision Support Exception - unselect if not a suspected or confirmed emergency medical condition->Emergency Medical Condition (MA) FINDINGS: Pulmonary Arteries: Pulmonary arteries are adequately opacified for evaluation. No evidence of intraluminal filling defect to suggest pulmonary embolism. Main pulmonary artery is normal in caliber. Mediastinum: No evidence of mediastinal lymphadenopathy. The heart and pericardium demonstrate no acute abnormality. There is no acute abnormality of the thoracic aorta. Lungs/pleura: There are worsening bilateral pulmonary airspace infiltrates with pulmonary airspace consolidation compared to the prior study, left greater than right. No effusion or pneumothorax. Upper Abdomen: Limited images of the upper abdomen are unremarkable. Soft Tissues/Bones: No acute bone or soft tissue abnormality. No evidence of pulmonary emboli. Significantly worsening bilateral pulmonary airspace infiltrates compared to the prior study, left greater than right, with areas of pulmonary airspace consolidation on the current exam.     US DUP LOWER EXTREMITY LEFT TONJA    Result Date: 8/27/2021  EXAMINATION: DUPLEX VENOUS ULTRASOUND OF THE LEFT LOWER EXTREMITY 8/27/2021 6:20 am TECHNIQUE: Duplex ultrasound using B-mode/gray scaled imaging and Doppler spectral analysis and color flow was obtained of the left lower extremity. COMPARISON: None. HISTORY: ORDERING SYSTEM PROVIDED HISTORY: dvt TECHNOLOGIST PROVIDED HISTORY: Reason for exam:->dvt What reading provider will be dictating this exam?->CRC FINDINGS: The visualized veins of the left lower extremity are patent and free of echogenic thrombus.  The veins demonstrate good compressibility with normal color flow study and spectral analysis. No evidence of DVT in the left lower extremity.        Patient Instructions:      Medication List      START taking these medications    doxycycline hyclate 100 MG capsule  Commonly known as: VIBRAMYCIN  Take 1 capsule by mouth every 12 hours for 5 days     predniSONE 20 MG tablet  Commonly known as: DELTASONE  Take 1 tablet by mouth daily for 10 days Take 3 tabs po bid times 2 days, take 2 tabs po bid times 2 days, take 1 tab po bid times 2 days, take 1 tab po qday times 2 days     sodium chloride 0.65 % nasal spray  Commonly known as: LIO, BABY AYR  1 spray by Nasal route 2 times daily        CONTINUE taking these medications    acetaminophen 500 MG tablet  Commonly known as: TYLENOL     budesonide-formoterol 160-4.5 MCG/ACT Aero  Commonly known as: SYMBICORT     ergocalciferol 1.25 MG (96941 UT) capsule  Commonly known as: ERGOCALCIFEROL     escitalopram 10 MG tablet  Commonly known as: LEXAPRO     fluticasone 50 MCG/ACT nasal spray  Commonly known as: FLONASE     ibuprofen 800 MG tablet  Commonly known as: ADVIL;MOTRIN  Take 1 tablet by mouth every 8 hours as needed for Pain     montelukast 10 MG tablet  Commonly known as: SINGULAIR     OXYGEN     pantoprazole 40 MG tablet  Commonly known as: PROTONIX     rizatriptan 10 MG disintegrating tablet  Commonly known as: MAXALT-MLT     VITAMIN C PO     ZINC PO        STOP taking these medications    ALPRAZolam 0.5 MG tablet  Commonly known as: Ger Arana           Where to Get Your Medications      [facility] [address] [phone]  [address] [place] [id number]    Phone: [phone]   · doxycycline hyclate 100 MG capsule     You can get these medications from any pharmacy    Bring a paper prescription for each of these medications  · predniSONE 20 MG tablet  · sodium chloride 0.65 % nasal spray           Note that more than 30 minutes was spent in preparing discharge papers, discussing discharge with patient, medication review, etc.    Signed:  Electronically signed by Nidhi Austin DO on 9/4/2021 at 3:06 PM

## 2021-09-07 ENCOUNTER — CARE COORDINATION (OUTPATIENT)
Dept: OTHER | Facility: CLINIC | Age: 36
End: 2021-09-07

## 2021-09-07 NOTE — CARE COORDINATION
[facility] Transitions Initial Follow Up Call    Call within 2 business days of discharge: Yes    Patient: Jonathan Guajardo Patient : 1985   MRN: [id number]  Reason for Admission: COVID pneumonia, Hypoxia, Acute respiratory failure  Discharge Date: 21 RARS: Readmission Risk Score: 11      Last Discharge [facility]       Complaint Diagnosis Description Type Department Provider    21 Shortness of Breath Pneumonia due to 2019 novel coronavirus . .. ED to Hosp-Admission (Discharged) (ADMITTED) HOWARD LuW REY Velásquez, D... Spoke with: N/A    Facility: [address] [place] [address],[address]    [facility] attempted to reach patient for Care Transitions call. HIPAA compliant message left requesting a return phone call at patients convenience. Will continue to follow. Follow Up  Future Appointments   Date Time Provider [place]   2021 10:30 AM [facility] 1 SEYZ [facility]   2021 11:30 AM KRISTYN Magana - CNP [facility]   10/12/2021  9:30 AM Franklyn Granger DO [facility] Luis Alberto Parekh RN BSN  Associate Care Manager  Phone: [phone]  Email: [email or web address]

## 2021-09-08 ENCOUNTER — CARE COORDINATION (OUTPATIENT)
Dept: OTHER | Facility: CLINIC | Age: 36
End: 2021-09-08

## 2021-09-08 NOTE — CARE COORDINATION
[facility]ie [facility] Transitions Initial Follow Up Call    Call within 2 business days of discharge: Yes    Patient: Tab Herndon Patient : 1985   MRN: [id number]  Reason for Admission: COVID pneumonia, Hypoxia, Acute respiratory failure  Discharge Date: 21 RARS: Readmission Risk Score: 11      Last Discharge [facility]       Complaint Diagnosis Description Type Department Provider    21 Shortness of Breath Pneumonia due to 2019 novel coronavirus . .. ED to Hosp-Admission (Discharged) (ADMITTED) HOWARD 4W Juliet Quintana DO; LUCINDA Whittaker. Spoke with: n/a    Facility: [facility] attempted to reach patient for Care Transitions call. HIPAA compliant message left requesting a return phone call at patients convenience. Will continue to follow.        Griselda Mcbride RN

## 2021-09-10 ENCOUNTER — CARE COORDINATION (OUTPATIENT)
Dept: OTHER | Facility: CLINIC | Age: 36
End: 2021-09-10

## 2021-09-10 NOTE — CARE COORDINATION
after recent readmission related to COVID pneumonia. Patient reports \"everything going a little better now\" and states \"still tired\". States had PCP and Dr. Darryle Jain appointments this week and was told will take a while for fatigue to improve/resolve. States she still has \"dry, hack\" cough that she states \"feels like something is there\" but denies coughing up mucous. States had taken Mucinex since being home. States still feeling urinary pressure when coughing and with issues of urinary incontinence. Patient states had first treatment of Epi Fix yesterday at Dr. Sandra Griffiths office. States treatment was initially denied with [facility] due to indicated for DM patients. States Dr. Darryle Jain was not aware of patient's diet controlled DM. States when request was re-submitted with stating patient's DM history, it was approved. Patient reports tendon at site \"feels really strong\". Patient states op site still open but improving. Patient states had PCP office visit Wednesday this week. States PCP completing FMLA and short term disability paperwork for her. Reports has not yet received short term disability payment but is getting financial assistance from mother until payments are received. Patient reports sleep as \"still crappy\" and states has been \"taking it easy\" since recent hospital discharge. States she has now realized that it will take time for her to fully recover due to the amount of physical stress and illness body has been through in the past few months. Reports desats to 80s when up and around. States wearing supplemental oxygen all the time. States most of the time pulse ox 94-95% on 4 LPM supplemental oxygen. Reports highest pulse ox since recent hospital discharge 98% which was with rest and while wearing supplemental oxygen. Patient states home alone with her 3year old son during the day but states her mother and boyfriend check on her frequently via phone.  States she did not get discharged with [facility] and denies need for [facility] (as she is a [employer] nurse). States will finish Prednisone RX on Sunday, Sept 12. Reports already finished Doxycycline RX. Patient  has been monitoring BP at home and has been normal. Reports being down total of 20 lbs since initially getting COVID.  was given Lasix while inpatient. Denies lower extremity swelling since getting home. Patient reports appetite improving. States continues to use incentive spirometer and now getting up to 2000 mL. Reports overall feeling \"better\" in the last few days since being home. States still with brain \"fog\" occasionally but less frequent. ACM discussed importance of regular A1C monitoring and notified of last A1C listed in Epic dated November 2020. Patient states she will request A1C at follow up in October. ACM reviewed discharge instructions, medical action plan and red flags with patient who verbalized understanding. Patient given an opportunity to ask questions and does not have any further questions or concerns at this time. Were discharge instructions available to patient? Yes. Reviewed appropriate site of care based on symptoms and resources available to patient including: PCP, Specialist, Benefits related nurse triage line, When to call 911 and [address]. The patient agrees to contact the PCP office for questions related to their healthcare. Medication reconciliation was performed with patient, who verbalizes understanding of administration of home medications. Advised obtaining a 90-day supply of all daily and as-needed medications. Covid Risk Education     Educated patient about risk for severe COVID-19 due to risk factors according to CDC guidelines. ACM reviewed discharge instructions, medical action plan and red flag symptoms with the patient who verbalized understanding. Discussed COVID vaccination status: Yes and reports she has not yet had COVID vaccine but plans to get vaccinated when eligible (post COVID). Education provided on COVID-19 vaccination as appropriate. Discussed exposure protocols and quarantine with CDC Guidelines. Patient was given an opportunity to verbalize any questions and concerns and agrees to contact ACM or health care provider for questions related to their healthcare. Reviewed and educated patient on any new and changed medications related to discharge diagnosis. Was patient discharged with a pulse oximeter? Yes and has pulse oximeter at home. Discussed and confirmed pulse oximeter discharge instructions and when to notify provider or seek emergency care. ACM provided contact information. Plan for follow-up call in 3-5 days based on severity of symptoms and risk factors. Plan for next call: follow up regarding recovery progression and reassess for needs    Goals        Patient Blas Snider  My incision to left foot will heal. (pt-stated)       My incision to left foot will heal.    Barriers: none  Plan for overcoming my barriers: N/A  Confidence: 8/10  Anticipated Goal Completion Date: 10/1/2021    7/26/2021: Patient reports incision to left foot open. Patient following up with podiatry. 8/9/2021: Patient states incision still open. States has been cleansing site with betadine and covered with dry sterile dressing. [facility] called Dr. Neeru Lopez office to schedule office visit for tomorrow @ [phone].    8/18/2021: Patient reports achilles incision improving. [facility] Dr. Avelina Flannery had planned for I&D surgery but decision was made to do in office procedure instead. 9/10/2021: Patient had first Epi-fix treatment at Dr. Neeru Lopez office yesterday. States tendon site still open but improving in appearance. Other      Conditions and Symptoms       I will schedule office visits, as directed by my provider. I will keep my appointment or reschedule if I have to cancel. I will notify my provider of any barriers to my plan of care.   I will notify my provider of any symptoms that indicate a worsening of my condition. Barriers: overwhelmed by complexity of regimen and stress  Plan for overcoming my barriers: I will work with my ACM to overcome barriers. Confidence: 8/10  Anticipated Goal Completion Date: 10/1/2021    8/9/2021: ACM called and scheduled appts with PCP, Dr. Latonya Bennett, Dr. Erika Cui. Also called Dr. Rossana Brito office to notify of patient c/o stress incontinence. ACM discussed red flags and nurse access line with patient this date. 8/18/2021: Patient states had follow up with PCP recently. Also has had follow ups with Dr. Latonya Bennett weekly for a couple weeks. Next appt with Dr. Latonya Bennett is next week. 9/10/2021: Patient had PCP and Dr. Latonya Bennett follow up this week. States has pulmonology f/u 9/23/2021. Follow Up  Future Appointments   Date Time Provider Rose Ga   9/22/2021 10:30 AM [facility]DIS RM 1 SEYZ [facility]   9/22/2021 11:30 AM [facility]   9/23/2021  2:00 PM KRISTYN Omalley CNP [facility]   10/12/2021  9:30 AM DO XIN Oates [facility]     Follow-up With  Details  Why  Contact Info   Gwen Loomis MD  On 9/8/2021    [address] [place] [id number] [phone]   Gentry Jeffers DPM  On 9/16/2021  Epi-Fix #2  [address]  [facility] [address] [place]         Jsamina Espinoza RN BSN  Associate Care Manager  Phone: [phone]  Email: [email or web address]

## 2021-09-17 ENCOUNTER — CARE COORDINATION (OUTPATIENT)
Dept: OTHER | Facility: CLINIC | Age: 36
End: 2021-09-17

## 2021-09-17 NOTE — CARE COORDINATION
[facility] Transitions Follow Up Call    2021    Patient: Xin Pineda  Patient : 1985   MRN: [id number]  Reason for Admission: COVID pneumonia, Hypoxia, Acute respiratory failure  Discharge Date: 21 RARS: Readmission Risk Score: 11         Spoke with: Grayson Craft, patient     Care Transitions Subsequent and Final Call    Subsequent and Final Calls  Do you have any ongoing symptoms?: Yes  Onset of Patient-reported symptoms: Other  Patient-reported symptoms: Cough, Shortness of Breath, Fatigue  Have your medications changed?: Yes  Patient Reports: Finished Prednisone  Do you have any questions related to your medications?: No  Do you currently have any active services?: No  Do you have any needs or concerns that I can assist you with?: No  Identified Barriers: Stress  Care Transitions Interventions  Other Interventions:         Patient contacted regarding COVID-19 diagnosis and pulse oximeter ordered at discharge. Discussed COVID-19 related testing which was available at this time. Test results were positive. Patient informed of results, if available? No and patient already aware of COVID positive result. Ambulatory Care Manager contacted the patient by telephone to perform follow-up assessment. Verified name and  with patient as identifiers. Patient has following risk factors of: diabetes. ACM called patient for CT inpatient follow up. Patient reports COVID symptoms continue to slowly improve. States she noticed being \"more winded\" and with \"panting breathing\" yesterday when outside and weather with increased humidity. States she has been driving self to Dr. Elba Rivera appointments as the office is close to her residence and she is able to park right in front of the door. Reports her mother takes her to the other provider appointments. Patient reports still \"extremely, extremely exhausted\". States gets [employer]" after getting her 2 older children off to school every morning. States sleep \"still broken\" and poor. Reports tries to go to sleep for nap in the morning but is unable to sleep. Patient c/o \"my tongue hurts so bad\" and states believes this is secondary to the frequent use of cough drops, lonzenges that contain menthol. States she has discontinued use of these items and is now using Mucus relief PRN. States has been following bland diet and avoiding hot fluids. States \"feels like I burned my taste buds off\". Patient states had #2 Epi-fix yesterday at Dr. Jordan Knox office for open incision site. States Dr. Arlene Neumann happy with how site is looking. Patient states Dr. Arlene Neumann said it normally takes 10 Epi-fix applications but dr told patient she may not need all 10 applications. Patient reports site still open and still able to see the tendon to site when moving her foot a certain way. Patient states has pulmonology, breast center, and Dr. Arlene Neumann appts next week and mentioned she will be \"exhausted\" by next Friday. ACM offered to follow up with patient in 10-14 days; patient agreeable to this plan. Patient states working on getting enough fluids in. States has been working on reminding self to move around throughout the day. States still using incentive spirometer and monitoring pulse ox. States frequency of cough has decreased. Reports BP \"good\". States pulse ox \"good\" but states it drops when she uses stairs inside her home. States knows to pace self and will rest on the steps for a couple minutes if needed. Symptoms reviewed with patient who verbalized the following symptoms: fatigue, shortness of breath, no new symptoms and no worsening symptoms. Due to no new or worsening symptoms encounter was not routed to provider for escalation. Educated patient about risk for severe COVID-19 due to risk factors according to CDC guidelines. ACM reviewed discharge instructions, medical action plan and red flag symptoms with the patient who verbalized understanding. Discussed COVID vaccination status: No. Education provided on COVID-19 vaccination as appropriate. Discussed exposure protocols and quarantine with CDC Guidelines. Patient was given an opportunity to verbalize any questions and concerns and agrees to contact ACM or health care provider for questions related to their healthcare. Was patient discharged with a pulse oximeter? Yes Discussed and confirmed pulse oximeter discharge instructions and when to notify provider or seek emergency care. ACM provided contact information. Plan for follow-up call in 10-14 days based on severity of symptoms and risk factors. Follow Up  Future Appointments   Date Time Provider Rose Ga   9/22/2021 10:30 AM [facility] [address] [facility] RM 1 SEYZ [facility]   9/22/2021 11:30 AM [facility] Brian [facility]   9/23/2021  2:00 PM KRISTYN Araujo CNP [facility]   10/12/2021  9:30 AM DO XIN Meyer [facility]     Follow-up With  Details  Why  Contact Info   Key Raya DPM  On 9/23/2021  Epi-fix #3  [address] [place] [address]  [phone]     Goals        Patient Jaison Riding  My incision to left foot will heal. (pt-stated)       My incision to left foot will heal.    Barriers: none  Plan for overcoming my barriers: N/A  Confidence: 8/10  Anticipated Goal Completion Date: 10/1/2021    7/26/2021: Patient reports incision to left foot open. Patient following up with podiatry. 8/9/2021: Patient states incision still open. States has been cleansing site with betadine and covered with dry sterile dressing. [facility] called Dr. Brandi Scott office to schedule office visit for tomorrow @ [phone].    8/18/2021: Patient reports achilles incision improving. \[facility] [place] [facility]\"" Dr. Mahnaz Brandt had planned for I&D surgery but decision was made to do in office procedure instead. 9/10/2021: Patient had first Epi-fix treatment at Dr. Brandi Scott office yesterday.  States tendon site still open but improving in appearance. 9/17/2021: Patient states had #2 Epi-fix treatment at Dr. Vargas Going office yesterday. States Dr. Edd Guzmán is pleased with progress of site. Patient reports site still open and still able to see tendon when moving her foot. Next epi-fix is next week. Other      Conditions and Symptoms       I will schedule office visits, as directed by my provider. I will keep my appointment or reschedule if I have to cancel. I will notify my provider of any barriers to my plan of care. I will notify my provider of any symptoms that indicate a worsening of my condition. Barriers: overwhelmed by complexity of regimen and stress  Plan for overcoming my barriers: I will work with my ACM to overcome barriers. Confidence: 8/10  Anticipated Goal Completion Date: 10/1/2021    8/9/2021: ACM called and scheduled appts with PCP, Dr. Edd Guzmán, Dr. Loren Russo. Also called Dr. Sabina Hernandez office to notify of patient c/o stress incontinence. ACM discussed red flags and nurse access line with patient this date. 8/18/2021: Patient states had follow up with PCP recently. Also has had follow ups with Dr. Edd Guzmán weekly for a couple weeks. Next appt with Dr. Edd Guzmán is next week. 9/10/2021: Patient had PCP and Dr. Edd Guzmán follow up this week. States has pulmonology f/u 9/23/2021.    9/17/2021: Patient has follow up appointments next week with Dr. Edd Guzmán, pulmonology, and [facility]. David Lozoya RN BSN  Associate Care Manager  Phone: [phone]  Email: [email or web address]

## 2021-09-20 ASSESSMENT — ENCOUNTER SYMPTOMS
VOMITING: 0
CHOKING: 0
SINUS PAIN: 0
RHINORRHEA: 0
DIARRHEA: 0
ABDOMINAL DISTENTION: 0
CONSTIPATION: 0
SORE THROAT: 0
SINUS PRESSURE: 0
EYE DISCHARGE: 0
CHEST TIGHTNESS: 0
WHEEZING: 0
VOICE CHANGE: 0
BACK PAIN: 0
BLOOD IN STOOL: 0
TROUBLE SWALLOWING: 0
NAUSEA: 0
EYE ITCHING: 0
ABDOMINAL PAIN: 0

## 2021-09-20 NOTE — PROGRESS NOTES
[facility])     GDM-diet controlled    History of palpitations     and tachycardia with anxiety    Irregular menses     Left ankle injury 06/07/2021    Migraines     Uses crutches 06/2021    left foot injury       Past Surgical History:   Procedure Laterality Date    ACHILLES TENDON SURGERY Left 6/18/2021    REPAIR OF ACHILLES TENDON TEAR LEFT SIDE performed by Alvin Fuentes DPM at [address] N/A 6/9/2021    DILATATION AND CURETTAGE HYSTEROSCOPY [address] performed by Woodrow Jones DO at [address] SALPINGO-OOPHORECTOMY Left 03/27/2016    laparoscopic left side       Current Outpatient Medications   Medication Sig Dispense Refill    guaiFENesin (MUCUS RELIEF ADULT PO) Take by mouth as needed (congestion)      albuterol sulfate (PROAIR RESPICLICK) 048 (90 Base) MCG/ACT aerosol powder inhalation Inhale 2 puffs into the lungs every 4 hours as needed for Wheezing or Shortness of Breath      sodium chloride (OCEAN, BABY AYR) 0.65 % nasal spray 1 spray by Nasal route 2 times daily 1 each 0    OXYGEN Inhale 4 L/min into the lungs continuous       ZINC PO Take 1 tablet by mouth daily       Ascorbic Acid (VITAMIN C PO) Take by mouth daily       acetaminophen (TYLENOL) 500 MG tablet Take 1,000 mg by mouth every 6 hours as needed for Pain      ibuprofen (ADVIL;MOTRIN) 800 MG tablet Take 1 tablet by mouth every 8 hours as needed for Pain 30 tablet 0    ergocalciferol (ERGOCALCIFEROL) 1.25 MG (92022 UT) capsule Take 50,000 Units by mouth once a week *MONDAYS*      montelukast (SINGULAIR) 10 MG tablet Take 10 mg by mouth nightly       escitalopram (LEXAPRO) 10 MG tablet Take 10 mg by mouth daily       pantoprazole (PROTONIX) 40 MG tablet Take 40 mg by mouth daily as needed (GI SYMPTOMS)       rizatriptan (MAXALT-MLT) 10 MG disintegrating tablet Take 10 mg by mouth       fluticasone (FLONASE) 50 MCG/ACT nasal spray 1 spray by Nasal route daily as needed for Rhinitis or Allergies       budesonide-formoterol (SYMBICORT) 160-4.5 MCG/ACT AERO Inhale 1 puff into the lungs 2 times daily       No current facility-administered medications for this visit. Allergies   Allergen Reactions    Norco [Hydrocodone-Acetaminophen] Nausea And Vomiting    Pcn [Penicillins] Nausea And Vomiting       Family History   Problem Relation Age of Onset    Hypertension Mother     Breast Cancer Maternal Aunt 48        breast cancer    Breast Cancer Maternal Uncle         colon cancer    Breast Cancer Maternal Grandmother 80        breast cancer    Breast Cancer Maternal Aunt 50        breast cancer       Social History     Socioeconomic History    Marital status: Single     Spouse name: Not on file    Number of children: Not on file    Years of education: Not on file    Highest education level: Not on file   Occupational History    Not on file   Tobacco Use    Smoking status: Never Smoker    Smokeless tobacco: Never Used   Vaping Use    Vaping Use: Never used   Substance and Sexual Activity    Alcohol use: Yes     Comment: Social    Drug use: No    Sexual activity: Yes     Partners: Male   Other Topics Concern    Not on file   Social History Narrative    Not on file     Social Determinants of Health     Financial Resource Strain: Medium Risk    Difficulty of Paying Living Expenses: Somewhat hard   Food Insecurity: No Food Insecurity    Worried About Running Out of Food in the Last Year: Never true    Sadie of Food in the Last Year: Never true   Transportation Needs: No Transportation Needs    Lack of Transportation (Medical): No    Lack of Transportation (Non-Medical):  No   Physical Activity: Inactive    Days of Exercise per Week: 0 days    Minutes of Exercise per Session: 0 min   Stress: Stress Concern Present    Feeling of Stress : Rather much   Social Connections: Socially Isolated    Frequency of Communication with Friends and Family: More than three times a week    Frequency of Social Gatherings with Friends and Family: Once a week    Attends [employer] Services: Never    Active Member of Clubs or Organizations: No    Attends Club or Organization Meetings: Never    Marital Status: Never    Intimate Partner Violence: Not At Risk    Fear of Current or Ex-Partner: No    Emotionally Abused: No    Physically Abused: No    Sexually Abused: No       Occupation: [employer] nurse    Review of Systems   Constitutional: Negative for activity change, appetite change, chills, fatigue, fever and unexpected weight change. Noy Graf has been battling several health issues since her last visit. She had a ruptured achilles tendion on left, also had uterine ablation, and unfortunately was diagnosed with Covid in June 2020. 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 HENT: Negative for congestion, postnasal drip, rhinorrhea, sinus pressure, sinus pain, sore throat, trouble swallowing and voice change. Eyes: Negative for discharge, itching and visual disturbance. Respiratory: Positive for cough and shortness of breath (increased over the past two weeks. ). Negative for choking, chest tightness and wheezing. Cardiovascular: Positive for palpitations and leg swelling. Negative for chest pain. Gastrointestinal: Negative for abdominal distention, abdominal pain, blood in stool, constipation, diarrhea, nausea and vomiting. Endocrine: Negative for cold intolerance and heat intolerance. Genitourinary: Negative for difficulty urinating, dysuria, frequency and hematuria. Musculoskeletal: Negative for arthralgias, back pain, gait problem, joint swelling, myalgias, neck pain and neck stiffness. Allergic/Immunologic: Negative for environmental allergies and food allergies. Neurological: Negative for dizziness, seizures, syncope, speech difficulty, weakness, light-headedness and headaches. Hematological: Negative for adenopathy. Does not bruise/bleed easily. Psychiatric/Behavioral: Negative for agitation, confusion and decreased concentration. The patient is not nervous/anxious. Patient denies previous history of DVT/PE. Objective:   Physical Exam  Vitals and nursing note reviewed. Constitutional:       General: She is not in acute distress. Appearance: She is well-developed. She is obese. She is not diaphoretic. 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 HENT:      Head: Normocephalic and atraumatic. Mouth/Throat:      Pharynx: No oropharyngeal exudate. Eyes:      General: No scleral icterus. Right eye: No discharge. Left eye: No discharge. Conjunctiva/sclera: Conjunctivae normal.      Pupils: Pupils are equal, round, and reactive to light. Neck:      Thyroid: No thyromegaly. Vascular: No JVD. Trachea: No tracheal deviation. Cardiovascular:      Rate and Rhythm: Regular rhythm. Tachycardia present. Heart sounds: Normal heart sounds. No murmur heard. No friction rub. No gallop. Comments: Rate is tachycardic; occasional short run of atrial fibrillation. Pulmonary:      Effort: Pulmonary effort is normal. No respiratory distress or retractions. Breath sounds: Normal breath sounds. No stridor. No wheezing or rales. Chest:      Chest wall: No mass, lacerations, deformity, swelling, tenderness or edema. Breasts: Breasts are symmetrical.         Right: No inverted nipple, mass, nipple discharge, skin change or tenderness. Left: No inverted nipple, mass, nipple discharge, skin change or tenderness. Comments: Breasts large and ptotic; overall her breast exam remains stable with no skin changes. Bilateral ridge of prominent dense breast tissue of the upper outer quadrants is clinically stable. No clinically suspicious findings. Abdominal:      General: There is no distension. Palpations: Abdomen is soft. Tenderness: There is no abdominal tenderness. There is no guarding or rebound. Musculoskeletal:         General: No tenderness or deformity. Normal range of motion. Right shoulder: Normal. Normal range of motion. Left shoulder: Normal. Normal range of motion. Cervical back: Normal range of motion and neck supple. Feet:      Comments: Walking boot left foot. Lymphadenopathy:      Cervical: No cervical adenopathy. Right cervical: No superficial, deep or posterior cervical adenopathy. Left cervical: No superficial, deep or posterior cervical adenopathy. Upper Body:      Right upper body: No supraclavicular or pectoral adenopathy. Left upper body: No supraclavicular or pectoral adenopathy. Skin:     General: Skin is warm and dry. Coloration: Skin is not pale. Findings: No erythema or rash. Comments: Multiple tattoos   Neurological:      Mental Status: She is alert and oriented to person, place, and time. Coordination: Coordination normal.   Psychiatric:         Behavior: Behavior normal.         Thought Content: Thought content normal.         Judgment: Judgment normal.       Assessment:      38 y.o. extremely pleasant female who was last seen in our office on August 17, 2020 following an MRI of the bilateral breast that had been ordered by her primary care physician recommended punch biopsy of the skin. At that time, she was seen by Dr. Janeen Jacobo and the skin thickening had resolved. Clinical exam was felt to correlate with her image findings and skin biopsy was not recommended.    -08/4/2020: BILATERAL MRI; [facility]    FINDINGS:    There is heterogeneous fibroglandular tissue with moderate background    parenchymal enhancement. Focal areas of skin thickening are visualized    in the medial aspects of both breasts with associated enhancement    (images 31 and 60 of the axial T1 postcontrast series).  There is no lymphadenopathy. Bilateral skin and nipple areolar complexes are    normal. Visualized portions of the chest and abdomen are unremarkable.              Impression    Focal areas of skin thickening in the medial aspects of both breasts    with associated enhancement are suspicious.         RECOMMENDATION:    Punch biopsy of the suspicious areas of skin thickening in the medial    aspect of both breasts is advised.         BIRADS 4: Suspicious abnormality                -08/17/2020 clinical follow-up with Dr. Armida Pelayo:  Clinical examination correlates with imaging, although no skin thickening present today. Dense tissue upper outer quadrants bilaterally. Correlates with her imaging findings. Long discussion about opportunities to decrease her risk of developing breast cancer including exercise, weight loss, and healthy diet. In light of her family history suggested that she come to see us for imaging and a clinical breast exam in July 2021. Questions answered to patient satisfaction    -09/22/21 - Bilateral screening mammogram - [facility]: Negative, BI-RADS 1.  -09/22/2021 clinical follow-up is without evidence of malignancy. Imaging reviewed, including BI-RADS result. We reviewed her family history in detail; she had 2 aunts with breast cancer in their late 35s to early 45s. During pt's visit today, a Chey-Nicole Risk Evaluation was performed. Pt has a 23.7 % lifetime risk (to age 80) of developing breast cancer (high risk). Per NCCN guidelines Version 1.2019 , the following recommendations were discussed:      Clinical encounter every 6-12 months    Referral to [facility].  Annual screening mammogram    Annual breast MRI   -Genetic Testing: Referral will be placed today. Remainder of high risk discussion will be tabled until her next visit due to clinical findings concerning for atrial fibrillation. She is scheduled to see Dr. Jed Rayo today for further evaluation and recommendations. Spoke to Dr. Fili Rico office who confirmed Syd Keenan has an appointment today. Plan:   1. Continue monthly breast self examination; detailed instructions reviewed today. Bring any changes to your physician's attention. 2. Continue healthy diet and exercise routinely as tolerated to maintain IBW. 3. Avoid alcohol. 4. Limit caffeine intake. 5. Repeat mammogram 1 year-not ordered. 6. Continue follow up with Primary Care and Gynecology. 7. Go to emergency room or follow-up today with Dr. Fili Rico for increased shortness of breath and tachycardia. 8. Refer to [employer] [facility]. 9. RTC 6 months with MRI of the bilateral breast prior. During today's visit, face-to-face time 29 minutes, greater than 50% in counseling education and coordination of care. All questions were answered to her apparent satisfaction, and she is agreeable to the plan as outlined above. Joby Avelar, RN, MSN, APRN-CNP, [address] [place]  Advanced Oncology Certified Nurse Practitioner  Department of Breast Surgery  [facility]/  [facility] [place] in collaboration with Dr. Josefina Menendez.  Nini/Sarina Burrell

## 2021-09-22 ENCOUNTER — OFFICE VISIT (OUTPATIENT)
Dept: BREAST CENTER | Age: 36
End: 2021-09-22
Payer: COMMERCIAL

## 2021-09-22 ENCOUNTER — HOSPITAL ENCOUNTER (OUTPATIENT)
Dept: GENERAL RADIOLOGY | Age: 36
Discharge: HOME | End: 2021-09-24
Payer: COMMERCIAL

## 2021-09-22 VITALS
BODY MASS INDEX: 41.41 KG/M2 | WEIGHT: 225 LBS | OXYGEN SATURATION: 99 % | TEMPERATURE: 98.4 F | HEART RATE: 122 BPM | DIASTOLIC BLOOD PRESSURE: 82 MMHG | RESPIRATION RATE: 18 BRPM | SYSTOLIC BLOOD PRESSURE: 120 MMHG | HEIGHT: 62 IN

## 2021-09-22 DIAGNOSIS — Z80.3 FAMILY HISTORY OF BREAST CANCER: Primary | ICD-10-CM

## 2021-09-22 DIAGNOSIS — Z01.818 PREOP TESTING: ICD-10-CM

## 2021-09-22 DIAGNOSIS — R92.2 DENSE BREAST TISSUE: ICD-10-CM

## 2021-09-22 DIAGNOSIS — Z12.31 ENCOUNTER FOR SCREENING MAMMOGRAM FOR HIGH-RISK PATIENT: ICD-10-CM

## 2021-09-22 DIAGNOSIS — R00.0 TACHYCARDIA: ICD-10-CM

## 2021-09-22 DIAGNOSIS — R92.2 DENSE BREAST TISSUE ON MAMMOGRAM: ICD-10-CM

## 2021-09-22 DIAGNOSIS — Z91.89 AT HIGH RISK FOR BREAST CANCER: ICD-10-CM

## 2021-09-22 PROBLEM — J18.9 PNEUMONIA: Status: RESOLVED | Noted: 2021-08-27 | Resolved: 2021-09-22

## 2021-09-22 PROBLEM — R09.89 UPPER RESPIRATORY SYMPTOM: Status: RESOLVED | Noted: 2018-10-21 | Resolved: 2021-09-22

## 2021-09-22 PROBLEM — O47.00 PRETERM CONTRACTIONS: Status: RESOLVED | Noted: 2018-08-18 | Resolved: 2021-09-22

## 2021-09-22 PROBLEM — Z3A.39 39 WEEKS GESTATION OF PREGNANCY: Status: RESOLVED | Noted: 2018-10-31 | Resolved: 2021-09-22

## 2021-09-22 PROCEDURE — 77063 BREAST TOMOSYNTHESIS BI: CPT

## 2021-09-22 PROCEDURE — G8427 DOCREV CUR MEDS BY ELIG CLIN: HCPCS | Performed by: NURSE PRACTITIONER

## 2021-09-22 PROCEDURE — 99213 OFFICE O/P EST LOW 20 MIN: CPT | Performed by: NURSE PRACTITIONER

## 2021-09-22 PROCEDURE — 1036F TOBACCO NON-USER: CPT | Performed by: NURSE PRACTITIONER

## 2021-09-22 PROCEDURE — 1111F DSCHRG MED/CURRENT MED MERGE: CPT | Performed by: NURSE PRACTITIONER

## 2021-09-22 PROCEDURE — G8417 CALC BMI ABV UP PARAM F/U: HCPCS | Performed by: NURSE PRACTITIONER

## 2021-09-22 ASSESSMENT — ENCOUNTER SYMPTOMS
SHORTNESS OF BREATH: 1
COUGH: 1

## 2021-09-24 ENCOUNTER — TELEPHONE (OUTPATIENT)
Dept: BREAST CENTER | Age: 36
End: 2021-09-24

## 2021-09-28 ENCOUNTER — CARE COORDINATION (OUTPATIENT)
Dept: OTHER | Facility: CLINIC | Age: 36
End: 2021-09-28

## 2021-09-28 NOTE — CARE COORDINATION
[address] [address] Transitions Follow Up Call    2021    Patient: Radha Powell  Patient : 1985   MRN: [id number]  Reason for Admission: COVID pneumonia, Hypoxia, Acute respiratory failure  Discharge Date: 21 RARS: Readmission Risk Score: 11         Spoke with: N/A    ACM attempted to reach patient for follow up call regarding COVID symptoms. HIPAA compliant message left requesting a return phone call at patients convenience. Will continue to follow. ACM will continue to follow. Follow Up  Future Appointments   Date Time Provider [place]   10/12/2021  9:30 AM Surjit Sanchez DO [facility]   10/12/2021  3:30 PM Sallyann Goldmann, APRN - CNP [facility]     Audrey Ojeda RN BSN  Associate Care Manager  Phone: [phone]  Email: [email or web address]

## 2021-09-30 ENCOUNTER — HOSPITAL ENCOUNTER (OUTPATIENT)
Age: 36
Discharge: HOME | End: 2021-10-02
Payer: COMMERCIAL

## 2021-09-30 ENCOUNTER — HOSPITAL ENCOUNTER (OUTPATIENT)
Dept: GENERAL RADIOLOGY | Age: 36
Discharge: HOME | End: 2021-10-02
Payer: COMMERCIAL

## 2021-09-30 DIAGNOSIS — U07.1 PNEUMONIA DUE TO COVID-19 VIRUS: ICD-10-CM

## 2021-09-30 DIAGNOSIS — J12.82 PNEUMONIA DUE TO COVID-19 VIRUS: ICD-10-CM

## 2021-09-30 PROCEDURE — 71046 X-RAY EXAM CHEST 2 VIEWS: CPT

## 2021-10-04 ENCOUNTER — APPOINTMENT (OUTPATIENT)
Dept: CT IMAGING | Age: 36
End: 2021-10-04
Payer: COMMERCIAL

## 2021-10-04 VITALS
TEMPERATURE: 98.4 F | HEIGHT: 62 IN | OXYGEN SATURATION: 100 % | SYSTOLIC BLOOD PRESSURE: 163 MMHG | WEIGHT: 225 LBS | DIASTOLIC BLOOD PRESSURE: 85 MMHG | RESPIRATION RATE: 15 BRPM | HEART RATE: 100 BPM | BODY MASS INDEX: 41.41 KG/M2

## 2021-10-04 PROCEDURE — 99283 EMERGENCY DEPT VISIT LOW MDM: CPT

## 2021-10-04 ASSESSMENT — PAIN DESCRIPTION - LOCATION: LOCATION: BACK

## 2021-10-04 ASSESSMENT — PAIN DESCRIPTION - PAIN TYPE: TYPE: ACUTE PAIN

## 2021-10-04 ASSESSMENT — PAIN SCALES - GENERAL: PAINLEVEL_OUTOF10: 5

## 2021-10-05 ENCOUNTER — HOSPITAL ENCOUNTER (EMERGENCY)
Age: 36
Discharge: HOME | End: 2021-10-05
Payer: COMMERCIAL

## 2021-10-05 ENCOUNTER — TELEPHONE (OUTPATIENT)
Dept: OTHER | Facility: CLINIC | Age: 36
End: 2021-10-05

## 2021-10-05 ENCOUNTER — APPOINTMENT (OUTPATIENT)
Dept: CT IMAGING | Age: 36
End: 2021-10-05
Payer: COMMERCIAL

## 2021-10-05 DIAGNOSIS — R06.02 SHORTNESS OF BREATH: Primary | ICD-10-CM

## 2021-10-05 PROCEDURE — 71250 CT THORAX DX C-: CPT

## 2021-10-05 NOTE — ED NOTES
FIRST PROVIDER CONTACT ASSESSMENT NOTE                                                                                                Department of Emergency Medicine                                                      First Provider Note  10/4/21  8:17 PM EDT  NAME: Sean Mayes  : 1985  MRN: [id number]    Chief Complaint: Shortness of Breath (worse with exertion, wears 4L chronically since august, recent covid pnuemonia, worsening cxr)      History of Present Illness:   Sean Mayes is a 38 y.o. female who presents to the ED for shortness of breath. Patient wears 4 L of oxygen chronically since August.  Patient recently had Covid pneumonia. Patient sees her lung doctor and was actually going to have a CT scan without contrast as an outpatient but cannot schedule till next week therefore her lung doctor told her to come in and get a CT of the chest without    Focused Physical Exam:  VS:    ED Triage Vitals   BP Temp Temp src Pulse Resp SpO2 Height Weight   -- -- -- -- -- -- -- --        General: Alert and in no apparent distress. Medical History:  has a past medical history of Anemia, Anxiety, Arthritis, Diabetes mellitus ([address] History of palpitations, Irregular menses, Left ankle injury, Migraines, and Uses crutches. Surgical History:  has a past surgical history that includes hernia repair; Salpingo-oophorectomy (Left, 2016); Dilation and curettage of uterus (N/A, 2021); and Achilles tendon surgery (Left, 2021). Social History:  reports that she has never smoked. She has never used smokeless tobacco. She reports current alcohol use. She reports that she does not use drugs. Family History: family history includes Breast Cancer in her maternal uncle; Breast Cancer (age of onset: 48) in her maternal aunt and maternal aunt; Breast Cancer (age of onset: 80) in her maternal grandmother; Hypertension in her mother.     Allergies: Norco [hydrocodone-acetaminophen] and Pcn [penicillins]     Initial Plan of Care:  Initiate Treatment-Testing, Proceed toTreatment Area When Bed Available for ED Attending/MLP to Continue Care    -------------------------------------------------END OF FIRST PROVIDER CONTACT ASSESSMENT NOTE--------------------------------------------------------  Electronically signed by Zaria Lemus PA-C   DD: 10/4/21       Zaria Lemus PA-C  10/04/21 2018

## 2021-10-05 NOTE — ED PROVIDER NOTES
[facility]  Department of Emergency Medicine   ED  Encounter Note  Admit Date/RoomTime: 10/5/2021  4:02 AM  ED Room: PIYUSH/PIYUSH  NAME: Joaquín Calderon  : 1985  MRN: [id number]     Chief Complaint:  Shortness of Breath (worse with exertion, wears 4L chronically since august, recent covid pnuemonia, worsening cxr)    HISTORY OF PRESENT ILLNESS        Joaquín Calderon is a 38 y.o. female who presents to the ED by private vehicle for shortness of breath, which has been ongoing since patient had Covid a couple months ago. She wears 4 L of oxygen since that time. She reports that her outpatient x-ray done by her pulmonologist showed possible worsening of her lung condition and he wanted a dry CAT scan for further evaluation and advised her to come to emergency since obtaining 1 outpatient with take too long. The complaint has been unchanged and are moderate in severity. She herself reports no difference in her symptoms. She plans on leaving after the CAT scan is done and following up with her pulmonologist.    ROS   Pertinent positives and negatives are stated within HPI, all other systems reviewed and are negative. Past Medical History:  has a past medical history of Anemia, Anxiety, Arthritis, Diabetes mellitus ([address] History of palpitations, Irregular menses, Left ankle injury, Migraines, and Uses crutches. Surgical History:  has a past surgical history that includes hernia repair; Salpingo-oophorectomy (Left, 2016); Dilation and curettage of uterus (N/A, 2021); and Achilles tendon surgery (Left, 2021). Social History:  reports that she has never smoked. She has never used smokeless tobacco. She reports current alcohol use. She reports that she does not use drugs.     Family History: family history includes Breast Cancer in her maternal uncle; Breast Cancer (age of onset: 48) in her maternal aunt and maternal aunt; Breast Cancer (age of onset: 80) in her maternal grandmother; Hypertension in her mother. Allergies: Norco [hydrocodone-acetaminophen] and Pcn [penicillins]    PHYSICAL EXAM   Oxygen Saturation Interpretation: Normal on room air analysis with her 4L O2        ED Triage Vitals [10/04/21 2017]   BP Temp Temp Source Pulse Resp SpO2 Height Weight   (!) 163/85 98.4 °F (36.9 °C) Temporal 100 15 100 % 5' 2\" (1.575 m) 225 lb (102.1 kg)         Physical Exam  Constitutional/General: Alert and oriented x3, well appearing, non toxic  HEENT:  NC/NT. PERRLA,  Airway patent. Respiratory: Not in respiratory distress  CV:  Regular rate. Regular rhythm. No murmurs  Musculoskeletal: Moves all extremities x4  Integument: skin warm and dry. Neurologic: GCS 15  Psychiatric: Normal Affect    Lab / Imaging Results   (All laboratory and radiology results have been personally reviewed by myself)  Labs:  No results found for this visit on 10/05/21. Imaging: All Radiology results interpreted by Radiologist unless otherwise noted. CT CHEST WO CONTRAST   Final Result   Previously seen consolidating infiltrates have mostly resolved. There is   mild residual ground-glass opacity bilaterally which is possibly   postinflammatory. ED Course / Medical Decision Making   Medications - No data to display       Consult(s):   None    Procedure(s):   none    MDM:   Pt presents to ED because her pulmonologist told her to come to emergency to get a CAT scan that he wanted. She reports he told her it would take her too long to get it set up as an outpatient and just to go to the emergency to get it done. She is a Covid patient since August and has had respiratory issues ever since her diagnosis. She wears 4 L of oxygen constantly at this time. Follows with Dr. Rubin Hurley. Patient has no complaints at this time just wanted to get her CAT scan. She left immediately after she got back from CAT scan, not waiting for discharge paper.     Plan of Care/Counseling: Karel Bey PA-C reviewed today's visit with the patient in addition to providing specific details for the plan of care and counseling regarding the diagnosis and prognosis. Questions are answered at this time and are agreeable with the plan. ASSESSMENT     1. Shortness of breath      PLAN   Discharged home. Patient condition is stable    New Medications     Discharge Medication List as of 10/5/2021  5:15 AM        Electronically signed by Karel Bey PA-C   DD: 10/5/21  **This report was transcribed using voice recognition software. Every effort was made to ensure accuracy; however, inadvertent computerized transcription errors may be present.   END OF ED PROVIDER NOTE       Karel Bey PA-C  10/05/21 [id number]

## 2021-10-05 NOTE — TELEPHONE ENCOUNTER
Nurse Access contacted Dr. Bharti Junior Office to schedule ED follow up appt. SPoke with Benedict Lacy, she stated pt has appt scheduled for 10-12 with Dr. Bharti Junior.

## 2021-10-06 ENCOUNTER — CARE COORDINATION (OUTPATIENT)
Dept: OTHER | Facility: CLINIC | Age: 36
End: 2021-10-06

## 2021-10-06 LAB
ALBUMIN SERPL-MCNC: NORMAL G/DL
ALP BLD-CCNC: NORMAL U/L
ALT SERPL-CCNC: NORMAL U/L
ANION GAP SERPL CALCULATED.3IONS-SCNC: NORMAL MMOL/L
AST SERPL-CCNC: NORMAL U/L
AVERAGE GLUCOSE: 140
BILIRUB SERPL-MCNC: NORMAL MG/DL
BUN BLDV-MCNC: NORMAL MG/DL
CALCIUM SERPL-MCNC: NORMAL MG/DL
CHLORIDE BLD-SCNC: NORMAL MMOL/L
CO2: NORMAL
CREAT SERPL-MCNC: NORMAL MG/DL
GFR CALCULATED: NORMAL
GLUCOSE BLD-MCNC: NORMAL MG/DL
HBA1C MFR BLD: 6.5 %
POTASSIUM SERPL-SCNC: NORMAL MMOL/L
SODIUM BLD-SCNC: NORMAL MMOL/L
TOTAL PROTEIN: NORMAL

## 2021-10-15 ENCOUNTER — CARE COORDINATION (OUTPATIENT)
Dept: OTHER | Facility: CLINIC | Age: 36
End: 2021-10-15

## 2021-10-15 NOTE — CARE COORDINATION
Associate Care Manager (ACM) called patient for CC outreach. No answer; ACM left HIPAA compliant voicemail message with request for return call at patient's earliest convenience. ACM also sent lost to follow up letter via [address] ACM will continue to follow. Lisa Ag RN BSN  Associate Care Manager  Phone: [phone]  Email: [email or web address]

## 2021-10-26 ENCOUNTER — CARE COORDINATION (OUTPATIENT)
Dept: OTHER | Facility: CLINIC | Age: 36
End: 2021-10-26

## 2021-10-26 NOTE — CARE COORDINATION
Associate Care Manager [facility]) called patient for CC outreach follow up. No answer; ACM left HIPAA compliant voicemail message with request for return call at patient's earliest convenience. [facility] also mailed final lost to follow up letter to patient. ACM signing off due to lost to follow up unless patient returns the call. [facility] mailed the following letter to patient:    Dear Yarelis Avery,     I have been trying to reach you for a follow up call for services with our Associate Care Management Program. Your wellbeing is very important to us. With continued partnership in the [facility] program, we hope to work with you to optimize your health and increase your quality of life. I look forward to continuing to work with you. Please contact me at your convenience and we can schedule a time that works best for you. Due to not being able to reach you the past few times that I have called, I will make this the final outreach unless I hear back from you. I hope you're doing well and happy early birthday! Sincerely,      Sam Paulson RN BSN  Associate Care Manager  Phone: [phone]  Email: [email or web address]    [facility] mailed the following handouts with this letter: [employer], [employer] E-Visits, Nurse [employer], and Right Care Right Place Right Time.

## 2021-11-09 ENCOUNTER — CARE COORDINATION (OUTPATIENT)
Dept: OTHER | Facility: CLINIC | Age: 36
End: 2021-11-09

## 2021-11-09 RX ORDER — AMLODIPINE BESYLATE 5 MG/1
5 TABLET ORAL DAILY
COMMUNITY
End: 2022-10-05

## 2021-11-23 ENCOUNTER — CARE COORDINATION (OUTPATIENT)
Dept: OTHER | Facility: CLINIC | Age: 36
End: 2021-11-23

## 2021-11-23 RX ORDER — AMPICILLIN 500 MG/1
500 CAPSULE ORAL 4 TIMES DAILY
COMMUNITY
End: 2021-12-10 | Stop reason: ALTCHOICE

## 2021-11-23 NOTE — CARE COORDINATION
Ambulatory Care Coordination Note  11/9/2021  CM Risk Score: 12  Charlson 10 Year Mortality Risk Score: 2%     ACC: Kay Sunshine, RN    Summary Note: Associate Care Manager [facility]) called patient after patient left voicemail message for ACM apologizing for not being available when ACM attempted to reach patient in October. Patient reports she has been \"frustrated\" due to ongoing health issues. States she went to PCP recently and BP was elevated. States BP elevated at pulmonology appt also. Patient states she restarted Norvasc but will hold doses if BP WNL or low. States she has been monitoring BP at home but has been more concerned about pulse than BP at this point. States she has noticed tachycardia when going up and down the steps inside her home. Reports pulse 170, pulse ox 89% room air today after going up and down the basement steps. States after rest, pulse 127, pulse ox 95% room air. Patient questioning if she should have referral to cardiology. States she has next PCP follow up in 6 weeks and is planning to ask about cardiology referral at that time. Patient states she has been using supplemental oxygen at night with sleep and daytime PRN only. States she feels like she is \"in a bind\" at present due to needs to return to work or face termination by 12/1/2021. 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 States she is unsure if she would be physically able to do that as she gets short of breath at rest (\"winded with phone conversations\"). Patient states incision site to foot still not healed. States she continues to follow up with Dr. Delene Litten regularly and has requested culture be done as she states she has noticed odor from wound site. [facility] med student at Dr. Karla Nieto office collected wound culture but specimen was discarded at office.  Patient states she was notified per Dr. Delene Litten that odor is related to changing dressing once weekly and site looks ok. Patient states foot wound hurts in certain spots on site. Patient reports hair falling out recently. States she has been more anxious lately and is following up with her counselor. States she has purchased wigs due to hair loss. Patient reports unsure if hair loss is related to Covid or stress/anxiety. States she plans to discuss this with PCP at upcoming appt. Patient reports still experiencing \"brain fog\" and feeling \"blah\" some days. States still not sleeping well at night and waking frequently q night. States hoping to have appt with urology next week to discuss ongoing urinary incontinence. [facility] gynecology notified her that the incontinence is not related to ablation several months ago but possibly due to the trauma her body has experienced in the past several months with the health issues she has faced. Patient reports stress related to needing intermittent FMLA for son as he has seizures and to take him to his appts and testing. States she is currently in school and unsure how that would be effected if she gets terminated from [place] [id number] position due to inability to return to work in December. States when she last had appt with PCP, she was told she should not return to work until beginning of next year due to ongoing health issues. [facility] dr was concerned that patient would \"go backwards\" with her progress. Patient reports A1c recently higher and she is currently on Metformin for 90 days. States provider unsure if A1c elevated due to steroid use with having covid and being ill. States they will plan to continue to monitor A1c. Patient states she has been \"trying to be optimistic\" but currently \"in a frustrated place\". [facility] mental health counselor offered to increase patient's Xanax but she side effect of respiratory suppression. States she has pulmonology follow up 11/16/2021 with CNP. States she is concerned heading into cold and flu season after recent Covid infection.  [facility] she is now planning on having Covid vaccines. Patient states currently feeling like \"I don't want to talk or be bothered; I just want to cry\". States she continues to follow up with mental health counselor. States unsure if she would be able to do work at home position, if available. \[facility]\"" Gary Camilo notified her that short term disability will  end of November and will change over to long term disability. Prior to Admission medications    Medication Sig Start Date End Date Taking?  Authorizing Provider   metFORMIN (GLUCOPHAGE) 500 MG tablet Take 500 mg by mouth daily (with breakfast)   Yes Historical Provider, MD   amLODIPine (NORVASC) 5 MG tablet Take 5 mg by mouth daily   Yes Historical Provider, MD   ALPRAZolam (XANAX PO) Take 0.5 mg by mouth 3 times daily Takes PRN   Yes Historical Provider, MD   sodium chloride (OCEAN, BABY AYR) 0.65 % nasal spray 1 spray by Nasal route 2 times daily  Patient taking differently: 1 spray by Nasal route as needed for Congestion  21  Yes Juliet Webster,    OXYGEN Inhale 3 L/min into the lungs nightly Using PRN during day and at night while sleeping (With humidification)   Yes Historical Provider, MD   ZINC PO Take 1 tablet by mouth daily    Yes Historical Provider, MD   Ascorbic Acid (VITAMIN C PO) Take by mouth daily    Yes Historical Provider, MD   acetaminophen (TYLENOL) 500 MG tablet Take 1,000 mg by mouth every 6 hours as needed for Pain   Yes Historical Provider, MD   ibuprofen (ADVIL;MOTRIN) 800 MG tablet Take 1 tablet by mouth every 8 hours as needed for Pain 21  Yes Vianca Ponce,    ergocalciferol (ERGOCALCIFEROL) 1.25 MG (39509 UT) capsule Take 50,000 Units by mouth once a week ** 2/10/21  Yes Historical Provider, MD   montelukast (SINGULAIR) 10 MG tablet Take 10 mg by mouth nightly  2/10/21  Yes Historical Provider, MD   escitalopram (LEXAPRO) 10 MG tablet Take 10 mg by mouth daily  2/10/21  Yes Historical Provider, MD pantoprazole (PROTONIX) 40 MG tablet Take 40 mg by mouth daily as needed (GI SYMPTOMS)  2/10/21  Yes Historical Provider, MD   rizatriptan (MAXALT-MLT) 10 MG disintegrating tablet Take 10 mg by mouth as needed  2/10/21  Yes Historical Provider, MD   fluticasone (FLONASE) 50 MCG/ACT nasal spray 1 spray by Nasal route daily as needed for Rhinitis or Allergies  2/10/21  Yes Historical Provider, MD   budesonide-formoterol (SYMBICORT) 160-4.5 MCG/ACT AERO Inhale 2 puffs into the lungs 2 times daily  1/8/21  Yes Historical Provider, MD   ampicillin (PRINCIPEN) 500 MG capsule Take 500 mg by mouth 4 times daily Will be done 11/26/2021    Historical Provider, MD       Future Appointments   Date Time Provider Rose Ga   2/16/2022  1:30 PM KRISTYN Cr NP PULM CC [facility] PULM [facility]     Follow-up With  Details  Why  Contact Info   pulmonology  On 11/16/2021           Molina Arce RN BSN  Associate Care Manager  Phone: [phone]  Email: [email or web address]

## 2021-11-23 NOTE — CARE COORDINATION
Writer received referral from [facility] [address], [facility] for disability information to be sent to pt by email. Writer sent email to [email or web address] with requested resources.      RAF Marti, [facility]    Associate Care Management   [phone]

## 2021-11-23 NOTE — CARE COORDINATION
Ambulatory Care Coordination Note  11/23/2021  CM Risk Score: 12  Charlson 10 Year Mortality Risk Score: 2%     ACC: Jocelyn Pollack, RN    Summary Note: Associate Care Manager (ACM) called patient for CC outreach. Patient states she is currently on PO Ampicillin again for left foot wound infection. States she was told the same infection is present since August 2021 and she is concerned as it is not clearing up. States she discussed her concerns with Dr. Yaritza Leonard who said wound site looks good. Patient states she is wondering if she needs IV antibiotics for the wound infection as she has already been treated with Ampicillin x2, Doxycycline, and Omnicef. States she called PCP to discuss possible infectious disease referral and is waiting return call. States she is concerned that possibly tachycardia may be related to ongoing wound infection. Patient reports still with tachycardia and states pulse up to 152 after 2 minute walk at pulmonology office last week. [facility] pulm provider mentioned patient may need cardiology referral but stated that would need to come from patient's PCP. Patient states she called PCP about cardiology referral and is waiting return call. States pulse ox down to 87-88% room air after 2 minute walk on flat galileo at [facility] this week and she was told that is good. Patient states her concern is how long it takes her to recover and how much lower her pulse ox would go if she was walking longer/using steps. States she continues to wear supplemental oxygen at night with sleep. [facility] pulm provider ordered nocturnal pulse ox study that she is waiting to hear back from the office with further details about. Patient verbalized concern to [facility] regarding pulmonology follow up as she states she has been concerned about excessive dryness in her throat that occurs regularly and makes her feel \"like my throat is closing\". States she uses humidification with oxygen and has humidifier at her bedside. [facility] she has to drink a lot of water and cough to clear airway when the excessive dryness occurs. [facility] nothing done for throat dryness. [facility] she asked pulmonology provider about asthma work up as she was originally referred to pulmonology for possible asthma. [facility] she was told they will address that at next follow up in 3 months. Patient states she continues to see mental health provider due to elevated stress/anxiety level. [facility] she was told per counselor that some of the anxiety that patient is currently experiencing could be related to PTSD. Patient reports continues to experience feeling of being \"winded and out of breath\" with using stairs. States she continues to have concerns regarding returning to work due to ongoing symptoms. [facility] she continues to have urinary incontinence and has experienced not even knowing she is urinating while on the way to the bathroom in her home recently. [facility] initial appt with urology, Dr. Kristen Hawley is 12/15/2021. Patient states she feels like \"no one is listening to me\" regarding ongoing symptoms. States she continues to be frustrated and unsure how everything is going to turn out due to supposed to return to work on or around 12/1/2021. [facility] she has been in contact with [facility] absence manager and [employer] rep. [facility] Aden Camilo is currently sending paperwork to her providers as she will be moved to long term disability after 12/1/2021. [facility] offered to have MSW with ACM team reach out to patient with social security disability information. Patient accepted offer. Patient reports to [facility] that she has been following up with Dr. Raquel Woody weekly for wound management. [facility] she has had #8 of 10 skin grafts to left foot site and Dr. Raquel Woody said site looks good. Patient states she is concerned about wound site healing before infection heals. [facility] she has verbalized this concern to Dr. Raquel Woody.  [facility] she has been changing wound dressing once daily and applying 2x2 gauze covered by dry sterile dressing. Patient states wound drainage comes through the 2x2 daily. Patient describes drainage as \"purulent\". States next appt with Dr. Sumeet Juan is 11/30/2021. Patient states she had first Covid vaccine dose today at [employer]. States she was short of breath while walking inside [employer] during this phone call. ACM noted patient to sound short of breath. Patient's breathing improved when she sat down to rest. Patient states she continues to feel anxious when going out of her home and takes quick trips to the grocery store. Goals Addressed                    This Visit's Progress       Patient Stated      My left foot wound will heal. (pt-stated)         My left foot wound will heal.      Barriers: fear of failure  Plan for overcoming my barriers: I will work with my ACM to overcome barriers. Confidence: 8/10  Anticipated Goal Completion Date: 1/23/2022 11/23/2021: Patient reports left foot wound still open and currently changing dressing once daily. States she is currently on Ampicillin for 10 days for current infection. Next appt with podiatry 11/30/2021.   The infection to my left foot wound will clear up with current antibiotics. (pt-stated)         The infection to my left foot wound will clear up with current antibiotics. Barriers: overwhelmed by complexity of regimen and stress  Plan for overcoming my barriers: I will work with my ACM and podiatrist to overcome barriers. Confidence: 7/10  Anticipated Goal Completion Date: 12/23/2021 11/23/2021: Patient states left foot wound culture showed infection and she is currently on Ampicillin for 10 days. States she has ~3 days remaining on RX. [facility] sent the following email to patient:    Natalia Vega,    Thank you for taking my phone call today. I wanted to let you know that [facility] ([facility]) provides Life Matters to associates at no cost. The phone number to [employer] is [phone].  Please see attachment for additional information regarding Life Matters. Also, the phone number for [facility] is [phone]. You can use this phone number to apply for the C[facility] for [facility]. If the representative tells you that you cannot apply for the hardip fund over the phone, ask for a supervisor as you are able to do that. Let me know if you need anything before I reach out to you again. Have a blessed day,  Cindy Terrell RN        Prior to Admission medications    Medication Sig Start Date End Date Taking?  Authorizing Provider   ampicillin (PRINCIPEN) 500 MG capsule Take 500 mg by mouth 4 times daily Will be done 11/26/2021   Yes Historical Provider, MD   OXYGEN Inhale 3 L/min into the lungs nightly Using PRN during day and at night while sleeping (With humidification)   Yes Historical Provider, MD   metFORMIN (GLUCOPHAGE) 500 MG tablet Take 500 mg by mouth daily (with breakfast)    Historical Provider, MD   amLODIPine (NORVASC) 5 MG tablet Take 5 mg by mouth daily    Historical Provider, MD   ALPRAZolam (XANAX PO) Take 0.5 mg by mouth 3 times daily Takes PRN    Historical Provider, MD   sodium chloride (OCEAN, BABY AYR) 0.65 % nasal spray 1 spray by Nasal route 2 times daily  Patient taking differently: 1 spray by Nasal route as needed for Congestion  9/4/21   Juliet Webster DO   ZINC PO Take 1 tablet by mouth daily     Historical Provider, MD   Ascorbic Acid (VITAMIN C PO) Take by mouth daily     Historical Provider, MD   acetaminophen (TYLENOL) 500 MG tablet Take 1,000 mg by mouth every 6 hours as needed for Pain    Historical Provider, MD   ibuprofen (ADVIL;MOTRIN) 800 MG tablet Take 1 tablet by mouth every 8 hours as needed for Pain 6/9/21   Antonio Steiner,    ergocalciferol (ERGOCALCIFEROL) 1.25 MG (94553 UT) capsule Take 50,000 Units by mouth once a week *MONDAYS* 2/10/21   Historical Provider, MD   montelukast (SINGULAIR) 10 MG tablet Take 10 mg by mouth nightly  2/10/21 Historical Provider, MD   escitalopram (LEXAPRO) 10 MG tablet Take 10 mg by mouth daily  2/10/21   Historical Provider, MD   pantoprazole (PROTONIX) 40 MG tablet Take 40 mg by mouth daily as needed (GI SYMPTOMS)  2/10/21   Historical Provider, MD   rizatriptan (MAXALT-MLT) 10 MG disintegrating tablet Take 10 mg by mouth as needed  2/10/21   Historical Provider, MD   fluticasone (FLONASE) 50 MCG/ACT nasal spray 1 spray by Nasal route daily as needed for Rhinitis or Allergies  2/10/21   Historical Provider, MD   budesonide-formoterol [facility]) 160-4.5 MCG/ACT AERO Inhale 2 puffs into the lungs 2 times daily  1/8/21   Historical Provider, MD       Future Appointments   Date Time Provider Rose Sury   2/16/2022  1:30 PM KRISTYN Crane - NP [facility] PULM [facility]     Follow-up With  Details  Why  Contact Info   Beth Deleon DPM  On 11/30/2021  For wound re-check  [address] [address]  Inocente Lino MD  On 12/8/2021  PCP follow up  [address] [place] [address]  [facility] Fritz, DO  On 12/15/2021  Urology initial appt  [address] [place] [address]  [phone]       Plan:  -Patient waiting return call from PCP re: cardiology & infectious disease referral.     -Patient to have nocturnal pulse ox study done at home.    -Patient to complete current round of Ampicillin. -ACM to send referral to [facility] for social security disability info for patient. Lenox Collet, RN BSN  Associate Care Manager  Phone: [phone]  Email: [email or web address]

## 2021-12-10 ENCOUNTER — CARE COORDINATION (OUTPATIENT)
Dept: OTHER | Facility: CLINIC | Age: 36
End: 2021-12-10

## 2021-12-10 NOTE — CARE COORDINATION
Ambulatory Care Coordination Note  12/10/2021  CM Risk Score: 12  Charlson 10 Year Mortality Risk Score: 2%     ACC: Bernadette Strange RN    Summary Note: Associate Care Manager (ACM) called patient for CC outreach. Patient states had appt with PCP this week and PCP sent referral for infectious disease, per patient's request for ongoing foot infection. Patient states she has discussed desire for infectious disease consult with podiatry but keeps being told there is no need. States she plans to call infectious disease office by Tuesday next week if she has not heard from them by then. Patient states PCP decided patient does not need cardiology referral at this point as her pulse was normal at office visit this week. States she was told per PCP the plan is to continue to monitor pulse and BP. [facility] PCP suggested patient wait to have repeat PFT done due to patient would most likely fail test at this time. Patient states PCP notified her that patient's lung sounds were abnormal and instructed her to continue to use inhalers- which patient states she has been using as prescribed. Patient states has next office visit with Dr. Mansoor Payton 12/16/2021 and will have #10 skin graft for left foot wound. [facility] insurance approved 10 skin grafts total. [facility] since site still is not closed, she is looking forward to getting updated plant of care. [facility] at recent office visit, Dr. Mansoor Payton questioned patient if PCP/pulmonology would approve surgery; patient states unsure. Also [facility] has initial office visit with Dr. Yanet Leon next week and will possibly need surgery for urinary incontienence. Patient states now into week #4 of extremely dry throat at night and into early morning. States she drinks 3 bottles of water at night before bed and wakes up twice per night to urinate. States still having difficulty sleeping at night and consistently wakes up for ~2 hours/night.      Patient states still waiting determination of long term disability. States short term disability  on 2021. Patient states she has been in contact with absence manager regarding disability and was told she may be getting separation package from [facility] [employer] due to >6 months off work. Patient states she has not yet received separation package. Patient reports still with pain in left foot with movement. States still changing dressing per self daily. Patient states \"purulent\" drainage from site. States purchasing wound care supplies over the counter at her own expense- which patient states is getting expensive as it adds up. ACM suggested patient reach out to [facility] (secondary coverage) to be set up with DME for wound care supplies. Patient states she will do so. Patient states no longer wearing supplemental oxygen for majority of the day; using at night mainly. Reports occasional, non-productive cough. States she has not yet heard back from pulmonology office re: nocturnal pulse ox study. ACM offered to call pulmonology office re: pulse ox study; patient accepted offer. States still poor endurance as she feels like she needs nap after going up and down the steps and making food. Care Coordination Interventions    Program Enrollment: Complex Care  Referral from Primary Care Provider: No  Suggested Interventions and Community Resources  Social Work: Completed (Comment: MSW sent patient information on social security disability application process (1716) )         Goals Addressed                    This Visit's Progress       Patient Marija Rice  My left foot wound will heal. (pt-stated)   No change      My left foot wound will heal.      Barriers: fear of failure  Plan for overcoming my barriers: I will work with my ACM to overcome barriers. Confidence: 8/10  Anticipated Goal Completion Date: 2021: Patient reports left foot wound still open and currently changing dressing once daily.  States she is currently on Ampicillin for 10 days for current infection. Next appt with podiatry 11/30/2021.     12/10/2021: Patient states left foot wound still open and draining \"purulent\" drainage. States she is changing dressing once daily. States completed Ampicillin as prescribed. PCP sent referral for infectious disease this week; patient waiting call from office to schedule. Has next appt with podiatry 12/16/2021.   The infection to my left foot wound will clear up with current antibiotics. (pt-stated)   No change      The infection to my left foot wound will clear up with current antibiotics. Barriers: overwhelmed by complexity of regimen and stress  Plan for overcoming my barriers: I will work with my ACM and podiatrist to overcome barriers. Confidence: 7/10  Anticipated Goal Completion Date: 12/23/2021 11/23/2021: Patient states left foot wound culture showed infection and she is currently on Ampicillin for 10 days. States she has ~3 days remaining on RX.     12/10/2021: Patient states finished Ampicillin as prescribed. States left foot wound still open and draining \"purulent\" drainage. PCP sent referral to infectious disease; patient waiting call from ID office to schedule. Prior to Admission medications    Medication Sig Start Date End Date Taking?  Authorizing Provider   metFORMIN (GLUCOPHAGE) 500 MG tablet Take 500 mg by mouth daily (with breakfast)    Historical Provider, MD   amLODIPine (NORVASC) 5 MG tablet Take 5 mg by mouth daily    Historical Provider, MD   ALPRAZolam (XANAX PO) Take 0.5 mg by mouth 3 times daily Takes PRN    Historical Provider, MD   sodium chloride (OCEAN, BABY AYR) 0.65 % nasal spray 1 spray by Nasal route 2 times daily  Patient taking differently: 1 spray by Nasal route as needed for Congestion  9/4/21   Juliet Webster,    OXYGEN Inhale 3 L/min into the lungs nightly Using PRN during day and at night while sleeping (With humidification)    Historical Provider, MD   ZINC PO Take 1 tablet by MARBELLA  Associate Care Manager  Phone: [phone]  Email: [email or web address]

## 2022-01-13 DIAGNOSIS — S91.302A OPEN WOUND OF LEFT FOOT, INITIAL ENCOUNTER: ICD-10-CM

## 2022-01-15 ENCOUNTER — CARE COORDINATION (OUTPATIENT)
Dept: OTHER | Facility: CLINIC | Age: 37
End: 2022-01-15

## 2022-01-15 RX ORDER — RIMEGEPANT SULFATE 75 MG/75MG
1 TABLET, ORALLY DISINTEGRATING ORAL PRN
COMMUNITY
End: 2022-10-11 | Stop reason: SDUPTHER

## 2022-01-15 RX ORDER — OXYBUTYNIN CHLORIDE 10 MG/1
10 TABLET, EXTENDED RELEASE ORAL DAILY
COMMUNITY
End: 2022-10-05

## 2022-01-15 NOTE — CARE COORDINATION
Ambulatory Care Coordination Note  1/15/2022  CM Risk Score: 12  [place] 10 Year Mortality Risk Score: 2%     ACC: Ulysses Rous, RN    Summary Note: Associate Care Manager (ACM) called patient for CC outreach. Patient reports has seen urology and infectious disease this past week. States had wound culture repeated from left achilles tendon non-healing site. Patient reports infectious disease said she may need surgical intervention to debride site again and possibly IV antibiotics post-op. States next appointment with Dr. Souza How 1/20/2022. Patient states urology is referring her to urological surgeon as she may need surgical intervention for ongoing urinary incontinence. Patient states started Oxybutynin. Patient reports with anxiety related to possible upcoming surgeries. States had nocturnal pulse ox study completed which showed desat down to 50's. [facility] pulmonology instructed her to continue supplemental oxygen at night and plan is to repeat nocturnal pulse ox study in March. Patient states she does not have refill of Amlodipine and is out of med. States still monitoring BP daily and has been \"pretty decent\". States BP elevated couple times after moving around or doing a lot of activity. States she has follow up with PCP in Feb and plans to discuss Amlodipine at that time. AC offered to reach out to PCP for patient regarding Amlodipine. Patient declined offer. Patient reports frequent headaches since COVID infection that she states will be daily for up to 7-10 days. States migraine med recently switched to Nurtec which she states is effective. Reports recurrent nausea; denies need for anti-emetic unless she starts vomiting. States she would reach out to PCP to request anti-emetic if she needs in the future. Patient states has continued to follow up with counselor. [facility] has discussed crowd anxiety to counselor.  Patient states avoids crowds as much as possible and gets anxious if too many people around. States she was taking Xanax at bed to help with sleep but has not been do to side effect of respiratory depression. Patient states following with [facility] for genetic testing for breast and ovarian cancer due to high prevelance on maternal side. Patient reports spot on breast that \"comes and goes\" with menses that provider is monitoring. States she has breast testing Q 6 months. Patient states still with fatigue as she has not been sleeping well. States awake 3am -5am daily. Patient reports frutration and feeling of \"this is the longest 6-7 months of my life\". States nothing has been normal. Reports termination from [facility] due to prolonged medical leave. States she did not opt into cobra as she has [employer]. [facility] checked [facility] and found patient listed as active with coverage. [facility] notified patient and advised her to reach out to [facility] on Monday to follow up on this. Patient states she will do so. AC requested patient notify ACM outcome as if she is not current with [place]- ACM will need to sign off. AC discussed option of [facility]ing patient [facility] needs. Patient verbalized understanding. Care Coordination Interventions    Program Enrollment: Complex Care  Referral from Primary Care Provider: No  Suggested Interventions and Community Resources  Social Work: Completed (Comment: MSW sent patient information on social security disability application process (71/09/5684) )         Goals Addressed                    This Visit's Progress       Patient Julianne Ham  My left foot wound will heal. (pt-stated)   No change      My left foot wound will heal.      Barriers: fear of failure  Plan for overcoming my barriers: I will work with my ACM to overcome barriers. Confidence: 8/10  Anticipated Goal Completion Date: 1/23/2022 11/23/2021: Patient reports left foot wound still open and currently changing dressing once daily.  States she is currently on Ampicillin for 10 days for current infection. Next appt with podiatry 11/30/2021.     12/10/2021: Patient states left foot wound still open and draining \"purulent\" drainage. States she is changing dressing once daily. States completed Ampicillin as prescribed. PCP sent referral for infectious disease this week; patient waiting call from office to schedule. Has next appt with podiatry 12/16/2021.     1/15/2022: Patient states still with non healing site to left achilles tendon site. Reports ongoing odor from site. Patient now seeing infectious disease and may need another surgical debridement.   The infection to my left foot wound will clear up with current antibiotics. (pt-stated)   No change      The infection to my left foot wound will clear up with current antibiotics. Barriers: overwhelmed by complexity of regimen and stress  Plan for overcoming my barriers: I will work with my ACM and podiatrist to overcome barriers. Confidence: 7/10  Anticipated Goal Completion Date: 2/23/2022 11/23/2021: Patient states left foot wound culture showed infection and she is currently on Ampicillin for 10 days. States she has ~3 days remaining on RX.     12/10/2021: Patient states finished Ampicillin as prescribed. States left foot wound still open and draining \"purulent\" drainage. PCP sent referral to infectious disease; patient waiting call from ID office to schedule. 1/15/2022: Patient states left achilles site still with foul smelling odor. Has repeat wound culture pending. Prior to Admission medications    Medication Sig Start Date End Date Taking?  Authorizing Provider   oxybutynin (DITROPAN-XL) 10 MG extended release tablet Take 10 mg by mouth daily   Yes Historical Provider, MD   Rimegepant Sulfate (NURTEC) 75 MG TBDP Take 1 tablet by mouth as needed (migraines)   Yes Historical Provider, MD   metFORMIN (GLUCOPHAGE) 500 MG tablet Take 500 mg by mouth daily (with breakfast) Yes Historical Provider, MD   ALPRAZolam (XANAX PO) Take 0.5 mg by mouth 3 times daily Takes PRN   Yes Historical Provider, MD   sodium chloride (OCEAN, BABY AYR) 0.65 % nasal spray 1 spray by Nasal route 2 times daily  Patient taking differently: 1 spray by Nasal route as needed for Congestion  9/4/21  Yes Juliet Webster, DO   OXYGEN Inhale 3 L/min into the lungs nightly Using PRN during day and at night while sleeping (With humidification)   Yes Historical Provider, MD   acetaminophen (TYLENOL) 500 MG tablet Take 1,000 mg by mouth every 6 hours as needed for Pain   Yes Historical Provider, MD   ibuprofen (ADVIL;MOTRIN) 800 MG tablet Take 1 tablet by mouth every 8 hours as needed for Pain 6/9/21  Yes Gera Johnson,    ergocalciferol (ERGOCALCIFEROL) 1.25 MG (21725 UT) capsule Take 50,000 Units by mouth once a week *MONDAYS* 2/10/21  Yes Historical Provider, MD   montelukast (SINGULAIR) 10 MG tablet Take 10 mg by mouth nightly  2/10/21  Yes Historical Provider, MD   escitalopram (LEXAPRO) 10 MG tablet Take 10 mg by mouth daily  2/10/21  Yes Historical Provider, MD   fluticasone (FLONASE) 50 MCG/ACT nasal spray 1 spray by Nasal route daily as needed for Rhinitis or Allergies  2/10/21  Yes Historical Provider, MD   budesonide-formoterol (SYMBICORT) 160-4.5 MCG/ACT AERO Inhale 2 puffs into the lungs 2 times daily  1/8/21  Yes Historical Provider, MD   amLODIPine (NORVASC) 5 MG tablet Take 5 mg by mouth daily  Patient not taking: Reported on 1/15/2022    Historical Provider, MD   ZINC PO Take 1 tablet by mouth daily   Patient not taking: Reported on 1/15/2022    Historical Provider, MD   Ascorbic Acid (VITAMIN C PO) Take by mouth daily   Patient not taking: Reported on 1/15/2022    Historical Provider, MD   pantoprazole (PROTONIX) 40 MG tablet Take 40 mg by mouth daily as needed (GI SYMPTOMS)   Patient not taking: Reported on 1/15/2022 2/10/21   Historical Provider, MD       Future Appointments   Date Time Provider Department Center   2/3/2022  1:15 PM Peggy Cornejo APRN - CNP [facility]   2/16/2022  1:30 PM Vel Roberts APRN - NP [facility]     Follow-up With  Details  Why  Contact Brian Yung DPM  On 1/20/2022  Podiatry follow up  [address] [place] [address]  [phone]   Rachel Garnica  On 2/2/2022  Genetics @ [facility]  [address] [place] [address] [phone]     Plan:  -Patient to reach out to [place] on Monday to see about coverage. If not active, plan will be to transition to [facility] for ongoing care needs. Nat Bolton MSN RN  Associate Care Manager  Phone: [phone]  Email: [email or web address]

## 2022-01-31 ENCOUNTER — CARE COORDINATION (OUTPATIENT)
Dept: OTHER | Facility: CLINIC | Age: 37
End: 2022-01-31

## 2022-01-31 NOTE — CARE COORDINATION
Ambulatory Care Coordination Note  1/31/2022  CM Risk Score: 12  [place] 10 Year Mortality Risk Score: 2%     ACC: Christian Olszewski, RN    Summary Note: Associate Care Manager (ACM) called patient for CC outreach. Patient states she spoke to [place] re: coverage and was told she has active coverage until 2/1/2022 but that they were unable to tell her what the status is after that date. Patient states she never received information regarding Cobra; states MH termination date was 12/2/2021. Patient states she called [employer] re: [place] coverage and was told they do not do anything with that. States she is unsure who else to talk to. [facility] notified patient that [facility] [address]  is looking into this. ACM team to update patient once additional information received. Patient states Dr. Cory Morales plans to do surgery to left achilles tendon non-healing, chronic wound site. States she was told per Dr. Cory Morales at last appointment that he was going to be reaching out to patient's PCP to see if they would provide pre-op clearance for patient. Patient states next office visit with Dr. Cory Morales is Thurs this week. Patient also reports to AC that infectious disease cancelled her office visit that was scheduled for this week due to pending surgery on chronic achilles tendon site. States she was told ID would get involved if needed post-op. Patient states pain to left achilles chronic wound site more annoying now than it ever has been. States she continues to work with counselor.      Care Coordination Interventions    Program Enrollment: Complex Care  Referral from Primary Care Provider: No  Suggested Interventions and Community Resources  Social Work: Completed (Comment: MSW sent patient information on social security disability application process (49/86/4771) )         Goals Addressed                    This Visit's Progress       Patient Stated      COMPLETED: My left foot wound will heal. (pt-stated)         My left foot wound will heal.      Barriers: fear of failure  Plan for overcoming my barriers: I will work with my ACM to overcome barriers. Confidence: 8/10  Anticipated Goal Completion Date: 1/23/2022 11/23/2021: Patient reports left foot wound still open and currently changing dressing once daily. States she is currently on Ampicillin for 10 days for current infection. Next appt with podiatry 11/30/2021.     12/10/2021: Patient states left foot wound still open and draining \"purulent\" drainage. States she is changing dressing once daily. States completed Ampicillin as prescribed. PCP sent referral for infectious disease this week; patient waiting call from office to schedule. Has next appt with podiatry 12/16/2021.     1/15/2022: Patient states still with non healing site to left achilles tendon site. Reports ongoing odor from site. Patient now seeing infectious disease and may need another surgical debridement. 1/31/2022: Patient states left achilles site still non healing. Plan is now for surgical intervention by Dr. Cory Morales. Goal: NOT MET        COMPLETED: The infection to my left foot wound will clear up with current antibiotics. (pt-stated)         The infection to my left foot wound will clear up with current antibiotics. Barriers: overwhelmed by complexity of regimen and stress  Plan for overcoming my barriers: I will work with my ACM and podiatrist to overcome barriers. Confidence: 7/10  Anticipated Goal Completion Date: 2/23/2022 11/23/2021: Patient states left foot wound culture showed infection and she is currently on Ampicillin for 10 days. States she has ~3 days remaining on RX.     12/10/2021: Patient states finished Ampicillin as prescribed. States left foot wound still open and draining \"purulent\" drainage. PCP sent referral to infectious disease; patient waiting call from ID office to schedule. 1/15/2022: Patient states left achilles site still with foul smelling odor.  Has tablet Take 10 mg by mouth daily  2/10/21   Historical Provider, MD   pantoprazole (PROTONIX) 40 MG tablet Take 40 mg by mouth daily as needed (GI SYMPTOMS)   Patient not taking: Reported on 1/15/2022 2/10/21   Historical Provider, MD   fluticasone (FLONASE) 50 MCG/ACT nasal spray 1 spray by Nasal route daily as needed for Rhinitis or Allergies  2/10/21   Historical Provider, MD   budesonide-formoterol [facility]) 160-4.5 MCG/ACT AERO Inhale 2 puffs into the lungs 2 times daily  1/8/21   Historical Provider, MD       Future Appointments   Date Time Provider Rose Sury   2/16/2022  1:30 PM Samantha Romero APRN - NP [facility]     Follow-up With  Details  Why  Contact Info   Yimi Li DPM  On 2/3/2022  Podiatry follow up  [address] [place] [address]     Plan:  -[facility] team to work on verifying patient's [employer] coverage as patient was terminated from [employer] 12/2/2021 and has not received information or paid for cobra. (Patient does not plan to opt into cobra as she has coverage through [facility].)    Lucretia Woo MSN RN  Associate Care Manager  Phone: [phone]  Email: [email or web address]

## 2022-02-07 ENCOUNTER — CARE COORDINATION (OUTPATIENT)
Dept: OTHER | Facility: CLINIC | Age: 37
End: 2022-02-07

## 2022-02-07 NOTE — CARE COORDINATION
Associate Care Manager (ACM) called patient for CC outreach. No answer; ACM left HIPAA compliant voicemail message with request for return call. ACM will continue to follow. KAREN Fischer RN  Associate Care Manager  Phone: [phone]  Email: [email or web address]

## 2022-02-15 ENCOUNTER — CARE COORDINATION (OUTPATIENT)
Dept: OTHER | Facility: CLINIC | Age: 37
End: 2022-02-15

## 2022-02-15 NOTE — CARE COORDINATION
Ambulatory Care Coordination Note  2/15/2022  CM Risk Score: 12  [place] 10 Year Mortality Risk Score: 2%     ACC: Ron Santiago RN    Summary Note: Associate Care Manager (ACM) called patient for CC outreach. ACM notified patient that she is not active with [place], according to [employer] site. Patient states she [facility] with [place] enrollment information. [facility] she is currently in the process of reviewing the information but states she most likely will not enroll in [employer] [place] coverage as she currently has [employer]. Patient states PCP referred her to [facility] for second opinion for her non-healing wound to left achilles site. States she had initial office visit at [facility] that involved labs and imaging. States she was told she will require surgery but they want to go through lengthy pre-op testing to find out exactly what is needed. Patient  has MRI @ [facility] next week. [facility] she was told will most likely need IV antibiotic and wound vac post-op followed by extensive rehab. ACM notified patient that due to the fact that she is no longer [employer] and not going to enroll in [place], [place] required to sign off. ACM encouraged patient to reach out to [facility] to request  if desired. Patient verbalized understanding and thanked ACM for support provided. Care Coordination Interventions    Program Enrollment: Complex Care  Referral from Primary Care Provider: No  Suggested Interventions and Community Resources  Social Work: Completed (Comment: MSW sent patient information on social security disability application process (62/74/8948) )         Prior to Admission medications    Medication Sig Start Date End Date Taking?  Authorizing Provider   oxybutynin (DITROPAN-XL) 10 MG extended release tablet Take 10 mg by mouth daily    Historical Provider, MD   Rimegepant Sulfate (NURTEC) 75 MG TBDP Take 1 tablet by mouth as needed (migraines)    Historical Provider, MD   metFORMIN (GLUCOPHAGE) 500 MG tablet Take 500 mg by mouth daily (with breakfast)    Historical Provider, MD   amLODIPine (NORVASC) 5 MG tablet Take 5 mg by mouth daily  Patient not taking: Reported on 1/15/2022    Historical Provider, MD   ALPRAZolam (XANAX PO) Take 0.5 mg by mouth 3 times daily Takes PRN    Historical Provider, MD   sodium chloride (OCEAN, BABY AYR) 0.65 % nasal spray 1 spray by Nasal route 2 times daily  Patient taking differently: 1 spray by Nasal route as needed for Congestion  9/4/21   Juliet Webster DO   OXYGEN Inhale 3 L/min into the lungs nightly Using PRN during day and at night while sleeping (With humidification)    Historical Provider, MD   ZINC PO Take 1 tablet by mouth daily   Patient not taking: Reported on 1/15/2022    Historical Provider, MD   Ascorbic Acid (VITAMIN C PO) Take by mouth daily   Patient not taking: Reported on 1/15/2022    Historical Provider, MD   acetaminophen (TYLENOL) 500 MG tablet Take 1,000 mg by mouth every 6 hours as needed for Pain    Historical Provider, MD   ibuprofen (ADVIL;MOTRIN) 800 MG tablet Take 1 tablet by mouth every 8 hours as needed for Pain 6/9/21   Meron Grimaldo,    ergocalciferol (ERGOCALCIFEROL) 1.25 MG (48951 UT) capsule Take 50,000 Units by mouth once a week *MONDAYS* 2/10/21   Historical Provider, MD   montelukast (SINGULAIR) 10 MG tablet Take 10 mg by mouth nightly  2/10/21   Historical Provider, MD   escitalopram (LEXAPRO) 10 MG tablet Take 10 mg by mouth daily  2/10/21   Historical Provider, MD   pantoprazole (PROTONIX) 40 MG tablet Take 40 mg by mouth daily as needed (GI SYMPTOMS)   Patient not taking: Reported on 1/15/2022 2/10/21   Historical Provider, MD   fluticasone (FLONASE) 50 MCG/ACT nasal spray 1 spray by Nasal route daily as needed for Rhinitis or Allergies  2/10/21   Historical Provider, MD   budesonide-formoterol (SYMBICORT) 160-4.5 MCG/ACT AERO Inhale 2 puffs into the lungs 2 times daily  1/8/21 Historical Provider, MD       Future Appointments   Date Time Provider Rose Ga   2/16/2022  1:30 PM KRISTYN Stephens - NP AFL PULM CC [facility]     Follow-up With  Details  Why  Contact Info   Jennifer Ruiz PA-C  On 2/10/2022  Ortho @ [place]  [id number]  [phone]   MRI Left Ankle  On 2/24/2022  @ [facility]       Plan:  -ACM signing off due to patient no longer [place] [address] or covered by [facility]. Patient states not planning to enroll in [place] as she currently has [facility] Medicaid coverage. ([facility] encouraged patient to reach out to [facility] to request care manager if desired.)    KAREN Bah RN  Associate Care Manager  Phone: [phone]  Email: [email or web address] [facility]

## 2022-03-09 ENCOUNTER — TELEPHONE (OUTPATIENT)
Dept: BREAST CENTER | Age: 37
End: 2022-03-09

## 2022-03-09 NOTE — TELEPHONE ENCOUNTER
RN contacted patient in regards to breast MRI. Patient was last seen 09/22/2021 recommended 6 month follow up with B/L breast MRI. Patient states she is ok with moving forward with having MRI. Patient states she is having foot surgery today 03/9/2022 and might get a wound vac. RN asked if patient was still having regular cycles. Patient states she is but hasnt had March yet it is due soon. RN advised patient to call first day of April cycle as she is having surgery today and getting wound vac wont be able to move forward with the MRI at this time. Patient verbalized understanding. Patient not due for labs as she is under 71years old and does not have HTN or DM.          Electronically signed by Sujey Verde RN on 3/9/22 at 9:39 AM EST

## 2022-03-17 ENCOUNTER — TELEPHONE (OUTPATIENT)
Dept: BREAST CENTER | Age: 37
End: 2022-03-17

## 2022-03-17 NOTE — TELEPHONE ENCOUNTER
Authorization has been obtained for breast MRI [id number] -- Approval # [id number] valid until 5/16/22 per Aditya Whitley - HOLGER/[facility] representative.

## 2022-03-21 LAB
ALBUMIN SERPL-MCNC: 3.9 G/DL (ref 3.5–5.2)
ALP BLD-CCNC: 78 U/L (ref 35–104)
ALT SERPL-CCNC: 12 U/L (ref 0–32)
ANISOCYTOSIS: ABNORMAL
AST SERPL-CCNC: 13 U/L (ref 0–31)
BASOPHILS ABSOLUTE: 0 E9/L (ref 0–0.2)
BASOPHILS RELATIVE PERCENT: 0.4 % (ref 0–2)
BILIRUB SERPL-MCNC: <0.2 MG/DL (ref 0–1.2)
BILIRUBIN DIRECT: <0.2 MG/DL (ref 0–0.3)
BILIRUBIN, INDIRECT: NORMAL MG/DL (ref 0–1)
BURR CELLS: ABNORMAL
CREAT SERPL-MCNC: 0.5 MG/DL (ref 0.5–1)
EOSINOPHILS ABSOLUTE: 0 E9/L (ref 0.05–0.5)
EOSINOPHILS RELATIVE PERCENT: 1.1 % (ref 0–6)
GFR AFRICAN AMERICAN: >60
GFR NON-AFRICAN AMERICAN: >60 ML/MIN/1.73
HCT VFR BLD CALC: 38.5 % (ref 34–48)
HEMOGLOBIN: 11.4 G/DL (ref 11.5–15.5)
LYMPHOCYTES ABSOLUTE: 2.27 E9/L (ref 1.5–4)
LYMPHOCYTES RELATIVE PERCENT: 21.7 % (ref 20–42)
MCH RBC QN AUTO: 21.3 PG (ref 26–35)
MCHC RBC AUTO-ENTMCNC: 29.6 % (ref 32–34.5)
MCV RBC AUTO: 71.8 FL (ref 80–99.9)
MONOCYTES ABSOLUTE: 0.41 E9/L (ref 0.1–0.95)
MONOCYTES RELATIVE PERCENT: 4.4 % (ref 2–12)
NEUTROPHILS ABSOLUTE: 7.62 E9/L (ref 1.8–7.3)
NEUTROPHILS RELATIVE PERCENT: 73.9 % (ref 43–80)
OVALOCYTES: ABNORMAL
PDW BLD-RTO: 19.5 FL (ref 11.5–15)
PLATELET # BLD: 330 E9/L (ref 130–450)
PMV BLD AUTO: 9.2 FL (ref 7–12)
POIKILOCYTES: ABNORMAL
POLYCHROMASIA: ABNORMAL
RBC # BLD: 5.36 E12/L (ref 3.5–5.5)
TOTAL PROTEIN: 6.9 G/DL (ref 6.4–8.3)
WBC # BLD: 10.3 E9/L (ref 4.5–11.5)

## 2022-04-14 ENCOUNTER — TELEPHONE (OUTPATIENT)
Dept: BREAST CENTER | Age: 37
End: 2022-04-14

## 2022-04-14 NOTE — TELEPHONE ENCOUNTER
Called and spoke with patient to check on scheduling her breast MRI. Patient states she ended her cycle on April 8, 2022 which is out of the range. Asked patient to call on day one cycle in  May even if it is on a weekend. Script given to the schedulers who also asked the MRI questionnaire and reiterated the importance of calling on day one of her next cycle. Labs were obtained 3/21/2022.         Electronically signed by Manjula Abernathy RN on 4/14/22 at 10:06 AM EDT

## 2022-07-05 ENCOUNTER — TELEPHONE (OUTPATIENT)
Dept: BREAST CENTER | Age: 37
End: 2022-07-05

## 2022-07-05 NOTE — TELEPHONE ENCOUNTER
RN received a call from patient in regards to missed call about moving forward with breast MRI. Patient states she has not cycled for month of July she is due around 7/15. RN advised patient that she would let Valeriy Mclean know so she can reauthorize patient MRI. RN also informed patient that lab work is needed and orders will be placed for her to have drawn at a [facility] facility. Patient verbalized understanding. RN advised on day 1 of her cycle she needs to call office so that we can let the schedulers know to contact her and schedule. RN routing message to Valeriy Mclean to restart authorization and THOMAS Briggs for informational purposes.          Electronically signed by Latrell Bonilla RN on 7/5/22 at 4:35 PM EDT

## 2022-07-05 NOTE — TELEPHONE ENCOUNTER
Called and left message with call back number to discuss the need for her breast MRI to be scheduled. The authorization has  and will need  re authorized.        Electronically signed by Laly Mayen RN on 22 at 8:32 AM EDT

## 2022-07-21 ENCOUNTER — TELEPHONE (OUTPATIENT)
Dept: BREAST CENTER | Age: 37
End: 2022-07-21

## 2022-07-21 NOTE — TELEPHONE ENCOUNTER
Prior authorization has been obtained for breast MRI 96112 - Approval # [id number] valid until 9/19/22 per [employer].

## 2022-07-21 NOTE — TELEPHONE ENCOUNTER
Called and left detailed message in moving forward with breast MRI. Asked patient to return call so we can  move forward once her cycle begins.     Electronically signed by Sarahi Araujo RN on 7/21/22 at 1:18 PM EDT

## 2022-08-15 DIAGNOSIS — Z91.89 AT HIGH RISK FOR BREAST CANCER: Primary | ICD-10-CM

## 2022-08-30 LAB
AVERAGE GLUCOSE: NORMAL
HBA1C MFR BLD: 5.7 %

## 2022-09-14 ENCOUNTER — HOSPITAL ENCOUNTER (OUTPATIENT)
Dept: MRI IMAGING | Age: 37
Discharge: HOME | End: 2022-09-16
Payer: COMMERCIAL

## 2022-09-14 DIAGNOSIS — R92.2 DENSE BREAST TISSUE: ICD-10-CM

## 2022-09-14 DIAGNOSIS — R92.2 DENSE BREAST TISSUE ON MAMMOGRAM: ICD-10-CM

## 2022-09-14 DIAGNOSIS — Z80.3 FAMILY HISTORY OF BREAST CANCER: ICD-10-CM

## 2022-09-14 DIAGNOSIS — Z91.89 AT HIGH RISK FOR BREAST CANCER: ICD-10-CM

## 2022-09-14 PROCEDURE — A9585 GADOBUTROL INJECTION: HCPCS | Performed by: RADIOLOGY

## 2022-09-14 PROCEDURE — C8908 MRI W/O FOL W/CONT, BREAST,: HCPCS

## 2022-09-14 PROCEDURE — 6360000004 HC RX CONTRAST MEDICATION: Performed by: RADIOLOGY

## 2022-09-14 RX ADMIN — GADOBUTROL 10 ML: 604.72 INJECTION INTRAVENOUS at 12:17

## 2022-09-26 ASSESSMENT — ENCOUNTER SYMPTOMS
BACK PAIN: 0
CHOKING: 0
WHEEZING: 0
COUGH: 1
CHEST TIGHTNESS: 0
SHORTNESS OF BREATH: 1

## 2022-09-26 NOTE — PROGRESS NOTES
Subjective:  TC score 23.7% lifetime risk. This note was copied forward from the last encounter. Essential components for this patient record were reviewed and verified on this visit including:  recent hospitalizations, recent imaging, PMH, PSH, FH, SOC HX, Allergies, and Medications were reviewed and updated as appropriate. In addition, the assessment and plan were copied from prior office note and updated accordingly. Patient ID: Loco Vazquez is a 39 y.o. female. HPI  History and Physical    Patient's Name/Date of Birth: Loco Vazquez / 1985    Date: 2022       Loco aVzquez presents for to reestablish care. PCP: Daljit Mary MD. Gynecologist:  Dr. Tre Carpio. Michelle Layton was last seen in our office on 2021. She has a history of skin thickening and was recommended a punch biopsy of the skin. At that time, she was seen by Dr. Tammie Lares and the skin thickening had resolved. Clinical exam was felt to correlate with her image findings and skin biopsy was not recommended. Breast cancer risk factors include family hx on mother's side,   -maternal aunt times 2 with breast cancer, (both at age 37-2  age 47). -maternal grandmother breast cancer. Family history was reviewed on this visit without pertinent changes. Ashkenazi [place] Ancestry: No.  No history of thoracic radiation therapy. Genetics:  Seen by [facility] @ [address] [place] [address]:        Imaging to date:  Autumn Lawton IBS 23.5%; Grade C breast density. 2020 bilateral diagnostic mammogram and left breast ultrasound @ [facility]: Multiple oval masses of varying size scattered both breasts. A focal asymmetry in the left breast in the upper outer quadrant region and in the retroareolar region. 2020 bilateral breast ultrasound: Findings in question are not seen on ultrasound.   Stereotactic biopsy could not be performed due to limited visualization on mammography which would prevent localization. BI-RADS Category 0. Recommend breast MRI.  08/04/2020 MRI of the bilateral breasts: Focal areas of skin thickening in the medial aspect of both breasts are suspicious. BI-RADS 4.  08/17/2020 office visit with Dr. Raghu Tinajero; skin thickening not present and clinical exam correlated with imaging. 09/22/2021 bilateral screening mammogram: Negative, BI-RADS 1.  09/14/2022 MRI of the bilateral breast: No evidence for malignancy. Negative, BI-RADS 1. Breast biopsies:  None to date. Obstetric related history:  Age of menarche was 4. Patient denies hormonal therapy. [id number]. Age of first live birth was 31. Patient did breast feed. Bra Size: 38dd    Because violence is so common, we ask all our patients: are you in a relationship or do you live with a person who threatens, hurts, or controls you:  denies  Patient drinks little caffeinated beverages. Patient does not smoke cigarettes. Patient does not use recreational drugs. 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 Jyoti Johnnie today for follow-up of her predisposition to breast cancer. She has continued to prajapati post COVID ornis of breath and tachycardia since June 2020. In addition, she has a history of a ruptured Achilles tendon on the left with subsequent osteomyelitis requiring surgery at [facility]. Respiratory:  Positive for cough and shortness of breath (increased over the past two weeks. ). Negative for choking, chest tightness and wheezing. Cardiovascular:  Positive for palpitations and leg swelling. Negative for chest pain. Musculoskeletal:  Negative for arthralgias, back pain, gait problem, joint swelling, myalgias, neck pain and neck stiffness. Neurological:  Positive for headaches. Negative for syncope. Psychiatric/Behavioral:  Negative for agitation and decreased concentration. The patient is not nervous/anxious.     Patient denies previous history of DVT/PE. Objective:   Physical Exam  Vitals and nursing note reviewed. Constitutional:       General: She is not in acute distress. Appearance: She is well-developed. She is obese. She is not diaphoretic. Comments: ECOG remains stable at 2 due to post Covid syndrome. She is scheduled to undergo physical therapy for her deconditioning post COVID. No acute distress. HENT:      Head: Normocephalic and atraumatic. Mouth/Throat:      Pharynx: No oropharyngeal exudate. Eyes:      General: No scleral icterus. Right eye: No discharge. Left eye: No discharge. Conjunctiva/sclera: Conjunctivae normal.      Pupils: Pupils are equal, round, and reactive to light. Neck:      Thyroid: No thyromegaly. Vascular: No JVD. Trachea: No tracheal deviation. Cardiovascular:      Rate and Rhythm: Regular rhythm. Tachycardia present. Heart sounds: Normal heart sounds. No murmur heard. No friction rub. No gallop. Comments: Rate is again tachycardic today. Clinically normal sinus rhythm on this exam.  Pulmonary:      Effort: Pulmonary effort is normal. No respiratory distress or retractions. Breath sounds: Normal breath sounds. No stridor. No wheezing or rales. Chest:      Chest wall: No mass, lacerations, deformity, swelling, tenderness or edema. Breasts:     Breasts are symmetrical.      Right: No inverted nipple, mass, nipple discharge, skin change or tenderness. Left: No inverted nipple, mass, nipple discharge, skin change or tenderness. Comments: Breasts large and ptotic; Once again her exam remains stable and without evidence of malignancy. Ridge of prominent dense breast tissue of the bilateral upper outer quadrants is clinically stable. Abdominal:      General: There is no distension. Palpations: Abdomen is soft. Tenderness: There is no abdominal tenderness. There is no guarding or rebound.    Musculoskeletal:         General: No tenderness or deformity. Normal range of motion. Right shoulder: Normal. Normal range of motion. Left shoulder: Normal. Normal range of motion. Cervical back: Normal range of motion and neck supple. Feet:      Comments: Walking boot left foot. Lymphadenopathy:      Cervical: No cervical adenopathy. Right cervical: No superficial, deep or posterior cervical adenopathy. Left cervical: No superficial, deep or posterior cervical adenopathy. Upper Body:      Right upper body: No supraclavicular or pectoral adenopathy. Left upper body: No supraclavicular or pectoral adenopathy. Skin:     General: Skin is warm and dry. Coloration: Skin is not pale. Findings: No erythema or rash. Comments: Multiple tattoos   Neurological:      Mental Status: She is alert and oriented to person, place, and time. Coordination: Coordination normal.   Psychiatric:         Behavior: Behavior normal.         Thought Content: Thought content normal.         Judgment: Judgment normal.      Comments: Teary on this visit due to persistent post COVID syndrome, deconditioning and change in her ADLs. Assessment:      39 y.o. extremely pleasant female who was last seen in our office on August 17, 2020 following an MRI of the bilateral breast that had been ordered by her primary care physician recommended punch biopsy of the skin. At that time, she was seen by Dr. Shantanu Rios and the skin thickening had resolved. Clinical exam was felt to correlate with her image findings and skin biopsy was not recommended. Imaging to date:  [facility] Nishavivian IBS 23.5%; Grade C breast density. 07/17/2020 bilateral diagnostic mammogram and left breast ultrasound @ [facility]: Multiple oval masses of varying size scattered both breasts. A focal asymmetry in the left breast in the upper outer quadrant region and in the retroareolar region.   07/17/2020 bilateral breast ultrasound: Findings in question are not seen on ultrasound. Stereotactic biopsy could not be performed due to limited visualization on mammography which would prevent localization. BI-RADS Category 0. Recommend breast MRI.  08/04/2020 MRI of the bilateral breasts: Focal areas of skin thickening in the medial aspect of both breasts are suspicious. BI-RADS 4.  08/17/2020 office visit with Dr. Irma Madera; skin thickening not present and clinical exam correlated with imaging. 09/22/2021 bilateral screening mammogram: Negative, BI-RADS 1.  09/14/2022 MRI of the bilateral breast: No evidence for malignancy. Negative, BI-RADS 1.  10/05/2022 clinical follow-up is without evidence of malignancy. Radiology report reviewed, including BI-RADS result. We reviewed her family history in detail. Her imaging today has been negative. We reviewed her risk score on this visit. We reviewed her genetic testing results which were negative for pathogenic mutation; there was a variant of uncertain significance identified which we discussed most variants eventually are ruled negativ and it is not recommended to make medical decisions based on VUS. Recommend return to clinic in 6 months with a bilateral screening mammogram same day and visit with Dr. Gaye Clements. While she may benefit from chemoprevention with tamoxifen 20 mg by mouth once daily, her current symptoms of tachycardia, shortness of breath on exertion and hypertension are of clinical concern. She has persistent post COVID type symptoms including shortness of breath on exertion, tachycardia, and hypertension. On her exam today the symptoms persist.  Has not been seen by cardiology; follow with pulmonary medicine. After further discussion with primary care, will send to the emergency room for further evaluation and recommendations.       01/05/2022 genetic testing with [email or web address]:  No pathogenic mutation was detected and any of the 46 genes analyzed; APC variant of uncertain significance was noted ([id number], [id number]). 10/05/2022 Non healing wound to left achilles due to tendon rupture June 2021; Covid effects with O2 3LNC at HS. Chronic tachycardia. Plan:   Continue monthly breast self examination; detailed instructions reviewed today. Bring any changes to your physician's attention. Continue healthy diet and exercise routinely as tolerated to maintain IBW. Avoid alcohol. Limit caffeine intake. Repeat mammogram 6 months  Continue follow up with Primary Care and Gynecology. Go directly to emergency room as directed for hypertension, shortness of breath, and tachycardia. RTC 6 months with mammogram prior and see Dr. Karla Caro same day. Follow up with me in 1 year. I spent a total of 30 minutes on the date of the service which included preparing to see the patient, face-to-face patient care, completing clinical documentation, obtaining and/or reviewing separately obtained history, performing a medically appropriate examination, counseling and educating the patient/family/caregiver, ordering medications, tests, or procedures, communicating with other HCPs (not separately reported), independently interpreting results (not separately reported), communicating results to the patient/family/caregiver and care coordination (not separately reported). This document is generated, in part, by voice recognition software and thus syntax and grammatical errors are possible. Michelle Sanchez, RN, MSN, APRN-CNP, [address] [place]  Advanced Oncology Certified Nurse Practitioner  Department of Breast Surgery  [facility]/  [facility] in collaboration with Dr. Sarah Burnett.  Nini/Dr. Marline Roberts/Dr. Amberly Lindsey APRN-CNP

## 2022-10-05 ENCOUNTER — OFFICE VISIT (OUTPATIENT)
Dept: BREAST CENTER | Age: 37
End: 2022-10-05
Payer: COMMERCIAL

## 2022-10-05 ENCOUNTER — TELEPHONE (OUTPATIENT)
Dept: BREAST CENTER | Age: 37
End: 2022-10-05

## 2022-10-05 ENCOUNTER — HOSPITAL ENCOUNTER (EMERGENCY)
Age: 37
Discharge: HOME | End: 2022-10-05
Payer: COMMERCIAL

## 2022-10-05 VITALS
OXYGEN SATURATION: 99 % | HEART RATE: 94 BPM | SYSTOLIC BLOOD PRESSURE: 170 MMHG | TEMPERATURE: 100 F | RESPIRATION RATE: 18 BRPM | DIASTOLIC BLOOD PRESSURE: 102 MMHG

## 2022-10-05 VITALS
BODY MASS INDEX: 45.31 KG/M2 | DIASTOLIC BLOOD PRESSURE: 120 MMHG | OXYGEN SATURATION: 98 % | WEIGHT: 240 LBS | SYSTOLIC BLOOD PRESSURE: 220 MMHG | TEMPERATURE: 99.1 F | HEIGHT: 61 IN | RESPIRATION RATE: 22 BRPM | HEART RATE: 116 BPM

## 2022-10-05 DIAGNOSIS — Z12.31 VISIT FOR SCREENING MAMMOGRAM: Primary | ICD-10-CM

## 2022-10-05 DIAGNOSIS — I10 HYPERTENSION, UNSPECIFIED TYPE: Primary | ICD-10-CM

## 2022-10-05 PROCEDURE — 99282 EMERGENCY DEPT VISIT SF MDM: CPT

## 2022-10-05 PROCEDURE — 1036F TOBACCO NON-USER: CPT | Performed by: NURSE PRACTITIONER

## 2022-10-05 PROCEDURE — G8417 CALC BMI ABV UP PARAM F/U: HCPCS | Performed by: NURSE PRACTITIONER

## 2022-10-05 PROCEDURE — G8484 FLU IMMUNIZE NO ADMIN: HCPCS | Performed by: NURSE PRACTITIONER

## 2022-10-05 PROCEDURE — G8427 DOCREV CUR MEDS BY ELIG CLIN: HCPCS | Performed by: NURSE PRACTITIONER

## 2022-10-05 PROCEDURE — 99213 OFFICE O/P EST LOW 20 MIN: CPT | Performed by: NURSE PRACTITIONER

## 2022-10-05 PROCEDURE — 99213 OFFICE O/P EST LOW 20 MIN: CPT

## 2022-10-05 RX ORDER — LOSARTAN POTASSIUM 50 MG/1
50 TABLET ORAL DAILY
COMMUNITY
End: 2022-10-05

## 2022-10-05 NOTE — ED PROVIDER NOTES
[facility]""  Department of Emergency Medicine   ED  Encounter Note  Admit Date/RoomTime: 10/5/2022  1:00 PM  ED Room: [place] [address]    NAME: Lucretia Eisenberg  : 1985  MRN: [id number]     Chief Complaint:  Hypertension (Pt sent from [facility] for Hypertension hx of hypertension. Pt denies headache and blurred vision denies headache today as well)    History of Present Illness       Lucretia Eisenberg is a 39 y.o. old female who presents to the emergency department by private vehicle, for evaluation of high blood pressure. Patient states that this is been an ongoing problem intermittently over the past year and a half after she had COVID. She states that she has been struggling with intermittent hyper and hypotension as well as tachycardia. Patient states that she was started on amlodipine several months ago. She states that she felt like she was not tolerating the medication well, and was switched to losartan. Patient states that she has been taking that as directed, she has not missed any doses of this. Patient states that she was at the breast cancer center, and was sent here to the ER for evaluation of her hypertension. She states that her blood pressure has been running systolically in the 328I for several days now. She does check it at home. She denies chest pain, shortness of breath, headaches or visual changes. She denies any changes in urination. Patient states that she feels well, has no current complaints today. ROS   Pertinent positives and negatives are stated within HPI, all other systems reviewed and are negative.     Past Medical History:  has a past medical history of Anemia, Anxiety, Arthritis, Asthma, Depression, Diabetes mellitus ([address] History of palpitations, Hypertension, Hypoxia, Irregular menses, Left ankle injury, Low vitamin D level, Migraines, Obesity, PTSD (post-traumatic stress disorder), Type 2 diabetes mellitus without complication ([address] Urinary incontinence, and Uses crutches. Surgical History:  has a past surgical history that includes hernia repair; Salpingo-oophorectomy (Left, 03/27/2016); Dilation and curettage of uterus (N/A, 6/9/2021); and Achilles tendon surgery (Left, 6/18/2021). Social History:  reports that she has never smoked. She has never used smokeless tobacco. She reports current alcohol use. She reports that she does not use drugs. Family History: family history includes Breast Cancer in her maternal uncle; Breast Cancer (age of onset: 48) in her maternal aunt and maternal aunt; Breast Cancer (age of onset: 80) in her maternal grandmother; High Blood Pressure in her mother; Hypertension in her mother. Allergies: Norco [hydrocodone-acetaminophen] and Pcn [penicillins]    Physical Exam   Oxygen Saturation Interpretation: Normal.        ED Triage Vitals   BP Temp Temp Source Heart Rate Resp SpO2 Height Weight   10/05/22 1221 10/05/22 1213 10/05/22 1213 10/05/22 1213 10/05/22 1221 10/05/22 1213 -- --   (!) 175/100 99 °F (37.2 °C) Oral (!) 113 18 97 %           Constitutional:  Alert, development consistent with age. Eyes:  PERRL, EOMI, no discharge or conjunctival injection. Ears:  External ears without lesions. Throat:  Pharynx without injection, exudate, or tonsillar hypertrophy. Airway patient. Neck:  Normal ROM. Supple. Respiratory:  Clear to auscultation and breath sounds equal.  CV:  Regular rate and rhythm, normal heart sounds, without pathological murmurs, ectopy, gallops, or rubs. GI:  Abdomen Soft, nontender, good bowel sounds. No firm or pulsatile mass. Back:  No costovertebral tenderness. Integument:  Normal turgor. Warm, dry, without visible rash, unless noted elsewhere. Lymphatics: No lymphangitis or adenopathy noted. Neurological:  Oriented. Motor functions intact.     Lab / Imaging Results   (All laboratory and radiology results have been personally reviewed by myself)  Labs:  No results found for this visit on 10/05/22. Imaging: All Radiology results interpreted by Radiologist unless otherwise noted. No orders to display       ED Course / Medical Decision Making   Medications - No data to display     Re-Evaluations:  10/5/22      Time: 1300    Patients condition remains unchanged. Consultations:             None    Procedures:   none    MDM: Patient has no current complaints. She denies headaches, shortness of breath, chest pain, states that she feels well. Patient is hypertensive here today, she is already on blood pressure medication at home and takes it as she is directed. Patient states that she has a history of her blood pressure going high and then low. As patient is asymptomatic, patient declined to have any intervention for her blood pressure done at this time. Is rechecked, I discharge it is 170/102. Patient has a history of tachycardia, it is also resolved at time of discharge. Patient feels well, will discharge home at this time. Advised to check her blood pressure at home, follow-up with her PCP next week. Patient is encouraged to return to the ER for any worsening symptoms. Plan of Care/Counseling:  MISTY Marmolejo reviewed today's visit with the patient in addition to providing specific details for the plan of care and counseling regarding the diagnosis and prognosis. Questions are answered at this time and are agreeable with the plan. Assessment      1. Hypertension, unspecified type       Plan   Discharged home. Patient condition is good. New Medications     New Prescriptions    No medications on file     Electronically signed by MISTY Marmolejo   DD: 10/5/22  **This report was transcribed using voice recognition software. Every effort was made to ensure accuracy; however, inadvertent computerized transcription errors may be present.   END OF PROVIDER NOTE       MISTY Marmolejo  10/05/22 [id number]

## 2022-10-05 NOTE — ED NOTES
Pt denies any headache, blurred vision dizziness or chest pain Donald Love reevaluating at this time.      Varun Cavazos RN  10/05/22 [id number]

## 2022-10-05 NOTE — TELEPHONE ENCOUNTER
Patient's /94 with heart rate 110. patient states her heart has been racing. Patient here to see nurse practitioner for clinical breast exam.   Called and notified Dr Jackeline Jiménez of above. Patient does have appointment with her PCP next week. They had no earlier openings. Dr Filiberto Holter wants patient to go to the ED to be checked. Notified patient who states she has been dealing with this for some time and she feels no persons wants to fix it. Call made to ED department to give quick report and notify them of above and that patient will be coming over. Spoke with Prisclila, charge nurse, in regards to this patient. Patient to ED.

## 2022-10-07 ENCOUNTER — TELEPHONE (OUTPATIENT)
Dept: PRIMARY CARE CLINIC | Age: 37
End: 2022-10-07

## 2022-10-07 NOTE — TELEPHONE ENCOUNTER
Received a call from [facility] wanting to know if she would be allowed to up her Losartin to 100 mg for the next days until she comes in? She is requesting a call back at [phone]. Thank you.

## 2022-10-11 ENCOUNTER — OFFICE VISIT (OUTPATIENT)
Dept: PRIMARY CARE CLINIC | Age: 37
End: 2022-10-11
Payer: COMMERCIAL

## 2022-10-11 VITALS
OXYGEN SATURATION: 98 % | HEIGHT: 62 IN | WEIGHT: 233 LBS | TEMPERATURE: 99.1 F | DIASTOLIC BLOOD PRESSURE: 98 MMHG | SYSTOLIC BLOOD PRESSURE: 158 MMHG | HEART RATE: 100 BPM | BODY MASS INDEX: 42.88 KG/M2

## 2022-10-11 DIAGNOSIS — F43.10 PTSD (POST-TRAUMATIC STRESS DISORDER): ICD-10-CM

## 2022-10-11 DIAGNOSIS — I10 PRIMARY HYPERTENSION: ICD-10-CM

## 2022-10-11 DIAGNOSIS — R00.0 TACHYCARDIA: Primary | ICD-10-CM

## 2022-10-11 DIAGNOSIS — E74.39 GLUCOSE INTOLERANCE: ICD-10-CM

## 2022-10-11 DIAGNOSIS — E66.01 CLASS 3 SEVERE OBESITY DUE TO EXCESS CALORIES WITH SERIOUS COMORBIDITY AND BODY MASS INDEX (BMI) OF 40.0 TO 44.9 IN ADULT (HCC): ICD-10-CM

## 2022-10-11 DIAGNOSIS — R09.02 HYPOXIA: ICD-10-CM

## 2022-10-11 PROBLEM — E66.813 CLASS 3 SEVERE OBESITY DUE TO EXCESS CALORIES WITH SERIOUS COMORBIDITY IN ADULT: Status: ACTIVE | Noted: 2022-10-11

## 2022-10-11 PROCEDURE — G8417 CALC BMI ABV UP PARAM F/U: HCPCS | Performed by: INTERNAL MEDICINE

## 2022-10-11 PROCEDURE — G8484 FLU IMMUNIZE NO ADMIN: HCPCS | Performed by: INTERNAL MEDICINE

## 2022-10-11 PROCEDURE — 99214 OFFICE O/P EST MOD 30 MIN: CPT | Performed by: INTERNAL MEDICINE

## 2022-10-11 PROCEDURE — 1036F TOBACCO NON-USER: CPT | Performed by: INTERNAL MEDICINE

## 2022-10-11 PROCEDURE — G8427 DOCREV CUR MEDS BY ELIG CLIN: HCPCS | Performed by: INTERNAL MEDICINE

## 2022-10-11 RX ORDER — PANTOPRAZOLE SODIUM 40 MG/1
40 TABLET, DELAYED RELEASE ORAL
Qty: 30 TABLET | Refills: 2 | Status: SHIPPED | OUTPATIENT
Start: 2022-10-11

## 2022-10-11 RX ORDER — LOSARTAN POTASSIUM 50 MG/1
50 TABLET ORAL DAILY
Qty: 30 TABLET | Refills: 2 | Status: SHIPPED | OUTPATIENT
Start: 2022-10-11

## 2022-10-11 RX ORDER — METOPROLOL SUCCINATE 25 MG/1
25 TABLET, EXTENDED RELEASE ORAL DAILY
Qty: 30 TABLET | Refills: 0 | Status: SHIPPED | OUTPATIENT
Start: 2022-10-11

## 2022-10-11 RX ORDER — ERGOCALCIFEROL (VITAMIN D2) 1250 MCG
50000 CAPSULE ORAL WEEKLY
Qty: 4 CAPSULE | Refills: 2 | Status: SHIPPED | OUTPATIENT
Start: 2022-10-11

## 2022-10-11 RX ORDER — RIMEGEPANT SULFATE 75 MG/75MG
1 TABLET, ORALLY DISINTEGRATING ORAL PRN
Qty: 30 TABLET | Refills: 2 | Status: SHIPPED | OUTPATIENT
Start: 2022-10-11

## 2022-10-11 RX ORDER — FLUTICASONE PROPIONATE 50 MCG
1 SPRAY, SUSPENSION (ML) NASAL DAILY PRN
Qty: 16 G | Refills: 2 | Status: SHIPPED | OUTPATIENT
Start: 2022-10-11

## 2022-10-11 RX ORDER — MONTELUKAST SODIUM 10 MG/1
10 TABLET ORAL NIGHTLY
Qty: 30 TABLET | Refills: 2 | Status: SHIPPED | OUTPATIENT
Start: 2022-10-11

## 2022-10-11 RX ORDER — BUDESONIDE AND FORMOTEROL FUMARATE DIHYDRATE 160; 4.5 UG/1; UG/1
2 AEROSOL RESPIRATORY (INHALATION) 2 TIMES DAILY
Qty: 10.2 G | Refills: 2 | Status: SHIPPED | OUTPATIENT
Start: 2022-10-11

## 2022-10-11 SDOH — ECONOMIC STABILITY: FOOD INSECURITY: WITHIN THE PAST 12 MONTHS, THE FOOD YOU BOUGHT JUST DIDN'T LAST AND YOU DIDN'T HAVE MONEY TO GET MORE.: NEVER TRUE

## 2022-10-11 SDOH — ECONOMIC STABILITY: FOOD INSECURITY: WITHIN THE PAST 12 MONTHS, YOU WORRIED THAT YOUR FOOD WOULD RUN OUT BEFORE YOU GOT MONEY TO BUY MORE.: NEVER TRUE

## 2022-10-11 ASSESSMENT — PATIENT HEALTH QUESTIONNAIRE - PHQ9
SUM OF ALL RESPONSES TO PHQ9 QUESTIONS 1 & 2: 0
1. LITTLE INTEREST OR PLEASURE IN DOING THINGS: 0
SUM OF ALL RESPONSES TO PHQ QUESTIONS 1-9: 0
2. FEELING DOWN, DEPRESSED OR HOPELESS: 0

## 2022-10-11 ASSESSMENT — ENCOUNTER SYMPTOMS
DIARRHEA: 0
ABDOMINAL PAIN: 0
TROUBLE SWALLOWING: 0
COLOR CHANGE: 0
RHINORRHEA: 0
SHORTNESS OF BREATH: 1
VOMITING: 0
ALLERGIC/IMMUNOLOGIC NEGATIVE: 1
BACK PAIN: 0
CONSTIPATION: 0
SORE THROAT: 0
SINUS PRESSURE: 0
NAUSEA: 0
BLOOD IN STOOL: 0
ABDOMINAL DISTENTION: 0

## 2022-10-11 ASSESSMENT — SOCIAL DETERMINANTS OF HEALTH (SDOH): HOW HARD IS IT FOR YOU TO PAY FOR THE VERY BASICS LIKE FOOD, HOUSING, MEDICAL CARE, AND HEATING?: NOT HARD AT ALL

## 2022-10-11 NOTE — PROGRESS NOTES
Eliud Burr presents today for follow up of uncontrolled HTN after starting Losartan, also  after recent ER visit    Current Outpatient Medications   Medication Sig Dispense Refill    budesonide-formoterol (SYMBICORT) 160-4.5 MCG/ACT AERO Inhale 2 puffs into the lungs 2 times daily 10.2 g 2    ergocalciferol (ERGOCALCIFEROL) 1.25 MG (62177 UT) capsule Take 1 capsule by mouth once a week *MONDAYS* 4 capsule 2    fluticasone (FLONASE) 50 MCG/ACT nasal spray 1 spray by Nasal route daily as needed for Rhinitis or Allergies 16 g 2    losartan (COZAAR) 50 MG tablet Take 1 tablet by mouth daily 30 tablet 2    montelukast (SINGULAIR) 10 MG tablet Take 1 tablet by mouth nightly 30 tablet 2    Rimegepant Sulfate (NURTEC) 75 MG TBDP Take 1 tablet by mouth as needed (migraines) 30 tablet 2    pantoprazole (PROTONIX) 40 MG tablet Take 1 tablet by mouth every morning (before breakfast) 30 tablet 2    metoprolol succinate (TOPROL XL) 25 MG extended release tablet Take 1 tablet by mouth daily 30 tablet 0    ALPRAZolam (XANAX PO) Take 0.5 mg by mouth 3 times daily Takes PRN      sodium chloride (OCEAN, BABY AYR) 0.65 % nasal spray 1 spray by Nasal route 2 times daily (Patient taking differently: 1 spray by Nasal route as needed for Congestion) 1 each 0    OXYGEN Inhale 3 L/min into the lungs nightly Using PRN during day and at night while sleeping (With humidification)      Ascorbic Acid (VITAMIN C PO) Take by mouth daily      acetaminophen (TYLENOL) 500 MG tablet Take 1,000 mg by mouth every 6 hours as needed for Pain      ibuprofen (ADVIL;MOTRIN) 800 MG tablet Take 1 tablet by mouth every 8 hours as needed for Pain 30 tablet 0    escitalopram (LEXAPRO) 10 MG tablet Take 10 mg by mouth daily       ZINC PO Take 1 tablet by mouth daily  (Patient not taking: Reported on 10/11/2022)       No current facility-administered medications for this visit.       Past Medical History:   Diagnosis Date    Anemia     Iron deficiency Anxiety     Arthritis     Asthma     Depression     Diabetes mellitus ([facility]     GDM-diet controlled    History of palpitations     and tachycardia with anxiety    Hypertension     Hypoxia     post COVID    Irregular menses     Left ankle injury 06/07/2021    Low vitamin D level     Migraines     Obesity     PTSD (post-traumatic stress disorder)     Type 2 diabetes mellitus without complication ([facility]     Urinary incontinence     Uses crutches 06/2021    left foot injury          Subjective:  AS above. Was sent to ER from [facility] due to BP of 220/120 and rapid heart rate. At ER it was 170/102. Told just to follow up with us. Does refer occ headaches. When she checks it it goes up to 144, 587 diastolic. Is on Losartan 50, took 100 today. Rate has been coming down but has had issues with rapid heart rate off and on since she had COVID. Scheduled for Sleep study next week. Still uses Oxygen at night. Review of Systems   Constitutional:  Negative for activity change, appetite change and chills. HENT:  Negative for congestion, ear pain, mouth sores, postnasal drip, rhinorrhea, sinus pressure, sneezing, sore throat and trouble swallowing. Eyes:  Negative for visual disturbance. Respiratory:  Positive for shortness of breath. Cardiovascular:  Positive for palpitations. Negative for chest pain and leg swelling. Gastrointestinal:  Negative for abdominal distention, abdominal pain, blood in stool, constipation, diarrhea, nausea and vomiting. Endocrine: Negative for cold intolerance, heat intolerance, polydipsia and polyuria. Genitourinary:  Negative for difficulty urinating, dysuria, flank pain, frequency and urgency. Musculoskeletal:  Negative for arthralgias, back pain, gait problem, neck pain and neck stiffness. Skin: Negative. Negative for color change. Allergic/Immunologic: Negative.     Neurological:  Negative for dizziness, tremors, speech difficulty, weakness, light-headedness and headaches. Hematological: Negative. Psychiatric/Behavioral:  The patient is nervous/anxious. Objective:  BP (!) 158/98 (Site: Left Upper Arm, Position: Sitting, Cuff Size: Medium Adult)   Pulse 100   Temp 99.1 °F (37.3 °C)   Ht 5' 2\" (1.575 m)   Wt 233 lb (105.7 kg)   SpO2 98%   BMI 42.62 kg/m²      Physical Exam  Constitutional:       Appearance: She is obese. HENT:      Head: Normocephalic. Right Ear: Tympanic membrane and external ear normal. There is no impacted cerumen. Left Ear: Tympanic membrane and external ear normal. There is no impacted cerumen. Nose: Nose normal.      Mouth/Throat:      Pharynx: Oropharynx is clear. No oropharyngeal exudate. Eyes:      Extraocular Movements: Extraocular movements intact. Conjunctiva/sclera: Conjunctivae normal.      Pupils: Pupils are equal, round, and reactive to light. Cardiovascular:      Rate and Rhythm: Normal rate and regular rhythm. Heart sounds: No murmur heard. No friction rub. No gallop. Pulmonary:      Effort: Pulmonary effort is normal.      Breath sounds: Normal breath sounds. Abdominal:      General: Bowel sounds are normal. There is no distension. Palpations: Abdomen is soft. There is no mass. Tenderness: There is no abdominal tenderness. There is no guarding or rebound. Musculoskeletal:         General: No swelling, tenderness or deformity. Normal range of motion. Cervical back: Normal range of motion and neck supple. No tenderness. Lymphadenopathy:      Cervical: No cervical adenopathy. Skin:     General: Skin is warm. Coloration: Skin is not pale. Findings: No rash. Neurological:      General: No focal deficit present. Mental Status: She is alert and oriented to person, place, and time. Assessment:  Ligia Reyna was seen today for follow-up.     Diagnoses and all orders for this visit:    Tachycardia  Comments:  started on Metoprolol 25 qd, avoid caffeine    PTSD (post-traumatic stress disorder)    Hypoxia  Comments: Only at night, started post COVID, scheduled for Sleep study    Class 3 severe obesity due to excess calories with serious comorbidity and body mass index (BMI) of 40.0 to 44.9 in adult (HCC)    Glucose intolerance    Primary hypertension  Comments:  Uncontrolled on Losartan 50, added Metoprolol 25 qd, lower salt intake    Other orders  -     budesonide-formoterol (SYMBICORT) 160-4.5 MCG/ACT AERO; Inhale 2 puffs into the lungs 2 times daily  -     ergocalciferol (ERGOCALCIFEROL) 1.25 MG (54317 UT) capsule; Take 1 capsule by mouth once a week *MONDAYS*  -     fluticasone (FLONASE) 50 MCG/ACT nasal spray; 1 spray by Nasal route daily as needed for Rhinitis or Allergies  -     losartan (COZAAR) 50 MG tablet; Take 1 tablet by mouth daily  -     montelukast (SINGULAIR) 10 MG tablet; Take 1 tablet by mouth nightly  -     Rimegepant Sulfate (NURTEC) 75 MG TBDP; Take 1 tablet by mouth as needed (migraines)  -     pantoprazole (PROTONIX) 40 MG tablet; Take 1 tablet by mouth every morning (before breakfast)  -     metoprolol succinate (TOPROL XL) 25 MG extended release tablet;  Take 1 tablet by mouth daily

## 2022-10-29 ENCOUNTER — HOSPITAL ENCOUNTER (OUTPATIENT)
Dept: SLEEP CENTER | Age: 37
Discharge: HOME | End: 2022-10-29
Payer: COMMERCIAL

## 2022-10-29 DIAGNOSIS — G47.33 OBSTRUCTIVE SLEEP APNEA: ICD-10-CM

## 2022-10-29 PROCEDURE — 95810 POLYSOM 6/> YRS 4/> PARAM: CPT

## 2022-10-30 VITALS — BODY MASS INDEX: 43.05 KG/M2 | OXYGEN SATURATION: 96 % | HEART RATE: 90 BPM | WEIGHT: 228 LBS | HEIGHT: 61 IN

## 2022-10-30 ASSESSMENT — SLEEP AND FATIGUE QUESTIONNAIRES
ESS TOTAL SCORE: 12
HOW LIKELY ARE YOU TO NOD OFF OR FALL ASLEEP WHILE SITTING AND TALKING TO SOMEONE: 1
HOW LIKELY ARE YOU TO NOD OFF OR FALL ASLEEP WHILE SITTING INACTIVE IN A PUBLIC PLACE: 1
HOW LIKELY ARE YOU TO NOD OFF OR FALL ASLEEP WHILE SITTING QUIETLY AFTER LUNCH WITHOUT ALCOHOL: 1
HOW LIKELY ARE YOU TO NOD OFF OR FALL ASLEEP WHILE WATCHING TV: 2
HOW LIKELY ARE YOU TO NOD OFF OR FALL ASLEEP WHILE SITTING AND READING: 3
HOW LIKELY ARE YOU TO NOD OFF OR FALL ASLEEP WHEN YOU ARE A PASSENGER IN A CAR FOR AN HOUR WITHOUT A BREAK: 1
HOW LIKELY ARE YOU TO NOD OFF OR FALL ASLEEP IN A CAR, WHILE STOPPED FOR A FEW MINUTES IN TRAFFIC: 1
HOW LIKELY ARE YOU TO NOD OFF OR FALL ASLEEP WHILE LYING DOWN TO REST IN THE AFTERNOON WHEN CIRCUMSTANCES PERMIT: 2

## 2022-11-08 ENCOUNTER — OFFICE VISIT (OUTPATIENT)
Dept: PRIMARY CARE CLINIC | Age: 37
End: 2022-11-08
Payer: COMMERCIAL

## 2022-11-08 VITALS
HEART RATE: 81 BPM | DIASTOLIC BLOOD PRESSURE: 88 MMHG | WEIGHT: 238 LBS | SYSTOLIC BLOOD PRESSURE: 138 MMHG | OXYGEN SATURATION: 98 % | HEIGHT: 61 IN | BODY MASS INDEX: 44.93 KG/M2 | TEMPERATURE: 99.1 F

## 2022-11-08 DIAGNOSIS — E66.01 CLASS 3 SEVERE OBESITY DUE TO EXCESS CALORIES WITHOUT SERIOUS COMORBIDITY WITH BODY MASS INDEX (BMI) OF 40.0 TO 44.9 IN ADULT (HCC): ICD-10-CM

## 2022-11-08 DIAGNOSIS — G47.30 SLEEP APNEA, UNSPECIFIED TYPE: ICD-10-CM

## 2022-11-08 DIAGNOSIS — F41.9 ANXIETY DISORDER, UNSPECIFIED TYPE: ICD-10-CM

## 2022-11-08 DIAGNOSIS — I10 PRIMARY HYPERTENSION: Primary | ICD-10-CM

## 2022-11-08 PROCEDURE — 3074F SYST BP LT 130 MM HG: CPT | Performed by: INTERNAL MEDICINE

## 2022-11-08 PROCEDURE — G8417 CALC BMI ABV UP PARAM F/U: HCPCS | Performed by: INTERNAL MEDICINE

## 2022-11-08 PROCEDURE — G8427 DOCREV CUR MEDS BY ELIG CLIN: HCPCS | Performed by: INTERNAL MEDICINE

## 2022-11-08 PROCEDURE — 1036F TOBACCO NON-USER: CPT | Performed by: INTERNAL MEDICINE

## 2022-11-08 PROCEDURE — G8484 FLU IMMUNIZE NO ADMIN: HCPCS | Performed by: INTERNAL MEDICINE

## 2022-11-08 PROCEDURE — 3078F DIAST BP <80 MM HG: CPT | Performed by: INTERNAL MEDICINE

## 2022-11-08 PROCEDURE — 99214 OFFICE O/P EST MOD 30 MIN: CPT | Performed by: INTERNAL MEDICINE

## 2022-11-08 ASSESSMENT — ENCOUNTER SYMPTOMS
DIARRHEA: 0
VOMITING: 0
BACK PAIN: 0
ABDOMINAL DISTENTION: 0
TROUBLE SWALLOWING: 0
BLOOD IN STOOL: 0
ABDOMINAL PAIN: 0
CONSTIPATION: 0
COLOR CHANGE: 0
ALLERGIC/IMMUNOLOGIC NEGATIVE: 1
NAUSEA: 0
SINUS PRESSURE: 0
SORE THROAT: 0
RHINORRHEA: 0

## 2022-11-08 NOTE — PROGRESS NOTES
Dixie Garcia presents today for     Current Outpatient Medications   Medication Sig Dispense Refill    budesonide-formoterol (SYMBICORT) 160-4.5 MCG/ACT AERO Inhale 2 puffs into the lungs 2 times daily 10.2 g 2    ergocalciferol (ERGOCALCIFEROL) 1.25 MG (91569 UT) capsule Take 1 capsule by mouth once a week *MONDAYS* 4 capsule 2    fluticasone (FLONASE) 50 MCG/ACT nasal spray 1 spray by Nasal route daily as needed for Rhinitis or Allergies 16 g 2    losartan (COZAAR) 50 MG tablet Take 1 tablet by mouth daily 30 tablet 2    montelukast (SINGULAIR) 10 MG tablet Take 1 tablet by mouth nightly 30 tablet 2    Rimegepant Sulfate (NURTEC) 75 MG TBDP Take 1 tablet by mouth as needed (migraines) 30 tablet 2    metoprolol succinate (TOPROL XL) 25 MG extended release tablet Take 1 tablet by mouth daily 30 tablet 0    ALPRAZolam (XANAX PO) Take 0.5 mg by mouth 3 times daily Takes PRN      sodium chloride (OCEAN, BABY AYR) 0.65 % nasal spray 1 spray by Nasal route 2 times daily (Patient taking differently: 1 spray by Nasal route as needed for Congestion) 1 each 0    OXYGEN Inhale 3 L/min into the lungs nightly Using PRN during day and at night while sleeping (With humidification)      Ascorbic Acid (VITAMIN C PO) Take by mouth daily      acetaminophen (TYLENOL) 500 MG tablet Take 1,000 mg by mouth every 6 hours as needed for Pain      ibuprofen (ADVIL;MOTRIN) 800 MG tablet Take 1 tablet by mouth every 8 hours as needed for Pain 30 tablet 0    escitalopram (LEXAPRO) 10 MG tablet Take 10 mg by mouth daily       pantoprazole (PROTONIX) 40 MG tablet Take 1 tablet by mouth every morning (before breakfast) (Patient not taking: Reported on 11/8/2022) 30 tablet 2    ZINC PO Take 1 tablet by mouth daily  (Patient not taking: No sig reported)       No current facility-administered medications for this visit.       Past Medical History:   Diagnosis Date    Anemia     Iron deficiency    Anxiety     Arthritis     Asthma     Depression Diabetes mellitus ([facility]     GDM-diet controlled    History of palpitations     and tachycardia with anxiety    Hypertension     Hypoxia     post COVID    Irregular menses     Left ankle injury 06/07/2021    Low vitamin D level     Migraines     Obesity     PTSD (post-traumatic stress disorder)     Type 2 diabetes mellitus without complication ([facility]     Urinary incontinence     Uses crutches 06/2021    left foot injury          Subjective:  No side effects on Metoprolol. Had Sleep study, positive. Waiting for titration. Follows up with DR Hu due to Urinary incontinence, considering pacemaker vs Botox injections. Hypertension  Associated symptoms include palpitations. Pertinent negatives include no chest pain, headaches or neck pain. Review of Systems   Constitutional:  Negative for activity change, appetite change and chills. HENT:  Negative for congestion, ear pain, mouth sores, postnasal drip, rhinorrhea, sinus pressure, sneezing, sore throat and trouble swallowing. Eyes:  Negative for visual disturbance. Cardiovascular:  Positive for palpitations. Negative for chest pain and leg swelling. Gastrointestinal:  Negative for abdominal distention, abdominal pain, blood in stool, constipation, diarrhea, nausea and vomiting. Endocrine: Negative for cold intolerance, heat intolerance, polydipsia and polyuria. Genitourinary:  Negative for difficulty urinating, dysuria, flank pain, frequency and urgency. Urinary incontinence   Musculoskeletal:  Negative for arthralgias, back pain, gait problem, neck pain and neck stiffness. Skin: Negative. Negative for color change. Allergic/Immunologic: Negative. Neurological:  Negative for dizziness, tremors, speech difficulty, weakness, light-headedness and headaches. Hematological: Negative. Psychiatric/Behavioral: Negative.           Objective:  /88   Pulse 81   Temp 99.1 °F (37.3 °C)   Ht 5' 1\" (1.549 m)   Wt 238 lb (108 kg) SpO2 98%   BMI 44.97 kg/m²      Physical Exam  Constitutional:       Appearance: She is obese. HENT:      Head: Normocephalic. Right Ear: Tympanic membrane and external ear normal. There is no impacted cerumen. Left Ear: Tympanic membrane and external ear normal. There is no impacted cerumen. Nose: Nose normal.      Mouth/Throat:      Pharynx: Oropharynx is clear. No oropharyngeal exudate. Eyes:      Extraocular Movements: Extraocular movements intact. Conjunctiva/sclera: Conjunctivae normal.      Pupils: Pupils are equal, round, and reactive to light. Cardiovascular:      Rate and Rhythm: Normal rate and regular rhythm. Heart sounds: No murmur heard. No friction rub. No gallop. Pulmonary:      Effort: Pulmonary effort is normal.      Breath sounds: Normal breath sounds. Abdominal:      General: Bowel sounds are normal. There is no distension. Palpations: Abdomen is soft. There is no mass. Tenderness: There is no abdominal tenderness. There is no guarding or rebound. Musculoskeletal:         General: No swelling, tenderness or deformity. Normal range of motion. Cervical back: Normal range of motion and neck supple. No tenderness. Lymphadenopathy:      Cervical: No cervical adenopathy. Skin:     General: Skin is warm. Coloration: Skin is not pale. Findings: No rash. Neurological:      General: No focal deficit present. Mental Status: She is alert and oriented to person, place, and time. Assessment:  Kathleen Joya was seen today for hypertension. Diagnoses and all orders for this visit:    Primary hypertension  Comments:  Improving on Metoprolol. Cont on 25 mg for now. Contlow salt, deidre loss and low caffeine.      Class 3 severe obesity due to excess calories without serious comorbidity with body mass index (BMI) of 40.0 to 44.9 in adult [facility])  Comments:  Discussed low radha diet and need for increased exercise    Sleep apnea, unspecified type  Comments:  Wating for titration    Anxiety disorder, unspecified type  Comments:  Inproving

## 2022-11-16 RX ORDER — METOPROLOL SUCCINATE 25 MG/1
TABLET, EXTENDED RELEASE ORAL
Qty: 90 TABLET | Refills: 0 | Status: SHIPPED | OUTPATIENT
Start: 2022-11-16

## 2022-11-22 NOTE — PROGRESS NOTES
[address] [address]                               SLEEP STUDY REPORT    PATIENT NAME: Livan Dumont                 :        1985  MED REC NO:   [id number]                            ROOM:  ACCOUNT NO:   [de-identified]                           ADMIT DATE: 10/29/2022  PROVIDER:     Keila Landin MD    DATE OF STUDY:  10/29/2022    STUDY PERFORMED:  Sleep study. INDICATION FOR POLYSOMNOGRAPHY:  Witnessed apnea with daytime napping  and excessive daytime somnolence. CURRENT MEDICATIONS:  Lexapro, Singulair, Cozaar, Toprol, and Symbicort. ESS is 12, neck circumference 15, and BMI is 45. A diagnostic study was ordered. SLEEP ARCHITECTURE:  The patient spent a total of 418 minutes in bed and  slept 360 minutes for an overall sleep efficiency exceeding 85%. Sleep  latency was 45 minutes. SLEEP STAGES:  The patient was in N1 for 4%, N2 sleep for 62%, had  minimal N3 sleep, and was in REM sleep for 33% of this study. During this study, 78 obstructive events were recorded for an overall  index of 13. During REM sleep, this increases relatively dramatically  to 31. There was substantial REM sleep during this study as well. The patient spent majority of her sleep time on the left side. Some of  this was spent in the supine position. Moderate oxyhemoglobin desaturation was recorded  with the patient's  minimal SpO2 being 81%. The patient did spent 88% of her sleep time  with an SpO2 greater than 90%. Examination of the histogram shows significant periods of REM sleep with  associated oxyhemoglobin desaturation and obstructive events. These  occurred both in the supine position as well as the left lateral  decubitus position. IMPRESSION:  Severe obstructive sleep apnea exacerbated by REM sleep.    While the overall RDI is relatively modest, the RDI during REM sleep  with associated oxyhemoglobin desaturation is significant. This is  strangely independent of body position whether the patient is supine on  in the left lateral decubitus position. RECOMMENDATIONS:  On the basis of this study, repeat sleep study with  titration of positive airway pressure is recommended.         Faye Isidro MD      D: 11/21/2022 22:13:12       T: 11/21/2022 22:15:37     [email or web address]  Job#: [id number]     Doc#: [id number]    CC:

## 2022-11-30 NOTE — PROGRESS NOTES
[address] [address]                               SLEEP STUDY REPORT    PATIENT NAME: Chon Daily                 :        1985  MED REC NO:   [id number]                            ROOM:  ACCOUNT NO:   [de-identified]                           ADMIT DATE: 10/29/2022  PROVIDER:     Sree Butcher MD    DATE OF STUDY:  10/29/2022    POLYSOMNOGRAM OVER-READ    COMMENT:  I have reviewed the polysomnogram including Dr. Olivier Rogers  interpretation on this patient. I completely agree with the  interpretation and no changes are necessary.         Ray Lafleur MD  Diplomat of [employer] Medicine    D: 2022 11:51:58       T: 2022 11:54:21     [email or web address]  Job#: [id number]     Doc#: [id number]    CC:

## 2023-01-04 ENCOUNTER — HOSPITAL ENCOUNTER (OUTPATIENT)
Dept: SLEEP CENTER | Age: 38
Discharge: HOME | End: 2023-01-04
Payer: COMMERCIAL

## 2023-01-04 ENCOUNTER — OFFICE VISIT (OUTPATIENT)
Dept: PRIMARY CARE CLINIC | Age: 38
End: 2023-01-04
Payer: COMMERCIAL

## 2023-01-04 VITALS
SYSTOLIC BLOOD PRESSURE: 130 MMHG | BODY MASS INDEX: 45.69 KG/M2 | TEMPERATURE: 98.9 F | DIASTOLIC BLOOD PRESSURE: 80 MMHG | WEIGHT: 242 LBS | HEART RATE: 97 BPM | HEIGHT: 61 IN | OXYGEN SATURATION: 98 %

## 2023-01-04 DIAGNOSIS — R09.02 HYPOXIA: ICD-10-CM

## 2023-01-04 DIAGNOSIS — I10 PRIMARY HYPERTENSION: ICD-10-CM

## 2023-01-04 DIAGNOSIS — N39.3 STRESS INCONTINENCE OF URINE: ICD-10-CM

## 2023-01-04 DIAGNOSIS — F32.9 REACTIVE DEPRESSION: ICD-10-CM

## 2023-01-04 DIAGNOSIS — G47.33 OBSTRUCTIVE SLEEP APNEA: ICD-10-CM

## 2023-01-04 DIAGNOSIS — E66.01 CLASS 3 SEVERE OBESITY DUE TO EXCESS CALORIES WITH SERIOUS COMORBIDITY AND BODY MASS INDEX (BMI) OF 45.0 TO 49.9 IN ADULT (HCC): Primary | ICD-10-CM

## 2023-01-04 PROCEDURE — 95811 POLYSOM 6/>YRS CPAP 4/> PARM: CPT

## 2023-01-04 PROCEDURE — 3079F DIAST BP 80-89 MM HG: CPT | Performed by: INTERNAL MEDICINE

## 2023-01-04 PROCEDURE — 1036F TOBACCO NON-USER: CPT | Performed by: INTERNAL MEDICINE

## 2023-01-04 PROCEDURE — G8427 DOCREV CUR MEDS BY ELIG CLIN: HCPCS | Performed by: INTERNAL MEDICINE

## 2023-01-04 PROCEDURE — G8417 CALC BMI ABV UP PARAM F/U: HCPCS | Performed by: INTERNAL MEDICINE

## 2023-01-04 PROCEDURE — 99214 OFFICE O/P EST MOD 30 MIN: CPT | Performed by: INTERNAL MEDICINE

## 2023-01-04 PROCEDURE — 3075F SYST BP GE 130 - 139MM HG: CPT | Performed by: INTERNAL MEDICINE

## 2023-01-04 PROCEDURE — G8484 FLU IMMUNIZE NO ADMIN: HCPCS | Performed by: INTERNAL MEDICINE

## 2023-01-04 RX ORDER — MIRABEGRON 50 MG/1
1 TABLET, FILM COATED, EXTENDED RELEASE ORAL DAILY
COMMUNITY
Start: 2023-01-03

## 2023-01-04 ASSESSMENT — ENCOUNTER SYMPTOMS
BACK PAIN: 0
BLOOD IN STOOL: 0
CONSTIPATION: 0
DIARRHEA: 0
ALLERGIC/IMMUNOLOGIC NEGATIVE: 1
RHINORRHEA: 0
SINUS PRESSURE: 0
ABDOMINAL PAIN: 0
NAUSEA: 0
TROUBLE SWALLOWING: 0
VOMITING: 0
SORE THROAT: 0
COLOR CHANGE: 0
ABDOMINAL DISTENTION: 0

## 2023-01-04 ASSESSMENT — PATIENT HEALTH QUESTIONNAIRE - PHQ9
SUM OF ALL RESPONSES TO PHQ QUESTIONS 1-9: 0
1. LITTLE INTEREST OR PLEASURE IN DOING THINGS: 0
SUM OF ALL RESPONSES TO PHQ9 QUESTIONS 1 & 2: 0
2. FEELING DOWN, DEPRESSED OR HOPELESS: 0
SUM OF ALL RESPONSES TO PHQ QUESTIONS 1-9: 0

## 2023-01-04 NOTE — PROGRESS NOTES
Daria Ann presents today for follow up of Hypoxia after Covid, Urinary incontinence, Obesity, Depression,   HTN  Current Outpatient Medications   Medication Sig Dispense Refill    metoprolol succinate (TOPROL XL) 25 MG extended release tablet take 1 tablet by mouth once daily 90 tablet 0    budesonide-formoterol (SYMBICORT) 160-4.5 MCG/ACT AERO Inhale 2 puffs into the lungs 2 times daily 10.2 g 2    ergocalciferol (ERGOCALCIFEROL) 1.25 MG (35492 UT) capsule Take 1 capsule by mouth once a week *MONDAYS* 4 capsule 2    fluticasone (FLONASE) 50 MCG/ACT nasal spray 1 spray by Nasal route daily as needed for Rhinitis or Allergies 16 g 2    losartan (COZAAR) 50 MG tablet Take 1 tablet by mouth daily 30 tablet 2    montelukast (SINGULAIR) 10 MG tablet Take 1 tablet by mouth nightly 30 tablet 2    Rimegepant Sulfate (NURTEC) 75 MG TBDP Take 1 tablet by mouth as needed (migraines) 30 tablet 2    ALPRAZolam (XANAX PO) Take 0.5 mg by mouth 3 times daily Takes PRN      sodium chloride (OCEAN, BABY AYR) 0.65 % nasal spray 1 spray by Nasal route 2 times daily (Patient taking differently: 1 spray by Nasal route as needed for Congestion) 1 each 0    OXYGEN Inhale 3 L/min into the lungs nightly Using PRN during day and at night while sleeping (With humidification)      Ascorbic Acid (VITAMIN C PO) Take by mouth daily      acetaminophen (TYLENOL) 500 MG tablet Take 1,000 mg by mouth every 6 hours as needed for Pain      ibuprofen (ADVIL;MOTRIN) 800 MG tablet Take 1 tablet by mouth every 8 hours as needed for Pain 30 tablet 0    escitalopram (LEXAPRO) 10 MG tablet Take 10 mg by mouth daily       MYRBETRIQ 50 MG TB24 Take 1 tablet by mouth daily      pantoprazole (PROTONIX) 40 MG tablet Take 1 tablet by mouth every morning (before breakfast) (Patient not taking: No sig reported) 30 tablet 2    ZINC PO Take 1 tablet by mouth daily  (Patient not taking: No sig reported)       No current facility-administered medications for this visit. Past Medical History:   Diagnosis Date    Anemia     Iron deficiency    Anxiety     Arthritis     Asthma     Depression     Diabetes mellitus ([facility]     GDM-diet controlled    History of palpitations     and tachycardia with anxiety    Hypertension     Hypoxia     post COVID    Irregular menses     Left ankle injury 06/07/2021    Low vitamin D level     Migraines     Obesity     PTSD (post-traumatic stress disorder)     Type 2 diabetes mellitus without complication (HCC)     Urinary incontinence     Uses crutches 06/2021    left foot injury          Subjective:  Still having issues with urinary incontinence, saw Dr Myles Dupont for follow up, Myrbetric dose increased. Still some cough spells off and on, gets hoarse. Sees pulmonologist. Left ankle and left knee still bother her if she stands too long or going up and down the steps. Is repeating the sleep study today, was told before she had mild Apnea. Still has the O2 at home, hoping she does not not need it anymore. Still gets very tired after noon. Hypertension  Pertinent negatives include no chest pain, headaches, neck pain or palpitations. Review of Systems   Constitutional:  Negative for activity change, appetite change and chills. Fatigue   HENT:  Negative for congestion, ear pain, mouth sores, postnasal drip, rhinorrhea, sinus pressure, sneezing, sore throat and trouble swallowing. Eyes:  Negative for visual disturbance. Respiratory:          Dyspnea   Cardiovascular:  Negative for chest pain, palpitations and leg swelling. Gastrointestinal:  Negative for abdominal distention, abdominal pain, blood in stool, constipation, diarrhea, nausea and vomiting. Endocrine: Negative for cold intolerance, heat intolerance, polydipsia and polyuria. Genitourinary:  Negative for difficulty urinating, dysuria, flank pain, frequency and urgency.    Musculoskeletal:  Negative for arthralgias (left ankle and left knee), back pain, gait problem, neck pain and neck stiffness. Skin: Negative. Negative for color change. Allergic/Immunologic: Negative. Neurological:  Negative for dizziness, tremors, speech difficulty, weakness, light-headedness and headaches. Hematological: Negative. Psychiatric/Behavioral: Negative. Objective:  /80 (Site: Right Upper Arm, Position: Sitting, Cuff Size: Large Adult)   Pulse 97   Temp 98.9 °F (37.2 °C)   Ht 5' 1\" (1.549 m)   Wt 242 lb (109.8 kg)   LMP 12/22/2022   SpO2 98%   BMI 45.73 kg/m²      Physical Exam  Vitals reviewed. Constitutional:       Appearance: She is obese. HENT:      Head: Normocephalic. Right Ear: Tympanic membrane and external ear normal. There is no impacted cerumen. Left Ear: Tympanic membrane and external ear normal. There is no impacted cerumen. Nose: Nose normal.      Mouth/Throat:      Pharynx: Oropharynx is clear. No oropharyngeal exudate. Eyes:      Extraocular Movements: Extraocular movements intact. Conjunctiva/sclera: Conjunctivae normal.      Pupils: Pupils are equal, round, and reactive to light. Cardiovascular:      Rate and Rhythm: Normal rate and regular rhythm. Heart sounds: No murmur heard. No friction rub. No gallop. Pulmonary:      Effort: Pulmonary effort is normal.      Breath sounds: Normal breath sounds. Abdominal:      General: Bowel sounds are normal. There is no distension. Palpations: Abdomen is soft. There is no mass. Tenderness: There is no abdominal tenderness. There is no guarding or rebound. Musculoskeletal:         General: No swelling, tenderness or deformity. Normal range of motion. Cervical back: Normal range of motion and neck supple. No tenderness. Comments: Minimal limitation rom left ankle. Lymphadenopathy:      Cervical: No cervical adenopathy. Skin:     General: Skin is warm. Coloration: Skin is not pale. Findings: No rash.    Neurological:      General: No focal deficit present. Mental Status: She is alert and oriented to person, place, and time. Assessment:  Alexandr Ley was seen today for hypertension.     Diagnoses and all orders for this visit:    Class 3 severe obesity due to excess calories with serious comorbidity and body mass index (BMI) of 45.0 to 49.9 in [facility])  Comments:  Lower radha intake and increase physical activity    Hypoxia  Comments:  Clically improving, waiting for sleep study    Primary hypertension  Comments:  Controlled on med    Stress incontinence of urine  Comments:  Med dose was just increased at urologist's office    Reactive depression  Comments:  Better

## 2023-01-05 VITALS — WEIGHT: 228 LBS | HEART RATE: 103 BPM | HEIGHT: 61 IN | BODY MASS INDEX: 43.05 KG/M2 | OXYGEN SATURATION: 95 %

## 2023-01-23 ENCOUNTER — TELEPHONE (OUTPATIENT)
Dept: PRIMARY CARE CLINIC | Age: 38
End: 2023-01-23

## 2023-01-23 RX ORDER — METHYLPREDNISOLONE 4 MG/1
TABLET ORAL
Qty: 1 KIT | Refills: 0 | Status: SHIPPED | OUTPATIENT
Start: 2023-01-23 | End: 2023-01-29

## 2023-01-23 NOTE — TELEPHONE ENCOUNTER
Called in saying she is still having sciatic nerve pain and needs something prescribed because regular over the counter pain meds are not taking it away.

## 2023-02-28 RX ORDER — LOSARTAN POTASSIUM 50 MG/1
TABLET ORAL
Qty: 90 TABLET | Refills: 0 | Status: SHIPPED | OUTPATIENT
Start: 2023-02-28

## 2023-02-28 RX ORDER — MONTELUKAST SODIUM 10 MG/1
10 TABLET ORAL NIGHTLY
Qty: 90 TABLET | Refills: 0 | Status: SHIPPED | OUTPATIENT
Start: 2023-02-28

## 2023-02-28 RX ORDER — ERGOCALCIFEROL 1.25 MG/1
CAPSULE ORAL
Qty: 12 CAPSULE | Refills: 0 | Status: SHIPPED | OUTPATIENT
Start: 2023-02-28

## 2023-02-28 RX ORDER — METOPROLOL SUCCINATE 25 MG/1
TABLET, EXTENDED RELEASE ORAL
Qty: 90 TABLET | Refills: 0 | Status: SHIPPED | OUTPATIENT
Start: 2023-02-28

## 2023-02-28 RX ORDER — BUDESONIDE AND FORMOTEROL FUMARATE DIHYDRATE 160; 4.5 UG/1; UG/1
AEROSOL RESPIRATORY (INHALATION)
Qty: 10.2 G | Refills: 2 | Status: SHIPPED | OUTPATIENT
Start: 2023-02-28

## 2023-02-28 NOTE — TELEPHONE ENCOUNTER
Last Appointment:  1/4/2023  Future Appointments   Date Time Provider Rose Sury   4/4/2023  9:45 AM Eugenia Galarza MD [facility]   4/5/2023  9:00 AM CED MANCINI RM 2 CED [facility]   4/5/2023 10:00 AM Sandra Yo MD [address]

## 2023-04-04 ENCOUNTER — TELEPHONE (OUTPATIENT)
Dept: PRIMARY CARE CLINIC | Age: 38
End: 2023-04-04

## 2023-04-04 NOTE — TELEPHONE ENCOUNTER
Pt called to request the date on the referral we had sent to infectious disease for her last year. I did not see it in our computer system - I am assuming it is in her paper chart.  Please advise

## 2023-04-05 ENCOUNTER — OFFICE VISIT (OUTPATIENT)
Dept: BREAST CENTER | Age: 38
End: 2023-04-05
Payer: COMMERCIAL

## 2023-04-05 ENCOUNTER — HOSPITAL ENCOUNTER (OUTPATIENT)
Dept: GENERAL RADIOLOGY | Age: 38
Discharge: HOME | End: 2023-04-07
Payer: COMMERCIAL

## 2023-04-05 VITALS
WEIGHT: 245 LBS | OXYGEN SATURATION: 99 % | SYSTOLIC BLOOD PRESSURE: 148 MMHG | BODY MASS INDEX: 46.26 KG/M2 | HEIGHT: 61 IN | HEART RATE: 90 BPM | DIASTOLIC BLOOD PRESSURE: 88 MMHG | RESPIRATION RATE: 20 BRPM

## 2023-04-05 DIAGNOSIS — Z12.31 VISIT FOR SCREENING MAMMOGRAM: ICD-10-CM

## 2023-04-05 DIAGNOSIS — Z91.89 AT HIGH RISK FOR BREAST CANCER: Primary | ICD-10-CM

## 2023-04-05 PROCEDURE — 77063 BREAST TOMOSYNTHESIS BI: CPT

## 2023-04-05 PROCEDURE — 99213 OFFICE O/P EST LOW 20 MIN: CPT | Performed by: SURGERY

## 2023-04-05 NOTE — PROGRESS NOTES
Date of visit: 4/5/2023    10/05/22-TM  04/05/23      DIAGNOSIS:  1. (04/05/23) Higher risk for breast cancer  * TC 24%  2. Family history of breast cancer  * Maternal aunt-40s  * Maternal aunt-40s  * Maternal grandmother  3. Patient genetic panel testing  * VUS APC  4. (2012) History of breast skin thickening  * Resolved at office visit - no biopsy    IMAGING/PROCEDURES:  1. (09/14/22) MRI: BIRADS-1  2. (04/05/23) BILATERAL st-mammogram:      Breast History  Carol Brooks was in the office today for follow-up regarding her status as a higher risk patient for breast cancer. Berry Gomez has been followed in the clinic relating to family history initially. She subsequently underwent genetic testing which showed no pathogenic mutations in an actionable gene. 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 Breast Symptoms  Carol has no symptoms to report today. Breast Examination  There are no cervical, supraclavicular, or infraclavicular lymph nodes that are palpable. Inspection of the breast bilaterally demonstrate there to be no secondary signs of malignancy. There are no lesions of the nipple areola on either side. No spontaneous discharges witnessed. Palpation of the axilla bilaterally is without adenopathy. Palpation of the breast demonstrates no masses. Breast Imaging  Carol underwent screening studies today prior to her visit. These were read as negative. Assessment/Plan  Raquel Weber is in the office today for follow-up regarding her status is a high risk breast cancer patient. I informed Berry Gomez that based on her clinical exam and today's imaging there were no worrisome findings. We did discuss the potential risks and benefits of ongoing MRI screening. I informed her that she does qualify for this based on her nando score. She is due again for breast MRI in the fall and will meet with our nurse practitioner at that time.     In the interim we encourage self breast exam

## 2023-04-06 NOTE — TELEPHONE ENCOUNTER
Notified patient that first infectious Dr appointment was 01/13/2023 so referral was made sometime right before that. Pt agreed and thanked me for figuring it out.

## 2023-07-31 SDOH — ECONOMIC STABILITY: FOOD INSECURITY: WITHIN THE PAST 12 MONTHS, YOU WORRIED THAT YOUR FOOD WOULD RUN OUT BEFORE YOU GOT MONEY TO BUY MORE.: SOMETIMES TRUE

## 2023-07-31 SDOH — ECONOMIC STABILITY: FOOD INSECURITY: WITHIN THE PAST 12 MONTHS, THE FOOD YOU BOUGHT JUST DIDN'T LAST AND YOU DIDN'T HAVE MONEY TO GET MORE.: SOMETIMES TRUE

## 2023-07-31 SDOH — ECONOMIC STABILITY: INCOME INSECURITY: HOW HARD IS IT FOR YOU TO PAY FOR THE VERY BASICS LIKE FOOD, HOUSING, MEDICAL CARE, AND HEATING?: SOMEWHAT HARD

## 2023-08-01 ENCOUNTER — OFFICE VISIT (OUTPATIENT)
Dept: PRIMARY CARE CLINIC | Age: 38
End: 2023-08-01
Payer: COMMERCIAL

## 2023-08-01 VITALS
HEART RATE: 103 BPM | HEIGHT: 61 IN | OXYGEN SATURATION: 98 % | DIASTOLIC BLOOD PRESSURE: 90 MMHG | SYSTOLIC BLOOD PRESSURE: 148 MMHG | TEMPERATURE: 98.7 F | BODY MASS INDEX: 45.31 KG/M2 | WEIGHT: 240 LBS

## 2023-08-01 DIAGNOSIS — Z91.89 AT HIGH RISK FOR BREAST CANCER: ICD-10-CM

## 2023-08-01 DIAGNOSIS — E74.39 GLUCOSE INTOLERANCE: ICD-10-CM

## 2023-08-01 DIAGNOSIS — S99.912D INJURY OF LEFT ANKLE, SUBSEQUENT ENCOUNTER: ICD-10-CM

## 2023-08-01 DIAGNOSIS — G47.30 SLEEP APNEA, UNSPECIFIED TYPE: ICD-10-CM

## 2023-08-01 DIAGNOSIS — I10 PRIMARY HYPERTENSION: ICD-10-CM

## 2023-08-01 DIAGNOSIS — E78.5 SERUM LIPIDS HIGH: Primary | ICD-10-CM

## 2023-08-01 PROBLEM — S99.912A LEFT ANKLE INJURY: Status: ACTIVE | Noted: 2021-06-07

## 2023-08-01 PROBLEM — E66.9 OBESITY: Status: ACTIVE | Noted: 2022-10-11

## 2023-08-01 LAB — HBA1C MFR BLD: 5.9 %

## 2023-08-01 PROCEDURE — G8417 CALC BMI ABV UP PARAM F/U: HCPCS | Performed by: INTERNAL MEDICINE

## 2023-08-01 PROCEDURE — 3079F DIAST BP 80-89 MM HG: CPT | Performed by: INTERNAL MEDICINE

## 2023-08-01 PROCEDURE — G8427 DOCREV CUR MEDS BY ELIG CLIN: HCPCS | Performed by: INTERNAL MEDICINE

## 2023-08-01 PROCEDURE — 1036F TOBACCO NON-USER: CPT | Performed by: INTERNAL MEDICINE

## 2023-08-01 PROCEDURE — 83037 HB GLYCOSYLATED A1C HOME DEV: CPT | Performed by: INTERNAL MEDICINE

## 2023-08-01 PROCEDURE — 99214 OFFICE O/P EST MOD 30 MIN: CPT | Performed by: INTERNAL MEDICINE

## 2023-08-01 PROCEDURE — 3077F SYST BP >= 140 MM HG: CPT | Performed by: INTERNAL MEDICINE

## 2023-08-01 RX ORDER — RIMEGEPANT SULFATE 75 MG/75MG
1 TABLET, ORALLY DISINTEGRATING ORAL PRN
Qty: 30 TABLET | Refills: 2 | Status: SHIPPED | OUTPATIENT
Start: 2023-08-01

## 2023-08-01 RX ORDER — HYDROXYZINE PAMOATE 25 MG/1
1 CAPSULE ORAL NIGHTLY
COMMUNITY
Start: 2023-07-27

## 2023-08-01 RX ORDER — BUDESONIDE AND FORMOTEROL FUMARATE DIHYDRATE 160; 4.5 UG/1; UG/1
AEROSOL RESPIRATORY (INHALATION)
Qty: 10.2 G | Refills: 2 | Status: SHIPPED | OUTPATIENT
Start: 2023-08-01

## 2023-08-01 RX ORDER — MONTELUKAST SODIUM 10 MG/1
10 TABLET ORAL NIGHTLY
Qty: 90 TABLET | Refills: 0 | Status: SHIPPED | OUTPATIENT
Start: 2023-08-01

## 2023-08-01 RX ORDER — ERGOCALCIFEROL 1.25 MG/1
50000 CAPSULE ORAL WEEKLY
Qty: 12 CAPSULE | Refills: 0 | Status: SHIPPED | OUTPATIENT
Start: 2023-08-01

## 2023-08-01 RX ORDER — LOSARTAN POTASSIUM 50 MG/1
50 TABLET ORAL DAILY
Qty: 90 TABLET | Refills: 0 | Status: SHIPPED | OUTPATIENT
Start: 2023-08-01

## 2023-08-01 RX ORDER — FLUTICASONE PROPIONATE 50 MCG
1 SPRAY, SUSPENSION (ML) NASAL DAILY PRN
Qty: 16 G | Refills: 2 | Status: SHIPPED | OUTPATIENT
Start: 2023-08-01

## 2023-08-01 RX ORDER — METOPROLOL SUCCINATE 25 MG/1
25 TABLET, EXTENDED RELEASE ORAL DAILY
Qty: 90 TABLET | Refills: 0 | Status: SHIPPED | OUTPATIENT
Start: 2023-08-01

## 2023-08-01 SDOH — ECONOMIC STABILITY: FOOD INSECURITY: WITHIN THE PAST 12 MONTHS, THE FOOD YOU BOUGHT JUST DIDN'T LAST AND YOU DIDN'T HAVE MONEY TO GET MORE.: SOMETIMES TRUE

## 2023-08-01 SDOH — ECONOMIC STABILITY: FOOD INSECURITY: WITHIN THE PAST 12 MONTHS, YOU WORRIED THAT YOUR FOOD WOULD RUN OUT BEFORE YOU GOT MONEY TO BUY MORE.: SOMETIMES TRUE

## 2023-08-01 SDOH — ECONOMIC STABILITY: INCOME INSECURITY: HOW HARD IS IT FOR YOU TO PAY FOR THE VERY BASICS LIKE FOOD, HOUSING, MEDICAL CARE, AND HEATING?: SOMEWHAT HARD

## 2023-08-01 ASSESSMENT — ENCOUNTER SYMPTOMS
RHINORRHEA: 0
DIARRHEA: 0
BACK PAIN: 1
VOMITING: 0
SORE THROAT: 0
SINUS PRESSURE: 0
ALLERGIC/IMMUNOLOGIC NEGATIVE: 1
TROUBLE SWALLOWING: 0
BLOOD IN STOOL: 0
CONSTIPATION: 0
ABDOMINAL PAIN: 0
ABDOMINAL DISTENTION: 0
NAUSEA: 0
COLOR CHANGE: 0

## 2023-08-01 NOTE — PROGRESS NOTES
Shanon Marley presents today for   Follow up of Asthma, HTN, Glucose Intolerance, dyspnea and Hypoxemia after COVID, Sleep Apnea. Current Outpatient Medications   Medication Sig Dispense Refill    hydrOXYzine pamoate (VISTARIL) 25 MG capsule Take 1 capsule by mouth at bedtime      vitamin D (ERGOCALCIFEROL) 1.25 MG (47885 UT) CAPS capsule Take 1 capsule by mouth once a week 12 capsule 0    Rimegepant Sulfate (NURTEC) 75 MG TBDP Take 1 tablet by mouth as needed (migraines) 30 tablet 2    montelukast (SINGULAIR) 10 MG tablet Take 1 tablet by mouth nightly 90 tablet 0    metoprolol succinate (TOPROL XL) 25 MG extended release tablet Take 1 tablet by mouth daily 90 tablet 0    losartan (COZAAR) 50 MG tablet Take 1 tablet by mouth daily 90 tablet 0    fluticasone (FLONASE) 50 MCG/ACT nasal spray 1 spray by Nasal route daily as needed for Rhinitis or Allergies 16 g 2    budesonide-formoterol (SYMBICORT) 160-4.5 MCG/ACT AERO inhale 2 puffs by mouth and INTO THE LUNGS twice a day 10.2 g 2    NONFORMULARY Cpap machine      ALPRAZolam (XANAX PO) Take 0.5 mg by mouth 3 times daily Takes PRN      sodium chloride (OCEAN, BABY AYR) 0.65 % nasal spray 1 spray by Nasal route 2 times daily (Patient taking differently: 1 spray by Nasal route as needed for Congestion) 1 each 0    acetaminophen (TYLENOL) 500 MG tablet Take 2 tablets by mouth every 6 hours as needed for Pain      ibuprofen (ADVIL;MOTRIN) 800 MG tablet Take 1 tablet by mouth every 8 hours as needed for Pain 30 tablet 0    escitalopram (LEXAPRO) 10 MG tablet Take 1 tablet by mouth daily      MYRBETRIQ 50 MG TB24 Take 50 mg by mouth daily      OXYGEN Inhale 3 L/min into the lungs nightly Using PRN during day and at night while sleeping (With humidification)      ZINC PO Take 1 tablet by mouth daily      Ascorbic Acid (VITAMIN C PO) Take by mouth daily       No current facility-administered medications for this visit.       Past Medical History:   Diagnosis Date

## 2023-08-03 ENCOUNTER — TELEPHONE (OUTPATIENT)
Dept: PRIMARY CARE CLINIC | Age: 38
End: 2023-08-03

## 2023-08-03 DIAGNOSIS — M25.372 INSTABILITY OF LEFT ANKLE JOINT: Primary | ICD-10-CM

## 2023-08-03 NOTE — TELEPHONE ENCOUNTER
Patient phoned in and was asked by her physical therapist to have Dr. Hedy Murphy write a prescription for a Ankle Tuyet Splint System. Please give her a call at [phone] once written so she can pick it up and take it to her physical therapist so she can be fitted for it. Thank you.

## 2023-08-09 ENCOUNTER — TELEPHONE (OUTPATIENT)
Dept: PRIMARY CARE CLINIC | Age: 38
End: 2023-08-09

## 2023-08-09 NOTE — TELEPHONE ENCOUNTER
Patient requesting something be sent into [facility] in [place] for a UTI. Thank you.

## 2023-08-10 RX ORDER — SULFAMETHOXAZOLE AND TRIMETHOPRIM 800; 160 MG/1; MG/1
1 TABLET ORAL 2 TIMES DAILY
Qty: 20 TABLET | Refills: 0 | Status: SHIPPED | OUTPATIENT
Start: 2023-08-10 | End: 2023-08-20

## 2023-09-05 ENCOUNTER — TELEPHONE (OUTPATIENT)
Dept: BREAST CENTER | Age: 38
End: 2023-09-05

## 2023-09-05 NOTE — TELEPHONE ENCOUNTER
Called and left detailed message with call back number to discuss starting the process of her breast MRI. Will need to check her insurance, cycles and need for labs prior. She is due anytime after 9/15/23.     Electronically signed by Hardeep Calero RN on 9/5/23 at 11:20 AM EDT

## 2023-09-06 ENCOUNTER — TELEPHONE (OUTPATIENT)
Dept: PRIMARY CARE CLINIC | Age: 38
End: 2023-09-06

## 2023-09-18 ENCOUNTER — TELEPHONE (OUTPATIENT)
Dept: PRIMARY CARE CLINIC | Age: 38
End: 2023-09-18

## 2023-09-18 NOTE — TELEPHONE ENCOUNTER
Would like to know if she can increase her blood pressure pill metoprolol succinate 25 mg       Pt says her blood pressure has been in the 170s over 90 has been having headaches

## 2023-09-19 RX ORDER — METOPROLOL SUCCINATE 50 MG/1
50 TABLET, EXTENDED RELEASE ORAL DAILY
Qty: 90 TABLET | Refills: 0 | Status: SHIPPED | OUTPATIENT
Start: 2023-09-19

## 2023-10-18 ENCOUNTER — OFFICE VISIT (OUTPATIENT)
Dept: PRIMARY CARE CLINIC | Age: 38
End: 2023-10-18

## 2023-10-18 VITALS
OXYGEN SATURATION: 98 % | SYSTOLIC BLOOD PRESSURE: 126 MMHG | WEIGHT: 240 LBS | HEIGHT: 61 IN | HEART RATE: 92 BPM | TEMPERATURE: 98.9 F | DIASTOLIC BLOOD PRESSURE: 80 MMHG | BODY MASS INDEX: 45.31 KG/M2

## 2023-10-18 DIAGNOSIS — E78.5 SERUM LIPIDS HIGH: ICD-10-CM

## 2023-10-18 DIAGNOSIS — E74.39 GLUCOSE INTOLERANCE: ICD-10-CM

## 2023-10-18 DIAGNOSIS — I10 PRIMARY HYPERTENSION: Primary | ICD-10-CM

## 2023-10-18 DIAGNOSIS — E66.01 CLASS 3 SEVERE OBESITY DUE TO EXCESS CALORIES WITH SERIOUS COMORBIDITY AND BODY MASS INDEX (BMI) OF 45.0 TO 49.9 IN ADULT (HCC): ICD-10-CM

## 2023-10-18 DIAGNOSIS — J45.20 MILD INTERMITTENT ASTHMA WITHOUT COMPLICATION: ICD-10-CM

## 2023-10-18 RX ORDER — MONTELUKAST SODIUM 10 MG/1
10 TABLET ORAL NIGHTLY
Qty: 90 TABLET | Refills: 0 | Status: SHIPPED | OUTPATIENT
Start: 2023-10-18

## 2023-10-18 RX ORDER — RIMEGEPANT SULFATE 75 MG/75MG
1 TABLET, ORALLY DISINTEGRATING ORAL PRN
Qty: 90 TABLET | Refills: 0 | Status: SHIPPED | OUTPATIENT
Start: 2023-10-18

## 2023-10-18 RX ORDER — ALPRAZOLAM 0.5 MG/1
0.5 TABLET ORAL 3 TIMES DAILY PRN
COMMUNITY
Start: 2023-08-29

## 2023-10-18 ASSESSMENT — ENCOUNTER SYMPTOMS: SHORTNESS OF BREATH: 1

## 2023-11-02 ENCOUNTER — TELEPHONE (OUTPATIENT)
Dept: BREAST CENTER | Age: 38
End: 2023-11-02

## 2023-11-02 NOTE — TELEPHONE ENCOUNTER
Second attempt made disc contact patient about scheduling her breast MRI. Will need to check her insurance, cycles and need for labs prior once she returns call.

## 2023-11-02 NOTE — TELEPHONE ENCOUNTER
----- Message from Vane Mcneal RN sent at 4/5/2023 12:57 PM EDT -----  Breast MRI due September 15, 2023 or later with follow up visit.  Check status of labs and menstrual cycles

## 2023-11-20 ENCOUNTER — TELEPHONE (OUTPATIENT)
Dept: BREAST CENTER | Age: 38
End: 2023-11-20

## 2023-11-20 RX ORDER — LOSARTAN POTASSIUM 50 MG/1
50 TABLET ORAL DAILY
Qty: 90 TABLET | Refills: 0 | Status: SHIPPED | OUTPATIENT
Start: 2023-11-20

## 2023-11-20 NOTE — TELEPHONE ENCOUNTER
Third attempt made to contact patient about scheduling her breast MRI. Message with callback number again left on voicemail. Will mail letter to listed address in chart as another way to contact her.

## 2023-11-20 NOTE — TELEPHONE ENCOUNTER
----- Message from Yolanda Shearer RN sent at 4/5/2023 12:57 PM EDT -----  Breast MRI due September 15, 2023 or later with follow up visit.  Check status of labs and menstrual cycles

## 2023-12-05 ENCOUNTER — TELEPHONE (OUTPATIENT)
Dept: PRIMARY CARE CLINIC | Age: 38
End: 2023-12-05

## 2023-12-05 RX ORDER — BENZONATATE 200 MG/1
200 CAPSULE ORAL 3 TIMES DAILY PRN
Qty: 30 CAPSULE | Refills: 0 | Status: SHIPPED | OUTPATIENT
Start: 2023-12-05 | End: 2023-12-12

## 2023-12-28 ENCOUNTER — TELEPHONE (OUTPATIENT)
Dept: BREAST CENTER | Age: 38
End: 2023-12-28

## 2023-12-28 NOTE — TELEPHONE ENCOUNTER
RN attempt to contact patient to schedule OV and mammogram after 4/4/24 with NP. RN reached patient VM and left detailed message of why was calling and office number for patient to call office back and set up appt.      Patient was to have MRI in JULY but office was unable to reach patient and now to far past. Per  have her get screening mammo and OV in April and get back on track.       Electronically signed by Gerri Duval RN on 12/28/23 at 1:46 PM EST

## 2024-01-04 DIAGNOSIS — Z12.31 SCREENING MAMMOGRAM, ENCOUNTER FOR: Primary | ICD-10-CM

## 2024-01-04 NOTE — TELEPHONE ENCOUNTER
RN attempt to contact patient to schedule OV and mammogram after 4/4/24 with NP. RN reached patient VM and left detailed message of why was calling and office number for patient to call office back and set up appt.       Electronically signed by Gerri Duval RN on 1/4/24 at 9:25 AM EST

## 2024-01-10 RX ORDER — RIMEGEPANT SULFATE 75 MG/75MG
1 TABLET, ORALLY DISINTEGRATING ORAL PRN
Qty: 8 TABLET | Refills: 0 | Status: SHIPPED | OUTPATIENT
Start: 2024-01-10

## 2024-01-17 ENCOUNTER — OFFICE VISIT (OUTPATIENT)
Dept: PRIMARY CARE CLINIC | Age: 39
End: 2024-01-17
Payer: COMMERCIAL

## 2024-01-17 VITALS
HEART RATE: 114 BPM | SYSTOLIC BLOOD PRESSURE: 126 MMHG | HEIGHT: 61 IN | DIASTOLIC BLOOD PRESSURE: 78 MMHG | WEIGHT: 244 LBS | TEMPERATURE: 98.6 F | OXYGEN SATURATION: 98 % | BODY MASS INDEX: 46.07 KG/M2

## 2024-01-17 DIAGNOSIS — E78.5 SERUM LIPIDS HIGH: ICD-10-CM

## 2024-01-17 DIAGNOSIS — N39.46 MIXED STRESS AND URGE URINARY INCONTINENCE: ICD-10-CM

## 2024-01-17 DIAGNOSIS — R05.3 CHRONIC COUGH: ICD-10-CM

## 2024-01-17 DIAGNOSIS — E66.01 CLASS 3 SEVERE OBESITY DUE TO EXCESS CALORIES WITHOUT SERIOUS COMORBIDITY WITH BODY MASS INDEX (BMI) OF 45.0 TO 49.9 IN ADULT (HCC): ICD-10-CM

## 2024-01-17 DIAGNOSIS — I10 PRIMARY HYPERTENSION: Primary | ICD-10-CM

## 2024-01-17 DIAGNOSIS — E74.39 GLUCOSE INTOLERANCE: ICD-10-CM

## 2024-01-17 PROCEDURE — 99214 OFFICE O/P EST MOD 30 MIN: CPT | Performed by: INTERNAL MEDICINE

## 2024-01-17 PROCEDURE — 3074F SYST BP LT 130 MM HG: CPT | Performed by: INTERNAL MEDICINE

## 2024-01-17 PROCEDURE — G8417 CALC BMI ABV UP PARAM F/U: HCPCS | Performed by: INTERNAL MEDICINE

## 2024-01-17 PROCEDURE — 3078F DIAST BP <80 MM HG: CPT | Performed by: INTERNAL MEDICINE

## 2024-01-17 PROCEDURE — G8427 DOCREV CUR MEDS BY ELIG CLIN: HCPCS | Performed by: INTERNAL MEDICINE

## 2024-01-17 PROCEDURE — G8482 FLU IMMUNIZE ORDER/ADMIN: HCPCS | Performed by: INTERNAL MEDICINE

## 2024-01-17 PROCEDURE — 1036F TOBACCO NON-USER: CPT | Performed by: INTERNAL MEDICINE

## 2024-01-17 RX ORDER — ERGOCALCIFEROL 1.25 MG/1
50000 CAPSULE ORAL WEEKLY
Qty: 12 CAPSULE | Refills: 0 | Status: SHIPPED | OUTPATIENT
Start: 2024-01-17

## 2024-01-17 RX ORDER — BUDESONIDE AND FORMOTEROL FUMARATE DIHYDRATE 160; 4.5 UG/1; UG/1
AEROSOL RESPIRATORY (INHALATION)
Qty: 10.2 G | Refills: 2 | Status: SHIPPED | OUTPATIENT
Start: 2024-01-17

## 2024-01-17 RX ORDER — FLUTICASONE PROPIONATE 50 MCG
1 SPRAY, SUSPENSION (ML) NASAL DAILY PRN
Qty: 16 G | Refills: 2 | Status: SHIPPED | OUTPATIENT
Start: 2024-01-17

## 2024-01-17 RX ORDER — ALBUTEROL SULFATE 90 UG/1
2 AEROSOL, METERED RESPIRATORY (INHALATION) EVERY 6 HOURS PRN
Qty: 18 G | Refills: 3 | Status: SHIPPED | OUTPATIENT
Start: 2024-01-17

## 2024-01-17 RX ORDER — METOPROLOL SUCCINATE 50 MG/1
50 TABLET, EXTENDED RELEASE ORAL DAILY
Qty: 90 TABLET | Refills: 0 | Status: SHIPPED | OUTPATIENT
Start: 2024-01-17

## 2024-01-17 RX ORDER — LOSARTAN POTASSIUM 50 MG/1
50 TABLET ORAL DAILY
Qty: 90 TABLET | Refills: 0 | Status: SHIPPED | OUTPATIENT
Start: 2024-01-17

## 2024-01-17 RX ORDER — MONTELUKAST SODIUM 10 MG/1
10 TABLET ORAL NIGHTLY
Qty: 90 TABLET | Refills: 0 | Status: SHIPPED | OUTPATIENT
Start: 2024-01-17

## 2024-01-17 ASSESSMENT — PATIENT HEALTH QUESTIONNAIRE - PHQ9
SUM OF ALL RESPONSES TO PHQ QUESTIONS 1-9: 2
10. IF YOU CHECKED OFF ANY PROBLEMS, HOW DIFFICULT HAVE THESE PROBLEMS MADE IT FOR YOU TO DO YOUR WORK, TAKE CARE OF THINGS AT HOME, OR GET ALONG WITH OTHER PEOPLE: 1
8. MOVING OR SPEAKING SO SLOWLY THAT OTHER PEOPLE COULD HAVE NOTICED. OR THE OPPOSITE, BEING SO FIGETY OR RESTLESS THAT YOU HAVE BEEN MOVING AROUND A LOT MORE THAN USUAL: 0
9. THOUGHTS THAT YOU WOULD BE BETTER OFF DEAD, OR OF HURTING YOURSELF: 0
SUM OF ALL RESPONSES TO PHQ QUESTIONS 1-9: 2
4. FEELING TIRED OR HAVING LITTLE ENERGY: 1
SUM OF ALL RESPONSES TO PHQ QUESTIONS 1-9: 2
1. LITTLE INTEREST OR PLEASURE IN DOING THINGS: 0
SUM OF ALL RESPONSES TO PHQ QUESTIONS 1-9: 2
SUM OF ALL RESPONSES TO PHQ9 QUESTIONS 1 & 2: 0
7. TROUBLE CONCENTRATING ON THINGS, SUCH AS READING THE NEWSPAPER OR WATCHING TELEVISION: 0
5. POOR APPETITE OR OVEREATING: 0
3. TROUBLE FALLING OR STAYING ASLEEP: 1
6. FEELING BAD ABOUT YOURSELF - OR THAT YOU ARE A FAILURE OR HAVE LET YOURSELF OR YOUR FAMILY DOWN: 0
2. FEELING DOWN, DEPRESSED OR HOPELESS: 0

## 2024-01-17 NOTE — PROGRESS NOTES
Yannyenny Chaudhari presents today for   Follow up of cough, anxiety, HTN,   Current Outpatient Medications   Medication Sig Dispense Refill    Handicap Placard MISC by Does not apply route Patient cannot walk 200 ft without stopping to rest.    Expiration 5 years 1 each 0    budesonide-formoterol (SYMBICORT) 160-4.5 MCG/ACT AERO inhale 2 puffs by mouth and INTO THE LUNGS twice a day 10.2 g 2    fluticasone (FLONASE) 50 MCG/ACT nasal spray 1 spray by Nasal route daily as needed for Rhinitis or Allergies 16 g 2    losartan (COZAAR) 50 MG tablet Take 1 tablet by mouth daily 90 tablet 0    metoprolol succinate (TOPROL XL) 50 MG extended release tablet Take 1 tablet by mouth daily 90 tablet 0    montelukast (SINGULAIR) 10 MG tablet Take 1 tablet by mouth nightly 90 tablet 0    vitamin D (ERGOCALCIFEROL) 1.25 MG (42564 UT) CAPS capsule Take 1 capsule by mouth once a week 12 capsule 0    albuterol sulfate HFA (PROVENTIL HFA) 108 (90 Base) MCG/ACT inhaler Inhale 2 puffs into the lungs every 6 hours as needed for Wheezing 18 g 3    Rimegepant Sulfate (NURTEC) 75 MG TBDP Take 1 tablet by mouth as needed (migraines) 8 tablet 0    ALPRAZolam (XANAX) 0.5 MG tablet Take 1 tablet by mouth 2 times daily as needed for Anxiety.      hydrOXYzine pamoate (VISTARIL) 25 MG capsule Take 1 capsule by mouth at bedtime      MYRBETRIQ 50 MG TB24 Take 50 mg by mouth daily      acetaminophen (TYLENOL) 500 MG tablet Take 2 tablets by mouth every 6 hours as needed for Pain      ibuprofen (ADVIL;MOTRIN) 800 MG tablet Take 1 tablet by mouth every 8 hours as needed for Pain 30 tablet 0    escitalopram (LEXAPRO) 10 MG tablet Take 1 tablet by mouth daily      NONFORMULARY Cpap machine      sodium chloride (OCEAN, BABY AYR) 0.65 % nasal spray 1 spray by Nasal route 2 times daily (Patient not taking: Reported on 1/17/2024) 1 each 0    OXYGEN Inhale 3 L/min into the lungs nightly Using PRN during day and at night while sleeping (With humidification)

## 2024-01-19 PROBLEM — R05.3 CHRONIC COUGH: Status: ACTIVE | Noted: 2024-01-19

## 2024-01-19 PROBLEM — N39.46 MIXED STRESS AND URGE URINARY INCONTINENCE: Status: ACTIVE | Noted: 2024-01-19

## 2024-01-19 ASSESSMENT — ENCOUNTER SYMPTOMS
SINUS PRESSURE: 0
CONSTIPATION: 0
BLOOD IN STOOL: 0
ALLERGIC/IMMUNOLOGIC NEGATIVE: 1
RHINORRHEA: 0
BACK PAIN: 0
SORE THROAT: 0
TROUBLE SWALLOWING: 0
VOMITING: 0
COLOR CHANGE: 0
ABDOMINAL DISTENTION: 0
COUGH: 1
ABDOMINAL PAIN: 0
NAUSEA: 0
DIARRHEA: 0

## 2024-04-15 RX ORDER — BUDESONIDE AND FORMOTEROL FUMARATE DIHYDRATE 160; 4.5 UG/1; UG/1
AEROSOL RESPIRATORY (INHALATION)
Qty: 10.2 G | Refills: 2 | OUTPATIENT
Start: 2024-04-15

## 2024-04-22 NOTE — PROGRESS NOTES
Subjective:  TC score 23.7% lifetime risk.    This note was copied forward from the last encounter.  Essential components for this patient record were reviewed and verified on this visit including:  recent hospitalizations, recent imaging, PMH, PSH, FH, SOC HX, Allergies, and Medications were reviewed and updated as appropriate.  In addition, the assessment and plan were copied from prior office note and updated accordingly.     HPI  Patient's Name/Date of Birth: Carol Chaudhari / 1985        PCP: Jessica Varner MD. Gynecologist:  Dr. Chaves.    Carol is a oracio 38 y.o. female with a past medical history of PTSD, obesity, hypertension, JOYCE, hyperlipidemia, and stress and urge urinary incontinence.  She presents today for follow-up of her predisposition to breast cancer.  She has followed intermittently in the breast clinic since since 2020.  Menarche age 8.  Denies HRT.  .  First live birth age 26 and she did breast-feed.    2020 she has a history of skin thickening and was recommended a punch biopsy of the skin.  At that time, she was seen by Dr. Sarah Terrazas and the skin thickening had resolved.  Clinical exam was felt to correlate with her image findings and skin biopsy was not recommended.    Family history:    Breast cancer risk factors include family hx on mother's side,   maternal aunt breast cancer age 40s  Second maternal aunt with breast cancer age 40s  Maternal grandmother with breast cancer age unknown  Ashkenazi Jew Ancestry: No.  No history of thoracic radiation therapy.    Genetics:  Seen by Genetic Counselor @ [facility]:        Imaging to date:  Chey Rivera IBS 24%; Grade C breast density.  2020 bilateral diagnostic mammogram and left breast ultrasound @ [facility]: Multiple oval masses of varying size scattered both breasts.  A focal asymmetry in the left breast in the upper outer quadrant region and in the retroareolar region.  2020 bilateral

## 2024-04-25 RX ORDER — BUDESONIDE AND FORMOTEROL FUMARATE DIHYDRATE 160; 4.5 UG/1; UG/1
AEROSOL RESPIRATORY (INHALATION)
Qty: 10.2 G | Refills: 2 | Status: SHIPPED | OUTPATIENT
Start: 2024-04-25

## 2024-04-25 RX ORDER — METOPROLOL SUCCINATE 50 MG/1
50 TABLET, EXTENDED RELEASE ORAL DAILY
Qty: 90 TABLET | Refills: 0 | Status: SHIPPED | OUTPATIENT
Start: 2024-04-25

## 2024-04-30 ENCOUNTER — OFFICE VISIT (OUTPATIENT)
Dept: BREAST CENTER | Age: 39
End: 2024-04-30
Payer: COMMERCIAL

## 2024-04-30 ENCOUNTER — HOSPITAL ENCOUNTER (OUTPATIENT)
Dept: GENERAL RADIOLOGY | Age: 39
Discharge: HOME | End: 2024-05-02
Payer: COMMERCIAL

## 2024-04-30 VITALS
WEIGHT: 229 LBS | HEART RATE: 95 BPM | HEIGHT: 61 IN | BODY MASS INDEX: 43.23 KG/M2 | TEMPERATURE: 98.9 F | OXYGEN SATURATION: 96 % | SYSTOLIC BLOOD PRESSURE: 138 MMHG | DIASTOLIC BLOOD PRESSURE: 80 MMHG | RESPIRATION RATE: 16 BRPM

## 2024-04-30 VITALS — WEIGHT: 240 LBS | BODY MASS INDEX: 45.31 KG/M2 | HEIGHT: 61 IN

## 2024-04-30 DIAGNOSIS — Z80.3 FAMILY HISTORY OF BREAST CANCER: ICD-10-CM

## 2024-04-30 DIAGNOSIS — Z91.89 AT HIGH RISK FOR BREAST CANCER: Primary | ICD-10-CM

## 2024-04-30 DIAGNOSIS — Z12.31 SCREENING MAMMOGRAM, ENCOUNTER FOR: ICD-10-CM

## 2024-04-30 DIAGNOSIS — R92.30 DENSE BREAST: ICD-10-CM

## 2024-04-30 DIAGNOSIS — Z01.818 PREOP TESTING: ICD-10-CM

## 2024-04-30 PROBLEM — J12.82 PNEUMONIA DUE TO COVID-19 VIRUS: Status: RESOLVED | Noted: 2021-07-31 | Resolved: 2024-04-30

## 2024-04-30 PROBLEM — U07.1 PNEUMONIA DUE TO COVID-19 VIRUS: Status: RESOLVED | Noted: 2021-07-31 | Resolved: 2024-04-30

## 2024-04-30 PROBLEM — S99.912A LEFT ANKLE INJURY: Status: RESOLVED | Noted: 2021-06-07 | Resolved: 2024-04-30

## 2024-04-30 PROBLEM — R09.02 HYPOXIA: Status: RESOLVED | Noted: 2022-10-11 | Resolved: 2024-04-30

## 2024-04-30 PROBLEM — R00.0 TACHYCARDIA: Status: RESOLVED | Noted: 2021-09-22 | Resolved: 2024-04-30

## 2024-04-30 PROCEDURE — G8427 DOCREV CUR MEDS BY ELIG CLIN: HCPCS | Performed by: NURSE PRACTITIONER

## 2024-04-30 PROCEDURE — 1036F TOBACCO NON-USER: CPT | Performed by: NURSE PRACTITIONER

## 2024-04-30 PROCEDURE — 99213 OFFICE O/P EST LOW 20 MIN: CPT | Performed by: NURSE PRACTITIONER

## 2024-04-30 PROCEDURE — G8417 CALC BMI ABV UP PARAM F/U: HCPCS | Performed by: NURSE PRACTITIONER

## 2024-04-30 PROCEDURE — 77063 BREAST TOMOSYNTHESIS BI: CPT

## 2024-04-30 PROCEDURE — 3075F SYST BP GE 130 - 139MM HG: CPT | Performed by: NURSE PRACTITIONER

## 2024-04-30 PROCEDURE — 3079F DIAST BP 80-89 MM HG: CPT | Performed by: NURSE PRACTITIONER

## 2024-04-30 RX ORDER — ONABOTULINUMTOXINA 100 [USP'U]/1
INJECTION, POWDER, LYOPHILIZED, FOR SOLUTION INTRADERMAL; INTRAMUSCULAR
COMMUNITY
Start: 2024-02-01

## 2024-04-30 ASSESSMENT — ENCOUNTER SYMPTOMS
SHORTNESS OF BREATH: 0
COUGH: 0
CONSTIPATION: 0
VOMITING: 0
NAUSEA: 0
DIARRHEA: 0
ABDOMINAL PAIN: 0
ABDOMINAL DISTENTION: 0
ANAL BLEEDING: 0

## 2024-04-30 NOTE — PATIENT INSTRUCTIONS
Breast MRI due October or early November 2024.     [facility] - See forms provided.  You may also contact them at [phone] (Option 8) to speak with Heather.    Information on Tamoxifen provided.

## 2024-05-28 RX ORDER — LOSARTAN POTASSIUM 50 MG/1
50 TABLET ORAL DAILY
Qty: 90 TABLET | Refills: 0 | Status: SHIPPED | OUTPATIENT
Start: 2024-05-28

## 2024-07-12 ENCOUNTER — OFFICE VISIT (OUTPATIENT)
Dept: PRIMARY CARE CLINIC | Age: 39
End: 2024-07-12
Payer: COMMERCIAL

## 2024-07-12 VITALS
WEIGHT: 235 LBS | BODY MASS INDEX: 44.37 KG/M2 | OXYGEN SATURATION: 97 % | DIASTOLIC BLOOD PRESSURE: 70 MMHG | SYSTOLIC BLOOD PRESSURE: 130 MMHG | HEIGHT: 61 IN | HEART RATE: 100 BPM | TEMPERATURE: 97.5 F | RESPIRATION RATE: 16 BRPM

## 2024-07-12 DIAGNOSIS — I10 PRIMARY HYPERTENSION: Primary | ICD-10-CM

## 2024-07-12 DIAGNOSIS — E74.39 GLUCOSE INTOLERANCE: ICD-10-CM

## 2024-07-12 DIAGNOSIS — E66.01 CLASS 3 SEVERE OBESITY DUE TO EXCESS CALORIES WITHOUT SERIOUS COMORBIDITY WITH BODY MASS INDEX (BMI) OF 45.0 TO 49.9 IN ADULT (HCC): ICD-10-CM

## 2024-07-12 PROCEDURE — G8427 DOCREV CUR MEDS BY ELIG CLIN: HCPCS | Performed by: INTERNAL MEDICINE

## 2024-07-12 PROCEDURE — 3075F SYST BP GE 130 - 139MM HG: CPT | Performed by: INTERNAL MEDICINE

## 2024-07-12 PROCEDURE — G8417 CALC BMI ABV UP PARAM F/U: HCPCS | Performed by: INTERNAL MEDICINE

## 2024-07-12 PROCEDURE — 1036F TOBACCO NON-USER: CPT | Performed by: INTERNAL MEDICINE

## 2024-07-12 PROCEDURE — 3078F DIAST BP <80 MM HG: CPT | Performed by: INTERNAL MEDICINE

## 2024-07-12 PROCEDURE — 99214 OFFICE O/P EST MOD 30 MIN: CPT | Performed by: INTERNAL MEDICINE

## 2024-07-12 RX ORDER — FLUTICASONE PROPIONATE 50 MCG
1 SPRAY, SUSPENSION (ML) NASAL DAILY PRN
Qty: 16 G | Refills: 2 | Status: SHIPPED | OUTPATIENT
Start: 2024-07-12

## 2024-07-12 RX ORDER — LOSARTAN POTASSIUM 100 MG/1
100 TABLET ORAL DAILY
Qty: 90 TABLET | Refills: 0 | Status: SHIPPED | OUTPATIENT
Start: 2024-07-12

## 2024-07-12 RX ORDER — ALBUTEROL SULFATE 90 UG/1
2 AEROSOL, METERED RESPIRATORY (INHALATION) EVERY 6 HOURS PRN
Qty: 18 G | Refills: 3 | Status: SHIPPED | OUTPATIENT
Start: 2024-07-12

## 2024-07-12 RX ORDER — RIMEGEPANT SULFATE 75 MG/75MG
1 TABLET, ORALLY DISINTEGRATING ORAL PRN
Qty: 8 TABLET | Refills: 0 | Status: SHIPPED | OUTPATIENT
Start: 2024-07-12

## 2024-07-12 RX ORDER — MONTELUKAST SODIUM 10 MG/1
10 TABLET ORAL NIGHTLY
Qty: 90 TABLET | Refills: 0 | Status: SHIPPED | OUTPATIENT
Start: 2024-07-12

## 2024-07-12 RX ORDER — ERGOCALCIFEROL 1.25 MG/1
50000 CAPSULE ORAL WEEKLY
Qty: 12 CAPSULE | Refills: 0 | Status: SHIPPED | OUTPATIENT
Start: 2024-07-12

## 2024-07-12 RX ORDER — METOPROLOL SUCCINATE 50 MG/1
50 TABLET, EXTENDED RELEASE ORAL DAILY
Qty: 90 TABLET | Refills: 0 | Status: SHIPPED | OUTPATIENT
Start: 2024-07-12

## 2024-07-12 RX ORDER — LOSARTAN POTASSIUM 50 MG/1
50 TABLET ORAL DAILY
Qty: 90 TABLET | Refills: 0 | Status: CANCELLED | OUTPATIENT
Start: 2024-07-12

## 2024-07-12 RX ORDER — BUDESONIDE AND FORMOTEROL FUMARATE DIHYDRATE 160; 4.5 UG/1; UG/1
AEROSOL RESPIRATORY (INHALATION)
Qty: 10.2 G | Refills: 2 | Status: SHIPPED | OUTPATIENT
Start: 2024-07-12

## 2024-07-12 ASSESSMENT — ENCOUNTER SYMPTOMS
BACK PAIN: 0
CONSTIPATION: 0
SORE THROAT: 0
SINUS PRESSURE: 0
ABDOMINAL PAIN: 0
RHINORRHEA: 0
VOMITING: 0
ABDOMINAL DISTENTION: 0
NAUSEA: 0
TROUBLE SWALLOWING: 0
ALLERGIC/IMMUNOLOGIC NEGATIVE: 1
DIARRHEA: 0
COLOR CHANGE: 0
BLOOD IN STOOL: 0

## 2024-07-12 NOTE — PROGRESS NOTES
Quinlauren Chaudhari presents today for follow up of HTN, Asthma, Depression, HTN    Current Outpatient Medications   Medication Sig Dispense Refill    albuterol sulfate HFA (PROVENTIL HFA) 108 (90 Base) MCG/ACT inhaler Inhale 2 puffs into the lungs every 6 hours as needed for Wheezing 18 g 3    budesonide-formoterol (SYMBICORT) 160-4.5 MCG/ACT AERO inhale 2 puffs by mouth twice a day 10.2 g 2    fluticasone (FLONASE) 50 MCG/ACT nasal spray 1 spray by Nasal route daily as needed for Rhinitis or Allergies 16 g 2    metoprolol succinate (TOPROL XL) 50 MG extended release tablet Take 1 tablet by mouth daily 90 tablet 0    montelukast (SINGULAIR) 10 MG tablet Take 1 tablet by mouth nightly 90 tablet 0    rimegepant sulfate (NURTEC) 75 MG TBDP Take 1 tablet by mouth as needed (migraines) 8 tablet 0    vitamin D (ERGOCALCIFEROL) 1.25 MG (43594 UT) CAPS capsule Take 1 capsule by mouth once a week 12 capsule 0    losartan (COZAAR) 100 MG tablet Take 1 tablet by mouth daily 90 tablet 0    BOTOX 100 units injection       Handicap Placard MISC by Does not apply route Patient cannot walk 200 ft without stopping to rest.    Expiration 5 years 1 each 0    ALPRAZolam (XANAX) 0.5 MG tablet Take 1 tablet by mouth 2 times daily as needed for Anxiety.      hydrOXYzine pamoate (VISTARIL) 25 MG capsule Take 1 capsule by mouth at bedtime      NONFORMULARY Cpap machine      acetaminophen (TYLENOL) 500 MG tablet Take 2 tablets by mouth every 6 hours as needed for Pain      ibuprofen (ADVIL;MOTRIN) 800 MG tablet Take 1 tablet by mouth every 8 hours as needed for Pain 30 tablet 0    escitalopram (LEXAPRO) 10 MG tablet Take 1 tablet by mouth daily      ZINC PO Take 1 tablet by mouth daily (Patient not taking: Reported on 4/30/2024)       No current facility-administered medications for this visit.      Past Medical History:   Diagnosis Date    Anemia     Iron deficiency    Anxiety     Arthritis     Asthma     Depression     Diabetes mellitus

## 2024-09-26 SDOH — ECONOMIC STABILITY: FOOD INSECURITY: WITHIN THE PAST 12 MONTHS, YOU WORRIED THAT YOUR FOOD WOULD RUN OUT BEFORE YOU GOT MONEY TO BUY MORE.: SOMETIMES TRUE

## 2024-09-26 SDOH — ECONOMIC STABILITY: INCOME INSECURITY: HOW HARD IS IT FOR YOU TO PAY FOR THE VERY BASICS LIKE FOOD, HOUSING, MEDICAL CARE, AND HEATING?: PATIENT DECLINED

## 2024-09-26 SDOH — ECONOMIC STABILITY: FOOD INSECURITY: WITHIN THE PAST 12 MONTHS, THE FOOD YOU BOUGHT JUST DIDN'T LAST AND YOU DIDN'T HAVE MONEY TO GET MORE.: SOMETIMES TRUE

## 2024-09-26 SDOH — ECONOMIC STABILITY: TRANSPORTATION INSECURITY
IN THE PAST 12 MONTHS, HAS LACK OF TRANSPORTATION KEPT YOU FROM MEETINGS, WORK, OR FROM GETTING THINGS NEEDED FOR DAILY LIVING?: PATIENT DECLINED

## 2024-09-27 ENCOUNTER — OFFICE VISIT (OUTPATIENT)
Dept: PRIMARY CARE CLINIC | Age: 39
End: 2024-09-27
Payer: COMMERCIAL

## 2024-09-27 VITALS
HEIGHT: 61 IN | TEMPERATURE: 99 F | SYSTOLIC BLOOD PRESSURE: 118 MMHG | WEIGHT: 241 LBS | BODY MASS INDEX: 45.5 KG/M2 | DIASTOLIC BLOOD PRESSURE: 90 MMHG | HEART RATE: 93 BPM | OXYGEN SATURATION: 98 %

## 2024-09-27 DIAGNOSIS — E78.5 SERUM LIPIDS HIGH: ICD-10-CM

## 2024-09-27 DIAGNOSIS — E74.39 GLUCOSE INTOLERANCE: ICD-10-CM

## 2024-09-27 DIAGNOSIS — I10 PRIMARY HYPERTENSION: ICD-10-CM

## 2024-09-27 DIAGNOSIS — F43.10 PTSD (POST-TRAUMATIC STRESS DISORDER): ICD-10-CM

## 2024-09-27 DIAGNOSIS — E66.813 CLASS 3 SEVERE OBESITY DUE TO EXCESS CALORIES WITH SERIOUS COMORBIDITY AND BODY MASS INDEX (BMI) OF 45.0 TO 49.9 IN ADULT: Primary | ICD-10-CM

## 2024-09-27 DIAGNOSIS — E66.01 CLASS 3 SEVERE OBESITY DUE TO EXCESS CALORIES WITH SERIOUS COMORBIDITY AND BODY MASS INDEX (BMI) OF 45.0 TO 49.9 IN ADULT: Primary | ICD-10-CM

## 2024-09-27 LAB
ALBUMIN: 4.2 G/DL (ref 3.5–5.2)
ALP BLD-CCNC: 71 U/L (ref 35–104)
ALT SERPL-CCNC: 12 U/L (ref 0–32)
ANION GAP SERPL CALCULATED.3IONS-SCNC: 15 MMOL/L (ref 7–16)
AST SERPL-CCNC: 11 U/L (ref 0–31)
BILIRUB SERPL-MCNC: 0.3 MG/DL (ref 0–1.2)
BUN BLDV-MCNC: 14 MG/DL (ref 6–20)
CALCIUM SERPL-MCNC: 9.2 MG/DL (ref 8.6–10.2)
CHLORIDE BLD-SCNC: 104 MMOL/L (ref 98–107)
CHOLESTEROL, TOTAL: 171 MG/DL
CO2: 21 MMOL/L (ref 22–29)
CREAT SERPL-MCNC: 0.6 MG/DL (ref 0.5–1)
GFR, ESTIMATED: >90 ML/MIN/1.73M2
GLUCOSE BLD-MCNC: 103 MG/DL (ref 74–99)
HBA1C MFR BLD: 6 %
HDLC SERPL-MCNC: 35 MG/DL
LDL CHOLESTEROL: 119 MG/DL
POTASSIUM SERPL-SCNC: 4.4 MMOL/L (ref 3.5–5)
SODIUM BLD-SCNC: 140 MMOL/L (ref 132–146)
TOTAL PROTEIN: 7.6 G/DL (ref 6.4–8.3)
TRIGL SERPL-MCNC: 83 MG/DL
TSH SERPL DL<=0.05 MIU/L-ACNC: 0.91 UIU/ML (ref 0.27–4.2)
VLDLC SERPL CALC-MCNC: 17 MG/DL

## 2024-09-27 PROCEDURE — 1036F TOBACCO NON-USER: CPT | Performed by: INTERNAL MEDICINE

## 2024-09-27 PROCEDURE — 90661 CCIIV3 VAC ABX FR 0.5 ML IM: CPT | Performed by: INTERNAL MEDICINE

## 2024-09-27 PROCEDURE — 3080F DIAST BP >= 90 MM HG: CPT | Performed by: INTERNAL MEDICINE

## 2024-09-27 PROCEDURE — 83036 HEMOGLOBIN GLYCOSYLATED A1C: CPT | Performed by: INTERNAL MEDICINE

## 2024-09-27 PROCEDURE — 3074F SYST BP LT 130 MM HG: CPT | Performed by: INTERNAL MEDICINE

## 2024-09-27 PROCEDURE — 90471 IMMUNIZATION ADMIN: CPT | Performed by: INTERNAL MEDICINE

## 2024-09-27 PROCEDURE — G8417 CALC BMI ABV UP PARAM F/U: HCPCS | Performed by: INTERNAL MEDICINE

## 2024-09-27 PROCEDURE — G8427 DOCREV CUR MEDS BY ELIG CLIN: HCPCS | Performed by: INTERNAL MEDICINE

## 2024-09-27 PROCEDURE — 99214 OFFICE O/P EST MOD 30 MIN: CPT | Performed by: INTERNAL MEDICINE

## 2024-09-27 RX ORDER — BUDESONIDE AND FORMOTEROL FUMARATE DIHYDRATE 160; 4.5 UG/1; UG/1
AEROSOL RESPIRATORY (INHALATION)
Qty: 10.2 G | Refills: 2 | Status: SHIPPED | OUTPATIENT
Start: 2024-09-27

## 2024-09-27 RX ORDER — ONDANSETRON 4 MG/1
4 TABLET, FILM COATED ORAL EVERY 8 HOURS PRN
COMMUNITY
End: 2024-09-27 | Stop reason: SDUPTHER

## 2024-09-27 RX ORDER — METOPROLOL SUCCINATE 50 MG/1
50 TABLET, EXTENDED RELEASE ORAL DAILY
Qty: 90 TABLET | Refills: 0 | Status: SHIPPED | OUTPATIENT
Start: 2024-09-27

## 2024-09-27 RX ORDER — MONTELUKAST SODIUM 10 MG/1
10 TABLET ORAL NIGHTLY
Qty: 90 TABLET | Refills: 0 | Status: SHIPPED | OUTPATIENT
Start: 2024-09-27

## 2024-09-27 RX ORDER — RIMEGEPANT SULFATE 75 MG/75MG
1 TABLET, ORALLY DISINTEGRATING ORAL PRN
Qty: 8 TABLET | Refills: 5 | Status: SHIPPED | OUTPATIENT
Start: 2024-09-27

## 2024-09-27 RX ORDER — ONDANSETRON 4 MG/1
4 TABLET, FILM COATED ORAL EVERY 8 HOURS PRN
Qty: 90 TABLET | Refills: 0 | Status: SHIPPED | OUTPATIENT
Start: 2024-09-27

## 2024-09-27 RX ORDER — IBUPROFEN 800 MG/1
800 TABLET, FILM COATED ORAL EVERY 8 HOURS PRN
Qty: 120 TABLET | Refills: 0 | Status: SHIPPED | OUTPATIENT
Start: 2024-09-27

## 2024-09-27 RX ORDER — FLUTICASONE PROPIONATE 50 MCG
1 SPRAY, SUSPENSION (ML) NASAL DAILY PRN
Qty: 16 G | Refills: 2 | Status: SHIPPED | OUTPATIENT
Start: 2024-09-27

## 2024-09-27 RX ORDER — ALBUTEROL SULFATE 90 UG/1
2 INHALANT RESPIRATORY (INHALATION) EVERY 6 HOURS PRN
Qty: 18 G | Refills: 3 | Status: SHIPPED | OUTPATIENT
Start: 2024-09-27

## 2024-09-27 RX ORDER — ERGOCALCIFEROL 1.25 MG/1
50000 CAPSULE, LIQUID FILLED ORAL WEEKLY
Qty: 12 CAPSULE | Refills: 0 | Status: SHIPPED | OUTPATIENT
Start: 2024-09-27

## 2024-09-27 RX ORDER — ESCITALOPRAM OXALATE 20 MG/1
20 TABLET ORAL DAILY
COMMUNITY
Start: 2024-09-14

## 2024-09-27 RX ORDER — LOSARTAN POTASSIUM 100 MG/1
100 TABLET ORAL DAILY
Qty: 90 TABLET | Refills: 0 | Status: SHIPPED | OUTPATIENT
Start: 2024-09-27

## 2024-09-27 NOTE — PROGRESS NOTES
Yannyenny Chaudhari presents today for follow up of HTN, Asthma, Obesity, Anxiety    Current Outpatient Medications   Medication Sig Dispense Refill    escitalopram (LEXAPRO) 20 MG tablet Take 1 tablet by mouth daily      albuterol sulfate HFA (PROVENTIL HFA) 108 (90 Base) MCG/ACT inhaler Inhale 2 puffs into the lungs every 6 hours as needed for Wheezing 18 g 3    budesonide-formoterol (SYMBICORT) 160-4.5 MCG/ACT AERO inhale 2 puffs by mouth twice a day 10.2 g 2    fluticasone (FLONASE) 50 MCG/ACT nasal spray 1 spray by Nasal route daily as needed for Rhinitis or Allergies 16 g 2    ibuprofen (ADVIL;MOTRIN) 800 MG tablet Take 1 tablet by mouth every 8 hours as needed for Pain 120 tablet 0    losartan (COZAAR) 100 MG tablet Take 1 tablet by mouth daily 90 tablet 0    metoprolol succinate (TOPROL XL) 50 MG extended release tablet Take 1 tablet by mouth daily 90 tablet 0    montelukast (SINGULAIR) 10 MG tablet Take 1 tablet by mouth nightly 90 tablet 0    rimegepant sulfate (NURTEC) 75 MG TBDP Take 1 tablet by mouth as needed (migraines) 8 tablet 5    vitamin D (ERGOCALCIFEROL) 1.25 MG (80294 UT) CAPS capsule Take 1 capsule by mouth once a week 12 capsule 0    ondansetron (ZOFRAN) 4 MG tablet Take 1 tablet by mouth every 8 hours as needed for Nausea or Vomiting (due to headaches) 90 tablet 0    BOTOX 100 units injection       ALPRAZolam (XANAX) 0.5 MG tablet Take 1 tablet by mouth 2 times daily as needed for Anxiety.      hydrOXYzine pamoate (VISTARIL) 25 MG capsule Take 1 capsule by mouth at bedtime      acetaminophen (TYLENOL) 500 MG tablet Take 2 tablets by mouth every 6 hours as needed for Pain       No current facility-administered medications for this visit.      Past Medical History:   Diagnosis Date    Anemia     Iron deficiency    Anxiety     Arthritis     Asthma     Depression     Diabetes mellitus (HCC)     GDM-diet controlled    History of palpitations     and tachycardia with anxiety    Hypertension

## 2024-09-30 ASSESSMENT — ENCOUNTER SYMPTOMS
ABDOMINAL PAIN: 0
CONSTIPATION: 0
NAUSEA: 0
SHORTNESS OF BREATH: 1
SORE THROAT: 0
BACK PAIN: 0
DIARRHEA: 0
ABDOMINAL DISTENTION: 0
SINUS PRESSURE: 0
RHINORRHEA: 0
BLOOD IN STOOL: 0
COLOR CHANGE: 0
TROUBLE SWALLOWING: 0
VOMITING: 0
ALLERGIC/IMMUNOLOGIC NEGATIVE: 1

## 2024-10-09 ENCOUNTER — TELEPHONE (OUTPATIENT)
Dept: PRIMARY CARE CLINIC | Age: 39
End: 2024-10-09

## 2024-10-09 NOTE — TELEPHONE ENCOUNTER
Patient requesting Dr. Varner send a prescription in for a maintenance medication for her migraine headaches. States she is getting them more often. Please send to  [id number] - [place], [place] - [address]    She would like a call back to let her know if something is sent in. Thank you.

## 2024-10-10 ENCOUNTER — TELEPHONE (OUTPATIENT)
Dept: BREAST CENTER | Age: 39
End: 2024-10-10

## 2024-10-10 RX ORDER — TOPIRAMATE 50 MG/1
50 TABLET, FILM COATED ORAL 2 TIMES DAILY
Qty: 180 TABLET | Refills: 0 | Status: SHIPPED | OUTPATIENT
Start: 2024-10-10

## 2024-10-10 NOTE — TELEPHONE ENCOUNTER
----- Message from THOMAS RITCHIE RN sent at 4/30/2024  9:38 AM EDT -----  Breast MRI due end of October or early November for high risk. Patient was previously provided [facility] forms and contact number for Heather at the facility to assist with any questions.     Check status of cycle and labs needed.    Office visit after with Roshni.

## 2024-10-10 NOTE — TELEPHONE ENCOUNTER
RN attempted to contact patient to discuss moving forward with breast MRI. RN reached patient VM and left detailed message of why was calling and office number for patient to call office back.      Electronically signed by Gerri Duval RN on 10/10/24 at 9:03 AM EDT

## 2024-10-16 ENCOUNTER — TELEPHONE (OUTPATIENT)
Dept: BREAST CENTER | Age: 39
End: 2024-10-16

## 2024-10-16 NOTE — TELEPHONE ENCOUNTER
Prior authorization has been obtained for breast MRI  [id number] -- Approval # [id number] valid 10/16/24 - 12/15/24  - [facility]

## 2024-10-23 ENCOUNTER — TELEPHONE (OUTPATIENT)
Dept: BREAST CENTER | Age: 39
End: 2024-10-23

## 2024-10-23 NOTE — TELEPHONE ENCOUNTER
Called and left detailed message with call back number to schedule her breast MRI. Authorization is good until 12/15/24.she will need labs and a follow up with Monica Cha.    Electronically signed by Michelle Martinez RN on 10/23/24 at 10:32 AM EDT

## 2024-12-05 ENCOUNTER — HOSPITAL ENCOUNTER (OUTPATIENT)
Dept: MRI IMAGING | Age: 39
Discharge: HOME | End: 2024-12-07
Payer: COMMERCIAL

## 2024-12-05 DIAGNOSIS — R92.30 DENSE BREAST: ICD-10-CM

## 2024-12-05 DIAGNOSIS — Z91.89 AT HIGH RISK FOR BREAST CANCER: ICD-10-CM

## 2024-12-05 DIAGNOSIS — Z80.3 FAMILY HISTORY OF BREAST CANCER: ICD-10-CM

## 2024-12-05 PROCEDURE — 6360000004 HC RX CONTRAST MEDICATION: Performed by: RADIOLOGY

## 2024-12-05 PROCEDURE — C8908 MRI W/O FOL W/CONT, BREAST,: HCPCS

## 2024-12-05 PROCEDURE — A9585 GADOBUTROL INJECTION: HCPCS | Performed by: RADIOLOGY

## 2024-12-05 RX ORDER — GADOBUTROL 604.72 MG/ML
10 INJECTION INTRAVENOUS
Status: COMPLETED | OUTPATIENT
Start: 2024-12-05 | End: 2024-12-05

## 2024-12-05 RX ADMIN — GADOBUTROL 10 ML: 604.72 INJECTION INTRAVENOUS at 11:36

## 2024-12-13 ENCOUNTER — TELEPHONE (OUTPATIENT)
Dept: PRIMARY CARE CLINIC | Age: 39
End: 2024-12-13

## 2024-12-17 ENCOUNTER — OFFICE VISIT (OUTPATIENT)
Dept: PRIMARY CARE CLINIC | Age: 39
End: 2024-12-17
Payer: COMMERCIAL

## 2024-12-17 VITALS
DIASTOLIC BLOOD PRESSURE: 76 MMHG | HEART RATE: 87 BPM | HEIGHT: 61 IN | WEIGHT: 242 LBS | TEMPERATURE: 98.9 F | SYSTOLIC BLOOD PRESSURE: 110 MMHG | OXYGEN SATURATION: 96 % | BODY MASS INDEX: 45.69 KG/M2

## 2024-12-17 DIAGNOSIS — I10 PRIMARY HYPERTENSION: Primary | ICD-10-CM

## 2024-12-17 DIAGNOSIS — E78.5 SERUM LIPIDS HIGH: ICD-10-CM

## 2024-12-17 DIAGNOSIS — J45.909 MILD ASTHMA WITHOUT COMPLICATION, UNSPECIFIED WHETHER PERSISTENT: ICD-10-CM

## 2024-12-17 DIAGNOSIS — E66.813 CLASS 3 SEVERE OBESITY DUE TO EXCESS CALORIES WITHOUT SERIOUS COMORBIDITY WITH BODY MASS INDEX (BMI) OF 45.0 TO 49.9 IN ADULT: ICD-10-CM

## 2024-12-17 DIAGNOSIS — E66.01 CLASS 3 SEVERE OBESITY DUE TO EXCESS CALORIES WITHOUT SERIOUS COMORBIDITY WITH BODY MASS INDEX (BMI) OF 45.0 TO 49.9 IN ADULT: ICD-10-CM

## 2024-12-17 DIAGNOSIS — E74.39 GLUCOSE INTOLERANCE: ICD-10-CM

## 2024-12-17 PROCEDURE — G8484 FLU IMMUNIZE NO ADMIN: HCPCS | Performed by: INTERNAL MEDICINE

## 2024-12-17 PROCEDURE — 3078F DIAST BP <80 MM HG: CPT | Performed by: INTERNAL MEDICINE

## 2024-12-17 PROCEDURE — G8417 CALC BMI ABV UP PARAM F/U: HCPCS | Performed by: INTERNAL MEDICINE

## 2024-12-17 PROCEDURE — G8427 DOCREV CUR MEDS BY ELIG CLIN: HCPCS | Performed by: INTERNAL MEDICINE

## 2024-12-17 PROCEDURE — 99214 OFFICE O/P EST MOD 30 MIN: CPT | Performed by: INTERNAL MEDICINE

## 2024-12-17 PROCEDURE — 3074F SYST BP LT 130 MM HG: CPT | Performed by: INTERNAL MEDICINE

## 2024-12-17 PROCEDURE — 1036F TOBACCO NON-USER: CPT | Performed by: INTERNAL MEDICINE

## 2024-12-17 RX ORDER — FLUTICASONE PROPIONATE 50 MCG
1 SPRAY, SUSPENSION (ML) NASAL DAILY PRN
Qty: 16 G | Refills: 2 | Status: SHIPPED | OUTPATIENT
Start: 2024-12-17

## 2024-12-17 RX ORDER — ONDANSETRON 4 MG/1
4 TABLET, FILM COATED ORAL EVERY 8 HOURS PRN
Qty: 30 TABLET | Refills: 0 | Status: SHIPPED | OUTPATIENT
Start: 2024-12-17

## 2024-12-17 RX ORDER — MONTELUKAST SODIUM 10 MG/1
10 TABLET ORAL NIGHTLY
Qty: 90 TABLET | Refills: 0 | Status: SHIPPED | OUTPATIENT
Start: 2024-12-17

## 2024-12-17 RX ORDER — ERGOCALCIFEROL 1.25 MG/1
50000 CAPSULE, LIQUID FILLED ORAL WEEKLY
Qty: 12 CAPSULE | Refills: 0 | Status: SHIPPED | OUTPATIENT
Start: 2024-12-17

## 2024-12-17 RX ORDER — METOPROLOL SUCCINATE 50 MG/1
50 TABLET, EXTENDED RELEASE ORAL DAILY
Qty: 90 TABLET | Refills: 0 | Status: SHIPPED | OUTPATIENT
Start: 2024-12-17

## 2024-12-17 RX ORDER — RIMEGEPANT SULFATE 75 MG/75MG
1 TABLET, ORALLY DISINTEGRATING ORAL PRN
Qty: 8 TABLET | Refills: 5 | Status: SHIPPED | OUTPATIENT
Start: 2024-12-17

## 2024-12-17 RX ORDER — IBUPROFEN 800 MG/1
800 TABLET, FILM COATED ORAL EVERY 8 HOURS PRN
Qty: 120 TABLET | Refills: 0 | Status: SHIPPED | OUTPATIENT
Start: 2024-12-17

## 2024-12-17 RX ORDER — LOSARTAN POTASSIUM 100 MG/1
100 TABLET ORAL DAILY
Qty: 90 TABLET | Refills: 0 | Status: SHIPPED | OUTPATIENT
Start: 2024-12-17

## 2024-12-17 RX ORDER — TOPIRAMATE 50 MG/1
50 TABLET, FILM COATED ORAL 2 TIMES DAILY
Qty: 180 TABLET | Refills: 0 | Status: SHIPPED | OUTPATIENT
Start: 2024-12-17

## 2024-12-17 RX ORDER — ALBUTEROL SULFATE 90 UG/1
2 INHALANT RESPIRATORY (INHALATION) EVERY 6 HOURS PRN
Qty: 18 G | Refills: 3 | Status: SHIPPED | OUTPATIENT
Start: 2024-12-17

## 2024-12-17 RX ORDER — IBUPROFEN 800 MG/1
800 TABLET, FILM COATED ORAL EVERY 8 HOURS PRN
Qty: 120 TABLET | Refills: 2 | OUTPATIENT
Start: 2024-12-17

## 2024-12-17 RX ORDER — BUDESONIDE AND FORMOTEROL FUMARATE DIHYDRATE 160; 4.5 UG/1; UG/1
AEROSOL RESPIRATORY (INHALATION)
Qty: 10.2 G | Refills: 2 | Status: SHIPPED | OUTPATIENT
Start: 2024-12-17

## 2024-12-17 ASSESSMENT — ENCOUNTER SYMPTOMS: COUGH: 1

## 2024-12-17 NOTE — TELEPHONE ENCOUNTER
Name of Medication(s) Requested:  Requested Prescriptions     Pending Prescriptions Disp Refills    ibuprofen (ADVIL;MOTRIN) 800 MG tablet [Pharmacy Med Name: IBUPROFEN 800MG TABLETS] 120 tablet 0     Sig: TAKE 1 TABLET BY MOUTH EVERY 8 HOURS AS NEEDED FOR PAIN       Medication is on current medication list Yes    Dosage and directions were verified? Yes    Quantity verified: 90 day supply     Pharmacy Verified?  Yes    Last Appointment:  9/27/2024    Future appts:  Future Appointments   Date Time Provider Department Center   12/17/2024  8:15 AM [facility]   1/7/2025  3:00 PM Adilene Bailon, DO [facility]        (If no appt send self scheduling link. .REFILLAPPT)  Scheduling request sent?     [] Yes  [x] No    Does patient need updated?  [] Yes  [x] No

## 2024-12-17 NOTE — PROGRESS NOTES
Yannyenny ZOYA PatelChaudhari presents today for follow up of Obesity, Depression, HTN,     Current Outpatient Medications   Medication Sig Dispense Refill    topiramate (TOPAMAX) 50 MG tablet Take 1 tablet by mouth 2 times daily 180 tablet 0    escitalopram (LEXAPRO) 20 MG tablet Take 1 tablet by mouth daily      albuterol sulfate HFA (PROVENTIL HFA) 108 (90 Base) MCG/ACT inhaler Inhale 2 puffs into the lungs every 6 hours as needed for Wheezing 18 g 3    budesonide-formoterol (SYMBICORT) 160-4.5 MCG/ACT AERO inhale 2 puffs by mouth twice a day 10.2 g 2    fluticasone (FLONASE) 50 MCG/ACT nasal spray 1 spray by Nasal route daily as needed for Rhinitis or Allergies 16 g 2    ibuprofen (ADVIL;MOTRIN) 800 MG tablet Take 1 tablet by mouth every 8 hours as needed for Pain 120 tablet 0    losartan (COZAAR) 100 MG tablet Take 1 tablet by mouth daily 90 tablet 0    metoprolol succinate (TOPROL XL) 50 MG extended release tablet Take 1 tablet by mouth daily 90 tablet 0    montelukast (SINGULAIR) 10 MG tablet Take 1 tablet by mouth nightly 90 tablet 0    rimegepant sulfate (NURTEC) 75 MG TBDP Take 1 tablet by mouth as needed (migraines) 8 tablet 5    vitamin D (ERGOCALCIFEROL) 1.25 MG (67277 UT) CAPS capsule Take 1 capsule by mouth once a week 12 capsule 0    ondansetron (ZOFRAN) 4 MG tablet Take 1 tablet by mouth every 8 hours as needed for Nausea or Vomiting (due to headaches) 90 tablet 0    BOTOX 100 units injection       ALPRAZolam (XANAX) 0.5 MG tablet Take 1 tablet by mouth 2 times daily as needed for Anxiety.      hydrOXYzine pamoate (VISTARIL) 25 MG capsule Take 1 capsule by mouth at bedtime      acetaminophen (TYLENOL) 500 MG tablet Take 2 tablets by mouth every 6 hours as needed for Pain       No current facility-administered medications for this visit.      Past Medical History:   Diagnosis Date    Anemia     Iron deficiency    Anxiety     Arthritis     Asthma     Depression     Diabetes mellitus (HCC)     GDM-diet controlled

## 2024-12-18 ENCOUNTER — TELEPHONE (OUTPATIENT)
Dept: BREAST CENTER | Age: 39
End: 2024-12-18

## 2024-12-18 NOTE — TELEPHONE ENCOUNTER
Left message with call back number. Patient's breast MRI has been completed. Results benign. She is due for a follow up with SARAH Cha CNP. Will set a reminder to call her again soon.    ESTABLISHED: 6 month follow up - High risk breast cancer - Recent breast MRI benign.

## 2025-01-04 LAB
MICROORGANISM SPEC CULT: NO GROWTH
SPECIMEN DESCRIPTION: NORMAL

## 2025-01-07 PROBLEM — D25.2 SUBSEROUS LEIOMYOMA OF UTERUS: Status: ACTIVE | Noted: 2025-01-07

## 2025-01-15 NOTE — PROGRESS NOTES
ultrasound: Findings in question are not seen on ultrasound.  Stereotactic biopsy could not be performed due to limited visualization on mammography which would prevent localization.  BI-RADS Category 0.  Recommend breast MRI.  08/04/2020 MRI of the bilateral breasts: Focal areas of skin thickening in the medial aspect of both breasts are suspicious.  BI-RADS 4.  08/17/2020 office visit with Dr. Terrazas; skin thickening not present and clinical exam correlated with imaging.  09/22/2021 bilateral screening mammogram: Negative, BI-RADS 1.  09/14/2022 MRI of the bilateral breast: Negative, BI-RADS 1.  04/05/2023 bilateral screening mammogram: Negative, BI-RADS 1.  04/30/2024 bilateral screening mammogram: Negative, BI-RADS 1.  12/05/2025 MRI bilateral breast:  Benign, BI-RADS 2.    Breast biopsies:  None to date.    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      Review of Systems   Constitutional:  Negative for activity change, appetite change, fatigue and fever.        Nuha is doing relatively well.  She is busy with her family.  No breast related complaints.   HENT: Negative.     Respiratory:  Positive for shortness of breath (Chronic shortness of breath on exertion.  Overall subjectively stable.). Negative for apnea, cough, choking, chest tightness, wheezing and stridor.         On bronchodilators per primary care.  Previously followed with pulmonology.   Cardiovascular:  Negative for chest pain, palpitations and leg swelling.   Gastrointestinal:  Negative for abdominal distention, abdominal pain, anal bleeding, blood in stool, constipation, diarrhea, nausea, rectal pain and vomiting.   Genitourinary: Negative.         Due to follow-up with gynecology-Dr. Bailon early summer.   Musculoskeletal:  Negative for arthralgias, back pain, gait problem, joint swelling, myalgias, neck pain and neck stiffness.   Neurological:  Positive for headaches. Negative for dizziness, tremors, seizures, syncope, speech

## 2025-02-12 ENCOUNTER — OFFICE VISIT (OUTPATIENT)
Dept: BREAST CENTER | Age: 40
End: 2025-02-12
Payer: COMMERCIAL

## 2025-02-12 VITALS
TEMPERATURE: 98.1 F | HEART RATE: 86 BPM | HEIGHT: 61 IN | OXYGEN SATURATION: 99 % | BODY MASS INDEX: 45.31 KG/M2 | DIASTOLIC BLOOD PRESSURE: 78 MMHG | WEIGHT: 240 LBS | RESPIRATION RATE: 20 BRPM | SYSTOLIC BLOOD PRESSURE: 138 MMHG

## 2025-02-12 DIAGNOSIS — Z91.89 AT HIGH RISK FOR BREAST CANCER: Primary | ICD-10-CM

## 2025-02-12 DIAGNOSIS — Z12.31 VISIT FOR SCREENING MAMMOGRAM: ICD-10-CM

## 2025-02-12 PROCEDURE — 3078F DIAST BP <80 MM HG: CPT | Performed by: NURSE PRACTITIONER

## 2025-02-12 PROCEDURE — 99213 OFFICE O/P EST LOW 20 MIN: CPT | Performed by: NURSE PRACTITIONER

## 2025-02-12 PROCEDURE — 1036F TOBACCO NON-USER: CPT | Performed by: NURSE PRACTITIONER

## 2025-02-12 PROCEDURE — 3075F SYST BP GE 130 - 139MM HG: CPT | Performed by: NURSE PRACTITIONER

## 2025-02-12 PROCEDURE — G8417 CALC BMI ABV UP PARAM F/U: HCPCS | Performed by: NURSE PRACTITIONER

## 2025-02-12 PROCEDURE — G8427 DOCREV CUR MEDS BY ELIG CLIN: HCPCS | Performed by: NURSE PRACTITIONER

## 2025-02-12 ASSESSMENT — ENCOUNTER SYMPTOMS
BLOOD IN STOOL: 0
STRIDOR: 0
APNEA: 0
RECTAL PAIN: 0
SHORTNESS OF BREATH: 1

## 2025-02-25 NOTE — TELEPHONE ENCOUNTER
Name of Medication(s) Requested:  Requested Prescriptions     Pending Prescriptions Disp Refills    ondansetron (ZOFRAN) 4 MG tablet [Pharmacy Med Name: ONDANSETRON 4MG TABLETS] 30 tablet 0     Sig: TAKE 1 TABLET BY MOUTH EVERY 8 HOURS AS NEEDED FOR NAUSEA OR VOMITING       Medication is on current medication list Yes    Dosage and directions were verified? Yes    Quantity verified: 90 day supply     Pharmacy Verified?  Yes    Last Appointment:  12/17/2024    Future appts:  Future Appointments   Date Time Provider Department Center   3/11/2025  8:15 AM Jessica Varner MD [facility]   6/10/2025  2:00 PM SE[facility] RM 1 [facility]   6/10/2025  3:00 PM Monica Cha, APRN - CNP [facility]   1/9/2026 12:00 PM Adilene Bailon, DO [facility]        (If no appt send self scheduling link. .REFILLAPPT)  Scheduling request sent?     [] Yes  [] No    Does patient need updated?  [] Yes  [] No

## 2025-02-26 RX ORDER — ONDANSETRON 4 MG/1
4 TABLET, FILM COATED ORAL EVERY 8 HOURS PRN
Qty: 30 TABLET | Refills: 0 | Status: SHIPPED | OUTPATIENT
Start: 2025-02-26

## 2025-02-26 RX ORDER — LOSARTAN POTASSIUM 100 MG/1
100 TABLET ORAL DAILY
Qty: 90 TABLET | Refills: 0 | OUTPATIENT
Start: 2025-02-26

## 2025-03-08 SDOH — ECONOMIC STABILITY: FOOD INSECURITY: WITHIN THE PAST 12 MONTHS, YOU WORRIED THAT YOUR FOOD WOULD RUN OUT BEFORE YOU GOT MONEY TO BUY MORE.: NEVER TRUE

## 2025-03-08 SDOH — ECONOMIC STABILITY: INCOME INSECURITY: IN THE LAST 12 MONTHS, WAS THERE A TIME WHEN YOU WERE NOT ABLE TO PAY THE MORTGAGE OR RENT ON TIME?: NO

## 2025-03-08 SDOH — ECONOMIC STABILITY: FOOD INSECURITY: WITHIN THE PAST 12 MONTHS, THE FOOD YOU BOUGHT JUST DIDN'T LAST AND YOU DIDN'T HAVE MONEY TO GET MORE.: NEVER TRUE

## 2025-03-08 ASSESSMENT — PATIENT HEALTH QUESTIONNAIRE - PHQ9
2. FEELING DOWN, DEPRESSED OR HOPELESS: NOT AT ALL
SUM OF ALL RESPONSES TO PHQ QUESTIONS 1-9: 0
SUM OF ALL RESPONSES TO PHQ9 QUESTIONS 1 & 2: 0
SUM OF ALL RESPONSES TO PHQ QUESTIONS 1-9: 0
1. LITTLE INTEREST OR PLEASURE IN DOING THINGS: NOT AT ALL
2. FEELING DOWN, DEPRESSED OR HOPELESS: NOT AT ALL
1. LITTLE INTEREST OR PLEASURE IN DOING THINGS: NOT AT ALL

## 2025-03-10 NOTE — TELEPHONE ENCOUNTER
Name of Medication(s) Requested:  Requested Prescriptions     Pending Prescriptions Disp Refills    fluticasone (FLONASE) 50 MCG/ACT nasal spray [Pharmacy Med Name: FLUTICASONE 50MCG NASAL SP (120) RX] 16 g 2     Sig: SHAKE LIQUID AND USE 1 SPRAY IN EACH NOSTRIL DAILY AS NEEDED FOR RHINITIS OR ALLERGIES       Medication is on current medication list Yes    Dosage and directions were verified? Yes    Quantity verified: 90 day supply     Pharmacy Verified?  Yes    Last Appointment:  12/17/2024    Future appts:  Future Appointments   Date Time Provider Department Center   3/11/2025  8:15 AM Jessica Varner MD [facility]   6/10/2025  2:00 PM [facility] RM 1 [facility]   6/10/2025  3:00 PM Monica Cah, APRN - CNP [facility]   1/9/2026 12:00 PM Adilene Bailon, DO [facility]        (If no appt send self scheduling link. .REFILLAPPT)  Scheduling request sent?     [] Yes  [x] No    Does patient need updated?  [] Yes  [x] No

## 2025-03-11 ENCOUNTER — OFFICE VISIT (OUTPATIENT)
Dept: PRIMARY CARE CLINIC | Age: 40
End: 2025-03-11
Payer: COMMERCIAL

## 2025-03-11 VITALS
HEIGHT: 61 IN | TEMPERATURE: 99.1 F | WEIGHT: 239 LBS | BODY MASS INDEX: 45.12 KG/M2 | SYSTOLIC BLOOD PRESSURE: 118 MMHG | OXYGEN SATURATION: 98 % | DIASTOLIC BLOOD PRESSURE: 80 MMHG | HEART RATE: 92 BPM

## 2025-03-11 DIAGNOSIS — E78.00 HIGH CHOLESTEROL: ICD-10-CM

## 2025-03-11 DIAGNOSIS — G43.109 MIGRAINE WITH AURA AND WITHOUT STATUS MIGRAINOSUS, NOT INTRACTABLE: Primary | ICD-10-CM

## 2025-03-11 DIAGNOSIS — R73.9 HYPERGLYCEMIA: ICD-10-CM

## 2025-03-11 DIAGNOSIS — I10 PRIMARY HYPERTENSION: ICD-10-CM

## 2025-03-11 LAB
ALBUMIN: 4.1 G/DL (ref 3.5–5.2)
ALP BLD-CCNC: 73 U/L (ref 35–104)
ALT SERPL-CCNC: 12 U/L (ref 0–32)
ANION GAP SERPL CALCULATED.3IONS-SCNC: 19 MMOL/L (ref 7–16)
AST SERPL-CCNC: 13 U/L (ref 0–31)
BILIRUB SERPL-MCNC: 0.2 MG/DL (ref 0–1.2)
BUN BLDV-MCNC: 14 MG/DL (ref 6–20)
CALCIUM SERPL-MCNC: 9.3 MG/DL (ref 8.6–10.2)
CHLORIDE BLD-SCNC: 111 MMOL/L (ref 98–107)
CHOLESTEROL, TOTAL: 164 MG/DL
CO2: 15 MMOL/L (ref 22–29)
CREAT SERPL-MCNC: 0.7 MG/DL (ref 0.5–1)
GFR, ESTIMATED: >90 ML/MIN/1.73M2
GLUCOSE BLD-MCNC: 120 MG/DL (ref 74–99)
HBA1C MFR BLD: 5.9 %
HDLC SERPL-MCNC: 30 MG/DL
LDL CHOLESTEROL: 114 MG/DL
POTASSIUM SERPL-SCNC: 4.5 MMOL/L (ref 3.5–5)
SODIUM BLD-SCNC: 145 MMOL/L (ref 132–146)
TOTAL PROTEIN: 7.8 G/DL (ref 6.4–8.3)
TRIGL SERPL-MCNC: 101 MG/DL
VLDLC SERPL CALC-MCNC: 20 MG/DL

## 2025-03-11 PROCEDURE — 83036 HEMOGLOBIN GLYCOSYLATED A1C: CPT | Performed by: INTERNAL MEDICINE

## 2025-03-11 PROCEDURE — 3074F SYST BP LT 130 MM HG: CPT | Performed by: INTERNAL MEDICINE

## 2025-03-11 PROCEDURE — G8417 CALC BMI ABV UP PARAM F/U: HCPCS | Performed by: INTERNAL MEDICINE

## 2025-03-11 PROCEDURE — 3079F DIAST BP 80-89 MM HG: CPT | Performed by: INTERNAL MEDICINE

## 2025-03-11 PROCEDURE — 36415 COLL VENOUS BLD VENIPUNCTURE: CPT | Performed by: INTERNAL MEDICINE

## 2025-03-11 PROCEDURE — 1036F TOBACCO NON-USER: CPT | Performed by: INTERNAL MEDICINE

## 2025-03-11 PROCEDURE — 99214 OFFICE O/P EST MOD 30 MIN: CPT | Performed by: INTERNAL MEDICINE

## 2025-03-11 PROCEDURE — G8427 DOCREV CUR MEDS BY ELIG CLIN: HCPCS | Performed by: INTERNAL MEDICINE

## 2025-03-11 RX ORDER — MONTELUKAST SODIUM 10 MG/1
10 TABLET ORAL NIGHTLY
Qty: 90 TABLET | Refills: 0 | Status: SHIPPED | OUTPATIENT
Start: 2025-03-11

## 2025-03-11 RX ORDER — RIMEGEPANT SULFATE 75 MG/75MG
1 TABLET, ORALLY DISINTEGRATING ORAL PRN
Qty: 8 TABLET | Refills: 5 | Status: SHIPPED | OUTPATIENT
Start: 2025-03-11

## 2025-03-11 RX ORDER — TOPIRAMATE 50 MG/1
50 TABLET, FILM COATED ORAL 2 TIMES DAILY
Qty: 180 TABLET | Refills: 0 | Status: SHIPPED | OUTPATIENT
Start: 2025-03-11

## 2025-03-11 RX ORDER — METOPROLOL SUCCINATE 50 MG/1
50 TABLET, EXTENDED RELEASE ORAL DAILY
Qty: 90 TABLET | Refills: 0 | Status: SHIPPED | OUTPATIENT
Start: 2025-03-11

## 2025-03-11 RX ORDER — ERGOCALCIFEROL 1.25 MG/1
50000 CAPSULE, LIQUID FILLED ORAL WEEKLY
Qty: 12 CAPSULE | Refills: 0 | Status: SHIPPED | OUTPATIENT
Start: 2025-03-11

## 2025-03-11 RX ORDER — FLUTICASONE PROPIONATE 50 MCG
SPRAY, SUSPENSION (ML) NASAL
Qty: 16 G | Refills: 2 | Status: SHIPPED | OUTPATIENT
Start: 2025-03-11

## 2025-03-11 RX ORDER — LOSARTAN POTASSIUM 100 MG/1
100 TABLET ORAL DAILY
Qty: 90 TABLET | Refills: 0 | Status: SHIPPED | OUTPATIENT
Start: 2025-03-11

## 2025-03-11 ASSESSMENT — ENCOUNTER SYMPTOMS
ALLERGIC/IMMUNOLOGIC NEGATIVE: 1
CONSTIPATION: 0
BACK PAIN: 0
NAUSEA: 0
TROUBLE SWALLOWING: 0
VOMITING: 0
ABDOMINAL PAIN: 0
RHINORRHEA: 0
SORE THROAT: 0
BLOOD IN STOOL: 0
SINUS PRESSURE: 0
COLOR CHANGE: 0
ABDOMINAL DISTENTION: 0
DIARRHEA: 0

## 2025-03-11 NOTE — PROGRESS NOTES
Yannyenny Chaudhari presents today for follow up of Bronchial Asthma, Depression, HTN    Current Outpatient Medications   Medication Sig Dispense Refill    fluticasone (FLONASE) 50 MCG/ACT nasal spray SHAKE LIQUID AND USE 1 SPRAY IN EACH NOSTRIL DAILY AS NEEDED FOR RHINITIS OR ALLERGIES 16 g 2    losartan (COZAAR) 100 MG tablet Take 1 tablet by mouth daily 90 tablet 0    metoprolol succinate (TOPROL XL) 50 MG extended release tablet Take 1 tablet by mouth daily 90 tablet 0    montelukast (SINGULAIR) 10 MG tablet Take 1 tablet by mouth nightly 90 tablet 0    rimegepant sulfate (NURTEC) 75 MG TBDP Take 1 tablet by mouth as needed (migraines) 8 tablet 5    topiramate (TOPAMAX) 50 MG tablet Take 1 tablet by mouth 2 times daily 180 tablet 0    vitamin D (ERGOCALCIFEROL) 1.25 MG (78646 UT) CAPS capsule Take 1 capsule by mouth once a week 12 capsule 0    ondansetron (ZOFRAN) 4 MG tablet TAKE 1 TABLET BY MOUTH EVERY 8 HOURS AS NEEDED FOR NAUSEA OR VOMITING 30 tablet 0    albuterol sulfate HFA (PROVENTIL HFA) 108 (90 Base) MCG/ACT inhaler Inhale 2 puffs into the lungs every 6 hours as needed for Wheezing 18 g 3    budesonide-formoterol (SYMBICORT) 160-4.5 MCG/ACT AERO inhale 2 puffs by mouth twice a day 10.2 g 2    ibuprofen (ADVIL;MOTRIN) 800 MG tablet Take 1 tablet by mouth every 8 hours as needed for Pain 120 tablet 0    escitalopram (LEXAPRO) 20 MG tablet Take 1 tablet by mouth daily      BOTOX 100 units injection       ALPRAZolam (XANAX) 0.5 MG tablet Take 1 tablet by mouth 2 times daily as needed for Anxiety.      hydrOXYzine pamoate (VISTARIL) 25 MG capsule Take 1 capsule by mouth at bedtime (Patient taking differently: Take 1 capsule by mouth nightly as needed)      acetaminophen (TYLENOL) 500 MG tablet Take 2 tablets by mouth every 6 hours as needed for Pain       No current facility-administered medications for this visit.      Past Medical History:   Diagnosis Date    Anemia     Iron deficiency    Anxiety

## 2025-05-13 DIAGNOSIS — J45.909 MILD ASTHMA WITHOUT COMPLICATION, UNSPECIFIED WHETHER PERSISTENT: ICD-10-CM

## 2025-05-13 DIAGNOSIS — G43.109 MIGRAINE WITH AURA AND WITHOUT STATUS MIGRAINOSUS, NOT INTRACTABLE: ICD-10-CM

## 2025-05-13 RX ORDER — ALBUTEROL SULFATE 90 UG/1
2 INHALANT RESPIRATORY (INHALATION) EVERY 6 HOURS PRN
Qty: 18 G | Refills: 4 | Status: SHIPPED | OUTPATIENT
Start: 2025-05-13

## 2025-05-13 RX ORDER — MONTELUKAST SODIUM 10 MG/1
10 TABLET ORAL NIGHTLY
Qty: 90 TABLET | Refills: 2 | Status: SHIPPED | OUTPATIENT
Start: 2025-05-13

## 2025-05-13 RX ORDER — ERGOCALCIFEROL 1.25 MG/1
50000 CAPSULE, LIQUID FILLED ORAL WEEKLY
Qty: 12 CAPSULE | Refills: 1 | Status: SHIPPED | OUTPATIENT
Start: 2025-05-13

## 2025-05-13 RX ORDER — BUDESONIDE AND FORMOTEROL FUMARATE DIHYDRATE 160; 4.5 UG/1; UG/1
AEROSOL RESPIRATORY (INHALATION)
Qty: 10.2 G | Refills: 3 | Status: SHIPPED | OUTPATIENT
Start: 2025-05-13

## 2025-05-13 RX ORDER — METOPROLOL SUCCINATE 50 MG/1
50 TABLET, EXTENDED RELEASE ORAL DAILY
Qty: 90 TABLET | Refills: 2 | Status: SHIPPED | OUTPATIENT
Start: 2025-05-13

## 2025-05-13 RX ORDER — IBUPROFEN 800 MG/1
800 TABLET, FILM COATED ORAL EVERY 8 HOURS PRN
Qty: 120 TABLET | Refills: 2 | Status: CANCELLED | OUTPATIENT
Start: 2025-05-13

## 2025-05-13 RX ORDER — ONDANSETRON 4 MG/1
4 TABLET, FILM COATED ORAL EVERY 8 HOURS PRN
Qty: 30 TABLET | Refills: 2 | Status: SHIPPED | OUTPATIENT
Start: 2025-05-13

## 2025-05-13 RX ORDER — LOSARTAN POTASSIUM 100 MG/1
100 TABLET ORAL DAILY
Qty: 90 TABLET | Refills: 2 | Status: SHIPPED | OUTPATIENT
Start: 2025-05-13

## 2025-05-13 RX ORDER — TOPIRAMATE 50 MG/1
50 TABLET, FILM COATED ORAL 2 TIMES DAILY
Qty: 180 TABLET | Refills: 2 | Status: SHIPPED | OUTPATIENT
Start: 2025-05-13

## 2025-05-13 NOTE — TELEPHONE ENCOUNTER
Name of Medication(s) Requested:  Requested Prescriptions     Pending Prescriptions Disp Refills    albuterol sulfate HFA (PROVENTIL HFA) 108 (90 Base) MCG/ACT inhaler 18 g 4     Sig: Inhale 2 puffs into the lungs every 6 hours as needed for Wheezing    budesonide-formoterol (SYMBICORT) 160-4.5 MCG/ACT AERO 10.2 g 3     Sig: inhale 2 puffs by mouth twice a day    ibuprofen (ADVIL;MOTRIN) 800 MG tablet 120 tablet 2     Sig: Take 1 tablet by mouth every 8 hours as needed for Pain    losartan (COZAAR) 100 MG tablet 90 tablet 2     Sig: Take 1 tablet by mouth daily    metoprolol succinate (TOPROL XL) 50 MG extended release tablet 90 tablet 2     Sig: Take 1 tablet by mouth daily    montelukast (SINGULAIR) 10 MG tablet 90 tablet 2     Sig: Take 1 tablet by mouth nightly    ondansetron (ZOFRAN) 4 MG tablet 30 tablet 2     Sig: Take 1 tablet by mouth every 8 hours as needed for Nausea or Vomiting    topiramate (TOPAMAX) 50 MG tablet 180 tablet 2     Sig: Take 1 tablet by mouth 2 times daily    vitamin D (ERGOCALCIFEROL) 1.25 MG (72710 UT) CAPS capsule 12 capsule 1     Sig: Take 1 capsule by mouth once a week       Medication is on current medication list Yes    Dosage and directions were verified? Yes    Quantity verified: 90 day supply     Pharmacy Verified?  Yes    Last Appointment:  3/11/2025    Future appts:  Future Appointments   Date Time Provider Department Center   6/10/2025  2:00 PM YZ BOBBY [facility] RM 1 SEYZ BOBBY [facility] Rad/Car   6/10/2025  3:00 PM Monica Cha, APRN - CNP [facility]   6/11/2025  9:00 AM [facility]   1/9/2026 12:00 PM Adilene Bailon, DO [facility]        (If no appt send self scheduling link. .REFILLAPPT)  Scheduling request sent?     [] Yes  [] No    Does patient need updated?  [] Yes  [] No

## 2025-05-14 DIAGNOSIS — J45.909 MILD ASTHMA WITHOUT COMPLICATION, UNSPECIFIED WHETHER PERSISTENT: ICD-10-CM

## 2025-05-14 RX ORDER — BUDESONIDE AND FORMOTEROL FUMARATE DIHYDRATE 160; 4.5 UG/1; UG/1
2 AEROSOL RESPIRATORY (INHALATION) 2 TIMES DAILY
Qty: 10.2 G | Refills: 3 | Status: SHIPPED | OUTPATIENT
Start: 2025-05-14

## 2025-05-19 NOTE — PROGRESS NOTES
uterine enlargement and follows with Dr. Bailon.   Musculoskeletal:  Negative for arthralgias, back pain, gait problem, joint swelling, myalgias, neck pain and neck stiffness.   Neurological:  Positive for headaches (Chronic and stable). Negative for dizziness, tremors, seizures, syncope, speech difficulty, weakness, light-headedness and numbness.   Psychiatric/Behavioral:  Negative for agitation and decreased concentration. The patient is not nervous/anxious.         Follows with counseling routinely for PTSD   Unless otherwise stated in this report the patient's positive and negative responses for review of systems for constitutional, eyes, ENT, cardiovascular, respiratory, gastrointestinal, neurological, , musculoskeletal, and integument systems and related systems to the presenting problem are either stated in the history of present illness or were not pertinent or were negative for the symptoms and/or complaints related to the presenting medical problem.  Positives and pertinent negatives as per HPI.  All others reviewed and are negative.      Objective:   Physical Exam  Vitals and nursing note reviewed.   Constitutional:       General: She is not in acute distress.     Appearance: She is well-developed. She is obese. She is not ill-appearing, toxic-appearing or diaphoretic.      Comments: Good performance status.  No apparent distress.     HENT:      Head: Normocephalic and atraumatic.      Mouth/Throat:      Pharynx: No oropharyngeal exudate.   Eyes:      General: No scleral icterus.        Right eye: No discharge.         Left eye: No discharge.      Conjunctiva/sclera: Conjunctivae normal.      Pupils: Pupils are equal, round, and reactive to light.   Neck:      Thyroid: No thyromegaly.      Vascular: No JVD.      Trachea: No tracheal deviation.   Cardiovascular:      Rate and Rhythm: Regular rhythm. Tachycardia present.      Heart sounds: Normal heart sounds. No murmur heard.     No friction rub. No

## 2025-06-10 ENCOUNTER — HOSPITAL ENCOUNTER (OUTPATIENT)
Dept: GENERAL RADIOLOGY | Age: 40
Discharge: HOME | End: 2025-06-12
Payer: COMMERCIAL

## 2025-06-10 ENCOUNTER — OFFICE VISIT (OUTPATIENT)
Age: 40
End: 2025-06-10
Payer: COMMERCIAL

## 2025-06-10 VITALS
OXYGEN SATURATION: 98 % | BODY MASS INDEX: 45.5 KG/M2 | WEIGHT: 241 LBS | TEMPERATURE: 97.7 F | DIASTOLIC BLOOD PRESSURE: 80 MMHG | RESPIRATION RATE: 22 BRPM | HEART RATE: 85 BPM | SYSTOLIC BLOOD PRESSURE: 126 MMHG | HEIGHT: 61 IN

## 2025-06-10 DIAGNOSIS — R92.30 DENSE BREAST: ICD-10-CM

## 2025-06-10 DIAGNOSIS — Z91.89 AT HIGH RISK FOR BREAST CANCER: ICD-10-CM

## 2025-06-10 DIAGNOSIS — E66.01 MORBID OBESITY WITH BMI OF 45.0-49.9, ADULT (HCC): ICD-10-CM

## 2025-06-10 DIAGNOSIS — Z12.31 VISIT FOR SCREENING MAMMOGRAM: ICD-10-CM

## 2025-06-10 DIAGNOSIS — Z80.3 FAMILY HISTORY OF BREAST CANCER: Primary | ICD-10-CM

## 2025-06-10 DIAGNOSIS — Z01.818 PREOP TESTING: ICD-10-CM

## 2025-06-10 PROCEDURE — 99213 OFFICE O/P EST LOW 20 MIN: CPT | Performed by: NURSE PRACTITIONER

## 2025-06-10 PROCEDURE — 3079F DIAST BP 80-89 MM HG: CPT | Performed by: NURSE PRACTITIONER

## 2025-06-10 PROCEDURE — 77063 BREAST TOMOSYNTHESIS BI: CPT

## 2025-06-10 PROCEDURE — G8417 CALC BMI ABV UP PARAM F/U: HCPCS | Performed by: NURSE PRACTITIONER

## 2025-06-10 PROCEDURE — 3074F SYST BP LT 130 MM HG: CPT | Performed by: NURSE PRACTITIONER

## 2025-06-10 PROCEDURE — 99214 OFFICE O/P EST MOD 30 MIN: CPT | Performed by: NURSE PRACTITIONER

## 2025-06-10 PROCEDURE — G8427 DOCREV CUR MEDS BY ELIG CLIN: HCPCS | Performed by: NURSE PRACTITIONER

## 2025-06-10 PROCEDURE — 1036F TOBACCO NON-USER: CPT | Performed by: NURSE PRACTITIONER

## 2025-06-10 NOTE — PATIENT INSTRUCTIONS
Office will call in October to discuss moving forward with breast MRI.     Referral to Surgical weight loss submitted and office will call to set up consult.     Any questions or concerns, please contact the office at [phone].

## 2025-06-25 ENCOUNTER — TELEPHONE (OUTPATIENT)
Age: 40
End: 2025-06-25

## 2025-07-08 ENCOUNTER — INITIAL CONSULT (OUTPATIENT)
Age: 40
End: 2025-07-08
Payer: COMMERCIAL

## 2025-07-08 VITALS
WEIGHT: 237 LBS | DIASTOLIC BLOOD PRESSURE: 89 MMHG | SYSTOLIC BLOOD PRESSURE: 167 MMHG | BODY MASS INDEX: 44.75 KG/M2 | HEART RATE: 78 BPM | HEIGHT: 61 IN | RESPIRATION RATE: 20 BRPM | TEMPERATURE: 97.9 F

## 2025-07-08 DIAGNOSIS — K21.9 GASTROESOPHAGEAL REFLUX DISEASE WITHOUT ESOPHAGITIS: Primary | ICD-10-CM

## 2025-07-08 DIAGNOSIS — Z01.812 PRE-OPERATIVE LABORATORY EXAMINATION: ICD-10-CM

## 2025-07-08 DIAGNOSIS — D64.9 ANEMIA, UNSPECIFIED TYPE: ICD-10-CM

## 2025-07-08 DIAGNOSIS — I10 PRIMARY HYPERTENSION: ICD-10-CM

## 2025-07-08 DIAGNOSIS — E66.01 MORBID OBESITY DUE TO EXCESS CALORIES (HCC): ICD-10-CM

## 2025-07-08 DIAGNOSIS — G47.30 SLEEP APNEA, UNSPECIFIED TYPE: ICD-10-CM

## 2025-07-08 DIAGNOSIS — K30 INDIGESTION: ICD-10-CM

## 2025-07-08 DIAGNOSIS — R73.9 HYPERGLYCEMIA: ICD-10-CM

## 2025-07-08 PROCEDURE — G8427 DOCREV CUR MEDS BY ELIG CLIN: HCPCS | Performed by: STUDENT IN AN ORGANIZED HEALTH CARE EDUCATION/TRAINING PROGRAM

## 2025-07-08 PROCEDURE — 99204 OFFICE O/P NEW MOD 45 MIN: CPT | Performed by: STUDENT IN AN ORGANIZED HEALTH CARE EDUCATION/TRAINING PROGRAM

## 2025-07-08 PROCEDURE — 1036F TOBACCO NON-USER: CPT | Performed by: STUDENT IN AN ORGANIZED HEALTH CARE EDUCATION/TRAINING PROGRAM

## 2025-07-08 PROCEDURE — 3077F SYST BP >= 140 MM HG: CPT | Performed by: STUDENT IN AN ORGANIZED HEALTH CARE EDUCATION/TRAINING PROGRAM

## 2025-07-08 PROCEDURE — 3079F DIAST BP 80-89 MM HG: CPT | Performed by: STUDENT IN AN ORGANIZED HEALTH CARE EDUCATION/TRAINING PROGRAM

## 2025-07-08 PROCEDURE — G8417 CALC BMI ABV UP PARAM F/U: HCPCS | Performed by: STUDENT IN AN ORGANIZED HEALTH CARE EDUCATION/TRAINING PROGRAM

## 2025-07-08 PROCEDURE — 99203 OFFICE O/P NEW LOW 30 MIN: CPT | Performed by: STUDENT IN AN ORGANIZED HEALTH CARE EDUCATION/TRAINING PROGRAM

## 2025-07-08 NOTE — PATIENT INSTRUCTIONS
What is the next step to proceed with weight loss surgery?    Please be aware that any co-pays or deductibles may be requested prior to testing and / or procedures.    You will need to schedule a psychological evaluation for weight loss surgery.  Patients will be required to complete all psychological testing as required by the mental health provider. Patients must also follow all of the provider's recommendations before weight loss surgery can be scheduled.     The evaluation must be done a standard way for weight loss surgery. We strongly recommend that you contact one of our preferred providers listed below to arrange this:      Dr. Janay Pedro, PhD , [facility]  [address], [place], [place]    [phone]      Dr. Patrick Don, PhD     [address] [place], [place]      [phone]    Dr. Odalis Wright, [facility]  [address], [place], [place]   [phone]    Amy Reyna, [facility] [address] , [address] [facility]  [phone]    Clifton Donaldson, [facility] [address], [place]    [phone]        You will also need to plan on attending a 2 hour nutrition class at [facility] prior to your surgery.  We will schedule this for you when we schedule your surgery.    Please remember to have your labs drawn 10 days prior to your first scheduled dietary appointment.    Please remember, that while we will submit your case to insurance for surgery authorization, it is your responsibility to know if your plan covers weight loss surgery and keep up-to-date with changes to your insurance coverage.  We will do everything possible to help you get approved for weight loss surgery, but cannot guarantee an approval.     Please note that you will not be submitted to your insurance company until all pre-operative testing requirements are met.    In order to promote healing and prevent gastric

## 2025-07-08 NOTE — PROGRESS NOTES
Bariatric Surgery  New Patient Consultation    CHIEF COMPLAINT: Morbid obesity, Hypertension, Hyperlipidemia, Asthma, Obstructive Sleep Apnea treated with BiPAP/CPAP, GERD, Degenerative Joint Disease (DJD), and Anxiety, Prediabetes, Family history of breast cancer    HPI:     I had a pleasure of seeing Carol Chaudhari in the office in consultation for bariatric surgery to resolve and treat morbid obesity and comorbid conditions. she has tried and been unsuccessful with multiple previous attempts at conservative weight loss programs.     Follows with Monica Cha NP at [facility]. High risk for breast cancer due to family history. Genetic testing negative so far. Weight increased after COVID and achilles injury. Has three children and after the third one could not get weight off. She works at a home health nurse.     We had a long discussion and thoroughly reviewed their past medical and surgical history. she has attended an informational seminar/webinar and was given a patient guide summarizing weight loss surgery options, risks and results. The surgical options discussed include minimally invasive robotic/laparoscopic Aliya-en Y gastric bypass, sleeve gastrectomy, and biliopancreatic diversion/duodenal switch/single anastomosis duodeno-ileal bypass with sleeve gastrectomy. Additionally, endoscopic sleeve gastroplasty is an option although studies do not demonstrate as much weight loss as surgical procedures. All questions and concerns were addressed in detail.  We reviewed the ACS MBSAQIP Risk/Benefit Calculator based on their age, gender, and comorbidities present.     AOMs: Metformin    Last Surgical Weight Loss:      7/8/2025     9:43 AM   [facility] Weight [facility] Tr[facility]   Date 7/8/2025   Visit Type  New Consult   Height 5' 0.5\"   Initial Weight 237 lb   Initial BMI 45.5   Ideal Body Weight 140 lb   Initial Excess Body Weight (EBW) 97 lb     Allergies   Allergen Reactions    Norco [Hydrocodone-Acetaminophen]

## 2025-07-09 DIAGNOSIS — Z01.818 PRE-OPERATIVE CLEARANCE: Primary | ICD-10-CM

## 2025-07-09 PROBLEM — K21.9 GASTROESOPHAGEAL REFLUX DISEASE: Status: ACTIVE | Noted: 2025-07-09

## 2025-07-15 ENCOUNTER — TELEPHONE (OUTPATIENT)
Age: 40
End: 2025-07-15

## 2025-07-15 NOTE — TELEPHONE ENCOUNTER
Pt cx her initial nutrition consult appt.  Blane Mcclellan called pt to reschedule. Pt was N/A.  Blane Mcclellan LM asking pt to call the [facility] at [phone].

## (undated) DEVICE — ANTISEPTIC 16OZ H PEROX 1ST AID ORAL DEBRIDING AGNT

## (undated) DEVICE — TOWEL,OR,DSP,ST,BLUE,STD,6/PK,12PK/CS: Brand: MEDLINE

## (undated) DEVICE — DRAPE,EXTREMITY,89X128,STERILE: Brand: MEDLINE

## (undated) DEVICE — BANDAGE,GAUZE,BULKEE II,4.5"X4.1YD,STRL: Brand: MEDLINE

## (undated) DEVICE — Z DISCONTINUED GLOVE SURG SZ 7.5 L12IN FNGR THK13MIL WHT ISOLEX

## (undated) DEVICE — GOWN,SIRUS,FABRNF,L,20/CS: Brand: MEDLINE

## (undated) DEVICE — SOCK SPEC L9IN WHT UNIV W/ STD PRT FOR FLD MGMT

## (undated) DEVICE — GAUZE,SPONGE,4"X4",16PLY,XRAY,STRL,LF: Brand: MEDLINE

## (undated) DEVICE — 4-PORT MANIFOLD: Brand: NEPTUNE 2

## (undated) DEVICE — MARKER,SKIN,WI/RULER AND LABELS: Brand: MEDLINE

## (undated) DEVICE — SET ORTHO STD STORTSTD1

## (undated) DEVICE — INTENDED FOR TISSUE SEPARATION, AND OTHER PROCEDURES THAT REQUIRE A SHARP SURGICAL BLADE TO PUNCTURE OR CUT.: Brand: BARD-PARKER ® STAINLESS STEEL BLADES

## (undated) DEVICE — 3M™ COBAN™ NL STERILE NON-LATEX SELF-ADHERENT WRAP, 2084S, 4 IN X 5 YD (10 CM X 4,5 M), 18 ROLLS/CASE: Brand: 3M™ COBAN™

## (undated) DEVICE — TRAY SET D

## (undated) DEVICE — TRAY,VAG PREP,2PR VNYL GLV,4 C: Brand: MEDLINE INDUSTRIES, INC.

## (undated) DEVICE — SYRINGE MED 30ML STD CLR PLAS LUERLOCK TIP N CTRL DISP

## (undated) DEVICE — PACK PROCEDURE SURG GEN CUST

## (undated) DEVICE — PAD,NON-ADHERENT,3X8,STERILE,LF,1/PK: Brand: MEDLINE

## (undated) DEVICE — PADDING,UNDERCAST,COTTON, 4"X4YD STERILE: Brand: MEDLINE

## (undated) DEVICE — BUR SURG L70MM HD L8MM DIA4MM STR SHANK 235MM 8 FLUT MIC

## (undated) DEVICE — SET PSI

## (undated) DEVICE — BAG PRESSURE INFUSION 3 W STOPCOCK 3000 CC PREMIERPRO DISP

## (undated) DEVICE — STERILE PVP: Brand: MEDLINE INDUSTRIES, INC.

## (undated) DEVICE — LEGGINGS, PAIR, 31X48, STERILE: Brand: MEDLINE

## (undated) DEVICE — GAUZE,SPONGE,4"X4",8PLY,STRL,LF,10/TRAY: Brand: MEDLINE

## (undated) DEVICE — SOLUTION IV IRRIG WATER 1000ML POUR BRL 2F7114

## (undated) DEVICE — PAD,SANITARY,11 IN,MAXI,N-STRL,IND WRAP: Brand: MEDLINE

## (undated) DEVICE — SYSTEM TPS ORTHO

## (undated) DEVICE — PAD,ABDOMINAL,5"X9",ST,LF,25/BX: Brand: MEDLINE INDUSTRIES, INC.

## (undated) DEVICE — STOCKINETTE,DOUBLE PLY,6X48,STERILE: Brand: MEDLINE

## (undated) DEVICE — SET FLD COLL CLR W/ BLT IN WARN SYS FOR HYSTSCP DOLPHIN

## (undated) DEVICE — DOUBLE BASIN SET: Brand: MEDLINE INDUSTRIES, INC.

## (undated) DEVICE — COVER,LIGHT HANDLE,FLX,2/PK: Brand: MEDLINE INDUSTRIES, INC.

## (undated) DEVICE — Y-TYPE TUR/BLADDER IRRIGATION SET, REGULATING CLAMP

## (undated) DEVICE — NEEDLE HYPO 22GA L1.5IN BLK POLYPR HUB S STL REG BVL STR

## (undated) DEVICE — Z DISCONTINUED NO SUB IDED KIT ENDOMET ABLAT IMPED CTRL DEV W/ RF CTRL FTSWCH SUCT LN

## (undated) DEVICE — INSTRUMENT SYSTEM 4 BATTERY REUSABLE

## (undated) DEVICE — SUTURE SUTTAPE L40IN DIA1.3MM NONABSORBABLE WHT BLU L26.5MM AR7500

## (undated) DEVICE — PADDING CAST W6INXL4YD COT LO LINTING WYTEX

## (undated) DEVICE — DRESSING PETRO W3XL8IN OIL EMUL N ADH GZ KNIT IMPREG CELOS

## (undated) DEVICE — DRILL MINI CORDLESS

## (undated) DEVICE — CHLORAPREP 26ML ORANGE

## (undated) DEVICE — BANDAGE COMPR W6INXL12FT SMOOTH FOR LIMB EXSANG ESMARCH

## (undated) DEVICE — SOLUTION IV IRRIG POUR BRL 0.9% SODIUM CHL 2F7124

## (undated) DEVICE — JELLY,LUBE,STERILE,FLIP TOP,TUBE,2-OZ: Brand: MEDLINE

## (undated) DEVICE — GLOVE SURG SZ 65 L12IN FNGR THK79MIL GRN LTX FREE

## (undated) DEVICE — TRAY DRILL SYSTEM 4 REUSABLE

## (undated) DEVICE — BNDG,ELSTC,MATRIX,STRL,4"X5YD,LF,HOOK&LP: Brand: MEDLINE

## (undated) DEVICE — DRAPE,REIN 53X77,STERILE: Brand: MEDLINE

## (undated) DEVICE — Z INACTIVE USE 2660664 SOLUTION IRRIG 3000ML 0.9% SOD CHL USP UROMATIC PLAS CONT

## (undated) DEVICE — COVER HNDL LT DISP

## (undated) DEVICE — GLOVE SURG SZ 6 THK91MIL LTX FREE SYN POLYISOPRENE ANTI